# Patient Record
Sex: MALE | Race: WHITE | Employment: OTHER | URBAN - METROPOLITAN AREA
[De-identification: names, ages, dates, MRNs, and addresses within clinical notes are randomized per-mention and may not be internally consistent; named-entity substitution may affect disease eponyms.]

---

## 2017-11-27 ENCOUNTER — HOSPITAL ENCOUNTER (OUTPATIENT)
Dept: CT IMAGING | Age: 82
Discharge: HOME | End: 2017-11-27
Attending: UROLOGY
Payer: MEDICARE

## 2017-11-27 ENCOUNTER — HOSPITAL ENCOUNTER (OUTPATIENT)
Dept: NUCLEAR MEDICINE | Age: 82
Discharge: HOME | End: 2017-11-27
Attending: UROLOGY
Payer: MEDICARE

## 2017-11-27 DIAGNOSIS — C61 PROSTATE CANCER (HCC): ICD-10-CM

## 2017-11-27 PROCEDURE — 71260 CT THORAX DX C+: CPT

## 2017-11-27 PROCEDURE — 78306 BONE IMAGING WHOLE BODY: CPT

## 2017-11-27 PROCEDURE — 74177 CT ABD & PELVIS W/CONTRAST: CPT

## 2017-11-27 PROCEDURE — 74011250636 HC RX REV CODE- 250/636: Performed by: UROLOGY

## 2017-11-27 PROCEDURE — 82565 ASSAY OF CREATININE: CPT

## 2017-11-27 PROCEDURE — 74011636320 HC RX REV CODE- 636/320: Performed by: UROLOGY

## 2017-11-27 PROCEDURE — 74011000255 HC RX REV CODE- 255: Performed by: UROLOGY

## 2017-11-27 RX ORDER — SODIUM CHLORIDE 9 MG/ML
50 INJECTION, SOLUTION INTRAVENOUS
Status: COMPLETED | OUTPATIENT
Start: 2017-11-27 | End: 2017-11-27

## 2017-11-27 RX ORDER — SODIUM CHLORIDE 0.9 % (FLUSH) 0.9 %
10 SYRINGE (ML) INJECTION
Status: COMPLETED | OUTPATIENT
Start: 2017-11-27 | End: 2017-11-27

## 2017-11-27 RX ORDER — BARIUM SULFATE 20 MG/ML
900 SUSPENSION ORAL
Status: COMPLETED | OUTPATIENT
Start: 2017-11-27 | End: 2017-11-27

## 2017-11-27 RX ADMIN — SODIUM CHLORIDE 50 ML/HR: 900 INJECTION, SOLUTION INTRAVENOUS at 09:28

## 2017-11-27 RX ADMIN — IOPAMIDOL 100 ML: 755 INJECTION, SOLUTION INTRAVENOUS at 09:28

## 2017-11-27 RX ADMIN — Medication 10 ML: at 09:28

## 2017-11-27 RX ADMIN — BARIUM SULFATE 900 ML: 21 SUSPENSION ORAL at 09:28

## 2017-11-28 ENCOUNTER — HOSPITAL ENCOUNTER (OUTPATIENT)
Dept: CT IMAGING | Age: 82
Discharge: HOME | End: 2017-11-28
Attending: UROLOGY
Payer: MEDICARE

## 2017-11-28 DIAGNOSIS — C61 PROSTATE CANCER (HCC): ICD-10-CM

## 2017-11-28 LAB — CREAT BLD-MCNC: 1 MG/DL (ref 0.6–1.3)

## 2017-11-28 PROCEDURE — 82565 ASSAY OF CREATININE: CPT

## 2017-11-28 PROCEDURE — 74011636320 HC RX REV CODE- 636/320: Performed by: UROLOGY

## 2017-11-28 PROCEDURE — 74011250636 HC RX REV CODE- 250/636: Performed by: UROLOGY

## 2017-11-28 PROCEDURE — 71260 CT THORAX DX C+: CPT

## 2017-11-28 RX ORDER — SODIUM CHLORIDE 0.9 % (FLUSH) 0.9 %
10 SYRINGE (ML) INJECTION
Status: COMPLETED | OUTPATIENT
Start: 2017-11-28 | End: 2017-11-28

## 2017-11-28 RX ORDER — SODIUM CHLORIDE 9 MG/ML
50 INJECTION, SOLUTION INTRAVENOUS
Status: COMPLETED | OUTPATIENT
Start: 2017-11-28 | End: 2017-11-28

## 2017-11-28 RX ADMIN — SODIUM CHLORIDE 50 ML/HR: 900 INJECTION, SOLUTION INTRAVENOUS at 13:37

## 2017-11-28 RX ADMIN — Medication 10 ML: at 13:37

## 2017-11-28 RX ADMIN — IOPAMIDOL 100 ML: 612 INJECTION, SOLUTION INTRAVENOUS at 13:37

## 2018-05-30 ENCOUNTER — HOSPITAL ENCOUNTER (OUTPATIENT)
Dept: NUCLEAR MEDICINE | Age: 83
Discharge: HOME | End: 2018-05-30
Attending: UROLOGY
Payer: MEDICARE

## 2018-05-30 DIAGNOSIS — C61 MALIGNANT NEOPLASM OF PROSTATE (HCC): ICD-10-CM

## 2018-05-30 PROCEDURE — 78306 BONE IMAGING WHOLE BODY: CPT

## 2018-10-07 ENCOUNTER — HOSPITAL ENCOUNTER (INPATIENT)
Age: 83
LOS: 2 days | Discharge: HOME | DRG: 552 | End: 2018-10-09
Attending: EMERGENCY MEDICINE | Admitting: HOSPITALIST
Payer: MEDICARE

## 2018-10-07 ENCOUNTER — APPOINTMENT (OUTPATIENT)
Dept: CT IMAGING | Age: 83
DRG: 552 | End: 2018-10-07
Attending: EMERGENCY MEDICINE
Payer: MEDICARE

## 2018-10-07 ENCOUNTER — APPOINTMENT (OUTPATIENT)
Dept: GENERAL RADIOLOGY | Age: 83
DRG: 552 | End: 2018-10-07
Attending: EMERGENCY MEDICINE
Payer: MEDICARE

## 2018-10-07 ENCOUNTER — APPOINTMENT (OUTPATIENT)
Dept: MRI IMAGING | Age: 83
DRG: 552 | End: 2018-10-07
Attending: EMERGENCY MEDICINE
Payer: MEDICARE

## 2018-10-07 DIAGNOSIS — R29.898 LEFT LEG WEAKNESS: Primary | ICD-10-CM

## 2018-10-07 DIAGNOSIS — M54.40 ACUTE LEFT-SIDED LOW BACK PAIN WITH SCIATICA, SCIATICA LATERALITY UNSPECIFIED: ICD-10-CM

## 2018-10-07 PROBLEM — M54.9 ACUTE BACK PAIN: Status: ACTIVE | Noted: 2018-10-07

## 2018-10-07 LAB
ANION GAP BLD CALC-SCNC: 13 MMOL/L (ref 10–20)
BUN BLD-MCNC: 19 MG/DL (ref 9–20)
CA-I BLD-MCNC: 1.12 MMOL/L (ref 1.12–1.32)
CHLORIDE BLD-SCNC: 101 MMOL/L (ref 98–107)
CO2 BLD-SCNC: 31 MMOL/L (ref 21–32)
CREAT BLD-MCNC: 0.9 MG/DL (ref 0.6–1.3)
GLUCOSE BLD-MCNC: 92 MG/DL (ref 65–100)
HCT VFR BLD CALC: 37 % (ref 36.6–50.3)
POTASSIUM BLD-SCNC: 4 MMOL/L (ref 3.5–5.1)
SERVICE CMNT-IMP: NORMAL
SODIUM BLD-SCNC: 139 MMOL/L (ref 136–145)

## 2018-10-07 PROCEDURE — 74011636637 HC RX REV CODE- 636/637: Performed by: HOSPITALIST

## 2018-10-07 PROCEDURE — 72100 X-RAY EXAM L-S SPINE 2/3 VWS: CPT

## 2018-10-07 PROCEDURE — 99284 EMERGENCY DEPT VISIT MOD MDM: CPT

## 2018-10-07 PROCEDURE — 72158 MRI LUMBAR SPINE W/O & W/DYE: CPT

## 2018-10-07 PROCEDURE — 96374 THER/PROPH/DIAG INJ IV PUSH: CPT

## 2018-10-07 PROCEDURE — 74011250636 HC RX REV CODE- 250/636: Performed by: EMERGENCY MEDICINE

## 2018-10-07 PROCEDURE — 74177 CT ABD & PELVIS W/CONTRAST: CPT

## 2018-10-07 PROCEDURE — 74011250636 HC RX REV CODE- 250/636: Performed by: HOSPITALIST

## 2018-10-07 PROCEDURE — 65270000029 HC RM PRIVATE

## 2018-10-07 PROCEDURE — 74011000250 HC RX REV CODE- 250: Performed by: HOSPITALIST

## 2018-10-07 PROCEDURE — 72220 X-RAY EXAM SACRUM TAILBONE: CPT

## 2018-10-07 PROCEDURE — 80047 BASIC METABLC PNL IONIZED CA: CPT

## 2018-10-07 PROCEDURE — 74011000258 HC RX REV CODE- 258: Performed by: EMERGENCY MEDICINE

## 2018-10-07 PROCEDURE — 74011636320 HC RX REV CODE- 636/320: Performed by: EMERGENCY MEDICINE

## 2018-10-07 PROCEDURE — 74011250637 HC RX REV CODE- 250/637: Performed by: HOSPITALIST

## 2018-10-07 PROCEDURE — A9575 INJ GADOTERATE MEGLUMI 0.1ML: HCPCS | Performed by: EMERGENCY MEDICINE

## 2018-10-07 RX ORDER — PREDNISONE 5 MG/1
5 TABLET ORAL 2 TIMES DAILY WITH MEALS
Status: DISCONTINUED | OUTPATIENT
Start: 2018-10-07 | End: 2018-10-09 | Stop reason: HOSPADM

## 2018-10-07 RX ORDER — OXYCODONE AND ACETAMINOPHEN 5; 325 MG/1; MG/1
1 TABLET ORAL
Status: DISCONTINUED | OUTPATIENT
Start: 2018-10-07 | End: 2018-10-07

## 2018-10-07 RX ORDER — SODIUM CHLORIDE 0.9 % (FLUSH) 0.9 %
10 SYRINGE (ML) INJECTION
Status: COMPLETED | OUTPATIENT
Start: 2018-10-07 | End: 2018-10-07

## 2018-10-07 RX ORDER — PREDNISONE 5 MG/1
5 TABLET ORAL 2 TIMES DAILY
COMMUNITY

## 2018-10-07 RX ORDER — KETOROLAC TROMETHAMINE 30 MG/ML
15 INJECTION, SOLUTION INTRAMUSCULAR; INTRAVENOUS
Status: COMPLETED | OUTPATIENT
Start: 2018-10-07 | End: 2018-10-07

## 2018-10-07 RX ORDER — GADOTERATE MEGLUMINE 376.9 MG/ML
17 INJECTION INTRAVENOUS
Status: COMPLETED | OUTPATIENT
Start: 2018-10-07 | End: 2018-10-07

## 2018-10-07 RX ORDER — NALOXONE HYDROCHLORIDE 0.4 MG/ML
0.4 INJECTION, SOLUTION INTRAMUSCULAR; INTRAVENOUS; SUBCUTANEOUS AS NEEDED
Status: DISCONTINUED | OUTPATIENT
Start: 2018-10-07 | End: 2018-10-09 | Stop reason: HOSPADM

## 2018-10-07 RX ORDER — TAMSULOSIN HYDROCHLORIDE 0.4 MG/1
0.4 CAPSULE ORAL EVERY EVENING
COMMUNITY

## 2018-10-07 RX ORDER — ABIRATERONE ACETATE 250 MG/1
1000 TABLET ORAL DAILY
COMMUNITY

## 2018-10-07 RX ORDER — FENTANYL CITRATE 50 UG/ML
25 INJECTION, SOLUTION INTRAMUSCULAR; INTRAVENOUS
Status: DISCONTINUED | OUTPATIENT
Start: 2018-10-07 | End: 2018-10-09 | Stop reason: HOSPADM

## 2018-10-07 RX ORDER — SODIUM CHLORIDE 0.9 % (FLUSH) 0.9 %
5-10 SYRINGE (ML) INJECTION EVERY 8 HOURS
Status: DISCONTINUED | OUTPATIENT
Start: 2018-10-07 | End: 2018-10-09 | Stop reason: HOSPADM

## 2018-10-07 RX ORDER — DIPHENHYDRAMINE HCL 25 MG
25 CAPSULE ORAL
Status: DISCONTINUED | OUTPATIENT
Start: 2018-10-07 | End: 2018-10-09 | Stop reason: HOSPADM

## 2018-10-07 RX ORDER — OXYCODONE AND ACETAMINOPHEN 5; 325 MG/1; MG/1
1 TABLET ORAL
Status: DISCONTINUED | OUTPATIENT
Start: 2018-10-07 | End: 2018-10-09 | Stop reason: HOSPADM

## 2018-10-07 RX ORDER — AMOXICILLIN 250 MG
1 CAPSULE ORAL DAILY
Status: DISCONTINUED | OUTPATIENT
Start: 2018-10-08 | End: 2018-10-09 | Stop reason: HOSPADM

## 2018-10-07 RX ORDER — TRAMADOL HYDROCHLORIDE 50 MG/1
50 TABLET ORAL
Status: DISCONTINUED | OUTPATIENT
Start: 2018-10-07 | End: 2018-10-09 | Stop reason: HOSPADM

## 2018-10-07 RX ORDER — KETOROLAC TROMETHAMINE 30 MG/ML
15 INJECTION, SOLUTION INTRAMUSCULAR; INTRAVENOUS
Status: DISCONTINUED | OUTPATIENT
Start: 2018-10-07 | End: 2018-10-09 | Stop reason: HOSPADM

## 2018-10-07 RX ORDER — FAMOTIDINE 20 MG/1
20 TABLET, FILM COATED ORAL 2 TIMES DAILY
Status: DISCONTINUED | OUTPATIENT
Start: 2018-10-07 | End: 2018-10-09 | Stop reason: HOSPADM

## 2018-10-07 RX ORDER — SODIUM CHLORIDE 0.9 % (FLUSH) 0.9 %
5-10 SYRINGE (ML) INJECTION AS NEEDED
Status: DISCONTINUED | OUTPATIENT
Start: 2018-10-07 | End: 2018-10-09 | Stop reason: HOSPADM

## 2018-10-07 RX ORDER — ONDANSETRON 2 MG/ML
4 INJECTION INTRAMUSCULAR; INTRAVENOUS
Status: DISCONTINUED | OUTPATIENT
Start: 2018-10-07 | End: 2018-10-09 | Stop reason: HOSPADM

## 2018-10-07 RX ORDER — LIDOCAINE 50 MG/G
1 PATCH TOPICAL EVERY 24 HOURS
Status: DISCONTINUED | OUTPATIENT
Start: 2018-10-07 | End: 2018-10-09 | Stop reason: HOSPADM

## 2018-10-07 RX ORDER — ENOXAPARIN SODIUM 100 MG/ML
40 INJECTION SUBCUTANEOUS EVERY 24 HOURS
Status: DISCONTINUED | OUTPATIENT
Start: 2018-10-07 | End: 2018-10-08

## 2018-10-07 RX ORDER — TAMSULOSIN HYDROCHLORIDE 0.4 MG/1
0.8 CAPSULE ORAL
Status: DISCONTINUED | OUTPATIENT
Start: 2018-10-07 | End: 2018-10-09 | Stop reason: HOSPADM

## 2018-10-07 RX ORDER — CYCLOBENZAPRINE HCL 10 MG
5 TABLET ORAL 3 TIMES DAILY
Status: DISCONTINUED | OUTPATIENT
Start: 2018-10-07 | End: 2018-10-09 | Stop reason: HOSPADM

## 2018-10-07 RX ADMIN — Medication 10 ML: at 12:02

## 2018-10-07 RX ADMIN — GADOTERATE MEGLUMINE 17 ML: 376.9 INJECTION INTRAVENOUS at 12:02

## 2018-10-07 RX ADMIN — CYCLOBENZAPRINE HYDROCHLORIDE 5 MG: 10 TABLET, FILM COATED ORAL at 17:56

## 2018-10-07 RX ADMIN — CYCLOBENZAPRINE HYDROCHLORIDE 5 MG: 10 TABLET, FILM COATED ORAL at 21:17

## 2018-10-07 RX ADMIN — TAMSULOSIN HYDROCHLORIDE 0.8 MG: 0.4 CAPSULE ORAL at 21:17

## 2018-10-07 RX ADMIN — IOPAMIDOL 100 ML: 755 INJECTION, SOLUTION INTRAVENOUS at 16:06

## 2018-10-07 RX ADMIN — PREDNISONE 5 MG: 5 TABLET ORAL at 17:55

## 2018-10-07 RX ADMIN — Medication 10 ML: at 17:57

## 2018-10-07 RX ADMIN — OXYCODONE HYDROCHLORIDE AND ACETAMINOPHEN 1 TABLET: 5; 325 TABLET ORAL at 15:07

## 2018-10-07 RX ADMIN — Medication 5 ML: at 22:00

## 2018-10-07 RX ADMIN — Medication 10 ML: at 16:06

## 2018-10-07 RX ADMIN — SODIUM CHLORIDE 100 ML: 900 INJECTION, SOLUTION INTRAVENOUS at 16:06

## 2018-10-07 RX ADMIN — KETOROLAC TROMETHAMINE 15 MG: 30 INJECTION, SOLUTION INTRAMUSCULAR at 13:29

## 2018-10-07 NOTE — IP AVS SNAPSHOT
Summary of Care Report The Summary of Care report has been created to help improve care coordination. Users with access to [employer] or [address] (Web-based application) may access additional patient information including the Discharge Summary. If you are not currently a [address] user and need more information, please call the number listed below in the [place] [address] section and ask to be connected with Medical Records. Facility Information Name Address Phone [address] [phone] Patient Information Patient Name Sex  Hernandez Joshi ([id number]) Male 10/13/1933 Discharge Information Admitting Provider Service Area Unit Jessee Domínguez MD / [facility] / [phone] Discharge Provider Discharge Date/Time Discharge Disposition Destination (none) 10/9/2018 (Pending) AHR (none) Patient Language Language ENGLISH [13] Hospital Problems as of 10/9/2018  Reviewed: 10/9/2018  7:49 AM by Hiren Garcia NP Class Noted - Resolved Last Modified POA Active Problems * (Principal)Acute back pain  10/7/2018 - Present 10/7/2018 by Jessee Domínguez MD Yes Entered by Jessee Domínguez MD  
  
Non-Hospital Problems as of 10/9/2018  Reviewed: 10/9/2018  7:49 AM by Hiren Garcia NP None You are allergic to the following No active allergies Current Discharge Medication List  
  
START taking these medications Dose & Instructions Dispensing Information Comments  
 cyclobenzaprine 10 mg tablet Commonly known as:  FLEXERIL Dose:  5 mg Take 0.5 Tabs by mouth three (3) times daily. Quantity:  90 Tab Refills:  0  
   
 oxyCODONE-acetaminophen 5-325 mg per tablet Commonly known as:  PERCOCET Dose:  1 Tab Take 1 Tab by mouth every four (4) hours as needed. Max Daily Amount: 6 Tabs. Quantity:  8 Tab Refills:  0  
   
 senna-docusate 8.6-50 mg per tablet Commonly known as:  Perales Service Dose:  1 Tab Take 1 Tab by mouth daily as needed for Constipation. Quantity:  30 Tab Refills:  0 CONTINUE these medications which have NOT CHANGED Dose & Instructions Dispensing Information Comments CITRACAL + BONE DENSITY 300-200-13.5 mg-unit-mg Tab Generic drug:  Calcium Cmb 2-D3-Min Cmb34-Gen Dose:  1 Tab Take 1 Tab by mouth two (2) times daily (with meals). Refills:  0  
   
 predniSONE 5 mg tablet Commonly known as:  Lenon Rivas Dose:  5 mg Take 5 mg by mouth two (2) times a day. Refills:  0  
   
 tamsulosin 0.4 mg capsule Commonly known as:  FLOMAX Dose:  0.4 mg Take 0.4 mg by mouth daily. Refills:  0  
   
 XGEVA Soln injection Generic drug:  denosumab Dose:  120 mg  
120 mg by SubCUTAneous route once. Refills:  0  
   
 ZYTIGA 250 mg Tab Generic drug:  abiraterone Dose:  1000 mg Take 1,000 mg by mouth daily. Refills:  0 Current Immunizations Name Date Influenza Vaccine (Quad) PF 10/9/2018 Follow-up Information Follow up With Details Comments Contact Info None   None (395) Patient stated that they have no PCP Discharge Instructions Steroid Injection Discharge Instructions General Information: A steroid injection was performed today, placing a combination of a steroid and an anesthetic (numbing medicine) into the space around the nerves of your spine. This is done to treat back pain. It may take 7-10 days for the injection to reach its full potential.  This procedure can be done at any level of the spinal column, depending on where your pain is. Your doctor will have ordered the appropriate level to be treated prior to your coming in for the procedure. Home Care Instructions: You can resume your regular diet. Do not drink alcohol today. You may notice that you have to use your pain medications less after your injection. Some people do not notice much of a change in their pain after the first injection. If that is the case, it is worth your time to have a second one done. This is why these injections are sometimes ordered in a series of three. Keep the puncture site clean and dry for 24 hours, and then you may remove the dressing. Showering is acceptable after the bandage is removed. Call If: 
 You should call your Physician and/or the Radiology Nurse if you have any bleeding other than a small spot on your bandage. Call if you have any signs of infection, fever, increased pain at the puncture site, confusion, or a headache that worsens when you stand and eases when lying flat. Follow-Up Instructions:  Please see your ordering doctor as he/she has requested. Let your doctor know if you have relief from your pain so they may schedule another injection for you if it is indicated. To Reach Us:   
 
 Should you experience any of these significant changes, please call [phone] between the hours of 7:30 am and 10 pm or [phone] after hours. After hours, ask the  to page the [address] Technologist, and describe the problem to the technologist.  
 
  
 
 
Patient Signature: 
Date: 10/9/2018 Discharging Nurse: Tommie Sousa RN Chart Review Routing History No Routing History on File

## 2018-10-07 NOTE — IP AVS SNAPSHOT
[address] 
[phone] Patient: Collette Bayard MRN: [id number] :10/13/1933 About your hospitalization You were admitted on:  2018 You last received care in the:  [address] [place] [address] You were discharged on:  2018 Why you were hospitalized Your primary diagnosis was:  Acute Back Pain Follow-up Information Follow up With Details Comments Contact Info None   None (395) Patient stated that they have no PCP Discharge Orders None A check rose marie indicates which time of day the medication should be taken. My Medications START taking these medications Instructions Each Dose to Equal  
 Morning Noon Evening Bedtime  
 cyclobenzaprine 10 mg tablet Commonly known as:  FLEXERIL Your last dose was: Your next dose is: Take 0.5 Tabs by mouth three (3) times daily. 5 mg  
    
   
   
   
  
 oxyCODONE-acetaminophen 5-325 mg per tablet Commonly known as:  PERCOCET Your last dose was: Your next dose is: Take 1 Tab by mouth every four (4) hours as needed. Max Daily Amount: 6 Tabs. 1 Tab  
    
   
   
   
  
 senna-docusate 8.6-50 mg per tablet Commonly known as:  Xiang Padilla Your last dose was: Your next dose is: Take 1 Tab by mouth daily as needed for Constipation. 1 Tab CONTINUE taking these medications Instructions Each Dose to Equal  
 Morning Noon Evening Bedtime CITRACAL + BONE DENSITY 300-200-13.5 mg-unit-mg Tab Generic drug:  Calcium Cmb 2-D3-Min Cmb34-Gen Your last dose was: Your next dose is: Take 1 Tab by mouth two (2) times daily (with meals). 1 Tab  
    
   
   
   
  
 predniSONE 5 mg tablet Commonly known as:  Tigre Pimentels Your last dose was: Your next dose is: Take 5 mg by mouth two (2) times a day. 5 mg  
    
   
   
   
  
 tamsulosin 0.4 mg capsule Commonly known as:  FLOMAX Your last dose was: Your next dose is: Take 0.4 mg by mouth daily. 0.4 mg  
    
   
   
   
  
 XGEVA Soln injection Generic drug:  denosumab Your last dose was: Your next dose is:    
   
   
 120 mg by SubCUTAneous route once. 120 mg  
    
   
   
   
  
 ZYTIGA 250 mg Tab Generic drug:  abiraterone Your last dose was: Your next dose is: Take 1,000 mg by mouth daily. 1000 mg Where to Get Your Medications Information on where to get these meds will be given to you by the nurse or doctor. ! Ask your nurse or doctor about these medications  
  cyclobenzaprine 10 mg tablet  
 oxyCODONE-acetaminophen 5-325 mg per tablet  
 senna-docusate 8.6-50 mg per tablet Opioid Education Prescription Opioids: What You Need to Know: 
 
Prescription opioids can be used to help relieve moderate-to-severe pain and are often prescribed following a surgery or injury, or for certain health conditions. These medications can be an important part of treatment but also come with serious risks. Opioids are strong pain medicines. Examples include hydrocodone, oxycodone, fentanyl, and morphine. Heroin is an example of an illegal opioid. It is important to work with your health care provider to make sure you are getting the safest, most effective care. WHAT ARE THE RISKS AND SIDE EFFECTS OF OPIOID USE? Prescription opioids carry serious risks of addiction and overdose, especially with prolonged use. An opioid overdose, often marked by slow breathing, can cause sudden death. The use of prescription opioids can have a number of side effects as well, even when taken as directed. · Tolerance-meaning you might need to take more of a medication for the same pain relief · Physical dependence-meaning you have symptoms of withdrawal when the medication is stopped. Withdrawal symptoms can include nausea, sweating, chills, diarrhea, stomach cramps, and muscle aches. Withdrawal can last up to several weeks, depending on which drug you took and how long you took it. · Increased sensitivity to pain · Constipation · Nausea, vomiting, and dry mouth · Sleepiness and dizziness · Confusion · Depression · Low levels of testosterone that can result in lower sex drive, energy, and strength · Itching and sweating RISKS ARE GREATER WITH:      
· History of drug misuse, substance use disorder, or overdose · Mental health conditions (such as depression or anxiety) · Sleep apnea · Older age (72 years or older) · Pregnancy Avoid alcohol while taking prescription opioids. Also, unless specifically advised by your health care provider, medications to avoid include: · Benzodiazepines (such as Xanax or Valium) · Muscle relaxants (such as Soma or Flexeril) · Hypnotics (such as Ambien or Lunesta) · Other prescription opioids KNOW YOUR OPTIONS Talk to your health care provider about ways to manage your pain that don't involve prescription opioids. Some of these options may actually work better and have fewer risks and side effects. Consult your physician before adding or stopping any medications, treatments, or physical activity. Options may include: 
· Pain relievers such as acetaminophen, ibuprofen, and naproxen · Some medications that are also used for depression or seizures · Physical therapy and exercise · Counseling to help patients learn how to cope better with triggers of pain and stress. · Application of heat or cold compress · Massage therapy · Relaxation techniques Be Informed Make sure you know the name of your medication, how much and how often to take it, and its potential risks & side effects.  
 
IF YOU ARE PRESCRIBED OPIOIDS FOR PAIN: 
 · Never take opioids in greater amounts or more often than prescribed. Remember the goal is not to be pain-free but to manage your pain at a tolerable level. · Follow up with your primary care provider to: · Work together to create a plan on how to manage your pain. · Talk about ways to help manage your pain that don't involve prescription opioids. · Talk about any and all concerns and side effects. · Help prevent misuse and abuse. · Never sell or share prescription opioids · Help prevent misuse and abuse. · Store prescription opioids in a secure place and out of reach of others (this may include visitors, children, friends, and family). · Safely dispose of unused/unwanted prescription opioids: Find your community drug take-back program or your pharmacy mail-back program, or flush them down the toilet, following guidance from the Food and Drug Administration (www.fda.gov/Drugs/ResourcesForYou). · Visit www.cdc.gov/drugoverdose to learn about the risks of opioid abuse and overdose. · If you believe you may be struggling with addiction, tell your health care provider and ask for guidance or call [employer] at 0-440-914-PCOO. Discharge Instructions Steroid Injection Discharge Instructions General Information: A steroid injection was performed today, placing a combination of a steroid and an anesthetic (numbing medicine) into the space around the nerves of your spine. This is done to treat back pain. It may take 7-10 days for the injection to reach its full potential.  This procedure can be done at any level of the spinal column, depending on where your pain is. Your doctor will have ordered the appropriate level to be treated prior to your coming in for the procedure. Home Care Instructions: You can resume your regular diet. Do not drink alcohol today.  You may notice that you have to use your pain medications less after your injection. Some people do not notice much of a change in their pain after the first injection. If that is the case, it is worth your time to have a second one done. This is why these injections are sometimes ordered in a series of three. Keep the puncture site clean and dry for 24 hours, and then you may remove the dressing. Showering is acceptable after the bandage is removed. Call If: 
 You should call your Physician and/or the Radiology Nurse if you have any bleeding other than a small spot on your bandage. Call if you have any signs of infection, fever, increased pain at the puncture site, confusion, or a headache that worsens when you stand and eases when lying flat. Follow-Up Instructions:  Please see your ordering doctor as he/she has requested. Let your doctor know if you have relief from your pain so they may schedule another injection for you if it is indicated. To Reach Us:   
 
 Should you experience any of these significant changes, please call [phone] between the hours of 7:30 am and 10 pm or [phone] after hours. After hours, ask the  to page the [address] Technologist, and describe the problem to the technologist.  
 
  
 
 
Patient Signature: 
Date: 10/9/2018 Discharging Nurse: Priscilla Fonseca RN  
 
 
  
  
  
[facility] Announcement We are excited to announce that we are making your provider's discharge notes available to you in [employer]. You will see these notes when they are completed and signed by the physician that discharged you from your recent hospital stay. If you have any questions or concerns about any information you see in [employer], please call the Health Information Department where you were seen or reach out to your Primary Care Provider for more information about your plan of care. Introducing [facility]!    
 [facility] introduces [employer] patient portal. Now you can access parts of your medical record, email your doctor's office, and request medication refills online. 1. In your internet browser, go to [email or web address] 2. Click on the First Time User? Click Here link in the Sign In box. You will see the New Member Sign Up page. 3. Enter your [employer] Access Code exactly as it appears below. You will not need to use this code after youve completed the sign-up process. If you do not sign up before the expiration date, you must request a new code. · [employer] Access Code: [email or web address] Expires: 1/5/2019  8:09 AM 
 
4. Enter the last four digits of your Social Security Number (xxxx) and Date of Birth (mm/dd/yyyy) as indicated and click Submit. You will be taken to the next sign-up page. 5. Create a [employer] ID. This will be your [employer] login ID and cannot be changed, so think of one that is secure and easy to remember. 6. Create a [employer] password. You can change your password at any time. 7. Enter your Password Reset Question and Answer. This can be used at a later time if you forget your password. 8. Enter your e-mail address. You will receive e-mail notification when new information is available in [address] 9. Click Sign Up. You can now view and download portions of your medical record. 10. Click the Download Summary menu link to download a portable copy of your medical information. If you have questions, please visit the Frequently Asked Questions section of the [employer] website. Remember, [employer] is NOT to be used for urgent needs. For medical emergencies, dial 911. Now available from your iPhone and Android! Introducing [employer] As a [employer] [facility] patient, I wanted to make you aware of our electronic visit tool called [employer] [place]. [facility] [address] [facility]/7 allows you to connect within minutes with a medical provider 24 hours a day, seven days a week via a mobile device or tablet or logging into a secure website from your computer. You can access [employer] from anywhere in the [place]. A virtual visit might be right for you when you have a simple condition and feel like you just dont want to get out of bed, or cant get away from work for an appointment, when your regular [facility] provider is not available (evenings, weekends or holidays), or when youre out of town and need minor care. Electronic visits cost only $49 and if the [employer] provider determines a prescription is needed to treat your condition, one can be electronically transmitted to a nearby pharmacy*. Please take a moment to enroll today if you have not already done so. The enrollment process is free and takes just a few minutes. To enroll, please download the [employer] jayna to your tablet or phone, or visit [email or web address] to enroll on your computer. And, as an [address] [place] [address] patient with a [employer] account, the results of your visits will be scanned into your electronic medical record and your primary care provider will be able to view the scanned results. We urge you to continue to see your regular [facility] provider for your ongoing medical care. And while your primary care provider may not be the one available when you seek a [employer] virtual visit, the peace of mind you get from getting a real diagnosis real time can be priceless. For more information on [employer], view our Frequently Asked Questions (FAQs) at [email or web address]. Sincerely, 
 
Twanna Lefort, MD 
Chief Medical Officer Jimmy Jacinto *:  certain medications cannot be prescribed via [employer] Unresulted Labs-Please follow up with your PCP about these lab tests Order Current Status CULTURE, URINE Preliminary result Providers Seen During Your Hospitalization Provider Specialty Primary office phone Anand Lopez MD Emergency Medicine [phone] Rudi Abel MD Internal Medicine [phone] Waleska Denny MD Family Practice [phone] Hui Brooks MD Internal Medicine [phone] Immunizations Administered for This Admission Name Date Influenza Vaccine (Quad) PF 10/9/2018 Your Primary Care Physician (PCP) Primary Care Physician Office Phone Office Fax NONE ** None ** ** None ** You are allergic to the following No active allergies Recent Documentation Height Weight BMI  
  
  
 1.905 m 79.4 kg 21.87 kg/m2 Emergency Contacts Name Discharge Info Relation Home Work Mobile OsvaldoSamira TRONCOSO DISCHARGE CAREGIVER [3] Spouse [3] [phone] Patient Belongings The following personal items are in your possession at time of discharge: 
  Dental Appliances: None         Home Medications: None   Jewelry: None  Clothing: Shirt, Pants, Footwear    Other Valuables: Cell Phone Please provide this summary of care documentation to your next provider. Signatures-by signing, you are acknowledging that this After Visit Summary has been reviewed with you and you have received a copy. Patient Signature:  ____________________________________________________________ Date:  ____________________________________________________________  
  
Latoya Zhao Provider Signature:  ____________________________________________________________ Date:  ____________________________________________________________

## 2018-10-07 NOTE — H&P
History & Physical 
 
Primary Care Provider: None Source of Information: Patient History of Presenting Illness:  
Mirian Paige is a 80 y.o. male who presents with acute back pain and left leg pain Seth Flusumit. Jean Florentino is an 80 y.o. male  With history of metastatic prostate cancer came to ED with a c/o left sided back pain, left leg/knee pain s/p GLF today. Pt currently rates his pain at 8/10 in severity and states there his sx are exacerbated with movement. Pt reports that he began having mild back pain on Thursday which worsened through the night and into Friday morning. He states that he tried taking Advil, using hot/cold compresses with no relief of pain. Pt states that this morning, at around 0500am,  he got up to use the bathroom and his left leg \"gave out on him\" causing him to fall and land on his buttocks. He denies any LOC/head injury. Pt reports that since falling, his pain is radiating from his left lower back, down his leg and into his left knee. Pt specifically denies chest pain, shortness of breath, n/v,d , fever, chills, numbness, tingling, abdominal pain, HA, weakness, cough, leg swelling, dizziness or any other acute sx. Pt denies any recent travel, known sick contacts, or recent illness. Review of Systems: 
General: no weight changes, not sleeping last 2 days due to pain, no changes of appetitie HEENT: no headache, no vision changes, no nose discharge, no hearing changes RES: no wheezing, no cough, no sob CVS: no cp, no palpitation. Muscular: HPI, no leg swelling,  
Skin: no rash, no itching GI: no vomiting, no diarrhea : no dysuria, no hematuria Hemo: no gum bleeding, no petechial  
Neuro: no sensation changes, no focal weakness Endo: no polydipsia Psych: denied depression No past medical history on file. No past surgical history on file. Prior to Admission medications Medication Sig Start Date End Date Taking? Authorizing Provider  
abiraterone (ZYTIGA) 250 mg tab Take 1,000 mg by mouth daily. Yes Historical Provider  
predniSONE (DELTASONE) 5 mg tablet Take 5 mg by mouth two (2) times a day. Yes Historical Provider Calcium Cmb 2-D3-Min Cmb34-Gen (CITRACAL + BONE DENSITY) 300-200-13.5 mg-unit-mg tab Take 1 Tab by mouth two (2) times daily (with meals). Yes Historical Provider  
tamsulosin (FLOMAX) 0.4 mg capsule Take 0.4 mg by mouth daily. Yes Historical Provider  
denosumab (XGEVA) soln injection 120 mg by SubCUTAneous route once. Yes Historical Provider No Known Allergies No family history on file. SOCIAL HISTORY: 
Patient resides: 
[facility]ependently [facility] Smoking history:  
None x Former Chronic Alcohol history:  
None x Social   
Chronic Ambulates: was independently prior Independently   
w/cane   
w/walker   
w/wc CODE STATUS: 
DNR Full x Other Objective:  
 
Physical Exam:  
 
Visit Vitals  /80 (BP 1 Location: Right arm, BP Patient Position: At rest)  Pulse 86  Temp 97.6 °F (36.4 °C)  Resp 16  
 Ht 6' 3\" (1.905 m)  Wt 79.4 kg (175 lb)  SpO2 95%  BMI 21.87 kg/m2 O2 Device: Room air General:  Alert, cooperative, no distress, appears stated age. Head:  Normocephalic, without obvious abnormality, atraumatic. Eyes:  Conjunctivae/corneas clear. PERRL, EOMs intact. Nose: Nares normal. Septum midline. Mucosa normal. No drainage or sinus tenderness. Throat: Lips, mucosa, and tongue normal. Teeth and gums normal.  
Neck: Supple, symmetrical, trachea midline, no adenopathy, thyroid: no enlargement/tenderness/nodules, no carotid bruit and no JVD. Back:   Symmetric, lower back tenderness . No CVA tenderness. Lungs:   Clear to auscultation bilaterally. Chest wall:  No tenderness or deformity. Heart:  Regular rate and rhythm, S1, S2 normal, no murmur, click, rub or gallop. Abdomen:   Soft, non-tender. Bowel sounds normal. No masses,  No organomegaly. Extremities: Extremities normal, atraumatic, no cyanosis or edema. left straight leg positive,   
Pulses: 2+ and symmetric all extremities. Skin: Skin color, texture, turgor normal. No rashes or lesions Neurologic: CNII-XII intact. Patellar reflexes are 0 on the left side. Other DTR normal.   
 
 
 
Lumbar MRI:1. L5-S1 right disc extrusion likely affects the right L5 nerve. 2. Dextroscoliosis. Multilevel mild stenoses are detailed above. 3. No enhancing metastatic disease. Old, treated metastatic lesion is in the 
left aspect of the S1 segment of the sacrum. Data Review:  
 
Recent Days: 
No results for input(s): WBC, HGB, HCT, PLT, HGBEXT, HCTEXT, PLTEXT, HGBEXT, HCTEXT, PLTEXT in the last 72 hours. No results for input(s): NA, K, CL, CO2, GLU, BUN, CREA, CA, MG, PHOS, ALB, TBIL, SGOT, ALT, INR in the last 72 hours. No lab exists for component: INREXT, INREXT No results for input(s): PH, PCO2, PO2, HCO3, FIO2 in the last 72 hours. 24 Hour Results: No results found for this or any previous visit (from the past 24 hour(s)). Imaging:  
 
Assessment:  
 
Principal Problem: 
  Acute back pain (10/7/2018) Plan: 1. Acute back pain with left leg pain: no acute fracture, but has significant abnormal of back MRI. Will star ATC flexeril and prn pain meds with Toradol for moderate pain, if pain is severe, will consider IV fentanyl. Pending pelvic CT to eval hip. PT/OT eval.  
2. Metastatic prostate cancer: continue home oral meds including Zytiga and prednisone Signed By: Matti Barrett MD   
 October 7, 2018

## 2018-10-07 NOTE — PROGRESS NOTES
TRANSFER - IN REPORT: 
 
Verbal report received from Kellen(name) on Rom Hilario  being received from [facility](unit) for routine progression of care Report consisted of patients Situation, Background, Assessment and  
Recommendations(SBAR). Information from the following report(s) SBAR, Kardex, ED Summary, Procedure Summary, Intake/Output, MAR, Accordion and Recent Results was reviewed with the receiving nurse. Opportunity for questions and clarification was provided. Assessment completed upon patients arrival to unit and care assumed.

## 2018-10-07 NOTE — PROGRESS NOTES
Admission Medication Reconciliation: 
 
Information obtained from: Patient and wife Significant PMH/Disease States: No past medical history on file. Chief Complaint for this Admission:  Fall, back pain Allergies:  Review of patient's allergies indicates no known allergies. Prior to Admission Medications:  
Prior to Admission Medications Prescriptions Last Dose Informant Patient Reported? Taking? Calcium Cmb 2-D3-Min Cmb34-Gen (CITRACAL + BONE DENSITY) 300-200-13.5 mg-unit-mg tab 10/7/2018 at Unknown time  Yes Yes Sig: Take 1 Tab by mouth two (2) times daily (with meals). abiraterone (ZYTIGA) 250 mg tab 10/7/2018 at Unknown time  Yes Yes Sig: Take 1,000 mg by mouth daily. denosumab (XGEVA) soln injection 10/1/2018  Yes Yes Si mg by SubCUTAneous route once. predniSONE (DELTASONE) 5 mg tablet 10/7/2018 at Unknown time  Yes Yes Sig: Take 5 mg by mouth two (2) times a day. tamsulosin (FLOMAX) 0.4 mg capsule 10/6/2018 at 1900  Yes Yes Sig: Take 0.4 mg by mouth daily. Facility-Administered Medications: None Comments/Recommendations: Medication and allergy information provided by patient and his silvina wife. Added all agents. Followed by [facility] (Dr. Iram Gaitan). Thank you for allowing me to participate in the care of your patient. Kamron PhamD, RN #[id number]

## 2018-10-07 NOTE — ED TRIAGE NOTES
Pt reports falling this morning at 0500. Pt states his left leg gave out on him and he fell on his buttocks. Pt C/O left lower back pain that radiates into left leg. Pt denies hitting head of having LOC.

## 2018-10-07 NOTE — ROUTINE PROCESS
TRANSFER - OUT REPORT: 
 
Verbal report given to NATO Azevedo(name) on Tamie Davis  being transferred to [facility](unit) for routine progression of care Report consisted of patients Situation, Background, Assessment and  
Recommendations(SBAR). Information from the following report(s) SBAR, Kardex, Intake/Output, MAR and Recent Results was reviewed with the receiving nurse. Lines:  
Peripheral IV 10/07/18 Left Antecubital (Active) Site Assessment Clean, dry, & intact 10/7/2018  9:48 AM  
Phlebitis Assessment 0 10/7/2018  9:48 AM  
Infiltration Assessment 0 10/7/2018  9:48 AM  
Dressing Status Clean, dry, & intact 10/7/2018  9:48 AM  
  
 
Opportunity for questions and clarification was provided. Patient to be transported to CT and to floor.

## 2018-10-07 NOTE — ED PROVIDER NOTES
HPI Comments: JanineAlexandria Villatoro is an 80 y.o. male who presents ambulatory to the ED with a c/o left sided back pain, left leg/knee pain s/p GLF today. Pt currently rates his pain at 8/10 in severity and states there his sx are exacerbated with movement. Pt reports that he began having mild back pain on Thursday which worsened through the night and into Friday morning. He states that he tried taking Advil, using hot/cold compresses with no relief of pain. Pt states that this morning, at around 0500am,  he got up to use the bathroom and his left leg \"gave out on him\" causing him to fall and land on his buttocks. He denies any LOC/head injury. Pt reports that since falling, his pain is radiating from his left lower back, down his leg and into his left knee. Pt specifically denies chest pain, shortness of breath, n/v,d , fever, chills, numbness, tingling, abdominal pain, HA, weakness, cough, leg swelling, dizziness or any other acute sx. Pt denies any recent travel, known sick contacts, or recent illness. PCP: None PMHx significant for: Metastatic Prostate CA, Enlarged Prostate PSHx significant for: none Social Hx: Tobacco: none EtOH: none Illicit drug use: none There are no further complaints or symptoms at this time. Signed by: serenity Rojas for Elayne Lentz MD  on October 7th, 2018 at 08:24am. 
 
The history is provided by the patient. No  was used. No past medical history on file. No past surgical history on file. No family history on file. Social History Social History  Marital status:  Spouse name: N/A  
 Number of children: N/A  
 Years of education: N/A Occupational History  Not on file. Social History Main Topics  Smoking status: Not on file  Smokeless tobacco: Not on file  Alcohol use Not on file  Drug use: Not on file  Sexual activity: Not on file Other Topics Concern  Not on file Social History Narrative ALLERGIES: Review of patient's allergies indicates no known allergies. Review of Systems Constitutional: Negative for chills, diaphoresis and fever. Respiratory: Negative for cough and shortness of breath. Cardiovascular: Negative for chest pain and leg swelling. Gastrointestinal: Negative for abdominal pain, diarrhea, nausea and vomiting. Genitourinary: Negative for dysuria and hematuria. Musculoskeletal: Positive for back pain. Positive left leg/knee pain All other systems reviewed and are negative. Vitals:  
 10/07/18 0810 BP: 162/84 Pulse: 77 Resp: 16 Temp: 97.8 °F (36.6 °C) SpO2: 96% Weight: 79.4 kg (175 lb) Height: 6' 3\" (1.905 m) Physical Exam  
Constitutional: He is oriented to person, place, and time. He appears well-developed and well-nourished. He appears distressed (Patient in mild distress with pain in the left lower back. VS as noted. ). HENT:  
Head: Normocephalic and atraumatic. Nose: Nose normal.  
Mouth/Throat: Oropharynx is clear and moist.  
Eyes: Conjunctivae and EOM are normal. Pupils are equal, round, and reactive to light. No scleral icterus. Neck: Normal range of motion. Neck supple. No JVD present. No tracheal deviation present. No thyromegaly present. No carotid bruits noted. Cardiovascular: Normal rate, regular rhythm, normal heart sounds and intact distal pulses. Exam reveals no gallop and no friction rub. No murmur heard. Pulmonary/Chest: Effort normal and breath sounds normal. No respiratory distress. He has no wheezes. He has no rales. He exhibits no tenderness. Abdominal: Soft. Bowel sounds are normal. He exhibits no distension and no mass. There is no tenderness. There is no rebound and no guarding. Musculoskeletal: He exhibits tenderness. He exhibits no edema. No significant tenderness to palpation over the left lower back/sacral region. Lymphadenopathy: He has no cervical adenopathy. Neurological: He is alert and oriented to person, place, and time. Reflex Scores: 
     Patellar reflexes are 0 on the left side. Patient is unable to raise his leg off the bed. There is no patella tendon reflex. Skin: Skin is warm and dry. No rash noted. No erythema. Psychiatric: He has a normal mood and affect. His behavior is normal. Judgment and thought content normal.  
Nursing note and vitals reviewed. Signed by: Marquise Bangura scribing for Dominick Lentz MD  on October 7th, 2018 at 08:24am. 
 
MDM Number of Diagnoses or Management Options Left leg weakness: new and requires workup Amount and/or Complexity of Data Reviewed Tests in the radiology section of CPT®: ordered and reviewed Decide to obtain previous medical records or to obtain history from someone other than the patient: yes Obtain history from someone other than the patient: yes Review and summarize past medical records: yes Independent visualization of images, tracings, or specimens: yes Risk of Complications, Morbidity, and/or Mortality Presenting problems: high Diagnostic procedures: high Management options: high Patient Progress Patient progress: stable ED Course Procedures The patient's x-ray of the sacrum and coccyx demonstrates findings consistent with metastatic disease in the right ischium and right inferior pubic ramus. As previously noted on bone scan. There is a lot of degenerative change of his lumbar spine it appears arthritic in nature. Given the acute function of his left leg, the lack of patella reflex, and inability the patient recently of the bed, we'll obtain an MRI of the low back to determine etiology of the symptoms. The MRI fails to demonstrate any etiology for these symptoms. He has protruding disc on the right side at L5-S1 which does not explain the symptoms.  There are degenerative changes throughout the spine but no clear etiology for cauterization of the left leg weakness and loss of reflux. He continues to complain of pain in the lower back. We'll CT his abdomen and pelvis. The patient is still unable to lift the leg off the bed. He is unable to walk. We'll consult the hospitalist for admission, further evaluation and treatment. It is also noted that the patient is taking Lupron once every 6 months, Zytiga 4 tabs daily for his cancer, prednisone 5 mg twice a day with meals. He is not receiving any radiation therapy or other treatments for his cancer except those noted above

## 2018-10-08 LAB
APPEARANCE UR: ABNORMAL
BACTERIA URNS QL MICRO: ABNORMAL /HPF
BILIRUB UR QL: NEGATIVE
COLOR UR: ABNORMAL
EPITH CASTS URNS QL MICRO: ABNORMAL /LPF
GLUCOSE UR STRIP.AUTO-MCNC: NEGATIVE MG/DL
HGB UR QL STRIP: NEGATIVE
HYALINE CASTS URNS QL MICRO: ABNORMAL /LPF (ref 0–5)
KETONES UR QL STRIP.AUTO: NEGATIVE MG/DL
LEUKOCYTE ESTERASE UR QL STRIP.AUTO: ABNORMAL
NITRITE UR QL STRIP.AUTO: NEGATIVE
PH UR STRIP: 6.5 [PH] (ref 5–8)
PROT UR STRIP-MCNC: NEGATIVE MG/DL
RBC #/AREA URNS HPF: ABNORMAL /HPF (ref 0–5)
SP GR UR REFRACTOMETRY: 1.03 (ref 1–1.03)
UA: UC IF INDICATED,UAUC: ABNORMAL
UROBILINOGEN UR QL STRIP.AUTO: 1 EU/DL (ref 0.2–1)
WBC URNS QL MICRO: ABNORMAL /HPF (ref 0–4)

## 2018-10-08 PROCEDURE — G8979 MOBILITY GOAL STATUS: HCPCS

## 2018-10-08 PROCEDURE — 81001 URINALYSIS AUTO W/SCOPE: CPT | Performed by: HOSPITALIST

## 2018-10-08 PROCEDURE — 97116 GAIT TRAINING THERAPY: CPT

## 2018-10-08 PROCEDURE — 97530 THERAPEUTIC ACTIVITIES: CPT

## 2018-10-08 PROCEDURE — 74011250637 HC RX REV CODE- 250/637: Performed by: HOSPITALIST

## 2018-10-08 PROCEDURE — 74011636637 HC RX REV CODE- 636/637: Performed by: HOSPITALIST

## 2018-10-08 PROCEDURE — 87077 CULTURE AEROBIC IDENTIFY: CPT | Performed by: HOSPITALIST

## 2018-10-08 PROCEDURE — G8988 SELF CARE GOAL STATUS: HCPCS

## 2018-10-08 PROCEDURE — G8989 SELF CARE D/C STATUS: HCPCS

## 2018-10-08 PROCEDURE — G8978 MOBILITY CURRENT STATUS: HCPCS

## 2018-10-08 PROCEDURE — 87086 URINE CULTURE/COLONY COUNT: CPT | Performed by: HOSPITALIST

## 2018-10-08 PROCEDURE — 74011000250 HC RX REV CODE- 250: Performed by: HOSPITALIST

## 2018-10-08 PROCEDURE — 97535 SELF CARE MNGMENT TRAINING: CPT

## 2018-10-08 PROCEDURE — 74011250636 HC RX REV CODE- 250/636: Performed by: HOSPITALIST

## 2018-10-08 PROCEDURE — G8987 SELF CARE CURRENT STATUS: HCPCS

## 2018-10-08 PROCEDURE — 97161 PT EVAL LOW COMPLEX 20 MIN: CPT

## 2018-10-08 PROCEDURE — 65270000029 HC RM PRIVATE

## 2018-10-08 PROCEDURE — 97165 OT EVAL LOW COMPLEX 30 MIN: CPT

## 2018-10-08 RX ORDER — ABIRATERONE ACETATE 250 MG/1
1000 TABLET ORAL DAILY
Status: DISCONTINUED | OUTPATIENT
Start: 2018-10-08 | End: 2018-10-09 | Stop reason: HOSPADM

## 2018-10-08 RX ADMIN — OXYCODONE HYDROCHLORIDE AND ACETAMINOPHEN 1 TABLET: 5; 325 TABLET ORAL at 21:55

## 2018-10-08 RX ADMIN — Medication 10 ML: at 21:57

## 2018-10-08 RX ADMIN — CYCLOBENZAPRINE HYDROCHLORIDE 5 MG: 10 TABLET, FILM COATED ORAL at 08:25

## 2018-10-08 RX ADMIN — KETOROLAC TROMETHAMINE 15 MG: 30 INJECTION, SOLUTION INTRAMUSCULAR at 12:37

## 2018-10-08 RX ADMIN — Medication 10 ML: at 06:02

## 2018-10-08 RX ADMIN — ABIRATERONE ACETATE 1000 MG: 250 TABLET ORAL at 11:29

## 2018-10-08 RX ADMIN — SENNOSIDES AND DOCUSATE SODIUM 1 TABLET: 8.6; 5 TABLET ORAL at 08:26

## 2018-10-08 RX ADMIN — PREDNISONE 5 MG: 5 TABLET ORAL at 08:26

## 2018-10-08 RX ADMIN — PREDNISONE 5 MG: 5 TABLET ORAL at 18:31

## 2018-10-08 RX ADMIN — TAMSULOSIN HYDROCHLORIDE 0.8 MG: 0.4 CAPSULE ORAL at 21:56

## 2018-10-08 RX ADMIN — CYCLOBENZAPRINE HYDROCHLORIDE 5 MG: 10 TABLET, FILM COATED ORAL at 21:56

## 2018-10-08 RX ADMIN — OXYCODONE HYDROCHLORIDE AND ACETAMINOPHEN 1 TABLET: 5; 325 TABLET ORAL at 08:26

## 2018-10-08 RX ADMIN — Medication 10 ML: at 15:21

## 2018-10-08 RX ADMIN — CYCLOBENZAPRINE HYDROCHLORIDE 5 MG: 10 TABLET, FILM COATED ORAL at 15:17

## 2018-10-08 NOTE — CONSULTS
Consult dictated. Likely lft L3 radic. 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 No acute disk herniation or fx. Recommend pt/ot . Trans foramenal L3 nerve block.

## 2018-10-08 NOTE — PROGRESS NOTES
Occupational Therapy EVALUATION/discharge Patient: Rom Hilario (5 y.o. male) Date: 10/8/2018 Primary Diagnosis: Acute back pain Precautions:  (recommended back precautions with back pain) ASSESSMENT:  
Based on the objective data described below, the patient presents with overall IND for upper body ADLs, up to Min A for lower body ADLs, and SPV-CGA for functional mobility s/p admission for back pain with GLF. PTA, patient IND, active, working part time, & living with spouse. Now presents close to functional baseline, however noted LLE weakness, able to complete bed mobility, ambulation in room & bathroom with RW, and transfers with CGA & cues for safety (no noted L knee buckling). Educated on limiting bending, lifting, & twisting with back pain, LRT completed for bed mobility. Able to stand at the sink x3 minutes for grooming with SPV, provided education on ADL modifications for comfort. Able to tailor sit with RLE, however increased difficulty with LLE, encouraged hip ER exercises for strengthening. Of note, patient planning trip to [place] in a few days, educated on travel adaptations & safety. No further acute OT needs, however would benefit from outpatient PT to maximize safety, LLE strength, & mobility. Further skilled acute occupational therapy is not indicated at this time. Discharge Recommendations: Outpatient PT Further Equipment Recommendations for Discharge: No needs SUBJECTIVE:  
Patient stated Yony Dawsoner just can't figure out what's going on with this left leg.  OBJECTIVE DATA SUMMARY:  
HISTORY:  
No past medical history on file. No past surgical history on file. Prior Level of Function/Environment/Context: PTA, IND, working part time as an  (interviews, on-street/out of office work), living with wife in a 2 story house). Hx metastatic prostate CA. Has some DME/AE from wife's previous spinal sx & mother. Home Situation Home Environment: Private residence # Steps to Enter: 4 Rails to Enter: Yes Hand Rails : Bilateral 
One/Two Story Residence: Two story # of Interior Steps: 15 Interior Rails: Right Living Alone: No 
Support Systems: Spouse/Significant Other/Partner Patient Expects to be Discharged to[de-identified] Private residence Current DME Used/Available at Home: Grab bars (reacher) Tub or Shower Type: Shower Hand dominance: Right EXAMINATION OF PERFORMANCE DEFICITS: 
Cognitive/Behavioral Status: 
Neurologic State: Alert Orientation Level: Oriented X4 Cognition: Appropriate for age attention/concentration; Appropriate decision making; Appropriate safety awareness; Follows commands Perception: Appears intact Perseveration: No perseveration noted Safety/Judgement: Awareness of environment; Fall prevention Skin: Appears intact Edema: None noted in BUEs Hearing: Auditory Auditory Impairment: None Vision/Perceptual:   
Tracking: Able to track stimulus in all quadrants w/o difficulty Acuity: Within Defined Limits Range of Motion: In [address] AROM: Within functional limits PROM: Within functional limits Strength: In [address] Strength: Within functional limits Coordination: 
Coordination: Within functional limits Fine Motor Skills-Upper: Left Intact; Right Intact Gross Motor Skills-Upper: Left Intact; Right Intact Tone & Sensation: In [address] Tone: Normal 
Sensation: Intact Balance: 
Sitting: Intact Standing: Intact; With support (RW) Functional Mobility and Transfers for ADLs:Bed Mobility: 
Supine to Sit: Supervision (cues for LRT) Sit to Supine:  (up in the chair at end of session) Transfers: 
Sit to Stand: Supervision; Adaptive equipment (with RW, cues for safety) Stand to Sit: Supervision; Adaptive equipment (with RW, cues for safety) Bed to Chair: Minimum assistance; Adaptive equipment (with RW, cues for safety) Toilet Transfer : Contact guard assistance; Adaptive equipment (with RW, cues for safety) ADL Assessment: 
Feeding: Independent* Oral Facial Hygiene/Grooming: Supervision Bathing: Supervision* Upper Body Dressing: Independent* Lower Body Dressing: Supervision Toileting: Supervision *inferred per functional mobility, LLE pain & weakness, BUE AROM & strength, and activity tolerance ADL Intervention and task modifications: 
  
 
Grooming Grooming Assistance: Supervision/set up (standing at the sink) Brushing Teeth: Supervision/set-up; Compensatory technique training (education for limiting bend/twist with back pain) Cues: Verbal cues provided;Visual cues provided Lower Body Dressing Assistance Dressing Assistance: Supervision/set up (able to reach down to feet, difficulty with LLE tailor sit) Socks: Independent; Compensatory technique training (able to reach to feet to adjust socks, ed on tailor sit) Leg Crossed Method Used: Yes (difficulty with LLE d/t pain & decr AROM) Position Performed: Seated in chair Cues: Verbal cues provided;Visual cues provided Toileting Toileting Assistance: Contact guard assistance (simulated in bathroom, patient with no urgency, CGA for trx) Bladder Hygiene: Independent Bowel Hygiene: Supervision/set-up; Compensatory technique training (cues for limiting twisting & bending with back pain) Cues: Verbal cues provided;Visual cues provided Adaptive Equipment: Grab bars; [address] Cognitive Retraining Safety/Judgement: Awareness of environment; Fall prevention Therapeutic Exercise: 
Ambulation in room & bathroom with RW, cues for RW management & safety, able to stand at the sink x3 minutes Functional Measure: 
Barthel Index: 
 
Bathin Bladder: 10 Bowels: 10 
Groomin Dressing: 10 Feeding: 10 Mobility: 10 Stairs: 5 Toilet Use: 10 Transfer (Bed to Chair and Back): 10 Total: 85 Barthel and G-code impairment scale: Percentage of impairment CH 
0% CI 
1-19% CJ 
20-39% CK 
40-59% CL 
60-79% CM 
80-99% CN 
100% Barthel Score 0-100 100 99-80 79-60 59-40 20-39 1-19 
 0 Barthel Score 0-20 20 17-19 13-16 9-12 5-8 1-4 0 The Barthel ADL Index: Guidelines 1. The index should be used as a record of what a patient does, not as a record of what a patient could do. 2. The main aim is to establish degree of independence from any help, physical or verbal, however minor and for whatever reason. 3. The need for supervision renders the patient not independent. 4. A patient's performance should be established using the best available evidence. Asking the patient, friends/relatives and nurses are the usual sources, but direct observation and common sense are also important. However direct testing is not needed. 5. Usually the patient's performance over the preceding 24-48 hours is important, but occasionally longer periods will be relevant. 6. Middle categories imply that the patient supplies over 50 per cent of the effort. 7. Use of aids to be independent is allowed. Navin Carlin., Barthel, DArgeliaW. (0430). Functional evaluation: the Barthel Index. [facility] (14)2. SHERRIE Manjarrez, Lanette Curiel., Abi Pimentel, [place], [address] [place] [address] (1999). Measuring the change indisability after inpatient rehabilitation; comparison of the responsiveness of the Barthel Index and Functional [place] Measure. Journal of Neurology, Neurosurgery, and Psychiatry, 66(4), 718-593. Carlton Cooper, N.CRIS.A, AARON Arana, & Stephie Rebolledo, M.A. (2004.) Assessment of post-stroke quality of life in cost-effectiveness studies: The usefulness of the Barthel Index and the EuroQoL-5D. [facility], 13, 974-88 G codes: In compliance with CMSs Claims Based Outcome Reporting, the following G-code set was chosen for this patient based on their primary functional limitation being treated: The outcome measure chosen to determine the severity of the functional limitation was the Barthel Index with a score of 85/100 which was correlated with the impairment scale. ? Self Care:  
  - CURRENT STATUS: CI - 1%-19% impaired, limited or restricted  - GOAL STATUS: CI - 1%-19% impaired, limited or restricted  - D/C STATUS:  CI - 1%-19% impaired, limited or restricted Occupational Therapy Evaluation Charge Determination History Examination Decision-Making LOW Complexity : Brief history review  LOW Complexity : 1-3 performance deficits relating to physical, cognitive , or psychosocial skils that result in activity limitations and / or participation restrictions  LOW Complexity : No comorbidities that affect functional and no verbal or physical assistance needed to complete eval tasks Based on the above components, the patient evaluation is determined to be of the following complexity level: LOW Pain: 
Pain Scale 1: Numeric (0 - 10) Pain Intensity 1: 1 Pain Location 1: Back;Leg 
Pain Orientation 1: Left;Posterior Pain Description 1: Constant; Shooting Pain Intervention(s) 1: Medication (see MAR); Distraction Activity Tolerance:  
Good, minimal posterior hip/low back pain noted Please refer to the flowsheet for vital signs taken during this treatment. After treatment:  
[x]  Patient left in no apparent distress sitting up in chair 
[]  Patient left in no apparent distress in bed 
[x]  Call bell left within reach [x]  Nursing notified 
[x]  Caregiver present 
[]  Bed alarm activated COMMUNICATION/EDUCATION:  
Communication/Collaboration: 
[x]      Home safety education was provided and the patient/caregiver indicated understanding. [x]      Patient/family have participated as able and agree with findings and recommendations. []      Patient is unable to participate in plan of care at this time.  
Findings and recommendations were discussed with: Physical Therapist and Registered Nurse Mariusz Andersen OT Time Calculation: 32 mins

## 2018-10-08 NOTE — CONSULTS
[address]    Dasha Michelle  MR#: [id number]  : 10/13/1933  ACCOUNT #: [de-identified]   DATE OF SERVICE: 10/08/2018    REASON FOR CONSULTATION:  Left leg pain and weakness. HISTORY OF PRESENT ILLNESS:  This is a very nice relatively active 77-year-old male with history of prostate cancer metastatic to the bone. He reports no major chronic back or leg pain, although he does have some atrophy in his left upper leg. He started getting pain on Thursday of last week and the left lower back and into the leg. His left leg gave out on him and he fell on , was brought to the emergency room. His left leg he describes the pain as going in from the left leg down into the lateral and anterior thigh into the knee. He was admitted, underwent a CT scan of the abdomen and pelvis which shows old metastatic disease. No fractures or dislocations. He has scoliosis. MRI of the lumbar spine shows small the MRI shows what their reading is a disk herniation on the right at L5-S1. This does not correspond to any of his clinical findings. There is Scoliosis left leaning on the L3-4 with a slight lateral listhesis foraminal stenosis of the L3 foramen. This is all chronic, but could be causing his symptoms. He has spondylosis at L2-3 as well. PAST MEDICAL HISTORY:  Metastatic prostate cancer. PAST SURGICAL HISTORY:  Prostatectomy and radiation treatment. MEDICATIONS:  On admission, he is on oral steroids prednisone 5 mg b.i.d., calcium, Flomax Xgeva and Zytiga. SOCIAL HISTORY:  He is  and lives independently. Does not drink or smoke. REVIEW OF SYSTEMS:  As above. NEUROLOGIC:  Patient is a well nourished male in no apparent distress. He does have some atrophy of his left upper leg. His motor shows 3/5 weakness in his both left iliopsoas and quad. His plantar and dorsiflexion appears intact. His reflexes are benign.   He has no pain with an external or internal rotation of his hip. IMAGING:  As noted above. IMPRESSION:  Clinically, the patient has a left L3 radiculopathy. His MRI shows chronic findings of spondylosis and scoliosis that narrows the L3 foramen. I do not see any acute large disk herniation or fractures, but this is likely the etiology of his pain. His CT of his abdomen and pelvis does not show any major hip or pelvic pathology that I can tell and he does not have any provocative pain maneuvers with his hip. The incidental disk herniation is not clinically relevant at this point, I would not rush to operate on him given his age and the findings. We will consult interventional radiology for an L3 nerve block. Continue with physical therapy and likely we will continue to rehab and see how he does over the next several weeks. If he does not improve or gets worse, we can certainly proceed surgically, but this would require a fusion due to the anatomy. MD MARILYNN Beasley / YAMILETH  D: 10/08/2018 14:36     T: 10/08/2018 15:03  JOB #: [id number]  CC: Redd Atkinson MD

## 2018-10-08 NOTE — PROGRESS NOTES
Care Management Interventions PCP Verified by CM: Yes 
Palliative Care Criteria Met (RRAT>21 & CHF Dx)?: No 
MyChart Signup: No 
Discharge Durable Medical Equipment: No 
Physical Therapy Consult: Yes Occupational Therapy Consult: Yes Speech Therapy Consult: No 
Current Support Network: Lives with Spouse Confirm Follow Up Transport: Family Discharge Location Discharge Placement: Home with family assistance Reason for Admission:   Acute back pain and left leg pain RRAT Score:     7 Plan for utilizing home health:     Not homebound Likelihood of Readmission:  low Transition of Care Plan:   Chart reviewed for transitions of care, discussed patient during rounds. Patient admitted for the above, is being seen by PT,OT, neurosurgery. PT is recommending outpatient at this time, and patient is scheduled to go on a trip to [place] in 3 days. Cm will assist with any other needs.  
[employer] , [place]

## 2018-10-08 NOTE — PROGRESS NOTES
Bedside and Verbal shift change report given to Genene Sandhoff (oncoming nurse) by Con Guerrero (offgoing nurse). Report included the following information SBAR, Kardex, ED Summary and MAR. Bedside and Verbal shift change report given to Les Alegria (oncoming nurse) by Genene Sandhoff (offgoing nurse). Report included the following information SBAR, Kardex, ED Summary, Intake/Output, MAR and Recent Results.

## 2018-10-08 NOTE — PROGRESS NOTES
Informed MD that patient wanted to be discharged after hearing that IR will do the steroid injection tomorrow. No discharge order obtained. Explained to pt's wife that doing the injection outpatient could take 2-3 days to be scheduled versus staying another night to have injection tomorrow afternoon. Pt and wife agreed to stay.

## 2018-10-08 NOTE — PROGRESS NOTES
Spiritual Care Partner Volunteer visited patient in Rm 537 on 10/8/2018. Documented by: 
Chaplain Victoria MDiv, MS, [address] ([id number])

## 2018-10-08 NOTE — PROGRESS NOTES
Hospitalist Progress Note Minus MD Kerri 
Answering service: [phone] OR [phone] from in house phone Date of Service:  10/8/2018 NAME:  Louise Lanier :  10/13/1933 MRN:  [id number] Admission Summary:  
 
80 y.o. male came to ED with a c/o left sided back pain, left leg/knee pain s/p GLF today. Pt currently rates his pain at 8/10 in severity and states there his sx are exacerbated with movement. Pt reports that he began having mild back pain on Thursday which worsened through the night and into Friday morning. He states that he tried taking Advil, using hot/cold compresses with no relief of pain. Pt states that this morning, at around 0500am,  he got up to use the bathroom and his left leg \"gave out on him\" causing him to fall and land on his buttocks. He denies any LOC/head injury. Workup in the ER negative for acute fracture. But has degenerative disc disease. Interval history / Subjective: No acute complaint. Assessment & Plan:  
 
Fall 
-Mechanical fall secondary to leg weakness 
-No fractures on imaging -PT evaluation pending Back pain 
-Patient has dextroscoliosis with multilevel mild stenosis, also L5-S1 disc protrusion 
-Patient describes symptoms of radiculopathy radiating to left leg, with sudden weakness 
-Neurosurgery evaluation requested for further recommendation  
-Continue flexeril and PRN pain meds Prostate cancer 
-Continue zytiga, xgeva and flomax Code status: FULL 
DVT prophylaxis: Lovenox Care Plan discussed with: Patient/Family and Nurse Disposition: TBD Hospital Problems  Never Reviewed Codes Class Noted POA * (Principal)Acute back pain ICD-10-CM: M54.9 ICD-9-CM: 724.5  10/7/2018 Yes Review of Systems: A comprehensive review of systems was negative except for that written in the HPI. Vital Signs: Last 24hrs VS reviewed since prior progress note. Most recent are: 
Visit Vitals  /70 (BP 1 Location: Right arm, BP Patient Position: At rest;Supine; Head of bed elevated (Comment degrees))  Pulse 81  Temp 97.3 °F (36.3 °C)  Resp 16  
 Ht 6' 3\" (1.905 m)  Wt 79.4 kg (175 lb)  SpO2 97%  BMI 21.87 kg/m2 Intake/Output Summary (Last 24 hours) at 10/08/18 1315 Last data filed at 10/08/18 4321 Gross per 24 hour Intake                0 ml Output              200 ml Net             -200 ml Physical Examination:  
 
 
     
Constitutional:  No acute distress, cooperative, pleasant   
ENT:  Oral mucous moist, oropharynx benign. Neck supple, Resp:  CTA bilaterally. No wheezing/rhonchi/rales. No accessory muscle use CV:  Regular rhythm, normal rate, no murmurs, gallops, rubs GI:  Soft, non distended, non tender. normoactive bowel sounds, no hepatosplenomegaly Musculoskeletal:  No edema, warm, 2+ pulses throughout Neurologic:  Moves all extremities. AAOx3, CN II-XII reviewed Skin:  Good turgor, no rashes or ulcers Data Review:  
 Review and/or order of clinical lab test 
 
 
Labs:  
No results for input(s): WBC, HGB, HCT, PLT, HGBEXT, HCTEXT, PLTEXT in the last 72 hours. No results for input(s): NA, K, CL, CO2, BUN, CREA, GLU, CA, MG, PHOS, URICA in the last 72 hours. No results for input(s): SGOT, GPT, ALT, AP, TBIL, TBILI, TP, ALB, GLOB, GGT, AML, LPSE in the last 72 hours. No lab exists for component: AMYP, HLPSE No results for input(s): INR, PTP, APTT in the last 72 hours. No lab exists for component: INREXT No results for input(s): FE, TIBC, PSAT, FERR in the last 72 hours. No results found for: FOL, RBCF No results for input(s): PH, PCO2, PO2 in the last 72 hours. No results for input(s): CPK, CKNDX, TROIQ in the last 72 hours. No lab exists for component: CPKMB No results found for: CHOL, CHOLX, CHLST, CHOLV, HDL, LDL, LDLC, DLDLP, TGLX, TRIGL, TRIGP, CHHD, CHHDX Lab Results Component Value Date/Time Glucose (POC) 92 10/07/2018 02:51 PM  
 
Lab Results Component Value Date/Time Color YELLOW/STRAW 10/08/2018 08:48 AM  
 Appearance CLOUDY (A) 10/08/2018 08:48 AM  
 Specific gravity 1.029 10/08/2018 08:48 AM  
 pH (UA) 6.5 10/08/2018 08:48 AM  
 Protein NEGATIVE  10/08/2018 08:48 AM  
 Glucose NEGATIVE  10/08/2018 08:48 AM  
 Ketone NEGATIVE  10/08/2018 08:48 AM  
 Bilirubin NEGATIVE  10/08/2018 08:48 AM  
 Urobilinogen 1.0 10/08/2018 08:48 AM  
 Nitrites NEGATIVE  10/08/2018 08:48 AM  
 Leukocyte Esterase SMALL (A) 10/08/2018 08:48 AM  
 Epithelial cells FEW 10/08/2018 08:48 AM  
 Bacteria 2+ (A) 10/08/2018 08:48 AM  
 WBC 5-10 10/08/2018 08:48 AM  
 RBC 0-5 10/08/2018 08:48 AM  
 
 
 
Medications Reviewed:  
 
Current Facility-Administered Medications Medication Dose Route Frequency  influenza vaccine 2018-19 (6 mos+)(PF) (FLUARIX QUAD/FLULAVAL QUAD) injection 0.5 mL  0.5 mL IntraMUSCular PRIOR TO DISCHARGE  abiraterone (ZYTIGA) 250 mg tablet - 1,000 mg = 4 tab (Patient Supplied)  1,000 mg Oral DAILY  predniSONE (DELTASONE) tablet 5 mg  5 mg Oral BID WITH MEALS  cyclobenzaprine (FLEXERIL) tablet 5 mg  5 mg Oral TID  ketorolac (TORADOL) injection 15 mg  15 mg IntraVENous Q6H PRN  
 famotidine (PEPCID) tablet 20 mg  20 mg Oral BID  lidocaine (LIDODERM) 5 % patch 1 Patch  1 Patch TransDERmal Q24H  
 sodium chloride (NS) flush 5-10 mL  5-10 mL IntraVENous Q8H  
 sodium chloride (NS) flush 5-10 mL  5-10 mL IntraVENous PRN  
 naloxone (NARCAN) injection 0.4 mg  0.4 mg IntraVENous PRN  
 enoxaparin (LOVENOX) injection 40 mg  40 mg SubCUTAneous Q24H  
 diphenhydrAMINE (BENADRYL) capsule 25 mg  25 mg Oral Q4H PRN  
 ondansetron (ZOFRAN) injection 4 mg  4 mg IntraVENous Q4H PRN  
  fentaNYL citrate (PF) injection 25 mcg  25 mcg IntraVENous Q4H PRN  
 senna-docusate (PERICOLACE) 8.6-50 mg per tablet 1 Tab  1 Tab Oral DAILY  tamsulosin (FLOMAX) capsule 0.8 mg  0.8 mg Oral QHS  traMADol (ULTRAM) tablet 50 mg  50 mg Oral Q6H PRN  
 oxyCODONE-acetaminophen (PERCOCET) 5-325 mg per tablet 1 Tab  1 Tab Oral Q4H PRN  
 
______________________________________________________________________ EXPECTED LENGTH OF STAY: - - - 
ACTUAL LENGTH OF STAY:          1 Mikel Eden MD

## 2018-10-08 NOTE — PROGRESS NOTES
Problem: Mobility Impaired (Adult and Pediatric) Goal: *Acute Goals and Plan of Care (Insert Text) physical Therapy EVALUATION Patient: Elvin Jaime (25 y.o. male) Date: 10/8/2018 Primary Diagnosis: Acute back pain Precautions:recommended back precautions with back pain ASSESSMENT : 
Based on the objective data described below, the patient presents with onset of left sided back pain which pt describes as beginning Thursday morning and progressively worsening over the next several days. Pt reports he had a fall due to his LLE \"giving out\". After this fall pt reports left groin pain and progressive back pain. Pt presents with weakness of LLE and decreased mobility from his baseline level of independence. Pt reports he enjoys walking and frequently walks 1 mile. Provided education regarding back strategies to reduce strain on his back including no bending, lifting, or twisting. He was instructed to use the log roll technique and did so with supervision. Pt ambulated with a rolling walker and instructed to go slowly and use step to gait pattern to provide increased safety and support with LLE weakness. Pt tolerated ambulation x 45 feet without episodes of LLE buckling. Pt reports improved pain control and currently rating pain 2/10. Recommend pt continue using a rolling walker at this time. Pt will benefit from outpatient PT for strengthening and progressive gait training. Of note pt has a trip planned to [place] in 3 days. Patient will benefit from skilled intervention to address the above impairments. Patients rehabilitation potential is considered to be Good Factors which may influence rehabilitation potential include:  
[]         None noted 
[]         Mental ability/status []         Medical condition 
[]         Home/family situation and support systems 
[]         Safety awareness [x]         Pain tolerance/management 
[]         Other: PLAN : 
 Recommendations and Planned Interventions: 
[x]           Bed Mobility Training             []    Neuromuscular Re-Education 
[x]           Transfer Training                   []    Orthotic/Prosthetic Training 
[x]           Gait Training                         []    Modalities [x]           Therapeutic Exercises           []    Edema Management/Control 
[]           Therapeutic Activities            [x]    Patient and Family Training/Education 
[]           Other (comment): Frequency/Duration: Patient will be followed by physical therapy  5 times a week to address goals. Discharge Recommendations: Outpatient Further Equipment Recommendations for Discharge: pt owns a rolling walker and straight cane SUBJECTIVE:  
Patient stated I was not like this a few days ago. I went for a walk on Thursday.  OBJECTIVE DATA SUMMARY:  
HISTORY:   
No past medical history on file. No past surgical history on file. Prior Level of Function/Home Situation: independent, active, works part time, enjoys walking for exercise Personal factors and/or comorbidities impacting plan of care: medical history, CA with mets Home Situation Home Environment: Private residence # Steps to Enter: 4 Rails to Enter: Yes Hand Rails : Bilateral 
One/Two Story Residence: Two story # of Interior Steps: 15 Interior Rails: Right Living Alone: No 
Support Systems: Spouse/Significant Other/Partner Patient Expects to be Discharged to[de-identified] Private residence Current DME Used/Available at Home: Grab bars (reacher) Tub or Shower Type: Shower EXAMINATION/PRESENTATION/DECISION MAKING: Critical Behavior: 
Neurologic State: Alert Orientation Level: Oriented X4 Cognition: Appropriate for age attention/concentration, Appropriate decision making, Appropriate safety awareness, Follows commands Safety/Judgement: Awareness of environment, Fall prevention Hearing: Auditory Auditory Impairment: NoneSkin:  intact Range Of Motion: AROM: Within functional limits PROM: Within functional limits Strength:   
Strength: Within functional limits BUE, RLE 
LLE with weakness, knee extension 4-/5, hip flexion 4-/5, dorsiflexion 5/5 Tone & Sensation:  
Tone: Normal 
  
  
  
  
Sensation: Intact Coordination: 
Coordination: Within functional limits Vision:  
Tracking: Able to track stimulus in all quadrants w/o difficulty Acuity: Within Defined Limits Functional Mobility: 
Bed Mobility: 
Provided education regarding log roll technique Rolling: Supervision Supine to Sit: Supervision Transfers: 
Sit to Stand: Supervision; Adaptive equipment (with RW, cues for safety) Stand to Sit: Supervision; Adaptive equipment (with RW, cues for safety), on first attempt pt sat quickly with poor control when descending to surface of bed, needs cues for increased safety awareness Bed to Chair: Minimum assistance; Adaptive equipment (with RW, cues for safety) Balance:  
Sitting: Intact Standing: Intact; With supportAmbulation/Gait Training:Distance (ft): 45 Feet (ft) Assistive Device: Walker, rolling;Gait belt Ambulation - Level of Assistance: Assist x1;Contact guard assistance Gait Description (WDL): Exceptions to [facility] Gait Abnormalities: Antalgic; Step to gait Speed/Sejal: Slow Step Length: Left shortened;Right shortened Therapeutic Exercises:  
Reviewed knee extension, hip flexion, and hip IR exercises in sitting position Functional Measure: 
Tinetti test: 
 
Sitting Balance: 1 Arises: 2 Attempts to Rise: 2 Immediate Standing Balance: 1 Standing Balance: 1 Nudged: 2 Eyes Closed: 1 Turn 360 Degrees - Continuous/Discontinuous: 1 Turn 360 Degrees - Steady/Unsteady: 1 Sitting Down: 2 Balance Score: 14 Indication of Gait: 1 
R Step Length/Height: 1 L Step Length/Height: 1 
R Foot Clearance: 1 L Foot Clearance: 1 Step Symmetry: 0 Step Continuity: 1 Path: 1 Trunk: 1 Walking Time: 1 Gait Score: 9 Total Score: 23 Tinetti Test and G-code impairment scale: 
Percentage of Impairment CH 
 
0% 
 CI 
 
1-19% CJ 
 
20-39% CK 
 
40-59% CL 
 
60-79% CM 
 
80-99% CN  
 
100% Tinetti Score 0-28 28 23-27 17-22 12-16 6-11 1-5 0 Tinetti Tool Score Risk of Falls 
<19 = High Fall Risk 19-24 = Moderate Fall Risk 25-28 = Low Fall Risk Tinetti ME. Performance-Oriented Assessment of Mobility Problems in Elderly Patients. Brown 66; [id number]. (Scoring Description: PT Bulletin Feb. 10, 1993) Older adults: Glory Enrique et al, 2009; n = [address] [employer] elderly evaluated with ABC, KATHY, ADL, and IADL) · Mean KATHY score for males aged 69-68 years = 26.21(3.40) · Mean KATHY score for females age 69-68 years = 25.16(4.30) · Mean KATHY score for males over 80 years = 23.29(6.02) · Mean KATHY score for females over 80 years = 17.20(8.32) G codes: In compliance with CMSs Claims Based Outcome Reporting, the following G-code set was chosen for this patient based on their primary functional limitation being treated: The outcome measure chosen to determine the severity of the functional limitation was the Tinetti with a score of 23/28 which was correlated with the impairment scale. ? Mobility - Walking and Moving Around:  
  - CURRENT STATUS: CI - 1%-19% impaired, limited or restricted  - GOAL STATUS: CH - 0% impaired, limited or restricted  - D/C STATUS:  ---------------To be determined--------------- Physical Therapy Evaluation Charge Determination History Examination Presentation Decision-Making MEDIUM  Complexity : 1-2 comorbidities / personal factors will impact the outcome/ POC  LOW Complexity : 1-2 Standardized tests and measures addressing body structure, function, activity limitation and / or participation in recreation  MEDIUM Complexity : Evolving with changing characteristics  LOW Complexity : FOTO score of  Based on the above components, the patient evaluation is determined to be of the following complexity level: LOW Pain: 
Pain Scale 1: Numeric (0 - 10) Pain Intensity 1: 2 Pain Location 1: Back;Leg 
Pain Orientation 1: Left;Posterior Pain Description 1: Constant Pain Intervention(s) 1: Medication (see MAR) Activity Tolerance:  
Good After treatment:  
[x]         Patient left in no apparent distress sitting up in chair 
[]         Patient left in no apparent distress in bed 
[x]         Call bell left within reach [x]         Nursing notified 
[]         Caregiver present 
[]         Bed alarm activated COMMUNICATION/EDUCATION:  
The patients plan of care was discussed with: Registered Nurse. [x]         Fall prevention education was provided and the patient/caregiver indicated understanding. []         Patient/family have participated as able in goal setting and plan of care. [x]         Patient/family agree to work toward stated goals and plan of care. []         Patient understands intent and goals of therapy, but is neutral about his/her participation. []         Patient is unable to participate in goal setting and plan of care. Thank you for this referral. 
Sushila Conrad Time Calculation: 31 mins

## 2018-10-09 ENCOUNTER — APPOINTMENT (OUTPATIENT)
Dept: INTERVENTIONAL RADIOLOGY/VASCULAR | Age: 83
DRG: 552 | End: 2018-10-09
Attending: NURSE PRACTITIONER
Payer: MEDICARE

## 2018-10-09 VITALS
OXYGEN SATURATION: 95 % | SYSTOLIC BLOOD PRESSURE: 146 MMHG | DIASTOLIC BLOOD PRESSURE: 74 MMHG | TEMPERATURE: 98.5 F | BODY MASS INDEX: 21.76 KG/M2 | RESPIRATION RATE: 16 BRPM | HEART RATE: 89 BPM | HEIGHT: 75 IN | WEIGHT: 175 LBS

## 2018-10-09 PROCEDURE — 90471 IMMUNIZATION ADMIN: CPT

## 2018-10-09 PROCEDURE — 90686 IIV4 VACC NO PRSV 0.5 ML IM: CPT | Performed by: HOSPITALIST

## 2018-10-09 PROCEDURE — 97110 THERAPEUTIC EXERCISES: CPT

## 2018-10-09 PROCEDURE — 64483 NJX AA&/STRD TFRM EPI L/S 1: CPT

## 2018-10-09 PROCEDURE — 3E0T3BZ INTRODUCTION OF ANESTHETIC AGENT INTO PERIPHERAL NERVES AND PLEXI, PERCUTANEOUS APPROACH: ICD-10-PCS | Performed by: RADIOLOGY

## 2018-10-09 PROCEDURE — 74011250637 HC RX REV CODE- 250/637: Performed by: HOSPITALIST

## 2018-10-09 PROCEDURE — 97116 GAIT TRAINING THERAPY: CPT

## 2018-10-09 PROCEDURE — 3E0T33Z INTRODUCTION OF ANTI-INFLAMMATORY INTO PERIPHERAL NERVES AND PLEXI, PERCUTANEOUS APPROACH: ICD-10-PCS | Performed by: RADIOLOGY

## 2018-10-09 PROCEDURE — 74011250636 HC RX REV CODE- 250/636: Performed by: RADIOLOGY

## 2018-10-09 PROCEDURE — 74011636320 HC RX REV CODE- 636/320: Performed by: RADIOLOGY

## 2018-10-09 PROCEDURE — 74011250636 HC RX REV CODE- 250/636: Performed by: HOSPITALIST

## 2018-10-09 PROCEDURE — 74011636637 HC RX REV CODE- 636/637: Performed by: HOSPITALIST

## 2018-10-09 RX ORDER — OXYCODONE AND ACETAMINOPHEN 5; 325 MG/1; MG/1
1 TABLET ORAL
Qty: 8 TAB | Refills: 0 | Status: SHIPPED | OUTPATIENT
Start: 2018-10-09 | End: 2019-05-22

## 2018-10-09 RX ORDER — DEXAMETHASONE SODIUM PHOSPHATE 4 MG/ML
INJECTION, SOLUTION INTRA-ARTICULAR; INTRALESIONAL; INTRAMUSCULAR; INTRAVENOUS; SOFT TISSUE
Status: DISCONTINUED
Start: 2018-10-09 | End: 2018-10-09 | Stop reason: HOSPADM

## 2018-10-09 RX ORDER — AMOXICILLIN 250 MG
1 CAPSULE ORAL
Qty: 30 TAB | Refills: 0 | Status: SHIPPED | OUTPATIENT
Start: 2018-10-09 | End: 2019-05-22

## 2018-10-09 RX ORDER — CYCLOBENZAPRINE HCL 10 MG
5 TABLET ORAL 3 TIMES DAILY
Qty: 90 TAB | Refills: 0 | Status: SHIPPED | OUTPATIENT
Start: 2018-10-09 | End: 2019-05-22

## 2018-10-09 RX ORDER — DEXAMETHASONE SODIUM PHOSPHATE 4 MG/ML
8 INJECTION, SOLUTION INTRA-ARTICULAR; INTRALESIONAL; INTRAMUSCULAR; INTRAVENOUS; SOFT TISSUE ONCE
Status: COMPLETED | OUTPATIENT
Start: 2018-10-09 | End: 2018-10-09

## 2018-10-09 RX ORDER — LIDOCAINE HYDROCHLORIDE 10 MG/ML
10 INJECTION, SOLUTION EPIDURAL; INFILTRATION; INTRACAUDAL; PERINEURAL
Status: COMPLETED | OUTPATIENT
Start: 2018-10-09 | End: 2018-10-09

## 2018-10-09 RX ADMIN — DEXAMETHASONE SODIUM PHOSPHATE 8 MG: 4 INJECTION, SOLUTION INTRA-ARTICULAR; INTRALESIONAL; INTRAMUSCULAR; INTRAVENOUS; SOFT TISSUE at 07:56

## 2018-10-09 RX ADMIN — IOHEXOL 5 ML: 180 INJECTION INTRAVENOUS at 07:55

## 2018-10-09 RX ADMIN — OXYCODONE HYDROCHLORIDE AND ACETAMINOPHEN 1 TABLET: 5; 325 TABLET ORAL at 11:14

## 2018-10-09 RX ADMIN — Medication 10 ML: at 06:55

## 2018-10-09 RX ADMIN — LIDOCAINE HYDROCHLORIDE 5 ML: 10 INJECTION, SOLUTION EPIDURAL; INFILTRATION; INTRACAUDAL; PERINEURAL at 07:56

## 2018-10-09 RX ADMIN — PREDNISONE 5 MG: 5 TABLET ORAL at 09:21

## 2018-10-09 RX ADMIN — FAMOTIDINE 20 MG: 20 TABLET ORAL at 09:21

## 2018-10-09 RX ADMIN — CYCLOBENZAPRINE HYDROCHLORIDE 5 MG: 10 TABLET, FILM COATED ORAL at 09:21

## 2018-10-09 RX ADMIN — INFLUENZA VIRUS VACCINE 0.5 ML: 15; 15; 15; 15 SUSPENSION INTRAMUSCULAR at 13:55

## 2018-10-09 RX ADMIN — OXYCODONE HYDROCHLORIDE AND ACETAMINOPHEN 1 TABLET: 5; 325 TABLET ORAL at 06:55

## 2018-10-09 RX ADMIN — SENNOSIDES AND DOCUSATE SODIUM 1 TABLET: 8.6; 5 TABLET ORAL at 09:21

## 2018-10-09 RX ADMIN — ABIRATERONE ACETATE 1000 MG: 250 TABLET ORAL at 11:14

## 2018-10-09 NOTE — PROGRESS NOTES
Neurosurgery Spine Progress Note Haily Osuna, AGACNP-BC Work Cell: [phone] October 9, 2018 1:16 PM  
 
Elvin Jaime HPI:  
  
Elvin Jaime is a 80 y.o. male with history of prostate cancer with bony metastasis. He started to have low back pain last Thursday. He was able to go to work without difficulty. Unfortunately on Friday he developed leg pain and weakness. Pain traveled along the anterior and lateral left thigh extending to his knee. His leg gave out and he fell on Sunday. This prompted the patient to present to [facility] for evaluation. The neurosurgery service was consulted to render an opinion regarding his acute pain and weakness. Subjective  
  
Patient examined after receiving nerve block with IR. He tells me he has some new numbness to a small area in his anterior thigh right above his knee. No new weakness. Ambulating without difficulty, pain has improved. He walked and completed stairs with PT. He denies headache,dizziness,dyspnea, cp, cough, abd pain, f/c, or n/v. 
 
  
 
 
 
Objective:  
 
Physical Exam: 
General:  Alert and oriented. No acute distress. Respiratory:  Breathing unlabored. Abdomen:  
Extremities: Soft, non-tender, non-distended No evidence of cyanosis. Pulses palpable in both upper and lower extremities. Neurologic: 
 
 
Musculoskeletal:  Speech clear, face symmetric, conjugate gaze. Right leg 5/5 throughout, left leg 3/5 in iliopsoas and quadricep, 5/5 in tibialis anterior and gastrocnemius. Calves soft, nontender upon palpation and with passive twitch. Moves both upper and lower extremities. Incision: clean, dry, and intact. No significant erythema or swelling. No active drainage noted. Vital Signs:   
Patient Vitals for the past 8 hrs: 
 BP Temp Pulse Resp SpO2  
10/09/18 0917 146/74 98.5 °F (36.9 °C) 89 16 95 % 10/09/18 0711 166/80 97.8 °F (36.6 °C) 72 16 97 % Temp (24hrs), Av.1 °F (36.7 °C), Min:97.7 °F (36.5 °C), Max:98.6 °F (37 °C) Intake/Output: 
10/09 0701 - 10/09 1900 In: -  
Out: 100 [Urine:100] 10/07 1901 - 10/09 0700 In: -  
Out: 400 [Urine:400] Pain Control:  
Pain Assessment Pain Scale 1: Numeric (0 - 10) Pain Intensity 1: 2 Pain Onset 1: pta Pain Location 1: Back, Leg 
Pain Orientation 1: Lower, Left Pain Description 1: Aching Pain Intervention(s) 1: Medication (see MAR) (using percocet) LAB:   
No results for input(s): HCT, HGB, HCTEXT, HGBEXT in the last 72 hours. No results found for: NA, K, CL, CO2, GLU, BUN, CREA, CA 
 
PT/OT:  
Gait:  Gait Speed/Sejal: Slow Step Length: Left shortened, Right shortened Gait Abnormalities: Antalgic, Step to gait Ambulation - Level of Assistance: Modified independent Distance (ft): 180 Feet (ft) Assistive Device: Brace/Splint, Gait belt, Walker, rolling Rail Use: Both Stairs - Level of Assistance: Contact guard assistance, Assist X1 Number of Stairs Trained: 13 
            
 
 
Assessment/Plan  
  
1. Acute L3 radiculopathy  
 -MRI 10/8 shows dextroscoliosis, L2-3 spondylosis with slight lateral listhesis and foraminal stenosis at L3.  -s/p L3 nerve block with IR 
 -pain improved, stable proximal weakness in LLE 
 -PT/OT 
 -Voiding -VTE Prophylaxes - TEDS & SCDs Plan above discussed with Dr. Lauren Wei Discharge To: Home after PT. F/u with Dr. Lauren Wei in 2-4 weeks.  
 
Signed By: Leona Alves NP

## 2018-10-09 NOTE — DISCHARGE SUMMARY
Discharge Summary       PATIENT ID: Yuly Becker  MRN: [id number]   YOB: 1933    DATE OF ADMISSION: 10/7/2018  8:12 AM    DATE OF DISCHARGE: 10/9/2018  PRIMARY CARE PROVIDER: None     ATTENDING PHYSICIAN: Carson Andrade MD  DISCHARGING PROVIDER: Robert Camejo MD    To contact this individual call [phone] and ask the  to page. If unavailable ask to be transferred the Adult Hospitalist Department. CONSULTATIONS: None    PROCEDURES/SURGERIES: * No surgery found *    ADMITTING [address] [place] [address] COURSE:     Altrosannese Fellers. Shantal Langston is an 80 y.o. male  With history of metastatic prostate cancer came to ED with a complaint of left sided back pain, left leg/knee pain s/p GLF   Back pain due to acute L3 radiculopathy  -s/p nerve block with IR  -Patient has dextroscoliosis with multilevel mild stenosis, also L5-S1 disc protrusion  - symptoms of radiculopathy radiating to left leg, with sudden weakness  -Continue flexeril and PRN pain meds  -Neurosurgery on board   Fall  -Mechanical fall secondary to leg weakness  -No fractures on imaging  -PT evaluation pending    Prostate cancer  -Continue zytiga, xgeva and flomax           Code status: Full Code  DVT prophylaxis:SCD    DISCHARGE DIAGNOSES / PLAN:      Back pain due to acute L3 radiculopathy  -s/p nerve block with IR  -Continue flexeril and PRN pain meds  -outpatient follow up with Neurosurgery  Fall  -continue outpatient PT   Prostate cancer  -Continue zytiga, xgeva and flomax    FOLLOW UP APPOINTMENTS:  Follow up with PCP in 2 weeks  Follow up with Neurosurgeon in 2 weeks    ADDITIONAL CARE RECOMMENDATIONS:     DIET: Regular Diet    ACTIVITY: Activity as tolerated    WOUND CARE: none    EQUIPMENT needed: none      DISCHARGE MEDICATIONS:  Discharge Medication List as of 10/9/2018  2:00 PM      START taking these medications    Details   cyclobenzaprine (FLEXERIL) 10 mg tablet Take 0.5 Tabs by mouth three (3) times daily. , Print, Disp-90 Tab, R-0      oxyCODONE-acetaminophen (PERCOCET) 5-325 mg per tablet Take 1 Tab by mouth every four (4) hours as needed. Max Daily Amount: 6 Tabs., Print, Disp-8 Tab, R-0      senna-docusate (PERICOLACE) 8.6-50 mg per tablet Take 1 Tab by mouth daily as needed for Constipation. , Print, Disp-30 Tab, R-0         CONTINUE these medications which have NOT CHANGED    Details   abiraterone (ZYTIGA) 250 mg tab Take 1,000 mg by mouth daily. , Historical Med      predniSONE (DELTASONE) 5 mg tablet Take 5 mg by mouth two (2) times a day., Historical Med      Calcium Cmb 2-D3-Min Cmb34-Gen (CITRACAL + BONE DENSITY) 300-200-13.5 mg-unit-mg tab Take 1 Tab by mouth two (2) times daily (with meals). , Historical Med      tamsulosin (FLOMAX) 0.4 mg capsule Take 0.4 mg by mouth daily. , Historical Med      denosumab (XGEVA) soln injection 120 mg by SubCUTAneous route once., Historical Med               NOTIFY YOUR PHYSICIAN FOR ANY OF THE FOLLOWING:   Fever over 101 degrees for 24 hours. Chest pain, shortness of breath, fever, chills, nausea, vomiting, diarrhea, change in mentation, falling, weakness, bleeding. Severe pain or pain not relieved by medications. Or, any other signs or symptoms that you may have questions about.     DISPOSITION:    Home With:   OT  PT  HH  RN       Long term SNF/Inpatient Rehab   x Independent/assisted living    Hospice    Other:       PATIENT CONDITION AT DISCHARGE:     Functional status    Poor     Deconditioned    x Independent      Cognition   x  Lucid     Forgetful     Dementia      Catheters/lines (plus indication)    Serna     PICC     PEG    x None      Code status    x Full code     DNR      PHYSICAL EXAMINATION AT DISCHARGE:   Refer to Progress Note      CHRONIC MEDICAL DIAGNOSES:  Problem List as of 10/9/2018  Date Reviewed: 10/9/2018          Codes Class Noted - Resolved    * (Principal)Acute back pain ICD-10-CM: M54.9  ICD-9-CM: 724.5  10/7/2018 - Present              Greater than 35 minutes were spent with the patient on counseling and coordination of care    Signed:   Jeffrey Fitzgerald MD  10/9/2018  1:20 PM

## 2018-10-09 NOTE — PROGRESS NOTES
Discharge instructions covered with patient and wife. Both state full understanding and no questions at this time.

## 2018-10-09 NOTE — PROGRESS NOTES
Problem: Mobility Impaired (Adult and Pediatric) Goal: *Acute Goals and Plan of Care (Insert Text) physical Therapy TREATMENT Patient: Daniella De La Cruz (18 y.o. male) Date: 10/9/2018 Diagnosis: Acute back pain Acute back pain Precautions:  (recommended back precautions with back pain), fall Chart, physical therapy assessment, plan of care and goals were reviewed. ASSESSMENT: 
Pt is making steady progress, had L3 nerve block this morning, pt reports left sided back pain and received pain medication prior to therapy,  his wife brought in his knee immobilizer and pt asking if this would be beneficial, pt transfers to sitting EOB using log roll technique with independence, donned knee immobilizer, pt stands with modified independence, ambulated with rolling walker to wheelchair placed outside of room, pt had one minor episode of left knee buckling without loss of balance, transported to stairs via wheelchair, pt cleared on full flight of stairs, able to ascend,descend 13 steps with both rails and verbal cueing for safest sequence, pt ambulated back to his room with rolling walker and modified independence and no further episodes of buckling, returned to chair with reminders to use BUE to control descent to chair, reviewed strengthening exercises with pt including straight leg raises, knee extension, hip flexion, hip internal rotation, and adduction. Encouraged pt to continue exercises frequently during the day. Pt cleared for discharge from a PT standpoint. Recommend outpatient PT. Pt states he is planning to go on his trip to [place] and has another business trip afterwards. He states he cannot start therapy until November. Inquired if pt has consulted with physician regarding his trip. Pt reports he was told his trip may not be a good idea but it was his choice. Progression toward goals: 
[x]      Improving appropriately and progressing toward goals []      Improving slowly and progressing toward goals 
[]      Not making progress toward goals and plan of care will be adjusted PLAN: 
Patient continues to benefit from skilled intervention to address the above impairments. Continue treatment per established plan of care. Discharge Recommendations:  Outpatient Further Equipment Recommendations for Discharge: Owns rolling walker SUBJECTIVE:  
Patient stated My wife really wants to go on this trip.   Pt had his wife bring in his knee immobilizer that he had at home. OBJECTIVE DATA SUMMARY:  
Critical Behavior: 
Neurologic State: Alert Orientation Level: Oriented X4 Cognition: Appropriate for age attention/concentration, Appropriate decision making, Appropriate safety awareness, Follows commands Safety/Judgement: Awareness of environment, Fall prevention Functional Mobility Training: 
Bed Mobility: 
Log Rolling: Independent Supine to Sit: Independent Transfers: 
Sit to Stand: Modified independent Stand to Sit: Modified independent, needs reminders to use BUE to control descent to chair Balance: 
Sitting: Intact Standing: Intact; With supportAmbulation/Gait Training: 
Distance (ft): 180 Feet (ft) Assistive Device: Brace/Splint;Gait belt;Walker, rolling Ambulation - Level of Assistance: Modified independent Gait Abnormalities: Antalgic; Step to gait, had one minor occurrence of left knee buckling after ambulating 10 feet Step Length: Left shortened;Right shortened Stairs: 
Number of Stairs Trained: 13 Stairs - Level of Assistance: Contact guard assistance;Assist X1 Rail Use: BothPain: 
Pain Scale 1: Numeric (0 - 10) Pain Intensity 1: 5 Pain Location 1: Back Pain Orientation 1: Lower; Left Pain Description 1: Aching Pain Intervention(s) 1: Medication (see MAR) (given percocet x 1 tab) Activity Tolerance:  
good After treatment: [x]  Patient left in no apparent distress sitting up in chair 
[]  Patient left in no apparent distress in bed 
[x]  Call bell left within reach [x]  Nursing notified 
[]  Caregiver present 
[]  Bed alarm activated COMMUNICATION/COLLABORATION:  
The patients plan of care was discussed with: Registered Nurse David Velasco Time Calculation: 34 mins

## 2018-10-09 NOTE — DISCHARGE INSTRUCTIONS
Steroid Injection Discharge Instructions    General Information:   A steroid injection was performed today, placing a combination of a steroid and an anesthetic (numbing medicine) into the space around the nerves of your spine. This is done to treat back pain. It may take 7-10 days for the injection to reach its full potential.  This procedure can be done at any level of the spinal column, depending on where your pain is. Your doctor will have ordered the appropriate level to be treated prior to your coming in for the procedure. Home Care Instructions: You can resume your regular diet. Do not drink alcohol today. You may notice that you have to use your pain medications less after your injection. Some people do not notice much of a change in their pain after the first injection. If that is the case, it is worth your time to have a second one done. This is why these injections are sometimes ordered in a series of three. Keep the puncture site clean and dry for 24 hours, and then you may remove the dressing. Showering is acceptable after the bandage is removed. Call If:   You should call your Physician and/or the Radiology Nurse if you have any bleeding other than a small spot on your bandage. Call if you have any signs of infection, fever, increased pain at the puncture site, confusion, or a headache that worsens when you stand and eases when lying flat. Follow-Up Instructions:  Please see your ordering doctor as he/she has requested. Let your doctor know if you have relief from your pain so they may schedule another injection for you if it is indicated. To Reach Us:       Should you experience any of these significant changes, please call [phone] between the hours of 7:30 am and 10 pm or [phone] after hours.  After hours, ask the  to page the [address] [place] [address] Technologist, and describe the problem to the technologist.            Patient Signature:  Date: 10/9/2018  Discharging Nurse: Kamini Modi Alan Tidwell

## 2018-10-10 LAB
BACTERIA SPEC CULT: ABNORMAL
CC UR VC: ABNORMAL
SERVICE CMNT-IMP: ABNORMAL

## 2018-11-02 ENCOUNTER — APPOINTMENT (OUTPATIENT)
Dept: CT IMAGING | Age: 83
End: 2018-11-02
Attending: EMERGENCY MEDICINE
Payer: MEDICARE

## 2018-11-02 ENCOUNTER — HOSPITAL ENCOUNTER (EMERGENCY)
Age: 83
Discharge: HOME | End: 2018-11-02
Attending: EMERGENCY MEDICINE
Payer: MEDICARE

## 2018-11-02 VITALS
DIASTOLIC BLOOD PRESSURE: 77 MMHG | HEIGHT: 75 IN | SYSTOLIC BLOOD PRESSURE: 181 MMHG | TEMPERATURE: 97.8 F | RESPIRATION RATE: 16 BRPM | BODY MASS INDEX: 21.87 KG/M2 | HEART RATE: 89 BPM | OXYGEN SATURATION: 99 %

## 2018-11-02 DIAGNOSIS — S09.90XA CLOSED HEAD INJURY, INITIAL ENCOUNTER: Primary | ICD-10-CM

## 2018-11-02 DIAGNOSIS — S01.01XA LACERATION OF SCALP, INITIAL ENCOUNTER: ICD-10-CM

## 2018-11-02 PROCEDURE — 97116 GAIT TRAINING THERAPY: CPT

## 2018-11-02 PROCEDURE — G8978 MOBILITY CURRENT STATUS: HCPCS

## 2018-11-02 PROCEDURE — 97161 PT EVAL LOW COMPLEX 20 MIN: CPT

## 2018-11-02 PROCEDURE — G8979 MOBILITY GOAL STATUS: HCPCS

## 2018-11-02 PROCEDURE — 74011000250 HC RX REV CODE- 250: Performed by: EMERGENCY MEDICINE

## 2018-11-02 PROCEDURE — 70450 CT HEAD/BRAIN W/O DYE: CPT

## 2018-11-02 PROCEDURE — 75810000293 HC SIMP/SUPERF WND  RPR

## 2018-11-02 PROCEDURE — G8980 MOBILITY D/C STATUS: HCPCS

## 2018-11-02 PROCEDURE — 99284 EMERGENCY DEPT VISIT MOD MDM: CPT

## 2018-11-02 PROCEDURE — 77030008467 HC STPLR SKN COVD -B

## 2018-11-02 RX ORDER — LIDOCAINE HYDROCHLORIDE AND EPINEPHRINE 10; 10 MG/ML; UG/ML
1.5 INJECTION, SOLUTION INFILTRATION; PERINEURAL
Status: COMPLETED | OUTPATIENT
Start: 2018-11-02 | End: 2018-11-02

## 2018-11-02 RX ADMIN — LIDOCAINE HYDROCHLORIDE,EPINEPHRINE BITARTRATE 15 MG: 10; .01 INJECTION, SOLUTION INFILTRATION; PERINEURAL at 13:10

## 2018-11-02 NOTE — ED PROVIDER NOTES
80 y.o. male with past medical history significant for prostate CA who presents from [facility] via EMS with chief complaint of head injury s/p GLF. Pt currently being treated for prostate CA with bony metastasis. Pt was admitted here 10/7/18-10/9/18 for left back and leg pain s/p GLF. Pt had an MRI of his lumbar spine, showing L5-S1 right disc extrusion likely affecting the right L5 nerve. Pt states since discharge, he continues with intermittent weakness of his LLE, and was supposed to have an appointment today to F/U with neurosurgery, but the appointment was postponed. Pt states he has been ambulatory with a walker since then. Pt states this morning, he had a routine appointment with urology for a shot of medication and blood work. Pt states after this, he went to the car wash, took about 10 steps out of the car when he fell backwards, striking the back of his head and suffering a laceration. Pt arrives with complains of head pain. Pt denies any LOC or neck pain. Pt denies any anticoagulant use. There are no other acute medical concerns at this time. Note written by Geraldo Meng, as dictated by Donavan Jarquin MD 11:56 AM 
 
 
The history is provided by the patient and medical records. No  was used. No past medical history on file. No past surgical history on file. No family history on file. Social History Socioeconomic History  Marital status:  Spouse name: Not on file  Number of children: Not on file  Years of education: Not on file  Highest education level: Not on file Social Needs  Financial resource strain: Not on file  Food insecurity - worry: Not on file  Food insecurity - inability: Not on file  Transportation needs - medical: Not on file  Transportation needs - non-medical: Not on file Occupational History  Not on file Tobacco Use  Smoking status: Not on file Substance and Sexual Activity  Alcohol use: Not on file  Drug use: Not on file  Sexual activity: Not on file Other Topics Concern  Not on file Social History Narrative  Not on file ALLERGIES: Patient has no known allergies. Review of Systems Constitutional: Negative for appetite change, chills and fever. HENT: Negative for rhinorrhea, sore throat and trouble swallowing. Eyes: Negative for photophobia. Respiratory: Negative for cough and shortness of breath. Cardiovascular: Negative for chest pain and palpitations. Gastrointestinal: Negative for abdominal pain, nausea and vomiting. Genitourinary: Negative for dysuria, frequency and hematuria. Musculoskeletal: Negative for arthralgias and neck pain. Skin: Positive for wound. Neurological: Positive for weakness (Baseline) and headaches. Negative for dizziness and syncope. Psychiatric/Behavioral: Negative for behavioral problems. The patient is not nervous/anxious. All other systems reviewed and are negative. Vitals:  
 11/02/18 1154 BP: 178/84 Pulse: 93 Resp: 16 Temp: 97.5 °F (36.4 °C) SpO2: 98% Physical Exam  
Constitutional: He is oriented to person, place, and time. He appears well-developed and well-nourished. HENT:  
Head: Normocephalic. Head is with laceration. Mouth/Throat: Oropharynx is clear and moist.  
Lots of dried blood to back of head with 4cm laceration. Eyes: EOM are normal. Pupils are equal, round, and reactive to light. Neck: Normal range of motion. Neck supple. Cardiovascular: Normal rate, regular rhythm, normal heart sounds and intact distal pulses. Exam reveals no gallop and no friction rub. No murmur heard. Pulmonary/Chest: Effort normal. No respiratory distress. He has no wheezes. He has no rales. Abdominal: Soft. There is no tenderness. There is no rebound. Musculoskeletal: Normal range of motion. He exhibits no tenderness. Neurological: He is alert and oriented to person, place, and time. No cranial nerve deficit. Motor; symmetric. Alert and oriented x4. Normal finger-nose-finger. Skin: No erythema. Psychiatric: He has a normal mood and affect. His behavior is normal.  
Nursing note and vitals reviewed. Note written by Geraldo Christensen, as dictated by Hailey Ott MD 11:56 AM 
 
[facility] Wound Repair 
Date/Time: 11/2/2018 1:22 PM 
Performed by: attendingPreparation: skin prepped with Shmarci-Clens Pre-procedure re-eval: Immediately prior to the procedure, the patient was reevaluated and found suitable for the planned procedure and any planned medications. Location details: scalp Wound length:2.6 - 7.5 cm Anesthesia: local infiltration Anesthesia: 
Local Anesthetic: lidocaine 1% with epinephrine Foreign bodies: no foreign bodies Debridement: none Skin closure: staples (x 5) Approximation: close Patient tolerance: Patient tolerated the procedure well with no immediate complications My total time at bedside, performing this procedure was 16-30 minutes. 12:33 PM 
Spoke with Esmer, Physical therapy, who will see the patient after head is stapled.

## 2018-11-02 NOTE — DISCHARGE INSTRUCTIONS
Learning About a Closed Head Injury  What is a closed head injury? A closed head injury happens when your head gets hit hard. The strong force of the blow causes your brain to shake in your skull. This movement can cause the brain to bruise, swell, or tear. Sometimes nerves or blood vessels also get damaged. This can cause bleeding in or around the brain. A concussion is a type of closed head injury. What are the symptoms? If you have a mild concussion, you may have a mild headache or feel \"not quite right. \" These symptoms are common. They usually go away over a few days to 4 weeks. But sometimes after a concussion, you feel like you can't function as well as before the injury. And you have new symptoms. This is called postconcussive syndrome. You may:  · Find it harder to solve problems, think, concentrate, or remember. · Have headaches. · Have changes in your sleep patterns, such as not being able to sleep or sleeping all the time. · Have changes in your personality. · Not be interested in your usual activities. · Feel angry or anxious without a clear reason. · Lose your sense of taste or smell. · Be dizzy, lightheaded, or unsteady. It may be hard to stand or walk. How is a closed head injury treated? Any person who may have a concussion needs to see a doctor. Some people have to stay in the hospital to be watched. Others can go home safely. If you go home, follow your doctor's instructions. He or she will tell you if you need someone to watch you closely for the next 24 hours or longer. Rest is the best treatment. Get plenty of sleep at night. And try to rest during the day. · Avoid activities that are physically or mentally demanding. These include housework, exercise, and schoolwork. And don't play video games, send text messages, or use the computer. You may need to change your school or work schedule to be able to avoid these activities.   · Ask your doctor when it's okay to drive, ride a bike, or operate machinery. · Take an over-the-counter pain medicine, such as acetaminophen (Tylenol), ibuprofen (Advil, Motrin), or naproxen (Aleve). Be safe with medicines. Read and follow all instructions on the label. · Check with your doctor before you use any other medicines for pain. · Do not drink alcohol or use illegal drugs. They can slow recovery. They can also increase your risk of getting a second head injury. Follow-up care is a key part of your treatment and safety. Be sure to make and go to all appointments, and call your doctor if you are having problems. It's also a good idea to know your test results and keep a list of the medicines you take. Where can you learn more? Go to http://norman-rubina.info/. Enter E235 in the search box to learn more about \"Learning About a Closed Head Injury. \"  Current as of: June 4, 2018  Content Version: 11.8  © 5535-5325 [employer]. Care instructions adapted under license by [employer] (which disclaims liability or warranty for this information). If you have questions about a medical condition or this instruction, always ask your healthcare professional. [place] [address] any warranty or liability for your use of this information. Cuts Closed With Staples: Care Instructions  Your Care Instructions  A cut can happen anywhere on your body. The doctor used staples to close the cut. Staples easily and quickly close a cut, which helps the cut heal.  Sometimes a cut can injure tendons, blood vessels, or nerves. If the cut went deep and through the skin, the doctor may have put in a layer of stitches below the staples. The deeper layer of stitches brings the deep part of the cut together. These stitches will dissolve and don't need to be removed. The staples in the upper layer are what you see on the cut. You may have a bandage.  You will need to have the staples removed, usually in 7 to 14 days.  The doctor has checked you carefully, but problems can develop later. If you notice any problems or new symptoms, get medical treatment right away. Follow-up care is a key part of your treatment and safety. Be sure to make and go to all appointments, and call your doctor if you are having problems. It's also a good idea to know your test results and keep a list of the medicines you take. How can you care for yourself at home? · Keep the cut dry for the first 24 to 48 hours. After this, you can shower if your doctor okays it. Pat the cut dry. · Don't soak the cut, such as in a bathtub. Your doctor will tell you when it's safe to get the cut wet. · If your doctor told you how to care for your cut, follow your doctor's instructions. If you did not get instructions, follow this general advice:  ? After the first 24 to 48 hours, wash around the cut with clean water 2 times a day. Don't use hydrogen peroxide or alcohol, which can slow healing. ? You may cover the cut with a thin layer of petroleum jelly, such as Vaseline, and a nonstick bandage. ? Apply more petroleum jelly and replace the bandage as needed. · Avoid any activity that could cause your cut to reopen. · Do not remove the staples on your own. Your doctor will tell you when to come back to have the staples removed. · Take pain medicines exactly as directed. ? If the doctor gave you a prescription medicine for pain, take it as prescribed. ? If you are not taking a prescription pain medicine, ask your doctor if you can take an over-the-counter medicine. When should you call for help? Call your doctor now or seek immediate medical care if:    · You have new pain, or your pain gets worse.     · The skin near the cut is cold or pale or changes color.     · You have tingling, weakness, or numbness near the cut.     · The cut starts to bleed, and blood soaks through the bandage.  Oozing small amounts of blood is normal.     · You have trouble moving the area near the cut.     · You have symptoms of infection, such as:  ? Increased pain, swelling, warmth, or redness around the cut.  ? Red streaks leading from the cut.  ? Pus draining from the cut.  ? A fever.    Watch closely for changes in your health, and be sure to contact your doctor if:    · You do not get better as expected. Where can you learn more? Go to http://norman-rubina.info/. Enter J709 in the search box to learn more about \"Cuts Closed With Staples: Care Instructions. \"  Current as of: November 20, 2017  Content Version: 11.8  © 1934-3110 [employer]. Care instructions adapted under license by [employer] (which disclaims liability or warranty for this information). If you have questions about a medical condition or this instruction, always ask your healthcare professional. [address] any warranty or liability for your use of this information.

## 2018-11-02 NOTE — SENIOR SERVICES NOTE
physical Therapy Emergency Department EVALUATION/DISCHARGE Patient: Yuly Becker (51 y.o. male) Date: 11/2/2018 Primary Diagnosis: No admission diagnoses are documented for this encounter. Precautions: Fall risk ASSESSMENT : 
Chart reviewed. Patient cleared to be seen by MD. Patient presents s/p fall in the parking lot resulting in head laceration. Head CT negative. This is patient's 6th fall since back pain initiated approx 2-3 weeks ago. Patient has been hospitalized recently with full work up revealing nerve impingement at L2-L3. Hip flexor weakness on the L identified. Pain radiating to below the L knee. Patient will be walking normally when he L knee will suddenly give out on him. Otherwise, patient is a very active and healthy 80year old. Discussed using the RW slowly, relying on his hands for each step to reduce risk of falling when knee gives way. Assessed ligaments in the L knee, no laxity identified. Recommend follow up with neurosurgery, sooner rather than later considering high fall risk. CM involved and getting appointment set for Monday. Patient is to continue using RW in the meantime. Further acute physical therapy is not indicated at this time. PLAN : 
Discharge Recommendations:  
 
[x]   Home with family, neurosurgery follow up Monday 
[]   Skilled nursing facility []   Admission to hospital with rehab likely needed 
[]   Inpatient rehab referral 
[]   Outpatient physical therapy referral 
[]   Other: Further Equipment Recommendations for Discharge: none 
[]   Rolling walker with 5\" wheels 
[]   Crutches  
[]   Cane  
[]   Wheelchair  
[]   Other: COMMUNICATION/EDUCATION:  
Communication/Collaboration: 
[x]   Fall prevention education was provided and the patient/caregiver indicated understanding. [x]   Patient/family have participated as able and agree with findings and recommendations. []   Patient is unable to participate in plan of care at this time. Findings and recommendations were discussed with: MD physician and  SUBJECTIVE:  
Patient stated It just hits me out of nowhere.  OBJECTIVE DATA SUMMARY:  
HISTORY:   
Past Medical History:  
Diagnosis Date  Cancer [facility])   
 prostate ca with mets History reviewed. No pertinent surgical history. Prior Level of Function/Home Situation: Patient lives with his wife. Ambulates independently at baseline. Was ambulating 1.5 miles per day for exercise. Recently went on a cruise to [place]. Personal factors and/or comorbidities impacting plan of care:  
 
Home Situation Home Environment: Private residence # Steps to Enter: 4 Rails to Enter: Yes Hand Rails : Bilateral 
Wheelchair Ramp: No 
One/Two Story Residence: Two story # of Interior Steps: 12 Interior Rails: Both Lift Chair Available: No 
Living Alone: No 
Support Systems: Spouse/Significant Other/Partner Patient Expects to be Discharged to[de-identified] Private residence Current DME Used/Available at Home: Walker, rolling EXAMINATION/PRESENTATION/DECISION MAKING: Critical Behavior: 
Neurologic State: Alert Orientation Level: Oriented X4 Cognition: Follows commands Hearing: Auditory Auditory Impairment: None Strength:   
Strength: Generally decreased, functional 
  
Tone & Sensation:  
Tone: Normal 
Sensation: Intact Coordination: 
Coordination: Within functional limits Functional Mobility: 
Bed Mobility: 
Supine to Sit: Independent Sit to Supine: Independent Transfers: 
Sit to Stand: Modified independent Stand to Sit: Modified independent Balance:  
Sitting: Intact Standing: Intact Ambulation/Gait Training: 
Distance (ft): 140 Feet (ft) Assistive Device: Gait belt;Walker, rolling Ambulation - Level of Assistance: Supervision Base of Support: Narrowed Special Tests: 
10 Meter walk test:  (Specify if any supplemental oxygen is used, the type, pre, during and post sats.) Self-Selected Or Fast-Velocity: Self Selected Velocity Trial 1: 8 Seconds Trial 2: 8 Seconds Trial 3: 8 Seconds Average : 8 Seconds Score: 1.25 m/s Walking Speed (m/s) Modifier Scale Age 52-63 Age 61-76 Age 66-77 Age 80-80 Male Female Male Female Male Female Male Female CH 
 0% Impaired ? 1.39 ? 1.40 ? 1.36 ? 1.30 ? 1.33 ? 1.27 ? 1.21 ? 1.15  
CI  
1-19% Impaired 1.11-1.38 1.12-1.39 1.09-1.35 1.04-1.29 1.06-1.32 1.01-1.26 0.96-1.20 0.92-1.14 CJ  
20-39% Impaired 0.83-1.10 0.84-1.11 0.82-1.08 0.78-1.03 0.80-1.05 0.76-1.00 0.72-0.95 0.69-0.91 CK  
40-59% Impaired 0.56-0.82 0.57-0.83 0.54-0.81 0.52-0.77 0.53-0.79 0.51-0.75 0.48-0.71 0.46-0.68 CL  
60-79% Impaired 0.28-0.55 0.28-0.56 0.27-0.53 0.26-0.51 0.27-0.52 0.25-0.50 0.24-0.49 0.23-0.45 CM 
 80-99% Impaired 0.01-0.28 < 0.01-0.28 < 0.01-0.27 < 0.01-0.26 0.01-0.27 0.01-0.24 0.01-0.23 0.01-0.22  
CN  
100% Impaired Cannot Perform Minimal Detectable Change (MDC-90) = 0.1 m/s Ronny TAMEZ. \"Comfortable and maximum walking speed of adults aged 20-79 years: reference values and determinants. \" Age and Agin Volume 26(1):15-9. Juan Miguel Nielsen. \"Age- and gender-related test performance in community-dwelling elderly people: Six-Minute Walk Test, Hayes Balance Scale, Timed Up & Go Test, and gait speeds. \" Physical Therapy: 2002 Volume 82(2):128-37. Arelis ROPER, Shanae KEBEDE, Russell Simons JD, Tommie CUMMINGS. \"Assessing stability and change of four performance measures: a longitudinal study evaluating outcome following total hip and knee arthroplasty. \" [facility] Musculoskeletal Disorders: 2005 Volume 6(3). Marsha Cisneros, PhD; Yara Prado, PhD. Sergio Blue Paper: \"Walking Speed: the Sixth Vital Sign\" Journal of Geriatric Physical Therapy: 2009 - Volume 32 - Issue 2 - p 25 . In compliance with CMSs Claims Based Outcome Reporting, the following G-code set was chosen for this patient based on their primary functional limitation being treated: The outcome measure chosen to determine the severity of the functional limitation was the 10 MWT with a score of 1.25 m/s which was correlated with the impairment scale. ? Mobility - Walking and Moving Around:  
  - CURRENT STATUS: CH - 0% impaired, limited or restricted  - GOAL STATUS: CH - 0% impaired, limited or restricted  - D/C STATUS:  CH - 0% impaired, limited or restricted Physical Therapy Evaluation Charge Determination History Examination Presentation Decision-Making LOW Complexity : Zero comorbidities / personal factors that will impact the outcome / POC LOW Complexity : 1-2 Standardized tests and measures addressing body structure, function, activity limitation and / or participation in recreation  LOW Complexity : Stable, uncomplicated  LOW Complexity : FOTO score of  Based on the above components, the patient evaluation is determined to be of the following complexity level: LOW Pain: 
Pain Scale 1: Numeric (0 - 10) Pain Intensity 1: 4 Pain Location 1: Head 
Pain Orientation 1: Posterior Pain Description 1: Sore Activity Tolerance: WFL Please refer to the flowsheet for vital signs taken during this treatment. After treatment:  
[]         Patient left in no apparent distress sitting up in chair 
[x]         Patient left in no apparent distress in bed 
[x]         Call bell left within reach [x]         Nursing notified 
[]         Caregiver present 
[]         Bed alarm activated Thank you for this referral. 
Johnathan Alvarenga PT, DPT Geriatric Clinical Specialist  
 Time Calculation: 30 mins

## 2018-11-02 NOTE — ED NOTES
Wound cleansed and bandage applied. Md has reviewed discharge instructions with the patient. The patient verbalized understanding.

## 2018-11-02 NOTE — PROGRESS NOTES
Date of previous inpatient admission/ ED visit? 10/7-10/9/2018 and discharge home with recommendation of outpatient therapy. Pt has been using walker. Pt is normally independent, had a trip to [facility] planned after last admission. Pt's history includes Prostate CA with bony mets. What brought the patient back to ED? Ground level fall, head laceration, in parking lot of car wash. Did patient decline recommended services during last admission/ ED visit (if yes, what)? No, pt discharged home with outpatient follow up. Has patient seen a provider since their last inpatient admission/ED visit (if yes, when)? Pt reported having a follow up scheduled for today with neurosurgery which was postponed but he did see his urologist today. CM Interventions: 
From previous inpatient admission/ED visit: outpatient follow up. From current inpatient admission/ED visit:  [facility] PT to see. CM will meet with pt at bedside. Expect discharge home with continued outpatient follow up. Esmer Carroll MSW  
 
2:30pm  Met with pt and wife at bedside who explained the above. Concern is repeated, unexpected falls. CM offered to facilitate rescheduling appointment that was postponed with Dr. Olga Moralez from today. Called office, spoke with Alonzo Estes,  Dr. Kelly Kelley nurse, phone direct for Alonzo Estes is [phone].  updated on new fall today,Plan is for Monday, November 5th 3:30pm follow up with Dr. Amor Haro and Tuesday would be plan B if needed. Alonzo Estes has Samira's number, pt's wife, and Alonzo Estes requested CM give pt/wife her direct extension. Pt/wife very appreciative. Updated AVS for pt who has already received from ED physician.  
 
Masoud Arriaga MSW

## 2018-11-02 NOTE — ED TRIAGE NOTES
Arrives via EMS for ground level fall in parking lot, denies LOC. States he just tripped and fell. LAC noted to back of head. Patient recently discharged from hospital for back pain

## 2018-11-08 ENCOUNTER — OFFICE VISIT (OUTPATIENT)
Dept: INTERNAL MEDICINE CLINIC | Age: 83
End: 2018-11-08

## 2018-11-08 VITALS
BODY MASS INDEX: 22.13 KG/M2 | HEIGHT: 75 IN | OXYGEN SATURATION: 97 % | HEART RATE: 93 BPM | DIASTOLIC BLOOD PRESSURE: 76 MMHG | RESPIRATION RATE: 18 BRPM | WEIGHT: 178 LBS | TEMPERATURE: 97.7 F | SYSTOLIC BLOOD PRESSURE: 140 MMHG

## 2018-11-08 DIAGNOSIS — C61 PROSTATE CANCER METASTATIC TO PELVIS (HCC): ICD-10-CM

## 2018-11-08 DIAGNOSIS — C79.89 PROSTATE CANCER METASTATIC TO PELVIS (HCC): ICD-10-CM

## 2018-11-08 DIAGNOSIS — R01.1 HEART MURMUR, SYSTOLIC: Primary | ICD-10-CM

## 2018-11-08 DIAGNOSIS — M51.36 DEGENERATIVE DISC DISEASE, LUMBAR: ICD-10-CM

## 2018-11-08 DIAGNOSIS — S01.01XA LACERATION OF SCALP WITHOUT FOREIGN BODY, INITIAL ENCOUNTER: ICD-10-CM

## 2018-11-08 DIAGNOSIS — C61 PROSTATE CANCER METASTATIC TO MULTIPLE SITES (HCC): ICD-10-CM

## 2018-11-08 PROBLEM — M51.16 LUMBAR DISC DISEASE WITH RADICULOPATHY: Status: ACTIVE | Noted: 2018-11-08

## 2018-11-08 RX ORDER — TAMSULOSIN HYDROCHLORIDE 0.4 MG/1
0.8 CAPSULE ORAL DAILY
COMMUNITY
End: 2018-11-15 | Stop reason: SDUPTHER

## 2018-11-08 RX ORDER — PREDNISONE 5 MG/1
TABLET ORAL 2 TIMES DAILY
COMMUNITY
End: 2018-11-15 | Stop reason: SDUPTHER

## 2018-11-08 RX ORDER — MICONAZOLE NITRATE 2 %
1 CREAM WITH APPLICATOR VAGINAL 2 TIMES DAILY
COMMUNITY

## 2018-11-08 NOTE — PROGRESS NOTES
Chief Complaint Patient presents with  New Patient Subjective: Clare Anand is an 80year old gentleman, very healthy. He was getting all of his medical care at the [facility] until a year ago. He went into urinary retention, had a PSA of 56 and was told to have metastatic prostate cancer. He was eventually referred to [facility], where he has been getting initially monthly injections and now he is on every six month Lupron. He is also on prednisone and an oral drug. He has done okay with this. Recently he has had severe back pain and was told by Natasha Mendoza from neurosurgery that he needs back surgery. I do not have then otes Patient seemed to have insisted on this  The MRI of his back showed a herniated disc and showed no evidence of prostate cancer. His last PSA was less than 1, he tells me. He has been getting along okay. He had a fall because his left leg is giving out and he had five staples placed in his head about six days ago and he needs to have those taken out, but probably not till next week. The CT of his head was negative. All hospital records were reviewed. I do not have [facility] records. He tells me he was told to have a murmur when in high school and had it checked by a cardiologist in [employer] and was told to be okay. He then spent time at [facility]. He was a  in [facility] and the [facility] and then worked for [employer] for the [employer]. Vitals:  
 11/08/18 1422 BP: 140/76 Pulse: 93 Resp: 18 Temp: 97.7 °F (36.5 °C) TempSrc: Oral  
SpO2: 97% Weight: 178 lb (80.7 kg) Height: 6' 3\" (1.905 m) Alert oriented very clear on walker Physical Examination:  His blood pressure today is somewhat elevated. He had a regular S1, regular S2, but he had a loud 3/6 systolic murmur. He had clear lungs. He had a soft abdomen. He had a little bit of facial puffiness consistent with cushingoid.  He had some left leg weakness, was walking with a walker. He had a healing scabby lesion over the left posterior parietal area and occipital that was scabbed over. One of the staples was removed, but the others were left because he was uncomfortable still. and scab was covering some of staples Plan:  I have asked him to return in about six or seven days to return to take the staples off. I have reviewed his labs and xrays. I have asked him to get an echocardiogram as he will need that prior to having any back surgery. I understand his EKG is normal.  His blood pressure is high today, but he is a little upset and stressed out. We will continue to monitor that. He says it is 120/70 at home. Vitals:  
 11/08/18 1422 BP: 140/76 Pulse: 93 Resp: 18 Temp: 97.7 °F (36.5 °C) TempSrc: Oral  
SpO2: 97% Weight: 178 lb (80.7 kg) Height: 6' 3\" (1.905 m) 3/6  holo systolic murmur Chest clear 1. Heart murmur, systolic Needs eval before back surgery 
- 2D ECHO COMPLETE ADULT (TTE) W OR WO CONTR; Future 2. Laceration of scalp without foreign body, initial encounter Wait till next week  To remove 3. Degenerative disc disease, lumbar May need surgery  Versus time as outcome likely the same 4. Prostate cancer metastatic to multiple sites [facility]) Unfortunate on prednisone and 2 testosterone blockers 5. 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

## 2018-11-15 ENCOUNTER — OFFICE VISIT (OUTPATIENT)
Dept: INTERNAL MEDICINE CLINIC | Age: 83
End: 2018-11-15

## 2018-11-15 VITALS
OXYGEN SATURATION: 99 % | WEIGHT: 175 LBS | BODY MASS INDEX: 21.76 KG/M2 | HEART RATE: 84 BPM | HEIGHT: 75 IN | TEMPERATURE: 96.4 F | RESPIRATION RATE: 18 BRPM | DIASTOLIC BLOOD PRESSURE: 78 MMHG | SYSTOLIC BLOOD PRESSURE: 158 MMHG

## 2018-11-15 DIAGNOSIS — S01.01XA LACERATION OF SCALP WITHOUT FOREIGN BODY, INITIAL ENCOUNTER: ICD-10-CM

## 2018-11-15 DIAGNOSIS — Z00.00 INITIAL MEDICARE ANNUAL WELLNESS VISIT: ICD-10-CM

## 2018-11-15 DIAGNOSIS — R01.1 HEART MURMUR, SYSTOLIC: Primary | ICD-10-CM

## 2018-11-15 DIAGNOSIS — Z13.39 SCREENING FOR ALCOHOLISM: ICD-10-CM

## 2018-11-15 DIAGNOSIS — Z13.31 SCREENING FOR DEPRESSION: ICD-10-CM

## 2018-11-15 NOTE — PROGRESS NOTES
This is an Initial Medicare Annual Wellness Exam (AWV) (Performed 12 months after IPPE or effective date of Medicare Part B enrollment, Once in a lifetime) I have reviewed the patient's medical history in detail and updated the computerized patient record. History Past Medical History:  
Diagnosis Date  Cancer [facility])   
 prostate ca with mets  Prostate cancer ([facility] [address] History reviewed. No pertinent surgical history. Current Outpatient Medications Medication Sig Dispense Refill  pneumococcal 13 jessy conj dip (PREVNAR 13, PF,) 0.5 mL syrg injection 0.5 mL by IntraMUSCular route once for 1 dose. 0.5 mL 0  
 varicella-zoster recombinant, PF, (SHINGRIX, PF,) 50 mcg/0.5 mL susr injection 0.5mL by IntraMUSCular route once now and then repeat in 2-6 months 0.5 mL 1  
 tamsulosin (FLOMAX) 0.4 mg capsule Take 0.8 mg by mouth daily.  calcium citrate-vitamin D3 (CITRACAL + D) tablet Take  by mouth two (2) times a day.  predniSONE (DELTASONE) 5 mg tablet Take  by mouth two (2) times a day.  leuprolide acetate (LUPRON DEPOT IM) by IntraMUSCular route.  denosumab (XGEVA) soln injection 120 mg by SubCUTAneous route. A month    
 OTHER Take 1,000 mg by mouth every morning.  cyclobenzaprine (FLEXERIL) 10 mg tablet Take 0.5 Tabs by mouth three (3) times daily. 90 Tab 0  
 oxyCODONE-acetaminophen (PERCOCET) 5-325 mg per tablet Take 1 Tab by mouth every four (4) hours as needed. Max Daily Amount: 6 Tabs. 8 Tab 0  
 senna-docusate (PERICOLACE) 8.6-50 mg per tablet Take 1 Tab by mouth daily as needed for Constipation. 30 Tab 0  
 abiraterone (ZYTIGA) 250 mg tab Take 1,000 mg by mouth daily.  predniSONE (DELTASONE) 5 mg tablet Take 5 mg by mouth two (2) times a day.  Calcium Cmb 2-D3-Min Cmb34-Gen (CITRACAL + BONE DENSITY) 300-200-13.5 mg-unit-mg tab Take 1 Tab by mouth two (2) times daily (with meals).  tamsulosin (FLOMAX) 0.4 mg capsule Take 0.4 mg by mouth daily.  denosumab (XGEVA) soln injection 120 mg by SubCUTAneous route once. No Known Allergies History reviewed. No pertinent family history. Social History Tobacco Use  Smoking status: Never Smoker Substance Use Topics  Alcohol use: Not on file Patient Active Problem List  
Diagnosis Code  Prostate cancer metastatic to multiple sites [facility]) C61  
 Heart murmur, systolic I61.9  Degenerative disc disease, lumbar M51.36  
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 [address] C61, C79.89  Lumbar disc disease with radiculopathy M51.16 Depression Risk Factor Screening: PHQ over the last two weeks 11/15/2018 Little interest or pleasure in doing things Not at all Feeling down, depressed, irritable, or hopeless Not at all Total Score PHQ 2 0 Alcohol Risk Factor Screening: You do not drink alcohol or very rarely. Functional Ability and Level of Safety:  
 
Hearing Loss Hearing is good. Activities of Daily Living The home contains: leg goes out walker Patient does total self care Fall Risk Fall Risk Assessment, last 12 mths 11/8/2018 Able to walk? Yes Fall in past 12 months? Yes Fall with injury? Yes  
Number of falls in past 12 months 6 Fall Risk Score 7 Abuse Screen Patient is not abused Cognitive Screening Evaluation of Cognitive Function: 
Has your family/caregiver stated any concerns about your memory: no 
Normal 
 
Patient Care Team  
Patient Care Team: 
Kajal Poole MD as PCP - General (Internal Medicine) Assessment/Plan Education and counseling provided: 
Are appropriate based on today's review and evaluation Diagnoses and all orders for this visit: 
 
1. Initial Medicare annual wellness visit -     pneumococcal 13 jessy conj dip (PREVNAR 13, PF,) 0.5 mL syrg injection; 0.5 mL by IntraMUSCular route once for 1 dose. -     varicella-zoster recombinant, PF, (SHINGRIX, PF,) 50 mcg/0.5 mL susr injection; 0.5mL by IntraMUSCular route once now and then repeat in 2-6 months 2. Screening for alcoholism -     [place] ANNUAL ALCOHOL SCREEN 15 MIN 3. Screening for depression 
-     [facility] Health Maintenance Due Topic Date Due  
 DTaP/Tdap/Td series (1 - Tdap) 10/13/1954  Shingrix Vaccine Age 50> (1 of 2) 10/13/1983  GLAUCOMA SCREENING Q2Y  10/13/1998  Pneumococcal 65+ High/Highest Risk (1 of 2 - PCV13) 10/13/1998  MEDICARE YEARLY EXAM  10/25/2018 Follow up to remove staples placed in ER about 9 days ago Denies pain or drainage Feels they are sticking up some Vitals:  
 11/15/18 1540 BP: 158/78 Pulse: 84 Resp: 18 Temp: 96.4 °F (35.8 °C) TempSrc: Oral  
SpO2: 99% Weight: 175 lb (79.4 kg) Height: 6' 3\" (1.905 m) BP Readings from Last 3 Encounters:  
11/15/18 158/78  
11/08/18 140/76  
11/02/18 181/77 Wound on scalp clean 8 staples were removed with some difficulty and some minor bleeding Wound then was clean Pressure placed and minor bleeding stopped Advise haircut next week Can shower Can use mild pressure to area BP up some will monitor 1. Initial Medicare annual wellness visit - pneumococcal 13 jessy conj dip (PREVNAR 13, PF,) 0.5 mL syrg injection; 0.5 mL by IntraMUSCular route once for 1 dose. Dispense: 0.5 mL; Refill: 0 
- varicella-zoster recombinant, PF, (SHINGRIX, PF,) 50 mcg/0.5 mL susr injection; 0.5mL by IntraMUSCular route once now and then repeat in 2-6 months  Dispense: 0.5 mL; Refill: 1 2. Screening for alcoholism - [place] ANNUAL ALCOHOL SCREEN 15 MIN 3. Screening for depression 
 
- [facility] 4. Heart murmur, systolic Echocardiogram ordered 5. Laceration of scalp without foreign body, initial encounter Clean and healing

## 2018-11-15 NOTE — PATIENT INSTRUCTIONS
Medicare Wellness Visit, Male The best way to live healthy is to have a lifestyle where you eat a well-balanced diet, exercise regularly, limit alcohol use, and quit all forms of tobacco/nicotine, if applicable. Regular preventive services are another way to keep healthy. Preventive services (vaccines, screening tests, monitoring & exams) can help personalize your care plan, which helps you manage your own care. Screening tests can find health problems at the earliest stages, when they are easiest to treat. [address] [facility] follows the current, evidence-based guidelines published by the [facility] ([facility]) when recommending preventive services for our patients. Because we follow these guidelines, sometimes recommendations change over time as research supports it. (For example, a prostate screening blood test is no longer routinely recommended for men with no symptoms.) Of course, you and your doctor may decide to screen more often for some diseases, based on your risk and co-morbidities (chronic disease you are already diagnosed with). Preventive services for you include: - Medicare offers their members a free annual wellness visit, which is time for you and your primary care provider to discuss and plan for your preventive service needs. Take advantage of this benefit every year! 
-All adults over age 72 should receive the recommended pneumonia vaccines. Current USPSTF guidelines recommend a series of two vaccines for the best pneumonia protection.  
-All adults should have a flu vaccine yearly and an ECG.  All adults age 61 and older should receive a shingles vaccine once in their lifetime.   
-All adults age 38-68 who are overweight should have a diabetes screening test once every three years.  
-Other screening tests & preventive services for persons with diabetes include: an eye exam to screen for diabetic retinopathy, a kidney function test, a foot exam, and stricter control over your cholesterol.  
-Cardiovascular screening for adults with routine risk involves an electrocardiogram (ECG) at intervals determined by the provider.  
-Colorectal cancer screening should be done for adults age 54-65 with no increased risk factors for colorectal cancer. There are a number of acceptable methods of screening for this type of cancer. Each test has its own benefits and drawbacks. Discuss with your provider what is most appropriate for you during your annual wellness visit. The different tests include: colonoscopy (considered the best screening method), a fecal occult blood test, a fecal DNA test, and sigmoidoscopy. 
-All adults born between [facility] should be screened once for Hepatitis C. 
-An Abdominal Aortic Aneurysm (AAA) Screening is recommended for men age 73-68 who has ever smoked in their lifetime. Here is a list of your current Health Maintenance items (your personalized list of preventive services) with a due date: 
Health Maintenance Due Topic Date Due  
 DTaP/Tdap/Td  (1 - Tdap) 10/13/1954  Shingles Vaccine (1 of 2) 10/13/1983  Glaucoma Screening   10/13/1998  Pneumococcal Vaccine (1 of 2 - PCV13) 10/13/1998 [address] [place] [address] Annual Well Visit  10/25/2018

## 2018-11-15 NOTE — PROGRESS NOTES
1. Have you been to the ER, urgent care clinic since your last visit? Hospitalized since your last visit? No 
 
2. Have you seen or consulted any other health care providers outside of the [facility] since your last visit? Include any pap smears or colon screening.  No

## 2018-11-20 ENCOUNTER — HOSPITAL ENCOUNTER (OUTPATIENT)
Dept: NON INVASIVE DIAGNOSTICS | Age: 83
Discharge: HOME | End: 2018-11-20
Attending: INTERNAL MEDICINE
Payer: MEDICARE

## 2018-11-20 DIAGNOSIS — R01.1 HEART MURMUR, SYSTOLIC: ICD-10-CM

## 2018-11-20 PROCEDURE — 93306 TTE W/DOPPLER COMPLETE: CPT

## 2018-11-20 NOTE — PROGRESS NOTES
Nothing to bad some  Leaking of  Valve mild and some increased filling pressures  Nothing too serious

## 2019-05-22 ENCOUNTER — OFFICE VISIT (OUTPATIENT)
Dept: INTERNAL MEDICINE CLINIC | Age: 84
End: 2019-05-22

## 2019-05-22 VITALS
DIASTOLIC BLOOD PRESSURE: 80 MMHG | TEMPERATURE: 98.8 F | OXYGEN SATURATION: 97 % | WEIGHT: 183 LBS | HEART RATE: 90 BPM | SYSTOLIC BLOOD PRESSURE: 139 MMHG | HEIGHT: 75 IN | BODY MASS INDEX: 22.75 KG/M2

## 2019-05-22 DIAGNOSIS — J01.90 ACUTE NON-RECURRENT SINUSITIS, UNSPECIFIED LOCATION: Primary | ICD-10-CM

## 2019-05-22 RX ORDER — AMOXICILLIN 875 MG/1
875 TABLET, FILM COATED ORAL 2 TIMES DAILY
Qty: 20 TAB | Refills: 0 | Status: SHIPPED | OUTPATIENT
Start: 2019-05-22 | End: 2019-06-02

## 2019-05-22 NOTE — PROGRESS NOTES
Marcio Easton is a 80 y.o. male  Chief Complaint   Patient presents with    Cough     came back from [facility] last night      Visit Vitals  /80   Pulse 90   Temp 98.8 °F (37.1 °C) (Oral)   Ht 6' 3\" (1.905 m)   Wt 183 lb (83 kg)   SpO2 97%   BMI 22.87 kg/m²     Health Maintenance Due   Topic Date Due    DTaP/Tdap/Td series (1 - Tdap) 10/13/1954    Shingrix Vaccine Age 50> (1 of 2) 10/13/1983    GLAUCOMA SCREENING Q2Y  10/13/1998    Pneumococcal 65+ years (1 of 2 - PCV13) 10/13/1998       HPI  Fellow traveler was sick during trip to [facility]. Pt started coughing 1 day later. Has now been coughing x 6 days. Feeling sick/nauseated/low appetitie  Returned home last night - coughed all night. Cough is productive. This morning  Hasn't tried any cold medications    Review of Systems   Constitutional: Positive for malaise/fatigue. Negative for fever. HENT: Positive for congestion. Negative for ear pain. Respiratory: Positive for cough and sputum production. Negative for shortness of breath. Neurological: Positive for headaches. Endo/Heme/Allergies: Negative for environmental allergies. Physical Exam   Constitutional: He is oriented to person, place, and time. He appears well-developed and well-nourished. No distress. HENT:   Head: Normocephalic and atraumatic. Mouth/Throat: Oropharynx is clear and moist.   Bilateral TMs with fluid. No erythema. Nasal mucosa red, inflamed. Eyes: Conjunctivae are normal.   Neck: Neck supple. Cardiovascular: Normal rate, regular rhythm and normal heart sounds. Pulmonary/Chest: Effort normal and breath sounds normal.   Lymphadenopathy:     He has no cervical adenopathy. Neurological: He is alert and oriented to person, place, and time. Skin: Skin is warm and dry. Nursing note and vitals reviewed. Diagnoses and all orders for this visit:    1.  Acute non-recurrent sinusitis, unspecified location  -     Fill INI 48 hours or if sx worsen: amoxicillin (AMOXIL) 875 mg tablet; Take 1 Tab by mouth two (2) times a day. Mucinex, Cepacol PRN, Rest, Fluids.

## 2019-05-22 NOTE — PATIENT INSTRUCTIONS
Mucinex (guaifenesin - any strength/formula 1-2 times/day)  Cepacol cough drops as needed  Rest, Fluids.

## 2019-05-30 ENCOUNTER — APPOINTMENT (OUTPATIENT)
Dept: GENERAL RADIOLOGY | Age: 84
DRG: 194 | End: 2019-05-30
Attending: EMERGENCY MEDICINE
Payer: MEDICARE

## 2019-05-30 ENCOUNTER — APPOINTMENT (OUTPATIENT)
Dept: CT IMAGING | Age: 84
DRG: 194 | End: 2019-05-30
Attending: EMERGENCY MEDICINE
Payer: MEDICARE

## 2019-05-30 ENCOUNTER — HOSPITAL ENCOUNTER (INPATIENT)
Age: 84
LOS: 3 days | Discharge: HOME | DRG: 194 | End: 2019-06-02
Attending: EMERGENCY MEDICINE | Admitting: INTERNAL MEDICINE
Payer: MEDICARE

## 2019-05-30 DIAGNOSIS — I48.92 ATRIAL FLUTTER, UNSPECIFIED TYPE (HCC): Primary | ICD-10-CM

## 2019-05-30 DIAGNOSIS — R50.9 FEVER, UNSPECIFIED FEVER CAUSE: ICD-10-CM

## 2019-05-30 DIAGNOSIS — R05.9 COUGH: ICD-10-CM

## 2019-05-30 LAB
ALBUMIN SERPL-MCNC: 2.9 G/DL (ref 3.5–5)
ALBUMIN/GLOB SERPL: 0.7 {RATIO} (ref 1.1–2.2)
ALP SERPL-CCNC: 62 U/L (ref 45–117)
ALT SERPL-CCNC: 14 U/L (ref 12–78)
ANION GAP SERPL CALC-SCNC: 10 MMOL/L (ref 5–15)
AST SERPL-CCNC: 19 U/L (ref 15–37)
BASOPHILS # BLD: 0 K/UL (ref 0–0.1)
BASOPHILS NFR BLD: 0 % (ref 0–1)
BILIRUB SERPL-MCNC: 1.2 MG/DL (ref 0.2–1)
BUN SERPL-MCNC: 18 MG/DL (ref 6–20)
BUN/CREAT SERPL: 17 (ref 12–20)
CALCIUM SERPL-MCNC: 8.4 MG/DL (ref 8.5–10.1)
CHLORIDE SERPL-SCNC: 101 MMOL/L (ref 97–108)
CO2 SERPL-SCNC: 23 MMOL/L (ref 21–32)
COMMENT, HOLDF: NORMAL
CREAT SERPL-MCNC: 1.07 MG/DL (ref 0.7–1.3)
DIFFERENTIAL METHOD BLD: ABNORMAL
EOSINOPHIL # BLD: 0.1 K/UL (ref 0–0.4)
EOSINOPHIL NFR BLD: 1 % (ref 0–7)
ERYTHROCYTE [DISTWIDTH] IN BLOOD BY AUTOMATED COUNT: 13.2 % (ref 11.5–14.5)
GLOBULIN SER CALC-MCNC: 4 G/DL (ref 2–4)
GLUCOSE SERPL-MCNC: 97 MG/DL (ref 65–100)
HCT VFR BLD AUTO: 39.3 % (ref 36.6–50.3)
HGB BLD-MCNC: 13.2 G/DL (ref 12.1–17)
IMM GRANULOCYTES # BLD AUTO: 0.1 K/UL (ref 0–0.04)
IMM GRANULOCYTES NFR BLD AUTO: 1 % (ref 0–0.5)
LACTATE BLD-SCNC: 1.28 MMOL/L (ref 0.4–2)
LYMPHOCYTES # BLD: 1.1 K/UL (ref 0.8–3.5)
LYMPHOCYTES NFR BLD: 10 % (ref 12–49)
MCH RBC QN AUTO: 30.6 PG (ref 26–34)
MCHC RBC AUTO-ENTMCNC: 33.6 G/DL (ref 30–36.5)
MCV RBC AUTO: 91 FL (ref 80–99)
MONOCYTES # BLD: 1.2 K/UL (ref 0–1)
MONOCYTES NFR BLD: 12 % (ref 5–13)
NEUTS SEG # BLD: 7.7 K/UL (ref 1.8–8)
NEUTS SEG NFR BLD: 76 % (ref 32–75)
NRBC # BLD: 0 K/UL (ref 0–0.01)
NRBC BLD-RTO: 0 PER 100 WBC
PLATELET # BLD AUTO: 218 K/UL (ref 150–400)
PMV BLD AUTO: 10.9 FL (ref 8.9–12.9)
POTASSIUM SERPL-SCNC: 3.5 MMOL/L (ref 3.5–5.1)
PROT SERPL-MCNC: 6.9 G/DL (ref 6.4–8.2)
RBC # BLD AUTO: 4.32 M/UL (ref 4.1–5.7)
SAMPLES BEING HELD,HOLD: NORMAL
SODIUM SERPL-SCNC: 134 MMOL/L (ref 136–145)
WBC # BLD AUTO: 10.2 K/UL (ref 4.1–11.1)

## 2019-05-30 PROCEDURE — 74011636320 HC RX REV CODE- 636/320: Performed by: RADIOLOGY

## 2019-05-30 PROCEDURE — 96360 HYDRATION IV INFUSION INIT: CPT

## 2019-05-30 PROCEDURE — 85025 COMPLETE CBC W/AUTO DIFF WBC: CPT

## 2019-05-30 PROCEDURE — 74011250637 HC RX REV CODE- 250/637: Performed by: EMERGENCY MEDICINE

## 2019-05-30 PROCEDURE — 74011000258 HC RX REV CODE- 258: Performed by: RADIOLOGY

## 2019-05-30 PROCEDURE — 74011000250 HC RX REV CODE- 250: Performed by: EMERGENCY MEDICINE

## 2019-05-30 PROCEDURE — 87040 BLOOD CULTURE FOR BACTERIA: CPT

## 2019-05-30 PROCEDURE — 80053 COMPREHEN METABOLIC PANEL: CPT

## 2019-05-30 PROCEDURE — 81001 URINALYSIS AUTO W/SCOPE: CPT

## 2019-05-30 PROCEDURE — 87086 URINE CULTURE/COLONY COUNT: CPT

## 2019-05-30 PROCEDURE — 83605 ASSAY OF LACTIC ACID: CPT

## 2019-05-30 PROCEDURE — 71046 X-RAY EXAM CHEST 2 VIEWS: CPT

## 2019-05-30 PROCEDURE — 74011250636 HC RX REV CODE- 250/636: Performed by: EMERGENCY MEDICINE

## 2019-05-30 PROCEDURE — 71275 CT ANGIOGRAPHY CHEST: CPT

## 2019-05-30 PROCEDURE — 93005 ELECTROCARDIOGRAM TRACING: CPT

## 2019-05-30 PROCEDURE — 99285 EMERGENCY DEPT VISIT HI MDM: CPT

## 2019-05-30 PROCEDURE — 65270000029 HC RM PRIVATE

## 2019-05-30 PROCEDURE — 36415 COLL VENOUS BLD VENIPUNCTURE: CPT

## 2019-05-30 PROCEDURE — 74011000258 HC RX REV CODE- 258: Performed by: EMERGENCY MEDICINE

## 2019-05-30 RX ORDER — AZITHROMYCIN 250 MG/1
500 TABLET, FILM COATED ORAL
Status: COMPLETED | OUTPATIENT
Start: 2019-05-30 | End: 2019-05-30

## 2019-05-30 RX ORDER — DILTIAZEM HYDROCHLORIDE 5 MG/ML
15 INJECTION INTRAVENOUS
Status: COMPLETED | OUTPATIENT
Start: 2019-05-30 | End: 2019-05-30

## 2019-05-30 RX ORDER — SODIUM CHLORIDE, SODIUM LACTATE, POTASSIUM CHLORIDE, CALCIUM CHLORIDE 600; 310; 30; 20 MG/100ML; MG/100ML; MG/100ML; MG/100ML
75 INJECTION, SOLUTION INTRAVENOUS CONTINUOUS
Status: DISCONTINUED | OUTPATIENT
Start: 2019-05-30 | End: 2019-06-01

## 2019-05-30 RX ORDER — ACETAMINOPHEN 325 MG/1
650 TABLET ORAL
Status: DISCONTINUED | OUTPATIENT
Start: 2019-05-30 | End: 2019-06-02 | Stop reason: HOSPADM

## 2019-05-30 RX ORDER — SODIUM CHLORIDE 0.9 % (FLUSH) 0.9 %
10 SYRINGE (ML) INJECTION
Status: COMPLETED | OUTPATIENT
Start: 2019-05-30 | End: 2019-05-30

## 2019-05-30 RX ORDER — ACETAMINOPHEN 325 MG/1
975 TABLET ORAL
Status: COMPLETED | OUTPATIENT
Start: 2019-05-30 | End: 2019-05-30

## 2019-05-30 RX ADMIN — SODIUM CHLORIDE 100 ML: 900 INJECTION, SOLUTION INTRAVENOUS at 22:29

## 2019-05-30 RX ADMIN — Medication 10 ML: at 22:29

## 2019-05-30 RX ADMIN — CEFTRIAXONE 1 G: 1 INJECTION, POWDER, FOR SOLUTION INTRAMUSCULAR; INTRAVENOUS at 23:14

## 2019-05-30 RX ADMIN — DILTIAZEM HYDROCHLORIDE 15 MG: 5 INJECTION INTRAVENOUS at 23:16

## 2019-05-30 RX ADMIN — ACETAMINOPHEN 975 MG: 325 TABLET ORAL at 23:10

## 2019-05-30 RX ADMIN — AZITHROMYCIN MONOHYDRATE 500 MG: 250 TABLET ORAL at 23:10

## 2019-05-30 RX ADMIN — IOPAMIDOL 75 ML: 755 INJECTION, SOLUTION INTRAVENOUS at 22:29

## 2019-05-30 RX ADMIN — SODIUM CHLORIDE 1000 ML: 900 INJECTION, SOLUTION INTRAVENOUS at 21:39

## 2019-05-31 PROBLEM — J18.9 COMMUNITY ACQUIRED PNEUMONIA: Status: ACTIVE | Noted: 2019-05-31

## 2019-05-31 LAB
ALBUMIN SERPL-MCNC: 2.5 G/DL (ref 3.5–5)
ALBUMIN/GLOB SERPL: 0.9 {RATIO} (ref 1.1–2.2)
ALP SERPL-CCNC: 57 U/L (ref 45–117)
ALT SERPL-CCNC: 11 U/L (ref 12–78)
ANION GAP SERPL CALC-SCNC: 9 MMOL/L (ref 5–15)
APPEARANCE UR: CLEAR
AST SERPL-CCNC: 17 U/L (ref 15–37)
ATRIAL RATE: 352 BPM
ATRIAL RATE: 82 BPM
BACTERIA URNS QL MICRO: NEGATIVE /HPF
BASOPHILS # BLD: 0 K/UL (ref 0–0.1)
BASOPHILS NFR BLD: 0 % (ref 0–1)
BILIRUB SERPL-MCNC: 0.7 MG/DL (ref 0.2–1)
BILIRUB UR QL: NEGATIVE
BNP SERPL-MCNC: 1071 PG/ML
BUN SERPL-MCNC: 15 MG/DL (ref 6–20)
BUN/CREAT SERPL: 16 (ref 12–20)
CALCIUM SERPL-MCNC: 7.5 MG/DL (ref 8.5–10.1)
CALCULATED P AXIS, ECG09: 40 DEGREES
CALCULATED R AXIS, ECG10: -27 DEGREES
CALCULATED R AXIS, ECG10: 4 DEGREES
CALCULATED T AXIS, ECG11: -56 DEGREES
CALCULATED T AXIS, ECG11: 30 DEGREES
CHLORIDE SERPL-SCNC: 105 MMOL/L (ref 97–108)
CHOLEST SERPL-MCNC: 124 MG/DL
CO2 SERPL-SCNC: 24 MMOL/L (ref 21–32)
COLOR UR: ABNORMAL
COMMENT, HOLDF: NORMAL
CREAT SERPL-MCNC: 0.92 MG/DL (ref 0.7–1.3)
DIAGNOSIS, 93000: NORMAL
DIAGNOSIS, 93000: NORMAL
DIFFERENTIAL METHOD BLD: ABNORMAL
EOSINOPHIL # BLD: 0.2 K/UL (ref 0–0.4)
EOSINOPHIL NFR BLD: 2 % (ref 0–7)
EPITH CASTS URNS QL MICRO: ABNORMAL /LPF
ERYTHROCYTE [DISTWIDTH] IN BLOOD BY AUTOMATED COUNT: 13.4 % (ref 11.5–14.5)
GLOBULIN SER CALC-MCNC: 2.9 G/DL (ref 2–4)
GLUCOSE SERPL-MCNC: 71 MG/DL (ref 65–100)
GLUCOSE UR STRIP.AUTO-MCNC: NEGATIVE MG/DL
HCT VFR BLD AUTO: 36.4 % (ref 36.6–50.3)
HDLC SERPL-MCNC: 39 MG/DL
HDLC SERPL: 3.2 {RATIO} (ref 0–5)
HGB BLD-MCNC: 11.9 G/DL (ref 12.1–17)
HGB UR QL STRIP: NEGATIVE
HYALINE CASTS URNS QL MICRO: ABNORMAL /LPF (ref 0–5)
IMM GRANULOCYTES # BLD AUTO: 0.1 K/UL (ref 0–0.04)
IMM GRANULOCYTES NFR BLD AUTO: 1 % (ref 0–0.5)
KETONES UR QL STRIP.AUTO: 15 MG/DL
LDLC SERPL CALC-MCNC: 62 MG/DL (ref 0–100)
LEUKOCYTE ESTERASE UR QL STRIP.AUTO: NEGATIVE
LIPID PROFILE,FLP: NORMAL
LYMPHOCYTES # BLD: 1.3 K/UL (ref 0.8–3.5)
LYMPHOCYTES NFR BLD: 16 % (ref 12–49)
MAGNESIUM SERPL-MCNC: 2.2 MG/DL (ref 1.6–2.4)
MCH RBC QN AUTO: 30.5 PG (ref 26–34)
MCHC RBC AUTO-ENTMCNC: 32.7 G/DL (ref 30–36.5)
MCV RBC AUTO: 93.3 FL (ref 80–99)
MONOCYTES # BLD: 1 K/UL (ref 0–1)
MONOCYTES NFR BLD: 12 % (ref 5–13)
NEUTS SEG # BLD: 5.8 K/UL (ref 1.8–8)
NEUTS SEG NFR BLD: 69 % (ref 32–75)
NITRITE UR QL STRIP.AUTO: NEGATIVE
NRBC # BLD: 0 K/UL (ref 0–0.01)
NRBC BLD-RTO: 0 PER 100 WBC
P-R INTERVAL, ECG05: 196 MS
PH UR STRIP: 6 [PH] (ref 5–8)
PHOSPHATE SERPL-MCNC: 2.7 MG/DL (ref 2.6–4.7)
PLATELET # BLD AUTO: 201 K/UL (ref 150–400)
PMV BLD AUTO: 9.9 FL (ref 8.9–12.9)
POTASSIUM SERPL-SCNC: 3.6 MMOL/L (ref 3.5–5.1)
PROT SERPL-MCNC: 5.4 G/DL (ref 6.4–8.2)
PROT UR STRIP-MCNC: NEGATIVE MG/DL
Q-T INTERVAL, ECG07: 348 MS
Q-T INTERVAL, ECG07: 404 MS
QRS DURATION, ECG06: 88 MS
QRS DURATION, ECG06: 92 MS
QTC CALCULATION (BEZET), ECG08: 464 MS
QTC CALCULATION (BEZET), ECG08: 472 MS
RBC # BLD AUTO: 3.9 M/UL (ref 4.1–5.7)
RBC #/AREA URNS HPF: ABNORMAL /HPF (ref 0–5)
SAMPLES BEING HELD,HOLD: NORMAL
SODIUM SERPL-SCNC: 138 MMOL/L (ref 136–145)
SP GR UR REFRACTOMETRY: 1.01 (ref 1–1.03)
TRIGL SERPL-MCNC: 115 MG/DL (ref ?–150)
TROPONIN I SERPL-MCNC: <0.05 NG/ML
TSH SERPL DL<=0.05 MIU/L-ACNC: 1.34 UIU/ML (ref 0.36–3.74)
UROBILINOGEN UR QL STRIP.AUTO: 0.2 EU/DL (ref 0.2–1)
VENTRICULAR RATE, ECG03: 107 BPM
VENTRICULAR RATE, ECG03: 82 BPM
VLDLC SERPL CALC-MCNC: 23 MG/DL
WBC # BLD AUTO: 8.4 K/UL (ref 4.1–11.1)
WBC URNS QL MICRO: ABNORMAL /HPF (ref 0–4)

## 2019-05-31 PROCEDURE — 74011250637 HC RX REV CODE- 250/637: Performed by: INTERNAL MEDICINE

## 2019-05-31 PROCEDURE — 74011250637 HC RX REV CODE- 250/637: Performed by: NURSE PRACTITIONER

## 2019-05-31 PROCEDURE — 65660000001 HC RM ICU INTERMED STEPDOWN

## 2019-05-31 PROCEDURE — 80061 LIPID PANEL: CPT

## 2019-05-31 PROCEDURE — 80053 COMPREHEN METABOLIC PANEL: CPT

## 2019-05-31 PROCEDURE — 97116 GAIT TRAINING THERAPY: CPT

## 2019-05-31 PROCEDURE — 84484 ASSAY OF TROPONIN QUANT: CPT

## 2019-05-31 PROCEDURE — 83735 ASSAY OF MAGNESIUM: CPT

## 2019-05-31 PROCEDURE — 74011250636 HC RX REV CODE- 250/636: Performed by: INTERNAL MEDICINE

## 2019-05-31 PROCEDURE — 74011000258 HC RX REV CODE- 258: Performed by: INTERNAL MEDICINE

## 2019-05-31 PROCEDURE — 93005 ELECTROCARDIOGRAM TRACING: CPT

## 2019-05-31 PROCEDURE — 85025 COMPLETE CBC W/AUTO DIFF WBC: CPT

## 2019-05-31 PROCEDURE — 84100 ASSAY OF PHOSPHORUS: CPT

## 2019-05-31 PROCEDURE — 97161 PT EVAL LOW COMPLEX 20 MIN: CPT

## 2019-05-31 PROCEDURE — 94760 N-INVAS EAR/PLS OXIMETRY 1: CPT

## 2019-05-31 PROCEDURE — 36415 COLL VENOUS BLD VENIPUNCTURE: CPT

## 2019-05-31 PROCEDURE — 84443 ASSAY THYROID STIM HORMONE: CPT

## 2019-05-31 PROCEDURE — 83880 ASSAY OF NATRIURETIC PEPTIDE: CPT

## 2019-05-31 PROCEDURE — 74011636637 HC RX REV CODE- 636/637: Performed by: INTERNAL MEDICINE

## 2019-05-31 RX ORDER — METOPROLOL SUCCINATE 25 MG/1
25 TABLET, EXTENDED RELEASE ORAL
Status: DISCONTINUED | OUTPATIENT
Start: 2019-05-31 | End: 2019-06-02 | Stop reason: HOSPADM

## 2019-05-31 RX ORDER — ENOXAPARIN SODIUM 100 MG/ML
40 INJECTION SUBCUTANEOUS EVERY 24 HOURS
Status: DISCONTINUED | OUTPATIENT
Start: 2019-05-31 | End: 2019-05-31

## 2019-05-31 RX ORDER — CALCIUM CARBONATE 200(500)MG
400 TABLET,CHEWABLE ORAL 2 TIMES DAILY WITH MEALS
Status: DISCONTINUED | OUTPATIENT
Start: 2019-05-31 | End: 2019-06-02 | Stop reason: HOSPADM

## 2019-05-31 RX ORDER — SODIUM CHLORIDE 0.9 % (FLUSH) 0.9 %
5-40 SYRINGE (ML) INJECTION AS NEEDED
Status: DISCONTINUED | OUTPATIENT
Start: 2019-05-31 | End: 2019-06-02 | Stop reason: HOSPADM

## 2019-05-31 RX ORDER — SODIUM CHLORIDE 0.9 % (FLUSH) 0.9 %
5-40 SYRINGE (ML) INJECTION EVERY 8 HOURS
Status: DISCONTINUED | OUTPATIENT
Start: 2019-05-31 | End: 2019-06-02 | Stop reason: HOSPADM

## 2019-05-31 RX ORDER — PREDNISONE 5 MG/1
5 TABLET ORAL 2 TIMES DAILY WITH MEALS
Status: DISCONTINUED | OUTPATIENT
Start: 2019-05-31 | End: 2019-06-02 | Stop reason: HOSPADM

## 2019-05-31 RX ORDER — GUAIFENESIN 100 MG/5ML
81 LIQUID (ML) ORAL DAILY
Status: DISCONTINUED | OUTPATIENT
Start: 2019-06-01 | End: 2019-06-02 | Stop reason: HOSPADM

## 2019-05-31 RX ORDER — ABIRATERONE ACETATE 250 MG/1
1000 TABLET ORAL DAILY
Status: DISCONTINUED | OUTPATIENT
Start: 2019-05-31 | End: 2019-06-02 | Stop reason: HOSPADM

## 2019-05-31 RX ORDER — MELATONIN
1000 DAILY
Status: DISCONTINUED | OUTPATIENT
Start: 2019-06-01 | End: 2019-06-02 | Stop reason: HOSPADM

## 2019-05-31 RX ORDER — ONDANSETRON 2 MG/ML
4 INJECTION INTRAMUSCULAR; INTRAVENOUS
Status: DISCONTINUED | OUTPATIENT
Start: 2019-05-31 | End: 2019-06-02 | Stop reason: HOSPADM

## 2019-05-31 RX ORDER — IPRATROPIUM BROMIDE AND ALBUTEROL SULFATE 2.5; .5 MG/3ML; MG/3ML
3 SOLUTION RESPIRATORY (INHALATION)
Status: DISCONTINUED | OUTPATIENT
Start: 2019-05-31 | End: 2019-06-02 | Stop reason: HOSPADM

## 2019-05-31 RX ORDER — BISACODYL 5 MG
5 TABLET, DELAYED RELEASE (ENTERIC COATED) ORAL DAILY PRN
Status: DISCONTINUED | OUTPATIENT
Start: 2019-05-31 | End: 2019-06-02 | Stop reason: HOSPADM

## 2019-05-31 RX ORDER — TAMSULOSIN HYDROCHLORIDE 0.4 MG/1
0.4 CAPSULE ORAL DAILY
Status: DISCONTINUED | OUTPATIENT
Start: 2019-05-31 | End: 2019-06-02 | Stop reason: HOSPADM

## 2019-05-31 RX ADMIN — METOPROLOL SUCCINATE 25 MG: 25 TABLET, EXTENDED RELEASE ORAL at 21:36

## 2019-05-31 RX ADMIN — AZITHROMYCIN MONOHYDRATE 500 MG: 500 INJECTION, POWDER, LYOPHILIZED, FOR SOLUTION INTRAVENOUS at 23:49

## 2019-05-31 RX ADMIN — Medication 10 ML: at 13:55

## 2019-05-31 RX ADMIN — Medication 10 ML: at 21:40

## 2019-05-31 RX ADMIN — SODIUM CHLORIDE, SODIUM LACTATE, POTASSIUM CHLORIDE, AND CALCIUM CHLORIDE 75 ML/HR: 600; 310; 30; 20 INJECTION, SOLUTION INTRAVENOUS at 16:28

## 2019-05-31 RX ADMIN — CEFTRIAXONE 1 G: 1 INJECTION, POWDER, FOR SOLUTION INTRAMUSCULAR; INTRAVENOUS at 21:35

## 2019-05-31 RX ADMIN — ABIRATERONE ACETATE 1000 MG: 250 TABLET ORAL at 13:53

## 2019-05-31 RX ADMIN — TAMSULOSIN HYDROCHLORIDE 0.4 MG: 0.4 CAPSULE ORAL at 08:11

## 2019-05-31 RX ADMIN — CALCIUM CARBONATE (ANTACID) CHEW TAB 500 MG 400 MG: 500 CHEW TAB at 16:25

## 2019-05-31 RX ADMIN — APIXABAN 2.5 MG: 2.5 TABLET, FILM COATED ORAL at 13:52

## 2019-05-31 RX ADMIN — PREDNISONE 5 MG: 5 TABLET ORAL at 16:25

## 2019-05-31 RX ADMIN — SODIUM CHLORIDE, SODIUM LACTATE, POTASSIUM CHLORIDE, AND CALCIUM CHLORIDE 75 ML/HR: 600; 310; 30; 20 INJECTION, SOLUTION INTRAVENOUS at 03:28

## 2019-05-31 NOTE — CONSULTS
Cardiovascular Consult    Patient: Desean Natarajan MRN: [id number]  SSN: [id number]    YOB: 1933  Age: 80 y.o. Sex: male       Subjective:      Date of  Admission: 5/30/2019   Primary Care Provider: Sandra Palumbo MD  Admission type:   Chief Complaint   Patient presents with    Fever    Cough     Desean Natarajan is a 80 y.o.  male [facility] [I48.92]. Patient complains of cough. This consultation was requested by Dr. Julia Richardson MD.  Pt has PMH of prostate cancer. Presented with chief c/o cough which he said started approximately 1 week ago after he returned from a trip in [place] where he was around another tourist who had a cough. He went to see his PCP who placed him on oral antibiotics but his cough did not improve after 2 days of amoxicillin. He had fever but no chills. He presented to ER last night with continued cough which was productive (yellow sputum). He also had generalized body aches and fatigue. In ER, he was noted to have Left lower lobe focal consolidation and determined to have pneumonia. Additionally, he was noted to be in [address] with RVR with rate to 140's. He Denies having any palpitations, dizziness, SOB at that time and no hx of syncope. His heart rate improved after IV hydration and he never required rate controled medications. Overnight he converted to NSR. He says he is overall feeling better. Pt denies any cardiac hx including afib, cad, HTN, HLD. Denies any hx of chest pain and was able to walk distances on [employer] tour without chest pain or SOB. Had echo 11/2018 with NL EF, NWMA, mild-mod mac     Of note, pt's wife follows with Dr. Marcel Sanchez. Past Medical History:   Diagnosis Date    Cancer [facility])     prostate ca with mets    Prostate cancer [facility])       History reviewed. No pertinent surgical history. History reviewed. No pertinent family history.    Social History     Tobacco Use    Smoking status: Never Smoker    Smokeless tobacco: Never Used   Substance Use Topics    Alcohol use: Never     Frequency: Never      Current Facility-Administered Medications   Medication Dose Route Frequency    . PHARMACY TO SUBSTITUTE PER PROTOCOL (Reordered from: abiraterone (ZYTIGA) 250 mg tab)    Per Protocol    tamsulosin (FLOMAX) capsule 0.4 mg  0.4 mg Oral DAILY    sodium chloride (NS) flush 5-40 mL  5-40 mL IntraVENous Q8H    sodium chloride (NS) flush 5-40 mL  5-40 mL IntraVENous PRN    ondansetron (ZOFRAN) injection 4 mg  4 mg IntraVENous Q4H PRN    bisacodyl (DULCOLAX) tablet 5 mg  5 mg Oral DAILY PRN    azithromycin (ZITHROMAX) 500 mg in 0.9% sodium chloride 250 mL IVPB  500 mg IntraVENous Q24H    cefTRIAXone (ROCEPHIN) 1 g in 0.9% sodium chloride (MBP/ADV) 50 mL  1 g IntraVENous Q24H    albuterol-ipratropium (DUO-NEB) 2.5 MG-0.5 MG/3 ML  3 mL Nebulization Q4H PRN    predniSONE (DELTASONE) tablet 5 mg  5 mg Oral BID WITH MEALS    apixaban (ELIQUIS) tablet 2.5 mg  2.5 mg Oral BID    metoprolol succinate (TOPROL-XL) XL tablet 25 mg  25 mg Oral QHS    acetaminophen (TYLENOL) tablet 650 mg  650 mg Oral Q4H PRN    lactated Ringers infusion  75 mL/hr IntraVENous CONTINUOUS    dilTIAZem (CARDIZEM) 125 mg in dextrose 5% 125 mL infusion  0-15 mg/hr IntraVENous TITRATE        No Known Allergies     Review of Symptoms:  Constitutional:Positive fevers negativechills. HEET: No trauma. No visual changes. No hearing loss. No sore throat. Neck: No adenopathy or swelling. Heart: No chest pain or palpitations, dizziness, syncope  Lungs: No shortness of breath. Positive cough. Abdomen: No pain. No nausea of vomiting. No BRBPR or melena. No diarrhea. Urology: No dysuria or hematuria. Ext: No edema. Skin: No acute rashes or ulcers. Endocrine: No heat or cold intolerance. Neuro: No transient neurologic deficits.             Subjective:     Visit Vitals  /71 (BP 1 Location: Right arm, BP Patient Position: At rest)   Pulse 83 Temp 98.5 °F (36.9 °C)   Resp 18   Ht 6' 3\" (1.905 m)   Wt 172 lb 13.5 oz (78.4 kg)   SpO2 97%   BMI 21.60 kg/m²     Physical Exam:  Head: Normocephalic, atraumatic. Eyes: Pupils equal, , Extra ocular muscles intact. Sclera anicteric. Ears: Grossly responsive to sound. Neck: No adenopathy. No JVD. Throat: No sores or erythema. Heart: Regular rate and rhythm. Normal S1 and S2. Grade II/VI systolic murmur heard best at apex. Lungs: Clear to auscultation bilaterally. No wheezing, rales, or rhonchi. Abdomen: Soft, non-tender. No guarding or rebound. No hepatosplenomegaly. Bowel sounds active. Ext: No edema. No ulceration. Skin: Normal coloration. Warm and dry. No rash. Neuro: Cranial nerves II through XII intact. Motor and sensory grossly intact. Affect: Appropriate. Alert and interactive. Cardiographics:  Telemetry: normal sinus rhythm  ECG: aflutter, variable AV conduction  Echocardiogram:  11/2018  Left ventricle: Systolic function was normal. Ejection fraction was  estimated in the range of 60 % to 65 %. There were no regional wall motion  abnormalities. Doppler parameters were consistent with abnormal left  ventricular relaxation (grade 1 diastolic dysfunction). Mitral valve: There was mild to moderate annular calcification. Tricuspid valve: There was mild regurgitation.       Data Reviewed: CMP:   Lab Results   Component Value Date/Time     05/31/2019 07:07 AM    K 3.6 05/31/2019 07:07 AM     05/31/2019 07:07 AM    CO2 24 05/31/2019 07:07 AM    AGAP 9 05/31/2019 07:07 AM    GLU 71 05/31/2019 07:07 AM    BUN 15 05/31/2019 07:07 AM    CREA 0.92 05/31/2019 07:07 AM    GFRAA >60 05/31/2019 07:07 AM    GFRNA >60 05/31/2019 07:07 AM    CA 7.5 (L) 05/31/2019 07:07 AM    MG 2.2 05/31/2019 07:07 AM    PHOS 2.7 05/31/2019 07:07 AM    ALB 2.5 (L) 05/31/2019 07:07 AM    TP 5.4 (L) 05/31/2019 07:07 AM    GLOB 2.9 05/31/2019 07:07 AM    AGRAT 0.9 (L) 05/31/2019 07:07 AM    SGOT 17 05/31/2019 07:07 AM    ALT 11 (L) 05/31/2019 07:07 AM     CBC:   Lab Results   Component Value Date/Time    WBC 8.4 05/31/2019 07:07 AM    HGB 11.9 (L) 05/31/2019 07:07 AM    HCT 36.4 (L) 05/31/2019 07:07 AM     05/31/2019 07:07 AM     All Cardiac Markers in the last 24 hours:   Lab Results   Component Value Date/Time    TROIQ <0.05 05/31/2019 07:07 AM     Lab Results   Component Value Date/Time    TSH 1.34 05/31/2019 07:07 AM       ABG: No results found for: PH, PHI, PCO2, PCO2I, PO2, PO2I, HCO3, HCO3I, FIO2, FIO2I  COAGS: No results found for: APTT, PTP, INR     Assessment:         Hospital Problems  Date Reviewed: 5/31/2019          Codes Class Noted POA    Community acquired pneumonia ICD-10-CM: J18.9  ICD-9-CM: 088  5/31/2019 Yes        * (Principal) Atrial flutter ([facility] [address] ICD-10-CM: U09.33  ICD-9-CM: 427.32  5/30/2019 Yes        Prostate cancer metastatic to multiple sites [facility]) ICD-10-CM: C61  ICD-9-CM: 185, 199.0  11/8/2018 Yes        Degenerative disc disease, lumbar ICD-10-CM: M51.36  ICD-9-CM: 722.52  11/8/2018 Yes               Plan:     Pt with CAP who presented with aflutter with RVR on admission, now converted on own overnight to NSR without antiarrhythmic- did receive IV hydration. Suspect aflutter RVR was driven by pneumonia. TSH normal.   Start Toprol XL 25 mg q hs. [id number]=4 (age). Start eliquis 2.5 mg BID (reduced dose due to age). No need for repeat echo since had echo in 11/2018 with NL ef, and only mild TR. Follow up with cardiology in 2 weeks. Darya Locke.  ITZEL Nash  5/31/2019, 11:42 AM    [facility] Office:   Priti Gamez Office:  [address]    [facility] [address] [facility]  [address]     [address] [place] [address] Street    [place], [id number] [place] [address]  P: [phone]    P: [phone]  F: [phone]    F: [phone]

## 2019-05-31 NOTE — ED TRIAGE NOTES
TRIAGE: Pt arrives ambulatory reporting cough that has worsened over the last few days, reports malaise, has been on ABX for 1-2 days (Amoxicillin). Pt reports travel to [facility] and returned 1 week ago.  Reports fever at home    Hx: [facility] MD

## 2019-05-31 NOTE — PROGRESS NOTES
Pt expressed trepidation regarding taking eliquis while also being on prednisone and zytiga (13 percent incidence of bruising with zytiga). Pt is concerned that he already bruises easily on both of these medications. Discussed pt concerns with Dr. Ninoska Irving- [id number] score is 2 and only due to age. Has had only one known episode of afib (on admission), none since. Per Dr. Ninoska Irving- will change Eliquis to ASA. Will arrange 30 day event monitor to be sent to pt's home and have pt follow up after 30 day event completed.

## 2019-05-31 NOTE — PROGRESS NOTES
Hospitalist Progress Note  Rudy Quinn MD  Answering service: [phone] from in house phone      Date of Service:  2019  NAME:  Estefanía Coronado  :  10/13/1933  MRN:  [id number]    Admission Summary:   85M hx of Prostate Ca p/w cough/fever following trip to [facility]. Found to be in A. Flutter in ED  Interval history / Subjective:   Patient seen and examined at bedside, feels ok, still having cough, back in sinus rhythm     Assessment & Plan:     #. A flutter with RVR- resolved. - rate controlled now back in Sinus, TSH wnl, TTE pending, Cardiology consult pending, check trops    #. CAP: seen on CT, hx of cough/fever: Abx, O2 as needed. Monitor- get PT/OT eval  #. Prostate Ca: on Hormonal Tx    Code status: Full  DVT prophylaxis: Enoxaparin  Care Plan discussed with: Patient/Family and Nurse  Disposition: TBD     Hospital Problems  Date Reviewed: 2019          Codes Class Noted POA    * (Principal) Atrial flutter ([facility] [address] ICD-10-CM: S42.54  ICD-9-CM: 427.32  2019 Yes            Review of Systems:   Pertinent items are mentioned in interval history. Vital Signs:    Last 24hrs VS reviewed since prior progress note. Most recent are:  Visit Vitals  /69 (BP 1 Location: Right arm, BP Patient Position: At rest)   Pulse 78   Temp 97.4 °F (36.3 °C)   Resp 22   Ht 6' 3\" (1.905 m)   Wt 78.4 kg (172 lb 13.5 oz)   SpO2 96%   BMI 21.60 kg/m²       No intake or output data in the 24 hours ending 19 0818     Physical Examination:   General:  Alert, oriented, No acute distress  Card:  S1, S2 with systolic murmurs- precordium, good peripheral perfusion  Resp:  No accessory muscle use, Good AE, no wheezes, no rhonchi  Abd:  Soft, non-tender, non-distended  Extremities:  No cyanosis or clubbing, no significant edema  Neuro:  Grossly normal, no focal neuro deficits, follows commands   Psych:  Good insight, AAOx3, not agitated.     Data Review:    Review and/or order of clinical lab test  Review and/or order of tests in the radiology section of CPT  Review and/or order of tests in the medicine section of CPT  Labs:     Recent Labs     05/31/19  0707 05/30/19 2110   WBC 8.4 10.2   HGB 11.9* 13.2   HCT 36.4* 39.3    218     Recent Labs     05/30/19 2110   *   K 3.5      CO2 23   BUN 18   CREA 1.07   GLU 97   CA 8.4*     Recent Labs     05/30/19 2110   SGOT 19   ALT 14   AP 62   TBILI 1.2*   TP 6.9   ALB 2.9*   GLOB 4.0     No results for input(s): INR, PTP, APTT in the last 72 hours. No lab exists for component: INREXT   No results for input(s): FE, TIBC, PSAT, FERR in the last 72 hours. No results found for: FOL, RBCF   No results for input(s): PH, PCO2, PO2 in the last 72 hours. No results for input(s): CPK, CKNDX, TROIQ in the last 72 hours. No lab exists for component: CPKMB  No results found for: CHOL, CHOLX, CHLST, CHOLV, HDL, LDL, LDLC, DLDLP, TGLX, TRIGL, TRIGP, CHHD, CHHDX  Lab Results   Component Value Date/Time    Glucose (POC) 92 10/07/2018 02:51 PM     Lab Results   Component Value Date/Time    Color YELLOW/STRAW 05/30/2019 10:58 PM    Appearance CLEAR 05/30/2019 10:58 PM    Specific gravity 1.015 05/30/2019 10:58 PM    pH (UA) 6.0 05/30/2019 10:58 PM    Protein NEGATIVE  05/30/2019 10:58 PM    Glucose NEGATIVE  05/30/2019 10:58 PM    Ketone 15 (A) 05/30/2019 10:58 PM    Bilirubin NEGATIVE  05/30/2019 10:58 PM    Urobilinogen 0.2 05/30/2019 10:58 PM    Nitrites NEGATIVE  05/30/2019 10:58 PM    Leukocyte Esterase NEGATIVE  05/30/2019 10:58 PM    Epithelial cells FEW 05/30/2019 10:58 PM    Bacteria NEGATIVE  05/30/2019 10:58 PM    WBC 0-4 05/30/2019 10:58 PM    RBC 0-5 05/30/2019 10:58 PM     Medications Reviewed:     Current Facility-Administered Medications   Medication Dose Route Frequency    . PHARMACY TO SUBSTITUTE PER PROTOCOL (Reordered from: abiraterone (ZYTIGA) 250 mg tab)    Per Protocol    tamsulosin (FLOMAX) capsule 0.4 mg  0.4 mg Oral DAILY    sodium chloride (NS) flush 5-40 mL  5-40 mL IntraVENous Q8H    sodium chloride (NS) flush 5-40 mL  5-40 mL IntraVENous PRN    ondansetron (ZOFRAN) injection 4 mg  4 mg IntraVENous Q4H PRN    bisacodyl (DULCOLAX) tablet 5 mg  5 mg Oral DAILY PRN    enoxaparin (LOVENOX) injection 40 mg  40 mg SubCUTAneous Q24H    azithromycin (ZITHROMAX) 500 mg in 0.9% sodium chloride 250 mL IVPB  500 mg IntraVENous Q24H    cefTRIAXone (ROCEPHIN) 1 g in 0.9% sodium chloride (MBP/ADV) 50 mL  1 g IntraVENous Q24H    albuterol-ipratropium (DUO-NEB) 2.5 MG-0.5 MG/3 ML  3 mL Nebulization Q4H PRN    acetaminophen (TYLENOL) tablet 650 mg  650 mg Oral Q4H PRN    lactated Ringers infusion  75 mL/hr IntraVENous CONTINUOUS    dilTIAZem (CARDIZEM) 125 mg in dextrose 5% 125 mL infusion  0-15 mg/hr IntraVENous TITRATE   ______________________________________________________________________  EXPECTED LENGTH OF STAY: - - -  ACTUAL LENGTH OF STAY:          1               Velasquez Barcenas MD

## 2019-05-31 NOTE — PROGRESS NOTES
Bedside shift change report given to [address] (oncoming nurse) by LEAH Ham (offgoing nurse). Report included the following information SBAR, Kardex, Procedure Summary, MAR and Recent Results.

## 2019-05-31 NOTE — PROGRESS NOTES
PHYSICAL THERAPY EVALUATION WITH DISCHARGE  Patient: Lacho Westfall (19 y.o. male)  Date: 5/31/2019  Primary Diagnosis: Atrial flutter ([facility] [I48.92]       Precautions: standard       ASSESSMENT :   Based on the objective data described below, patient presents with Independent overall for functional mobility. Gait training completed at Independent, 250 feet and using a no AD. Able to climb 6 steps with rail independently. Patient steady and safe. O2 sats stable on RA 96-97% throughout session. Educated on importance of OOB up in chair and ambulation given his pneumonia. No further PT needs identified. The following are barriers to independence while in acute care:   -Cognitive and/or behavioral: none  -Medical condition: functional endurance    -Other:       Discharge recommendations: None     Equipment recommendations for successful discharge (if) home: none       PLAN :  Discharging further skilled acute physical therapy at this time. SUBJECTIVE:   Patient stated I feel fine.     OBJECTIVE DATA SUMMARY:   HISTORY:    Past Medical History:   Diagnosis Date    Cancer [facility])     prostate ca with mets    [facility] ([facility]    History reviewed. No pertinent surgical history. Prior Level of Function/Home Situation: lives with wife, completely independent and working  Personal factors and/or comorbidities impacting plan of care: none    Home Situation  Home Environment: Private residence  One/Two Story Residence: Two story  Living Alone: No  Support Systems: Family member(s), Friends \ neighbors  Patient Expects to be Discharged to[de-identified] Private residence  Current DME Used/Available at Home: Reema Joe    EXAMINATION/PRESENTATION/DECISION MAKING:   Critical Behavior:              Hearing: Auditory  Auditory Impairment: Hard of hearing, bilateral  Skin:  NT  Edema: NT  Range Of Motion:  AROM: Within functional limits           PROM: Within functional limits           Strength:    Strength:  Within functional limits                    Tone & Sensation:   Tone: Normal                              Coordination:  Coordination: Within functional limits  Vision:      Functional Mobility:  Bed Mobility:  Rolling: Independent  Supine to Sit: Independent        Transfers:  Sit to Stand: Independent  Stand to Sit: Independent                       Balance:   Sitting: Intact  Standing: Intact  Ambulation/Gait Training:  Distance (ft): 250 Feet (ft)  Assistive Device: (no AD)  Ambulation - Level of Assistance: Independent                 Base of Support: Widened     Speed/Sejal: Pace decreased (<100 feet/min)     Stairs:  Number of Stairs Trained: 6  Stairs - Level of Assistance: Modified independent   Rail Use: Right       Functional Measure:  Tinetti test:    Sitting Balance: 1  Arises: 2  Attempts to Rise: 2  Immediate Standing Balance: 2  Standing Balance: 2  Nudged: 2  Eyes Closed: 1  Turn 360 Degrees - Continuous/Discontinuous: 1  Turn 360 Degrees - Steady/Unsteady: 1  Sitting Down: 2  Balance Score: 16  Indication of Gait: 1  R Step Length/Height: 1  L Step Length/Height: 1  R Foot Clearance: 1  L Foot Clearance: 1  Step Symmetry: 1  Step Continuity: 1  Path: 2  Trunk: 1  Walking Time: 1  Gait Score: 11  Total Score: 27         Tinetti Tool Score Risk of Falls  <19 = High Fall Risk  19-24 = Moderate Fall Risk  25-28 = Low Fall Risk  Tinetti ME. Performance-Oriented Assessment of Mobility Problems in Elderly Patients. Brown 66; [id number].  (Scoring Description: PT Bulletin Feb. 10, 1993)    Older adults: Blue Willett et al, 2009; n = 1000 [facility] elderly evaluated with ABC, KATHY, ADL, and IADL)  · Mean KATHY score for males aged 69-68 years = 26.21(3.40)  · Mean KATHY score for females age 69-68 years = 25.16(4.30)  · Mean KATHY score for males over 80 years = 23.29(6.02)  · Mean KATHY score for females over 80 years = 17.20(8.32)       Pain:  Pre treatment: 0 /10   During treatment: 0/10  Post treatment:  0/10   Location: NA Activity Tolerance:   Good  Please refer to the flowsheet for vital signs taken during this treatment. After treatment patient left:   Up in chair  Call light within reach  RN notified     COMMUNICATION/EDUCATION:   The patients plan of care was discussed with: Occupational Therapist, RN.       Thank you for this referral.  Shani Mahoney, PT   Time Calculation: 24 mins

## 2019-05-31 NOTE — ED NOTES
Patient left department with RN for transportation to inpatient bed. Patient's VS at the time of transfer were /50, 95 on RA, denies pain at this time, 98.9 orally, HR 95 sinus rhythm rhythm on the monitor. Patient was alert and oriented x 4 and denies further needs at time of transfer. TRANSFER - OUT REPORT:    Verbal report given to NATO Hogan(name) on Shanique Guy  being transferred to [facility](unit) for routine progression of care       Report consisted of patients Situation, Background, Assessment and   Recommendations(SBAR). Information from the following report(s) SBAR, Kardex, ED Summary, [facility] and Recent Results was reviewed with the receiving nurse. Opportunity for questions and clarification was provided.

## 2019-05-31 NOTE — PROGRESS NOTES
Reason for Admission:  The patient presented with A flutter with RVR                RRAT Score: 22                 Resources/supports as identified by patient/family: The patient has spouse as support                  Top Challenges facing patient (as identified by patient/family and CM): Finances/Medication cost?  The patient denied difficulty affording or accessing medications prior to hospitalization                   Transportation? The patient endorses that spouse will transport home upon discharge              Support system or lack thereof? Patient has support of spouse                      Living arrangements? The patient lives in own home with his wife             Self-care/ADLs/Cognition? Patient is alert and oriented x4, endorses being independent with ADLs and mobility           Current Advanced Directive/Advance Care Plan: Full code, not on file                            Plan for utilizing home health: PT/OT evaluated the patient and are currently stating no needs- no skilled nursing needs identified at this time                          Likelihood of readmission: Low                 Transition of Care Plan: Home    The CM met with the patient at bedside in order to introduce the role of CM and assess for patient needs. 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 The patient just returned from [facility] to the [address] [place] [address] Road. The patient endorses being independent with ADLs and mobility, denied use of DME in the home. The patient denied difficulty affording or accessing medications prior to hospitalization, and denied any concerns for transition home. CM will continue to follow for transitions of care, family to transport home upon discharge.  NNEO Tovar    Care Management Interventions  PCP Verified by CM: Yes(The patient verified PCP as Dr. Scott Fitchburg)  Mode of Transport at Discharge:  Other (see comment)(Patient's spouse will transport home upon discharge)  Transition of Care Consult (CM Consult): Discharge Planning  MyChart Signup: No  Discharge Durable Medical Equipment: No  Health Maintenance Reviewed: Yes  Physical Therapy Consult: Yes  Occupational Therapy Consult: Yes  Speech Therapy Consult: No  Current Support Network: Lives with Spouse, Own Home  Confirm Follow Up Transport: Family  Plan discussed with Pt/Family/Caregiver: Yes  Marvin Resource Information Provided?: No  Discharge Location  Discharge Placement: Home

## 2019-05-31 NOTE — PROGRESS NOTES
Problem: Pneumonia: Day 1  Goal: Activity/Safety  Pt educated on call bell, safety and fall prevention. Pt verbalizes understanding. Problem: Falls - Risk of  Goal: *Absence of Falls  Description  Document Betty Latin Fall Risk and appropriate interventions in the flowsheet. Outcome: Progressing Towards Goal  Note:   Fall Risk Interventions:                      History of Falls Interventions:  Investigate reason for fall, Door open when patient unattended         Outcome: Progressing Towards Goal

## 2019-05-31 NOTE — ED PROVIDER NOTES
HPI       80y M with hx of prostate CA here with fever and cough. Started about a week ago with cough. Was on a tour in [place] and other people around him on his tour were coughing. Got back home and it seemed better but then got worse. Saw his doctor and given rx for amox but then again started to feel better so didn't start it until 2 days ago when he started to feel badly again. Says he has diffuse body aches and trouble breathing with sputum production. No hx of breathing problems. No vomiting. No abdominal pain. No chest pain. Past Medical History:   Diagnosis Date    Cancer [facility])     prostate ca with mets    Prostate cancer [facility])        History reviewed. No pertinent surgical history. History reviewed. No pertinent family history.     Social History     Socioeconomic History    Marital status:      Spouse name: Not on file    Number of children: Not on file    Years of education: Not on file    Highest education level: Not on file   Occupational History    Not on file   Social Needs    Financial resource strain: Not on file    Food insecurity:     Worry: Not on file     Inability: Not on file    Transportation needs:     Medical: Not on file     Non-medical: Not on file   Tobacco Use    Smoking status: Never Smoker    Smokeless tobacco: Never Used   Substance and Sexual Activity    Alcohol use: Never     Frequency: Never    Drug use: Never    Sexual activity: Not on file   Lifestyle    Physical activity:     Days per week: Not on file     Minutes per session: Not on file    Stress: Not on file   Relationships    Social connections:     Talks on phone: Not on file     Gets together: Not on file     Attends [employer]ism service: Not on file     Active member of club or organization: Not on file     Attends meetings of clubs or organizations: Not on file     Relationship status: Not on file    Intimate partner violence:     Fear of current or ex partner: Not on file     Emotionally abused: Not on file     Physically abused: Not on file     Forced sexual activity: Not on file   Other Topics Concern    Not on file   Social History Narrative    ** Merged History Encounter **              ALLERGIES: Patient has no known allergies. Review of Systems   Review of Systems   Constitutional: (-) weight loss. HEENT: (-) stiff neck   Eyes: (-) discharge. Respiratory: (-) cough. Cardiovascular: (-) syncope. Gastrointestinal: (-) blood in stool. Genitourinary: (-) hematuria. Musculoskeletal: (-) myalgias. Neurological: (-) seizure. Skin: (-) petechiae  Lymph/Immunologic: (-) enlarged lymph nodes  All other systems reviewed and are negative. Vitals:    05/30/19 2103 05/30/19 2108 05/30/19 2115   BP:  148/65 108/73   Pulse: (!) 140 (!) 159 (!) 124   Resp:  20 20   Temp:  99.1 °F (37.3 °C)    SpO2: 94% 95% 95%   Weight:  79.8 kg (175 lb 14.8 oz)    Height:  6' 3\" (1.905 m)             Physical Exam Nursing note and vitals reviewed. Constitutional: oriented to person, place, and time. appears well-developed and well-nourished. No distress. Head: Normocephalic and atraumatic. Sclera anicteric  Nose: No rhinorrhea  Mouth/Throat: Oropharynx is clear and moist. Pharynx normal  Eyes: Conjunctivae are normal. Pupils are equal, round, and reactive to light. Right eye exhibits no discharge. Left eye exhibits no discharge. Neck: Painless normal range of motion. Neck supple. No LAD. Cardiovascular: fast rate, regular rhythm, normal heart sounds and intact distal pulses. Exam reveals no gallop and no friction rub. No murmur heard. Pulmonary/Chest:  Mild to mod respiratory distress. No wheezes. Coarse breath sounds throughout. Some increased work of breathing. No accessory muscle use. Good air exchange throughout. Abdominal: soft, non-tender, no rebound or guarding. No hepatosplenomegaly. Normal bowel sounds throughout.   Back: no tenderness to palpation, no deformities, no CVA tenderness  Extremities/Musculoskeletal: Normal range of motion. no tenderness. No edema. Distal extremities are neurovasc intact. Lymphadenopathy:   No adenopathy. Neurological:  Alert and oriented to person, place, and time. Coordination normal. CN 2-12 intact. Motor and sensory function intact. Skin: Skin is warm and dry. No rash noted. No pallor. MDM 80y M here with fever and cough. Suspect pneumonia. Will need cultures, lactate, fluids. Likely admit. Procedures    ED EKG interpretation:  Rhythm: atrial flutter; and irregular. Rate (approx.): 107; Axis: normal; QRS interval: normal ; ST/T wave: normal;  This EKG was interpreted by Antonia Ellison MD,ED Provider. Hospitalist Erlin for Admission  10:06 PM    ED Room Number: ER05/05  Patient Name and age:  Vidal Fallow 80 y.o.  male  Working Diagnosis:   1. Atrial flutter, unspecified type ([address]    2. Cough    3. Fever, unspecified fever cause      Readmission: no  Isolation Requirements:  no  Recommended Level of Care:  telemetry  Code Status:  full  Other:  80y M here with 1 week of worsening cough. Now with fever at home. Started on amox 2 days ago by PMD but no improvement. sats in low 90's on RA on arrival. Also tachy to the 140's and in new a-flutter. With fluids, HR into the 110's and not on any rate control. CXR clear but clinically it seems like pneumonia so will add on CT chest to have a better look. 10:14 PM  HR in the 100-110's with just IVF. Will hold on dilt unless rate goes up. Total critical care time (excluding procedures): 40 min    11:31 PM  HR back up so started on dilt drip. CTA chest shows infiltrate but no PE. Written for ceftriaxone and azithromycin.

## 2019-05-31 NOTE — PROGRESS NOTES
Problem: Patient Education: Go to Patient Education Activity  Goal: Patient/Family Education  Outcome: Progressing Towards Goal     Problem: Pneumonia: Day 1  Goal: Off Pathway (Use only if patient is Off Pathway)  Outcome: Progressing Towards Goal     Problem: Falls - Risk of  Goal: *Absence of Falls  Description  Document Gail Appiah Fall Risk and appropriate interventions in the flowsheet.   Outcome: Progressing Towards Goal

## 2019-05-31 NOTE — PROGRESS NOTES
Order acknowledged. Patient and wife decline need for OT at this time. Patient walked in hallway and went up stairs with PT earlier. Mohsen complete Ot order.

## 2019-05-31 NOTE — H&P
[address] [facility]  HISTORY AND PHYSICAL    Name:  Brittany Franco  MR#:  [id number]  :  10/13/1933  ACCOUNT #:  [de-identified]  ADMIT DATE:  2019      PRIMARY CARE PHYSICIAN:  Derald Goodpasture, MD    SOURCE OF INFORMATION:  The patient and the patient's spouse who was present at the bedside. CHIEF COMPLAINT:  Cough. HISTORY OF PRESENT ILLNESS:  This is an 26-year-old man with a past medical history significant for prostate cancer, was in his usual state of health until about a week ago when the patient developed cough. The cough is productive of yellowish sputum associated with fever at home. The patient was recently on a tour and stated that other people around him had similar symptoms. When the patient got back home from his trip, he went to his primary care physician office. The patient was prescribed amoxicillin, but was only told to take the amoxicillin if the symptoms get worse. The symptoms got worse 2 days ago and the patient started taking the antibiotics 2 days ago and despite being on antibiotics for 2 days, the patient did not notice any improvement, because of that came to the emergency room. In addition to cough, the patient also has shortness of breath, which is worse with very minimal exertion, but the patient denies chest pain. The cough is also associated with generalized body aches and pain. He was last admitted to this hospital in 10/2018 for evaluation of back pain following a fall at home. When the patient arrived at the emergency room, CTA of the chest was obtained. This was negative for pulmonary embolism, but the CTA shows evidence of pneumonia. The patient was started on antibiotics and was referred to the hospitalist service for evaluation for admission. Also when the patient arrived at the emergency room, he was found to be in atrial fibrillation/flutter. The patient has no history of cardiac arrhythmia or heart disease.   He was started on continuous infusion of Cardizem for rate control before he was referred to the hospitalist service for evaluation for admission. PAST MEDICAL HISTORY:  Prostate cancer. ALLERGIES:  NO KNOWN DRUG ALLERGIES. MEDICATIONS:  Zytiga 1000 mg daily, amoxicillin 875 mg 1 tablet twice daily, Xgeva 120 mg subcutaneously every month, prednisone 5 mg twice daily, Flomax 0.4 mg daily. FAMILY HISTORY:  This was reviewed. His father had heart disease at old age. PAST SURGICAL HISTORY:  Not significant. SOCIAL HISTORY:  No history of alcohol or tobacco abuse. REVIEW OF SYSTEMS:  HEAD, EYES, EARS, NOSE AND THROAT:  No headache, no dizziness, no blurring of vision, no photophobia. RESPIRATORY SYSTEM:  This is positive for cough and shortness of breath, no hemoptysis. CARDIOVASCULAR SYSTEM:  This is positive for orthopnea. No chest pain, no palpitations. GASTROINTESTINAL SYSTEM:  No nausea or vomiting. No diarrhea. No constipation. GENITOURINARY SYSTEM:  No dysuria, no urgency and no frequency. All other systems are reviewed and they are negative. PHYSICAL EXAMINATION:  GENERAL APPEARANCE:  The patient appeared ill, in moderate distress. VITAL SIGNS:  On arrival at the emergency room, temperature 99.1, pulse 140, respiratory rate 20, blood pressure 148/65, oxygen saturation 94% on room air. HEENT:  Head:  Normocephalic, atraumatic. Eyes:  Normal eye movements. No redness, no drainage, no discharge. Ears:  Normal external ears with no evidence of drainage. Nose:  No deformity and no drainage. Mouth and Throat:  No visible oral lesion. NECK:  Neck is supple. No JVD, no thyromegaly. CHEST:  Bilateral basilar crackles and few expiratory wheezing. HEART:  Normal S1 and S2, irregularly irregular. No clinically appreciable murmur. ABDOMEN:  Soft, nontender, normal bowel sounds. CNS:  Alert, oriented x3. No gross focal neurological deficit. EXTREMITIES:  No edema. Pulses 2+ bilaterally.   MUSCULOSKELETAL SYSTEM:  No evidence of joint deformity and swelling. SKIN:  No active skin lesions seen in the exposed part of the body. PSYCHIATRY:  Normal mood and affect. LYMPHATIC SYSTEM:  No cervical lymphadenopathy. DIAGNOSTIC DATA:  EKG shows atrial flutter with rapid ventricular response. Chest x-ray shows hyperinflated lungs. No acute cardiopulmonary disease. CTA of the chest, no pulmonary emboli, focal consolidation in left lower lobe superior segment suspicious for pneumonia. LABORATORY DATA:  Urinalysis: This is significant for negative nitrites, negative leuko esterase, negative bacteria. Hematology:  WBC 10.2, hemoglobin 13.2, hematocrit 39.3, platelets 583. Chemistry:  Sodium 134, potassium 3.5, chloride 101, CO2 23, glucose 97, BUN 18, creatinine 1.07, calcium 8.4, total bilirubin 1.2, ALT 14, AST 19, alkaline phosphatase 62, total protein 6.9, albumin level 2.9, globulin at 4.0, lactic acid level 1.28.    ASSESSMENT:  1. Atrial flutter with rapid ventricular response. 2.  Prostate cancer. 3.  Bacterial pneumonia. PLAN:  1. Atrial flutter with rapid ventricular response. We will admit the patient for further evaluation and treatment. The atrial flutter is most likely triggered by the episode of the pneumonia. We will continue with Cardizem infusion for rate control. We will check TSH level. We will obtain echocardiogram.  We will check serial cardiac markers to rule out acute myocardial infarction. Cardiology consult will be requested to assist in further evaluation and treatment of atrial fibrillation with rapid ventricular response. 2.  Prostate cancer. We will continue with preadmission medication. 3.  Bacterial pneumonia. This is what most likely triggered the atrial flutter with rapid ventricular response. We will start the patient on Rocephin and Zithromax. We will monitor the patient's clinical response to treatment.   The patient will also be placed on DuoNeb as well.    OTHER ISSUES:  Code status: The patient is a full code. We will place the patient on Lovenox for DVT prophylaxis. FUNCTIONAL STATUS PRIOR TO ADMISSION:  The patient is ambulatory without a cane or walker.         Varsha Whitlock MD      [id number]  D:  05/31/2019 5:39  T:  05/31/2019 5:48  JOB #:  [id number]  CC:  Alem Farr MD

## 2019-06-01 LAB
BACTERIA SPEC CULT: NORMAL
CC UR VC: NORMAL
SERVICE CMNT-IMP: NORMAL

## 2019-06-01 PROCEDURE — 74011000258 HC RX REV CODE- 258: Performed by: INTERNAL MEDICINE

## 2019-06-01 PROCEDURE — 94760 N-INVAS EAR/PLS OXIMETRY 1: CPT

## 2019-06-01 PROCEDURE — 65660000001 HC RM ICU INTERMED STEPDOWN

## 2019-06-01 PROCEDURE — 74011250637 HC RX REV CODE- 250/637: Performed by: INTERNAL MEDICINE

## 2019-06-01 PROCEDURE — 74011250636 HC RX REV CODE- 250/636: Performed by: INTERNAL MEDICINE

## 2019-06-01 PROCEDURE — 74011250637 HC RX REV CODE- 250/637: Performed by: NURSE PRACTITIONER

## 2019-06-01 PROCEDURE — 77030027138 HC INCENT SPIROMETER -A

## 2019-06-01 PROCEDURE — 74011636637 HC RX REV CODE- 636/637: Performed by: INTERNAL MEDICINE

## 2019-06-01 RX ORDER — GUAIFENESIN 600 MG/1
600 TABLET, EXTENDED RELEASE ORAL EVERY 12 HOURS
Status: DISCONTINUED | OUTPATIENT
Start: 2019-06-01 | End: 2019-06-02 | Stop reason: HOSPADM

## 2019-06-01 RX ADMIN — Medication 10 ML: at 22:14

## 2019-06-01 RX ADMIN — CALCIUM CARBONATE (ANTACID) CHEW TAB 500 MG 400 MG: 500 CHEW TAB at 09:13

## 2019-06-01 RX ADMIN — TAMSULOSIN HYDROCHLORIDE 0.4 MG: 0.4 CAPSULE ORAL at 09:12

## 2019-06-01 RX ADMIN — GUAIFENESIN 600 MG: 600 TABLET, EXTENDED RELEASE ORAL at 22:14

## 2019-06-01 RX ADMIN — SODIUM CHLORIDE, SODIUM LACTATE, POTASSIUM CHLORIDE, AND CALCIUM CHLORIDE 75 ML/HR: 600; 310; 30; 20 INJECTION, SOLUTION INTRAVENOUS at 13:23

## 2019-06-01 RX ADMIN — AZITHROMYCIN MONOHYDRATE 500 MG: 500 INJECTION, POWDER, LYOPHILIZED, FOR SOLUTION INTRAVENOUS at 23:36

## 2019-06-01 RX ADMIN — METOPROLOL SUCCINATE 25 MG: 25 TABLET, EXTENDED RELEASE ORAL at 22:14

## 2019-06-01 RX ADMIN — PREDNISONE 5 MG: 5 TABLET ORAL at 09:12

## 2019-06-01 RX ADMIN — VITAMIN D 1000 UNITS: 25 TAB ORAL at 09:12

## 2019-06-01 RX ADMIN — CEFTRIAXONE 1 G: 1 INJECTION, POWDER, FOR SOLUTION INTRAMUSCULAR; INTRAVENOUS at 23:36

## 2019-06-01 RX ADMIN — PREDNISONE 5 MG: 5 TABLET ORAL at 17:25

## 2019-06-01 RX ADMIN — Medication 10 ML: at 06:10

## 2019-06-01 RX ADMIN — ABIRATERONE ACETATE 1000 MG: 250 TABLET ORAL at 14:13

## 2019-06-01 RX ADMIN — Medication 10 ML: at 13:23

## 2019-06-01 RX ADMIN — CALCIUM CARBONATE (ANTACID) CHEW TAB 500 MG 400 MG: 500 CHEW TAB at 17:25

## 2019-06-01 RX ADMIN — ASPIRIN 81 MG 81 MG: 81 TABLET ORAL at 09:12

## 2019-06-01 RX ADMIN — GUAIFENESIN 600 MG: 600 TABLET, EXTENDED RELEASE ORAL at 10:34

## 2019-06-01 NOTE — PROGRESS NOTES
Problem: Pneumonia: Day 1  Goal: Diagnostic Test/Procedures  Outcome: Progressing Towards Goal  Daily labs, WBC within normal limits   Goal: Nutrition/Diet  Outcome: Progressing Towards Goal  Consumes 50% of daily food   Goal: Medications  Outcome: Progressing Towards Goal  Antibiotic therapy with rocephin and azithromycin   Goal: Respiratory  Outcome: Progressing Towards Goal  Incentive spirometer initiated, on RA cough and deep breathing  1530-Bedside shift change report given to Carly Meredith RN (oncoming nurse) by Iram Bernstein RN (offgoing nurse). Report included the following information SBAR, Kardex, Intake/Output and MAR.

## 2019-06-01 NOTE — PROGRESS NOTES
Hospitalist Progress Note  Madelin Helm MD  Answering service: [phone] from in house phone      Date of Service:  2019  NAME:  Shanique Guy  :  10/13/1933  MRN:  [id number]    Admission Summary:   85M hx of Prostate Ca p/w cough/fever following trip to [place]. Found to be in A. Flutter in ED  Interval history / Subjective:   Patient seen and examined at bedside, feels ok, still has cough, struggling to expectorate, in sinus rhythm. Will need to stay overnight will check tomorrow     Assessment & Plan:     #. A flutter with RVR- resolved. - rate controlled now back in Sinus,TSH wnl, Cardiology evaluated, changed AC to Aspirin only, trops -ve    #. CAP: seen on CT, hx of cough/fever: Abx, O2 as needed. Monitor- PT/OT cleared  #. 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) ICD-10-CM: C61  ICD-9-CM: 185, 199.0  2018 Yes        Degenerative disc disease, lumbar ICD-10-CM: M51.36  ICD-9-CM: 722.52  2018 Yes            Review of Systems:   Pertinent items are mentioned in interval history. Vital Signs:    Last 24hrs VS reviewed since prior progress note.  Most recent are:  Visit Vitals  /57 (BP 1 Location: Left arm, BP Patient Position: At rest)   Pulse 86   Temp 99.2 °F (37.3 °C)   Resp 22   Ht 6' 3\" (1.905 m)   Wt 78.4 kg (172 lb 13.5 oz)   SpO2 99%   BMI 21.60 kg/m²         Intake/Output Summary (Last 24 hours) at 2019 0748  Last data filed at 2019 1628  Gross per 24 hour   Intake 1455 ml   Output 600 ml   Net 855 ml        Physical Examination:   General:  Alert, oriented, No acute distress  Resp:  No accessory muscle use, fair AE, no wheezes, few rhonchi  Abd:  Soft, non-tender, non-distended  Extremities:  No cyanosis or clubbing, no significant edema  Neuro:  Grossly normal, no focal neuro deficits, follows commands   Psych:  Good insight, AAOx3, not agitated. Data Review:    Review and/or order of clinical lab test  Review and/or order of tests in the radiology section of CPT  Review and/or order of tests in the medicine section of CPT  Labs:     Recent Labs     05/31/19 0707 05/30/19 2110   WBC 8.4 10.2   HGB 11.9* 13.2   HCT 36.4* 39.3    218     Recent Labs     05/31/19 0707 05/30/19 2110    134*   K 3.6 3.5    101   CO2 24 23   BUN 15 18   CREA 0.92 1.07   GLU 71 97   CA 7.5* 8.4*   MG 2.2  --    PHOS 2.7  --      Recent Labs     05/31/19 0707 05/30/19 2110   SGOT 17 19   ALT 11* 14   AP 57 62   TBILI 0.7 1.2*   TP 5.4* 6.9   ALB 2.5* 2.9*   GLOB 2.9 4.0     No results for input(s): INR, PTP, APTT in the last 72 hours. No lab exists for component: INREXT, INREXT   No results for input(s): FE, TIBC, PSAT, FERR in the last 72 hours. No results found for: FOL, RBCF   No results for input(s): PH, PCO2, PO2 in the last 72 hours.   Recent Labs     05/31/19 0707   TROIQ <0.05     Lab Results   Component Value Date/Time    Cholesterol, total 124 05/31/2019 07:07 AM    HDL Cholesterol 39 05/31/2019 07:07 AM    LDL, calculated 62 05/31/2019 07:07 AM    Triglyceride 115 05/31/2019 07:07 AM    CHOL/HDL Ratio 3.2 05/31/2019 07:07 AM     Lab Results   Component Value Date/Time    Glucose (POC) 92 10/07/2018 02:51 PM     Lab Results   Component Value Date/Time    Color YELLOW/STRAW 05/30/2019 10:58 PM    Appearance CLEAR 05/30/2019 10:58 PM    Specific gravity 1.015 05/30/2019 10:58 PM    pH (UA) 6.0 05/30/2019 10:58 PM    Protein NEGATIVE  05/30/2019 10:58 PM    Glucose NEGATIVE 05/30/2019 10:58 PM    Ketone 15 (A) 05/30/2019 10:58 PM    Bilirubin NEGATIVE  05/30/2019 10:58 PM    Urobilinogen 0.2 05/30/2019 10:58 PM    Nitrites NEGATIVE  05/30/2019 10:58 PM    Leukocyte Esterase NEGATIVE  05/30/2019 10:58 PM    Epithelial cells FEW 05/30/2019 10:58 PM    Bacteria NEGATIVE  05/30/2019 10:58 PM    WBC 0-4 05/30/2019 10:58 PM    RBC 0-5 05/30/2019 10:58 PM     Medications Reviewed:     Current Facility-Administered Medications   Medication Dose Route Frequency    abiraterone tab 1,000 mg  1,000 mg Oral DAILY    tamsulosin (FLOMAX) capsule 0.4 mg  0.4 mg Oral DAILY    sodium chloride (NS) flush 5-40 mL  5-40 mL IntraVENous Q8H    sodium chloride (NS) flush 5-40 mL  5-40 mL IntraVENous PRN    ondansetron (ZOFRAN) injection 4 mg  4 mg IntraVENous Q4H PRN    bisacodyl (DULCOLAX) tablet 5 mg  5 mg Oral DAILY PRN    cefTRIAXone (ROCEPHIN) 1 g in 0.9% sodium chloride (MBP/ADV) 50 mL  1 g IntraVENous Q24H    albuterol-ipratropium (DUO-NEB) 2.5 MG-0.5 MG/3 ML  3 mL Nebulization Q4H PRN    predniSONE (DELTASONE) tablet 5 mg  5 mg Oral BID WITH MEALS    metoprolol succinate (TOPROL-XL) XL tablet 25 mg  25 mg Oral QHS    calcium carbonate (TUMS) chewable tablet 400 mg [elemental]  400 mg Oral BID WITH MEALS    cholecalciferol (VITAMIN D3) tablet 1,000 Units  1,000 Units Oral DAILY    azithromycin (ZITHROMAX) 500 mg in 0.9% sodium chloride (MBP/ADV) 250 mL  500 mg IntraVENous Q24H    aspirin chewable tablet 81 mg  81 mg Oral DAILY    acetaminophen (TYLENOL) tablet 650 mg  650 mg Oral Q4H PRN    lactated Ringers infusion  75 mL/hr IntraVENous CONTINUOUS    dilTIAZem (CARDIZEM) 125 mg in dextrose 5% 125 mL infusion  0-15 mg/hr IntraVENous TITRATE   ______________________________________________________________________  EXPECTED LENGTH OF STAY: 3d 7h  ACTUAL LENGTH OF STAY:          2               Valerie Graves MD

## 2019-06-01 NOTE — PROGRESS NOTES
1950: Bedside shift change report given to Jhoan Smith RN (oncoming nurse) by Rosina Duggan RN (offgoing nurse). Report included the following information SBAR, Kardex, MAR, Recent Results and Cardiac Rhythm NSR.       SPO2 WDL on room air. Vitals WDL. Patient has productive cough    Problem: Pneumonia: Day 3  Goal: Treatments/Interventions/Procedures  Outcome: Progressing Towards Goal     Problem: Pneumonia: Day 3  Goal: *Oxygen saturation within defined limits  Outcome: Progressing Towards Goal     Problem: Pneumonia: Day 3  Goal: *Hemodynamically stable  Outcome: Progressing Towards Goal     Problem: Falls - Risk of  Goal: *Absence of Falls  Description  Document Paolawneal Laynea Fall Risk and appropriate interventions in the flowsheet.   Outcome: Progressing Towards Goal

## 2019-06-02 VITALS
RESPIRATION RATE: 16 BRPM | TEMPERATURE: 97.5 F | HEIGHT: 75 IN | OXYGEN SATURATION: 96 % | SYSTOLIC BLOOD PRESSURE: 133 MMHG | DIASTOLIC BLOOD PRESSURE: 63 MMHG | BODY MASS INDEX: 22.01 KG/M2 | WEIGHT: 177.03 LBS | HEART RATE: 78 BPM

## 2019-06-02 PROCEDURE — 74011250637 HC RX REV CODE- 250/637: Performed by: INTERNAL MEDICINE

## 2019-06-02 PROCEDURE — 74011250637 HC RX REV CODE- 250/637: Performed by: NURSE PRACTITIONER

## 2019-06-02 PROCEDURE — 74011636637 HC RX REV CODE- 636/637: Performed by: INTERNAL MEDICINE

## 2019-06-02 RX ORDER — LEVOFLOXACIN 750 MG/1
750 TABLET ORAL DAILY
Qty: 5 TAB | Refills: 0 | Status: SHIPPED | OUTPATIENT
Start: 2019-06-02 | End: 2019-06-07

## 2019-06-02 RX ORDER — GUAIFENESIN 100 MG/5ML
81 LIQUID (ML) ORAL DAILY
Qty: 30 TAB | Refills: 0 | Status: SHIPPED | OUTPATIENT
Start: 2019-06-02 | End: 2019-06-03

## 2019-06-02 RX ORDER — METOPROLOL SUCCINATE 25 MG/1
25 TABLET, EXTENDED RELEASE ORAL
Qty: 30 TAB | Refills: 0 | Status: SHIPPED | OUTPATIENT
Start: 2019-06-02 | End: 2019-07-02

## 2019-06-02 RX ORDER — GUAIFENESIN 600 MG/1
600 TABLET, EXTENDED RELEASE ORAL EVERY 12 HOURS
Qty: 14 TAB | Refills: 0 | Status: SHIPPED | OUTPATIENT
Start: 2019-06-02 | End: 2019-06-09

## 2019-06-02 RX ADMIN — PREDNISONE 5 MG: 5 TABLET ORAL at 08:04

## 2019-06-02 RX ADMIN — TAMSULOSIN HYDROCHLORIDE 0.4 MG: 0.4 CAPSULE ORAL at 08:04

## 2019-06-02 RX ADMIN — GUAIFENESIN 600 MG: 600 TABLET, EXTENDED RELEASE ORAL at 08:04

## 2019-06-02 RX ADMIN — CALCIUM CARBONATE (ANTACID) CHEW TAB 500 MG 400 MG: 500 CHEW TAB at 08:04

## 2019-06-02 RX ADMIN — Medication 10 ML: at 06:57

## 2019-06-02 RX ADMIN — VITAMIN D 1000 UNITS: 25 TAB ORAL at 08:04

## 2019-06-02 RX ADMIN — ASPIRIN 81 MG 81 MG: 81 TABLET ORAL at 08:04

## 2019-06-02 NOTE — PROGRESS NOTES
Received a discharge order. Reviewed discharge instructions with patient and he verbalized understanding. Four new prescriptions were reviewed and given. IV's and heart monitor were removed. Vitals are stable and he has no complaints at this time. Patient is being discharged home with his wife. His wife will be transporting him home. He will be wheeled down to the car by [facility].

## 2019-06-02 NOTE — DISCHARGE INSTRUCTIONS
Discharge Instructions       PATIENT ID: Marina Martinez  MRN: [id number]   YOB: 1933    DATE OF ADMISSION: 5/30/2019  8:57 PM    DATE OF DISCHARGE: 6/2/2019    PRIMARY CARE PROVIDER: Theresa Macedo MD     ATTENDING PHYSICIAN: Mercy Ann MD  DISCHARGING PROVIDER: Kathi Larios MD    To contact this individual call [phone] and ask the  to page. If unavailable ask to be transferred the Adult Hospitalist Department. DISCHARGE DIAGNOSES  CAP and Atrial Flutter    CONSULTATIONS: IP CONSULT TO CARDIOLOGY    PROCEDURES/SURGERIES: * No surgery found *    FOLLOW UP APPOINTMENTS:   Follow-up Information     Follow up With Specialties Details Why Contact Info    Raphael Agee MD Cardiology On 7/3/2019 9:20  [facility]  [address] [facility]  [phone]      Theresa Macedo MD Internal Medicine   [address] [place] [address]  [phone]          cardiology office after Event monitor- supposed to be sent to you           ADDITIONAL CARE RECOMMENDATIONS: follow up with PCP and Cardiology    DIET: Resume previous diet    DISCHARGE MEDICATIONS:  Aspirin, metoprolol, levofloxacin    · It is important that you take the medication exactly as they are prescribed. · Keep your medication in the bottles provided by the pharmacist and keep a list of the medication names, dosages, and times to be taken in your wallet. · Do not take other medications without consulting your doctor. NOTIFY YOUR PHYSICIAN FOR ANY OF THE FOLLOWING:   Fever over 101 degrees for 24 hours. Chest pain, shortness of breath, fever, chills, nausea, vomiting, diarrhea, change in mentation, falling, weakness, bleeding. Severe pain or pain not relieved by medications. Or, any other signs or symptoms that you may have questions about. It was our pleasure to help take care of you and we hope you get well very soon.     DISPOSITION:    Home With:   OT  PT  HH  RN       SNF/Inpatient Rehab/LTAC   x Independent/assisted living    Hospice    Other:     CDMP Checked:   Yes x     PROBLEM LIST Updated:  Yes x     Signed:   Dallas Garrison MD  6/2/2019  7:55 AM

## 2019-06-02 NOTE — DISCHARGE SUMMARY
Discharge Summary       PATIENT ID: Marina Martinez  MRN: [id number]   YOB: 1933    DATE OF ADMISSION: 5/30/2019  8:57 PM    DATE OF DISCHARGE: 6/2/2019   PRIMARY CARE PROVIDER: Theresa Macedo MD     ATTENDING PHYSICIAN: Kathi Larios MD  DISCHARGING PROVIDER: Kathi Larios MD    To contact this individual call [phone] and ask the  to page. If unavailable ask to be transferred the Adult Hospitalist Department. CONSULTATIONS: IP CONSULT TO CARDIOLOGY    PROCEDURES/SURGERIES: * No surgery found *    ADMITTING DIAGNOSES & HOSPITAL COURSE:   Admitted with cough, CAP, developed Atrial Flutter which was converted to sinus with diltiazem, then evaluated by cardiology and started on metoprolol and aspirin, Abx helping with PNA treatment. F/u with Cardiology    Risk of Re-Admission: mod  DISCHARGE DIAGNOSES / PLAN:      #. A flutter with RVR- resolved. - rate controlled now back in Sinus,TSH wnl, Cardiology evaluated, changed AC to Aspirin only, trops -ve. F/u with cardiology who are arranging for event monitor outpatient.     #. CAP: seen on CT, hx of cough/fever: Abx, O2 as needed. Monitor- PT/OT cleared for home dc, switch to po abx.   #. Prostate Ca: on Hormonal Tx     FOLLOW UP APPOINTMENTS:    Follow-up Information     Follow up With Specialties Details Why Contact Info    Raphael Agee MD Cardiology On 7/3/2019 9:20  [address]  [address]      Theresa Macedo MD Internal Medicine   [address] [place] [address]  [phone]          cardiology office after Event monitor- supposed to be sent to you         ADDITIONAL CARE RECOMMENDATIONS:  Follow up with PCP and Cardiology    DIET: Resume previous diet    ACTIVITY: Activity as tolerated    DISCHARGE MEDICATIONS:  Current Discharge Medication List      START taking these medications    Details   aspirin 81 mg chewable tablet Take 1 Tab by mouth daily for 30 days. Qty: 30 Tab, Refills: 0      guaiFENesin ER (MUCINEX) 600 mg ER tablet Take 1 Tab by mouth every twelve (12) hours for 7 days. Qty: 14 Tab, Refills: 0      metoprolol succinate (TOPROL-XL) 25 mg XL tablet Take 1 Tab by mouth nightly for 30 days. Qty: 30 Tab, Refills: 0      levoFLOXacin (LEVAQUIN) 750 mg tablet Take 1 Tab by mouth daily for 5 days. Qty: 5 Tab, Refills: 0         CONTINUE these medications which have NOT CHANGED    Details   calcium citrate-vitamin D3 (CITRACAL + D) tablet Take  by mouth two (2) times a day. denosumab (XGEVA) soln injection 120 mg by SubCUTAneous route. A month      abiraterone (ZYTIGA) 250 mg tab Take 1,000 mg by mouth daily. predniSONE (DELTASONE) 5 mg tablet Take 5 mg by mouth two (2) times a day. tamsulosin (FLOMAX) 0.4 mg capsule Take 0.4 mg by mouth daily. leuprolide acetate (LUPRON DEPOT IM) by IntraMUSCular route. Every 6 months         STOP taking these medications       amoxicillin (AMOXIL) 875 mg tablet Comments:   Reason for Stopping:             NOTIFY YOUR PHYSICIAN FOR ANY OF THE FOLLOWING:   Fever over 101 degrees for 24 hours. Chest pain, shortness of breath, fever, chills, nausea, vomiting, diarrhea, change in mentation, falling, weakness, bleeding. Severe pain or pain not relieved by medications. Or, any other signs or symptoms that you may have questions about. DISPOSITION:    Home With:   OT  PT  HH  RN       Long term SNF/Inpatient Rehab   x Independent/assisted living    Hospice    Other:     PATIENT CONDITION AT DISCHARGE:     Functional status    Poor     Deconditioned     Independent      Cognition     Lucid     Forgetful     Dementia      Catheters/lines (plus indication)    Serna     PICC     PEG     None      Code status     Full code     DNR      PHYSICAL EXAMINATION AT DISCHARGE:  Patient seen and examined at bedside, Condition stable, explained discharge and follow up plans.   General:  Alert, oriented, No acute distress  Resp:  No accessory muscle use, Good AE. 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 [facility]) ICD-10-CM: C61, C79.89  ICD-9-CM: 185, 198.89  11/8/2018 - Present        Lumbar disc disease with radiculopathy ICD-10-CM: M51.16  ICD-9-CM: 722.10, 724.4  11/8/2018 - Present        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     Signed:   Verónica Mccabe MD  6/2/2019  7:56 AM

## 2019-06-03 ENCOUNTER — PATIENT OUTREACH (OUTPATIENT)
Dept: INTERNAL MEDICINE CLINIC | Age: 84
End: 2019-06-03

## 2019-06-03 ENCOUNTER — TELEPHONE (OUTPATIENT)
Dept: CARDIOLOGY CLINIC | Age: 84
End: 2019-06-03

## 2019-06-03 ENCOUNTER — TELEPHONE (OUTPATIENT)
Dept: INTERNAL MEDICINE CLINIC | Age: 84
End: 2019-06-03

## 2019-06-03 DIAGNOSIS — I48.92 ATRIAL FLUTTER, UNSPECIFIED TYPE (HCC): Primary | ICD-10-CM

## 2019-06-03 NOTE — TELEPHONE ENCOUNTER
Pt is calling he was admitted to the hospital on 5/30/19 and was told to f/u with his PCP, but Justus Hicks doesn't have anything until 6/27/19, pt would like to call back to discuss his other options to f/u with him, pt can be reached at [phone] or [phone]

## 2019-06-03 NOTE — TELEPHONE ENCOUNTER
Identifiers x 2. Patient informed that 30 day loop monitor would be mailed to [facility]. Given contact number for Marsha and instructed to call number to verify any financial obligations. Discussed the rationale for monitor, types of monitor and that Marsha is not affiliated with [employer]. Patient hesitant to wear monitor. Does not feel that he needs it. Rescheduled appointment to 7-17-19 at 10 am to verify that results are obtained. Patient verbalized understanding.

## 2019-06-03 NOTE — TELEPHONE ENCOUNTER
Identifiers x 2.  Spoke to patient and his wife. He is refusing to wear monitor. Does not feel that it is necessary. Discussed rationale and ease of monitor. Would like for me to cancel order and to move follow up appointment with Dr. Saurav Tucker.   [place] on 7-3-19 @ 10:40am.

## 2019-06-04 LAB
BACTERIA SPEC CULT: NORMAL
SERVICE CMNT-IMP: NORMAL

## 2019-06-05 ENCOUNTER — TELEPHONE (OUTPATIENT)
Dept: CARDIOLOGY CLINIC | Age: 84
End: 2019-06-05

## 2019-06-05 NOTE — TELEPHONE ENCOUNTER
Patient stated that he was sent a holter monitor that he does not want and he wants to make sure that he does not get billed for it. Please advise.     Phone #: [phone]  Thanks

## 2019-06-06 ENCOUNTER — PATIENT OUTREACH (OUTPATIENT)
Dept: INTERNAL MEDICINE CLINIC | Age: 84
End: 2019-06-06

## 2019-06-06 ENCOUNTER — OFFICE VISIT (OUTPATIENT)
Dept: INTERNAL MEDICINE CLINIC | Age: 84
End: 2019-06-06

## 2019-06-06 VITALS
HEIGHT: 75 IN | RESPIRATION RATE: 18 BRPM | TEMPERATURE: 96 F | HEART RATE: 80 BPM | OXYGEN SATURATION: 96 % | DIASTOLIC BLOOD PRESSURE: 66 MMHG | BODY MASS INDEX: 22.33 KG/M2 | SYSTOLIC BLOOD PRESSURE: 118 MMHG | WEIGHT: 179.6 LBS

## 2019-06-06 DIAGNOSIS — D84.821 IMMUNOSUPPRESSION DUE TO DRUG THERAPY (HCC): ICD-10-CM

## 2019-06-06 DIAGNOSIS — J18.9 COMMUNITY ACQUIRED PNEUMONIA OF LEFT LOWER LOBE OF LUNG: Primary | ICD-10-CM

## 2019-06-06 DIAGNOSIS — Z79.899 IMMUNOSUPPRESSION DUE TO DRUG THERAPY (HCC): ICD-10-CM

## 2019-06-06 DIAGNOSIS — I48.92 ATRIAL FLUTTER, UNSPECIFIED TYPE (HCC): ICD-10-CM

## 2019-06-06 NOTE — TELEPHONE ENCOUNTER
Returned patient's call and told him that he will not be charged for the loop monitor since he did not wear it and to send it back in the mail.

## 2019-06-06 NOTE — PROGRESS NOTES
Goals      Patient will attend [facility] appointments (pt-stated)      6/6/19: Patient attended [facility] appt today with Dr. Katie Rivas. NN discussed w/ Dr. Katie Rivas that pt declined ASA, wanted to discuss metoprolol, & declined Holter monitor. Provided patient with [facility] information & AMD. Dr. Katie Rivas is in agreement with cardiology f/u 7/3. Plan to contact patient next week for f/u. -SRW    6/3/19: Patient spoke with [facility] office today and has appointment with cardiologist Dr. Luna Cheung on 7/3 @ 10:40 AM, per Dr. Ghanshyam Rodriguez instructions to f/u after 30 day event monitor. Dr. Katie Rivas would like to see patient prior to leaving for vacation next week. His schedule is very full, so unable to get patient in until this Thursday 6/6 @ 2:30 PM. -SRW       Patient will monitor for s/s PNA (pt-stated)      6/6/19: Patient reports each day he feels better. Notified Dr. Katie Rivas that he may be due for a pneumonia vaccine. -SRW    6/3/19: Patient reports cough is better, productive for clear to light yellow sputum. Denies fever/chills. Taking Levaquin & Mucinex. Will discuss pneumococcal vaccine w/ Dr. Katie Rivas at upcoming appt to see if patient is eligible.  -SRW

## 2019-06-06 NOTE — PROGRESS NOTES
1. Have you been to the ER, urgent care clinic since your last visit? Hospitalized since your last visit? No    2. Have you seen or consulted any other health care providers outside of the [address] [place] [address] since your last visit? Include any pap smears or colon screening.  Yes    Chief Complaint   Patient presents with   [facility] Follow Up     follow up for PNA went to [facility]      Not fasting

## 2019-06-06 NOTE — PROGRESS NOTES
Chief Complaint   Patient presents with   [facility] Follow Up     follow up for PNA went to [facility]      Patient was admitted to [facility] on 5/30 and discharged on 6/2 for PNA and Atrial Flutter. The physician discharge summary was available at the time of outreach. Patient was contacted within 1 business day of discharge.            Top Challenges reviewed with the provider   Patient declines to take ASA, as prescribed during admission  Patient wants to discuss metoprolol to see if it's necessary to continue taking  Patient declines to participate in 30 day Holter monitor study  Cardiology follow up is schedule in one month-may need to see them sooner?         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 He is followed by [facility], did have an opinion once from the [facility]. He was admitted. He had pneumonia symptoms with cough and feeling very poorly. His admitting chest xray did not show it. His follow up CT the same day showed a left lower lobe infiltrate. He was treated with IV antibiotics and then switched to 750 of Levaquin, which he took for five days at home. He is feeling better. He had atrial flutter that converted shortly after hospital stay. He was sent home on aspirin and Metoprolol. He is afraid to take aspirin because his skin is so thin and he gets multiple bruises from the Zytiga, prednisone. He is still weak, his energy level is reduced, his appetite is good, he is not short of breath and he has not run a fever and he is much improved over what he was. Physical Examination:  His BP was normal.  He is a little cushingoid. His chest was clear. No rales, wheezes. S1, S2 regular. He was in a normal rhythm. Abdomen soft. Impression/Plan:  1. Pneumonia, improved. He has finished a full seven day course.   I do not think a follow up chest xray is necessary, not clinically important. I think based on his clinical symptoms if he should have worsening symptoms another xray would be important. 2. Follow up with cardiology the first week of July. He will continue Metoprolol till then. 3. He will stay off aspirin as he is wishing. 4. I told him he has immunosuppression and he needs to expect that he is going to get sick more easily. He had one pneumococcal 23 vaccine around age 72. I advised Prevnar 13, gave him a rx for it, told him to wait about a week and get that. He will get another pneumococcal 23 about a year from now. 5. We will see what cardiology thinks. 6. He also asked me in detail about his prostate cancer diagnosis and his treatment. I told him he is on a standard of care that he could get an opinion from a research center like [facility] and I will email him with a [facility] physician if he is interested. They may have a protocol for him. 7. I encouraged him to continue to follow up with me on a regular basis. Vitals:    06/06/19 1444   BP: 118/66   Pulse: 80   Resp: 18   Temp: 96 °F (35.6 °C)   TempSrc: Oral   SpO2: 96%   Weight: 179 lb 9.6 oz (81.5 kg)   Height: 6' 3\" (1.905 m)     SpO2 Readings from Last 3 Encounters:   06/06/19 96%   06/02/19 96%   05/22/19 97%           Diagnoses and all orders for this visit:    1. Community acquired pneumonia of left lower lobe of lung ([address]    2. Atrial flutter, unspecified type ([address]    3. Immunosuppression due to drug therapy    Other orders  -     pneumococcal 13 jessy conj dip (PREVNAR-13) 0.5 mL syrg injection; 0.5 mL by IntraMUSCular route once for 1 dose.  Indications: prevention of Streptococcus pneumoniae infection      Reviewed in detail  Wife present  Follow regularly advised

## 2019-06-14 ENCOUNTER — PATIENT OUTREACH (OUTPATIENT)
Dept: INTERNAL MEDICINE CLINIC | Age: 84
End: 2019-06-14

## 2019-06-14 NOTE — PROGRESS NOTES
Goals Addressed                 This Visit's Progress     Patient will monitor for s/s PNA (pt-stated)   On track     6/14/19: Patient reports he is feeling \"much improved\". He has a dry cough 1-2x/day only. Denies any fevers/chills. Reports Dr. Mike Felder told him to wait and get the Prevnar 13 vaccine in at least 2-3 weeks. Reviewed guidelines for PCV 23 and Prevnar 13. Patient is in agreement with plan to call next week for f/u. -SRW    6/6/19: Patient reports each day he feels better. Notified Dr. Mike Felder that he may be due for a pneumonia vaccine. Dr. Mike Felder reports patient received a PCV 23 around age 72. He provided patient with a Rx for Prevnar 13 and encouraged him to get in about 1 week or more. He will then be due for his 2nd dose of PCV 23 in about 1 year. -SRW    6/3/19: Patient reports cough is better, productive for clear to light yellow sputum. Denies fever/chills. Taking Levaquin & Mucinex. Will discuss pneumococcal vaccine w/ Dr. Mike Felder at upcoming appt to see if patient is eligible.  -SRW

## 2019-06-21 ENCOUNTER — PATIENT OUTREACH (OUTPATIENT)
Dept: INTERNAL MEDICINE CLINIC | Age: 84
End: 2019-06-21

## 2019-06-21 NOTE — PROGRESS NOTES
Attempted to contact patient for weekly [facility] follow up. No answer. Left detailed VMM requesting a return call.

## 2019-06-24 ENCOUNTER — PATIENT OUTREACH (OUTPATIENT)
Dept: INTERNAL MEDICINE CLINIC | Age: 84
End: 2019-06-24

## 2019-06-27 ENCOUNTER — PATIENT OUTREACH (OUTPATIENT)
Dept: INTERNAL MEDICINE CLINIC | Age: 84
End: 2019-06-27

## 2019-06-27 NOTE — PROGRESS NOTES
Goals Addressed                 This Visit's Progress     Patient will monitor for s/s PNA (pt-stated)   On track     6/27/19: Patient contacted this NN stating he is feeling much better, other than feeling his strength has not fully returned. He is currently taking care of his wife who had a recent knee surgery. -SRW    6/14/19: Patient reports he is feeling \"much improved\". He has a dry cough 1-2x/day only. Denies any fevers/chills. Reports Dr. Ying Lassiter told him to wait and get the Prevnar 13 vaccine in at least 2-3 weeks. Reviewed guidelines for PCV 23 and Prevnar 13. Patient is in agreement with plan to call next week for f/u. -SRW    6/6/19: Patient reports each day he feels better. Notified Dr. Ying Lassiter that he may be due for a pneumonia vaccine. Dr. Ying Lassiter reports patient received a PCV 23 around age 72. He provided patient with a Rx for Prevnar 13 and encouraged him to get in about 1 week or more. He will then be due for his 2nd dose of PCV 23 in about 1 year. -SRW    6/3/19: Patient reports cough is better, productive for clear to light yellow sputum. Denies fever/chills. Taking Levaquin & Mucinex. Will discuss pneumococcal vaccine w/ Dr. Ying Lassiter at upcoming appt to see if patient is eligible.  -SRW

## 2019-07-01 ENCOUNTER — PATIENT OUTREACH (OUTPATIENT)
Dept: INTERNAL MEDICINE CLINIC | Age: 84
End: 2019-07-01

## 2019-07-01 NOTE — PROGRESS NOTES
Patient has graduated from the Transitions of Care Coordination  program on 7/1/19. Patient's symptoms are stable at this time. Patient/family has the ability to self-manage. Care management goals have been completed at this time. No further nurse navigator follow up scheduled. Goals Addressed                 This Visit's Progress     COMPLETED: Patient will attend [facility] appointments (pt-stated)   On track     6/27/19: Patient declined f/u with cardiology given that he has declined holter monitor study. He feels the A Flutter was brought on by PNA and does not wish to f/u further on this. He discussed in detail with Dr. Misty Watts at appt. -SRW    6/6/19: Patient attended [facility] appt today with Dr. Misty Watts. NN discussed w/ Dr. Misty Watts that pt declined ASA, wanted to discuss metoprolol, & declined Holter monitor. Provided patient with [facility] information & AMD. Dr. Misty Watts is in agreement with cardiology f/u 7/3. Plan to contact patient next week for f/u. -SRW    6/3/19: Patient spoke with [facility] office today and has appointment with cardiologist Dr. KASI ANDREWS [facility] on 7/3 @ 10:40 AM, per Dr. Magdalena Braden instructions to f/u after 30 day event monitor. Dr. Misty Watts would like to see patient prior to leaving for vacation next week. His schedule is very full, so unable to get patient in until this Thursday 6/6 @ 2:30 PM. -SRW       COMPLETED: Patient will monitor for s/s PNA (pt-stated)        6/27/19: Patient contacted this NN stating he is feeling much better, other than feeling his strength has not fully returned. He is currently taking care of his wife who had a recent knee surgery. -SRW    6/14/19: Patient reports he is feeling \"much improved\". He has a dry cough 1-2x/day only. Denies any fevers/chills. Reports Dr. Misty Watts told him to wait and get the Prevnar 13 vaccine in at least 2-3 weeks. Reviewed guidelines for PCV 23 and Prevnar 13.  Patient is in agreement with plan to call next week for f/u. -SRW    6/6/19: Patient reports each day he feels better. Notified Dr. Kathrine Royal that he may be due for a pneumonia vaccine. Dr. Kathrine Royal reports patient received a PCV 23 around age 72. He provided patient with a Rx for Prevnar 13 and encouraged him to get in about 1 week or more. He will then be due for his 2nd dose of PCV 23 in about 1 year. -SRW    6/3/19: Patient reports cough is better, productive for clear to light yellow sputum. Denies fever/chills. Taking Levaquin & Mucinex. Will discuss pneumococcal vaccine w/ Dr. Kathrine Royal at upcoming appt to see if patient is eligible. -SRW            Pt has nurse navigator's contact information for any further questions, concerns, or needs. Patients upcoming visits:  No future appointments.

## 2019-10-23 ENCOUNTER — OFFICE VISIT (OUTPATIENT)
Dept: INTERNAL MEDICINE CLINIC | Age: 84
End: 2019-10-23

## 2019-10-23 VITALS
WEIGHT: 184 LBS | DIASTOLIC BLOOD PRESSURE: 81 MMHG | SYSTOLIC BLOOD PRESSURE: 157 MMHG | HEART RATE: 85 BPM | BODY MASS INDEX: 22.88 KG/M2 | OXYGEN SATURATION: 96 % | TEMPERATURE: 98.6 F | HEIGHT: 75 IN | RESPIRATION RATE: 14 BRPM

## 2019-10-23 DIAGNOSIS — C79.89 PROSTATE CANCER METASTATIC TO PELVIS (HCC): ICD-10-CM

## 2019-10-23 DIAGNOSIS — Z23 ENCOUNTER FOR IMMUNIZATION: ICD-10-CM

## 2019-10-23 DIAGNOSIS — S41.112A LACERATION OF ARM, LEFT, MULTIPLE SITES, INITIAL ENCOUNTER: Primary | ICD-10-CM

## 2019-10-23 DIAGNOSIS — S40.812A ABRASION OF LEFT ARM, INITIAL ENCOUNTER: ICD-10-CM

## 2019-10-23 DIAGNOSIS — C61 PROSTATE CANCER METASTATIC TO PELVIS (HCC): ICD-10-CM

## 2019-10-23 RX ORDER — CEPHALEXIN 500 MG/1
500 CAPSULE ORAL 2 TIMES DAILY
Qty: 20 CAP | Refills: 0 | Status: SHIPPED | OUTPATIENT
Start: 2019-10-23 | End: 2019-11-02

## 2019-10-23 RX ORDER — MUPIROCIN 20 MG/G
OINTMENT TOPICAL DAILY
Qty: 22 G | Refills: 0 | Status: SHIPPED | OUTPATIENT
Start: 2019-10-23 | End: 2020-09-14 | Stop reason: ALTCHOICE

## 2019-10-23 NOTE — PROGRESS NOTES
Nate Baez is a 80 y.o. male  Chief Complaint   Patient presents with    Fall     post fall. open area to elbow     Visit Vitals  /81 (BP 1 Location: Left arm, BP Patient Position: At rest)   Pulse 85   Temp 98.6 °F (37 °C) (Oral)   Resp 14   Ht 6' 3\" (1.905 m)   Wt 184 lb (83.5 kg)   SpO2 96%   BMI 23.00 kg/m²      Health Maintenance Due   Topic Date Due    DTaP/Tdap/Td series (1 - Tdap) [birth year]/1954    Shingrix Vaccine Age 50> (1 of 2) 10/13/1983    GLAUCOMA SCREENING Q2Y  10/13/1998    Pneumococcal 65+ years (1 of 2 - PCV13) 10/13/1998    Influenza Age 9 to Adult  08/01/2019    MEDICARE YEARLY EXAM  11/16/2019       HPI  Gray Jackman MD patient in to see me today for an acute visit. Lt was rushing to take out the recycling last night. It was raining. Pt slipped on the driveway and fell onto rough concrete. No head trauma. Landed on L arm. L arm bled at the time. Now bandaged with bandaids and bleeding stopped last night. Changed bandaids this morning. Not in pain today. UTD with [facility] for Prostate CA tx. Review of Systems   Constitutional: Negative for fever and malaise/fatigue. Respiratory: Negative for shortness of breath. Cardiovascular: Negative for chest pain and palpitations. Neurological: Negative for dizziness, loss of consciousness and weakness. Physical Exam   Constitutional: He is oriented to person, place, and time. He appears well-developed and well-nourished. No distress. HENT:   Head: Normocephalic and atraumatic. Neck: Neck supple. No JVD present. No bruit bilateral carotid arteries. Cardiovascular: Normal rate, regular rhythm and normal heart sounds. Pulmonary/Chest: Effort normal and breath sounds normal. No respiratory distress. Musculoskeletal: Normal range of motion. He exhibits no edema, tenderness or deformity. Neurological: He is alert and oriented to person, place, and time. Skin: Skin is warm and dry.    L arm with bruising, abrasion, and several small lacerations. Area is not hot/tender. Psychiatric: He has a normal mood and affect. His behavior is normal. Judgment and thought content normal.   Nursing note and vitals reviewed. Diagnoses and all orders for this visit:    1. Laceration of arm, left, multiple sites, initial encounter  -     TETANUS, DIPHTHERIA TOXOIDS AND ACELLULAR PERTUSSIS VACCINE (TDAP), IN INDIVIDS. >=7, IM    2. Encounter for immunization  -     TETANUS, DIPHTHERIA TOXOIDS AND ACELLULAR PERTUSSIS VACCINE (TDAP), IN INDIVIDS. >=7, IM    3. Abrasion of left arm, initial encounter  -     mupirocin (BACTROBAN) 2 % ointment; Apply  to affected area daily. -     cephALEXin (KEFLEX) 500 mg capsule; Take 1 Cap by mouth two (2) times a day for 10 days. - fill only necessary if area becomes more painful/hot/inflamed. Wound care instructions given today. 4. 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

## 2019-10-23 NOTE — PATIENT INSTRUCTIONS
Vaccine Information Statement Tdap (Tetanus, Diphtheria, Pertussis) Vaccine: What You Need to Know Many Vaccine Information Statements are available in [place] and other languages. See www.immunize.org/vis. Hojas de Información Sobre Vacunas están disponibles en español y en muchos otros idiomas. Visite MelanyScale.si 1. Why get vaccinated? Tetanus, diphtheria, and pertussis are very serious diseases. Tdap vaccine can protect us from these diseases. And, Tdap vaccine given to pregnant women can protect  babies against pertussis. TETANUS (Lockjaw) is rare in the [facility] today. It causes painful muscle tightening and stiffness, usually all over the body. ? It can lead to tightening of muscles in the head and neck so you cant open your mouth, swallow, or sometimes even breathe. Tetanus kills about 1 out of 10 people who are infected even after receiving the best medical care. DIPHTHERIA is also rare in the [facility] today. It can cause a thick coating to form in the back of the throat. ? It can lead to breathing problems, heart failure, paralysis, and death. PERTUSSIS (Whooping Cough) causes severe coughing spells, which can cause difficulty breathing, vomiting, and disturbed sleep. ? It can also lead to weight loss, incontinence, and rib fractures. Up to 2 in 100 adolescents and 5 in 100 adults with pertussis are hospitalized or have complications, which could include pneumonia or death. These diseases are caused by bacteria. Diphtheria and pertussis are spread from person to person through secretions from coughing or sneezing. Tetanus enters the body through cuts, scratches, or wounds. Before vaccines, as many as 200,000 cases of diphtheria, 200,000 cases of pertussis, and hundreds of cases of tetanus, were reported in the [place] each year.  Since vaccination began, reports of cases for tetanus and diphtheria have dropped by about 99% and for pertussis by about 80%. 2. Tdap vaccine Tdap vaccine can protect adolescents and adults from tetanus, diphtheria, and pertussis. One dose of Tdap is routinely given at age 6 or 15. People who did not get Tdap at that age should get it as soon as possible. Tdap is especially important for health care professionals and anyone having close contact with a baby younger than 12 months. Pregnant women should get a dose of Tdap during every pregnancy, to protect the  from pertussis. Infants are most at risk for severe, life-threatening complications from pertussis. Another vaccine, called Td, protects against tetanus and diphtheria, but not pertussis. A Td booster should be given every 10 years. Tdap may be given as one of these boosters if you have never gotten Tdap before. Tdap may also be given after a severe cut or burn to prevent tetanus infection. Your doctor or the person giving you the vaccine can give you more information. Tdap may safely be given at the same time as other vaccines. 3. Some people should not get this vaccine  A person who has ever had a life-threatening allergic reaction after a previous dose of any diphtheria, tetanus or pertussis containing vaccine, OR has a severe allergy to any part of this vaccine, should not get Tdap vaccine. Tell the person giving the vaccine about any severe allergies.  Anyone who had coma or long repeated seizures within 7 days after a childhood dose of DTP or DTaP, or a previous dose of Tdap, should not get Tdap, unless a cause other than the vaccine was found. They can still get Td.  Talk to your doctor if you: 
- have seizures or another nervous system problem, 
- had severe pain or swelling after any vaccine containing diphtheria, tetanus or pertussis,  
- ever had a condition called Guillain Barré Syndrome (GBS), 
- arent feeling well on the day the shot is scheduled. 4. Risks With any medicine, including vaccines, there is a chance of side effects. These are usually mild and go away on their own. Serious reactions are also possible but are rare. Most people who get Tdap vaccine do not have any problems with it. Mild Problems following Tdap 
(Did not interfere with activities)  Pain where the shot was given (about 3 in 4 adolescents or 2 in 3 adults)  Redness or swelling where the shot was given (about 1 person in 5)  Mild fever of at least 100.4°F (up to about 1 in 25 adolescents or 1 in 100 adults)  Headache (about 3 or 4 people in 10)  Tiredness (about 1 person in 3 or 4)  Nausea, vomiting, diarrhea, stomach ache (up to 1 in 4 adolescents or 1 in 10 adults)  Chills,  sore joints (about 1 person in 10)  Body aches (about 1 person in 3 or 4)  Rash, swollen glands (uncommon) Moderate Problems following Tdap (Interfered with activities, but did not require medical attention)  Pain where the shot was given (up to 1 in 5 or 6)  Redness or swelling where the shot was given (up to about 1 in 16 adolescents or 1 in 12 adults)  Fever over 102°F (about 1 in 100 adolescents or 1 in 250 adults)  Headache (about 1 in 7 adolescents or 1 in 10 adults)  Nausea, vomiting, diarrhea, stomach ache (up to 1 or 3 people in 100)  Swelling of the entire arm where the shot was given (up to about 1 in 500). Severe Problems following Tdap 
(Unable to perform usual activities; required medical attention)  Swelling, severe pain, bleeding, and redness in the arm where the shot was given (rare). Problems that could happen after any vaccine:  People sometimes faint after a medical procedure, including vaccination. Sitting or lying down for about 15 minutes can help prevent fainting, and injuries caused by a fall. Tell your doctor if you feel dizzy, or have vision changes or ringing in the ears.  Some people get severe pain in the shoulder and have difficulty moving the arm where a shot was given. This happens very rarely.  Any medication can cause a severe allergic reaction. Such reactions from a vaccine are very rare, estimated at fewer than 1 in a million doses, and would happen within a few minutes to a few hours after the vaccination. As with any medicine, there is a very remote chance of a vaccine causing a serious injury or death. The safety of vaccines is always being monitored. For more information, visit: www.cdc.gov/vaccinesafety/ 
 
 
The [place] Vaccine Injury Compensation Program (VICP) is a federal program that was created to compensate people who may have been injured by certain vaccines. Persons who believe they may have been injured by a vaccine can learn about the program and about filing a claim by calling 1-283.148.7300 or visiting the [employer] website at www.[facility].gov/vaccinecompensation.  There is a time limit to file a claim for compensation. 7. How can I learn more?  Ask your doctor. He or she can give you the vaccine package insert or suggest other sources of information.  Call your local or state health department.  Contact the Centers for Disease Control and Prevention (CDC): 
- Call 3-773.446.4701 (1-800-CDC-INFO) or 
- Visit CDCs website at www.cdc.gov/vaccines Vaccine Information Statement Tdap Vaccine 
(2/24/2015) 42 [address]AL[address]IS. Danielle Ordonez [id number] Department of Health and [employer] Centers for Disease Control and Prevention Office Use Only

## 2019-10-23 NOTE — PROGRESS NOTES
1. Have you been to the ER, urgent care clinic since your last visit? Hospitalized since your last visit? yes      2. Have you seen or consulted any other health care providers outside of the [address] [place] [address] since your last visit? Include any pap smears or colon screening. Yes    Chief Complaint   Patient presents with    Fall     post fall.  open area to elbow     Not fasting

## 2019-12-23 ENCOUNTER — OFFICE VISIT (OUTPATIENT)
Dept: INTERNAL MEDICINE CLINIC | Age: 84
End: 2019-12-23

## 2019-12-23 VITALS
DIASTOLIC BLOOD PRESSURE: 69 MMHG | WEIGHT: 191 LBS | OXYGEN SATURATION: 97 % | BODY MASS INDEX: 23.75 KG/M2 | HEART RATE: 79 BPM | RESPIRATION RATE: 18 BRPM | TEMPERATURE: 96.3 F | SYSTOLIC BLOOD PRESSURE: 144 MMHG | HEIGHT: 75 IN

## 2019-12-23 DIAGNOSIS — Z01.818 PREOP EXAM FOR INTERNAL MEDICINE: Primary | ICD-10-CM

## 2019-12-23 DIAGNOSIS — C61 PROSTATE CANCER METASTATIC TO PELVIS (HCC): ICD-10-CM

## 2019-12-23 DIAGNOSIS — Z23 ENCOUNTER FOR IMMUNIZATION: ICD-10-CM

## 2019-12-23 DIAGNOSIS — C79.89 PROSTATE CANCER METASTATIC TO PELVIS (HCC): ICD-10-CM

## 2019-12-23 DIAGNOSIS — Z00.00 MEDICARE ANNUAL WELLNESS VISIT, SUBSEQUENT: ICD-10-CM

## 2019-12-23 DIAGNOSIS — R01.1 HEART MURMUR, SYSTOLIC: ICD-10-CM

## 2019-12-23 NOTE — PATIENT INSTRUCTIONS
Medicare Wellness Visit, Male The best way to live healthy is to have a lifestyle where you eat a well-balanced diet, exercise regularly, limit alcohol use, and quit all forms of tobacco/nicotine, if applicable. Regular preventive services are another way to keep healthy. Preventive services (vaccines, screening tests, monitoring & exams) can help personalize your care plan, which helps you manage your own care. Screening tests can find health problems at the earliest stages, when they are easiest to treat. [facility] follows the current, evidence-based guidelines published by the [facility] ([facility]) when recommending preventive services for our patients. Because we follow these guidelines, sometimes recommendations change over time as research supports it. (For example, a prostate screening blood test is no longer routinely recommended for men with no symptoms). Of course, you and your doctor may decide to screen more often for some diseases, based on your risk and co-morbidities (chronic disease you are already diagnosed with). Preventive services for you include: - Medicare offers their members a free annual wellness visit, which is time for you and your primary care provider to discuss and plan for your preventive service needs. Take advantage of this benefit every year! 
-All adults over age 72 should receive the recommended pneumonia vaccines. Current USPSTF guidelines recommend a series of two vaccines for the best pneumonia protection.  
-All adults should have a flu vaccine yearly and tetanus vaccine every 10 years. 
-All adults age 48 and older should receive the shingles vaccines (series of two vaccines).       
-All adults age 38-68 who are overweight should have a diabetes screening test once every three years.  
-Other screening tests & preventive services for persons with diabetes include: an eye exam to screen for diabetic retinopathy, a kidney function test, a foot exam, and stricter control over your cholesterol.  
-Cardiovascular screening for adults with routine risk involves an electrocardiogram (ECG) at intervals determined by the provider.  
-Colorectal cancer screening should be done for adults age 54-65 with no increased risk factors for colorectal cancer. There are a number of acceptable methods of screening for this type of cancer. Each test has its own benefits and drawbacks. Discuss with your provider what is most appropriate for you during your annual wellness visit. The different tests include: colonoscopy (considered the best screening method), a fecal occult blood test, a fecal DNA test, and sigmoidoscopy. 
-All adults born between [facility] should be screened once for Hepatitis C. 
-An Abdominal Aortic Aneurysm (AAA) Screening is recommended for men age 73-68 who has ever smoked in their lifetime. Here is a list of your current Health Maintenance items (your personalized list of preventive services) with a due date: 
Health Maintenance Due Topic Date Due  Shingles Vaccine (1 of 2) 10/13/1983  Glaucoma Screening   10/13/1998  Pneumococcal Vaccine (1 of 1 - PPSV23) 10/13/1998  Flu Vaccine  08/01/2019 [address] [place] [address] Annual Well Visit  11/16/2019

## 2019-12-23 NOTE — PROGRESS NOTES
1. Have you been to the ER, urgent care clinic since your last visit? Hospitalized since your last visit? No    2. Have you seen or consulted any other health care providers outside of the [address] [place] [address] since your last visit? Include any pap smears or colon screening.  Urology-dr Ines Salvador, ophthamology-dr Negron Finely

## 2019-12-23 NOTE — PROGRESS NOTES
Henrry Noel was referred for evaluation by:Dr. pringle for Pre- Op Evaluation. Please see encounter details and orders for consultative summary. Type of surgery : cataract  Surgery site : VEI  Anesthesia type: MAC  Date of procedure: 01/09/20    Allergies: No Known Allergies  Latex allergy: no  Prior reactions to anesthesia:  None    Functional status:  he is able to ambulate up 2 flights of step withno shortness of breath, chest pain  Prior cardiac evaluation:   no    Current Outpatient Medications   Medication Sig    mupirocin (BACTROBAN) 2 % ointment Apply  to affected area daily.  calcium citrate-vitamin D3 (CITRACAL + D) tablet Take 1 Tab by mouth two (2) times a day.  leuprolide acetate (LUPRON DEPOT IM) by IntraMUSCular route. Every 6 months    denosumab (XGEVA) soln injection 120 mg by SubCUTAneous route. A month    abiraterone (ZYTIGA) 250 mg tab Take 1,000 mg by mouth daily.  predniSONE (DELTASONE) 5 mg tablet Take 5 mg by mouth two (2) times a day.  tamsulosin (FLOMAX) 0.4 mg capsule Take 0.4 mg by mouth every evening. No current facility-administered medications for this visit. Past Medical History:   Diagnosis Date    Cancer [facility])     prostate ca with mets    Prostate cancer [facility])      History reviewed. No pertinent surgical history. Social History     Tobacco Use    Smoking status: Never Smoker    Smokeless tobacco: Never Used   Substance Use Topics    Alcohol use: Never     Frequency: Never    Drug use: Never       Blood pressure 144/69, pulse 79, temperature 96.3 °F (35.7 °C), temperature source Oral, resp. rate 18, height 6' 3\" (1.905 m), weight 191 lb (86.6 kg), SpO2 97 %. Cushingoid  Reg rhythm  Systolic M  Lungs clear  abd soft    Gait normal  Neuro: Cranial nerves and fundi are normal. JIMBO. EOM's intact. No papilledema. Neck supple.   Skin excoriated  Cleared for the planned surgical procedure and anesthesia based on todays findings and exam  Report sent and hand written report filled out  This is the Subsequent Medicare Annual Wellness Exam, performed 12 months or more after the Initial AWV or the last Subsequent AWV    I have reviewed the patient's medical history in detail and updated the computerized patient record. 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 No pertinent surgical history. Current Outpatient Medications   Medication Sig Dispense Refill    mupirocin (BACTROBAN) 2 % ointment Apply  to affected area daily. 22 g 0    calcium citrate-vitamin D3 (CITRACAL + D) tablet Take 1 Tab by mouth two (2) times a day.  leuprolide acetate (LUPRON DEPOT IM) by IntraMUSCular route. Every 6 months      denosumab (XGEVA) soln injection 120 mg by SubCUTAneous route. A month      abiraterone (ZYTIGA) 250 mg tab Take 1,000 mg by mouth daily.  predniSONE (DELTASONE) 5 mg tablet Take 5 mg by mouth two (2) times a day.  tamsulosin (FLOMAX) 0.4 mg capsule Take 0.4 mg by mouth every evening. No Known Allergies    History reviewed. No pertinent family history.   Social History     Tobacco Use    Smoking status: Never Smoker    Smokeless tobacco: Never Used   Substance Use Topics    Alcohol use: Never     Frequency: Never       Depression Risk Factor Screening:     3 most recent PHQ Screens 10/23/2019   Little interest or pleasure in doing things Not at all   Feeling down, depressed, irritable, or hopeless Not at all Total Score PHQ 2 0       Alcohol Risk Factor Screening (MALE > 65): Do you average more 1 drink per night or more than 7 drinks a week: No    In the past three months have you have had more than 4 drinks containing alcohol on one occasion: No      Functional Ability and Level of Safety:   Hearing: Hearing is good. Activities of Daily Living: The home contains: handrails  Patient does total self care    Ambulation: with no difficulty    Fall Risk:  Fall Risk Assessment, last 12 mths 10/23/2019   Able to walk? Yes   Fall in past 12 months? Yes   Fall with injury? No   Number of falls in past 12 months 1   Fall Risk Score 1       Abuse Screen:  Patient is not abused    Cognitive Screening   Has your family/caregiver stated any concerns about your memory: no  Cognitive Screening: Normal - Verbal Fluency Test    Patient Care Team   Patient Care Team:  Cherie Roque MD as PCP - General (Internal Medicine)  Cherie Roque MD as PCP - [facility] Provider    Assessment/Plan   Education and counseling provided:  Are appropriate based on today's review and evaluation    Diagnoses and all orders for this visit:    1. Medicare annual wellness visit, subsequent    2. Encounter for immunization  -     ADMIN PNEUMOCOCCAL VACCINE  -     PNEUMOCOCCAL CONJ VACCINE 13 VALENT IM        Health Maintenance Due   Topic Date Due    Shingrix Vaccine Age 49> (1 of 2) 10/13/1983    GLAUCOMA SCREENING Q2Y  10/13/1998    Pneumococcal 65+ years (1 of 1 - PPSV23) 10/13/1998    Influenza Age 5 to Adult  08/01/2019    MEDICARE YEARLY EXAM  11/16/2019     Diagnoses and all orders for this visit:    1. Preop exam for internal medicine    2. Medicare annual wellness visit, subsequent    3. Encounter for immunization  -     ADMIN PNEUMOCOCCAL VACCINE  -     PNEUMOCOCCAL CONJ VACCINE 13 VALENT IM    4. 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  Heart murmur, systolic      Reviewed immune status  Reviewed fall risk  Reviewed  Echocardiogram  Certainly doing ell    Will coordinate care with all the physicians and providers

## 2020-06-28 ENCOUNTER — APPOINTMENT (OUTPATIENT)
Dept: CT IMAGING | Age: 85
End: 2020-06-28
Attending: PHYSICIAN ASSISTANT
Payer: MEDICARE

## 2020-06-28 ENCOUNTER — HOSPITAL ENCOUNTER (EMERGENCY)
Age: 85
Discharge: HOME | End: 2020-06-28
Attending: EMERGENCY MEDICINE | Admitting: EMERGENCY MEDICINE
Payer: MEDICARE

## 2020-06-28 ENCOUNTER — APPOINTMENT (OUTPATIENT)
Dept: GENERAL RADIOLOGY | Age: 85
End: 2020-06-28
Attending: PHYSICIAN ASSISTANT
Payer: MEDICARE

## 2020-06-28 VITALS
RESPIRATION RATE: 18 BRPM | TEMPERATURE: 98.4 F | DIASTOLIC BLOOD PRESSURE: 81 MMHG | SYSTOLIC BLOOD PRESSURE: 160 MMHG | OXYGEN SATURATION: 96 % | HEART RATE: 84 BPM

## 2020-06-28 DIAGNOSIS — T14.8XXA MULTIPLE SKIN TEARS: ICD-10-CM
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
## 2020-06-28 DIAGNOSIS — T14.8XXA ABRASION: ICD-10-CM

## 2020-06-28 DIAGNOSIS — S20.212A CONTUSION OF LEFT CHEST WALL, INITIAL ENCOUNTER: ICD-10-CM

## 2020-06-28 PROCEDURE — 99283 EMERGENCY DEPT VISIT LOW MDM: CPT

## 2020-06-28 PROCEDURE — 70450 CT HEAD/BRAIN W/O DYE: CPT

## 2020-06-28 PROCEDURE — 72125 CT NECK SPINE W/O DYE: CPT

## 2020-06-28 PROCEDURE — 71101 X-RAY EXAM UNILAT RIBS/CHEST: CPT

## 2020-06-28 RX ORDER — TRAMADOL HYDROCHLORIDE 50 MG/1
50 TABLET ORAL
Qty: 10 TAB | Refills: 0 | Status: SHIPPED | OUTPATIENT
Start: 2020-06-28 | End: 2020-07-01

## 2020-06-28 RX ORDER — BACITRACIN 500 UNIT/G
1 PACKET (EA) TOPICAL
Status: DISCONTINUED | OUTPATIENT
Start: 2020-06-28 | End: 2020-06-29 | Stop reason: HOSPADM

## 2020-06-28 NOTE — ED TRIAGE NOTES
Patient arrives to ED after fall. Reports left knee gave out and patient fell in driveway. Skin tears noted to forehead, left hand, elbow, and knee. Patient complaining of left rib pain.

## 2020-06-29 NOTE — ED PROVIDER NOTES
Yaz Espinoza is a 80 y.o. male  who presents by private vehicle  to ER with c/o Patient presents with:  Geetha Pryor presents with complaints of fall. Patient reports he was walking down his stairs outside when his left knee gave out and he fell. Patient reports hitting his head, denies LOC. Patient with multiple abrasions/skin tears and left rib pain. Patient states that he is UTD on his tetanus vaccine. He specifically denies any fevers, chills, nausea, vomiting, chest pain, shortness of breath, headache, rash, diarrhea, abdominal pain, urinary/bowel changes, sweating or weight loss. PCP: Mario Rosen MD   PMHx significant for: Past Medical History:  No date: Cancer [facility])      Comment:  prostate ca with mets  No date: Prostate cancer [facility])   PSHx significant for: History reviewed. No pertinent surgical history. Social Hx: Tobacco use: Social History    Tobacco Use      Smoking status: Never Smoker      Smokeless tobacco: Never Used  ; EtOH use: The patient states he drinks 0 per week.; Illicit Drug use: Allergies:  No Known Allergies    There are no other complaints, changes or physical findings at this time. Past Medical History:   Diagnosis Date    Cancer [facility])     prostate ca with mets    Prostate cancer [facility])        History reviewed. No pertinent surgical history. History reviewed. No pertinent family history.     Social History     Socioeconomic History    Marital status:      Spouse name: Not on file    Number of children: Not on file    Years of education: Not on file    Highest education level: Not on file   Occupational History    Not on file   Social Needs    Financial resource strain: Not on file    Food insecurity     Worry: Not on file     Inability: Not on file    Transportation needs     Medical: Not on file     Non-medical: Not on file   Tobacco Use    Smoking status: Never Smoker    Smokeless tobacco: Never Used   Substance and Sexual Activity    Alcohol use: Never     Frequency: Never    Drug use: Never    Sexual activity: Not on file   Lifestyle    Physical activity     Days per week: Not on file     Minutes per session: Not on file    Stress: Not on file   Relationships    Social connections     Talks on phone: Not on file     Gets together: Not on file     Attends Islam service: Not on file     Active member of club or organization: Not on file     Attends meetings of clubs or organizations: Not on file     Relationship status: Not on file    Intimate partner violence     Fear of current or ex partner: Not on file     Emotionally abused: Not on file     Physically abused: Not on file     Forced sexual activity: Not on file   Other Topics Concern    Not on file   Social History Narrative    ** Merged History Encounter **              ALLERGIES: Patient has no known allergies. Review of Systems   Constitutional: Negative for activity change, appetite change, chills and fever. HENT: Negative for congestion and sore throat. Respiratory: Negative for cough and shortness of breath. Cardiovascular: Positive for chest pain. Gastrointestinal: Negative for abdominal pain, diarrhea, nausea and vomiting. Genitourinary: Negative for dysuria. Musculoskeletal: Negative for arthralgias and myalgias. Skin: Positive for wound. Negative for color change. Neurological: Negative for dizziness. Psychiatric/Behavioral: The patient is not nervous/anxious. All other systems reviewed and are negative. Vitals:    06/28/20 1825   BP: 160/81   Pulse: 84   Resp: 18   Temp: 98.4 °F (36.9 °C)   SpO2: 96%            Physical Exam  Vitals signs and nursing note reviewed. Constitutional:       Appearance: He is well-developed. HENT:      Head: Normocephalic and atraumatic. Right Ear: External ear normal.      Left Ear: External ear normal.      Mouth/Throat:      Pharynx: No oropharyngeal exudate.    Eyes:      General: No scleral icterus. Right eye: No discharge. Left eye: No discharge. Conjunctiva/sclera: Conjunctivae normal.      Pupils: Pupils are equal, round, and reactive to light. Neck:      Musculoskeletal: Normal range of motion and neck supple. Thyroid: No thyromegaly. Trachea: No tracheal deviation. Cardiovascular:      Rate and Rhythm: Normal rate and regular rhythm. Heart sounds: Normal heart sounds. No murmur. Pulmonary:      Effort: Pulmonary effort is normal. No respiratory distress. Breath sounds: Normal breath sounds. No wheezing or rales. Chest:      Chest wall: Tenderness present. Abdominal:      General: Bowel sounds are normal. There is no distension. Palpations: Abdomen is soft. Tenderness: There is no abdominal tenderness. There is no guarding or rebound. Musculoskeletal: Normal range of motion. General: No tenderness. Arms:       Comments: Spine palpated with out step off or crepitus. Non-TTP over spine. Full ROM to all extremities. All extremities are NVI. Patient ambulatory to exam room with out difficulty. Lymphadenopathy:      Cervical: No cervical adenopathy. Skin:     General: Skin is warm. Findings: Abrasion present. No erythema or rash. Neurological:      Mental Status: He is alert and oriented to person, place, and time. Cranial Nerves: No cranial nerve deficit. Coordination: Coordination normal.   Psychiatric:         Behavior: Behavior normal.         Thought Content: Thought content normal.         Judgment: Judgment normal.          MDM  Number of Diagnoses or Management Options  Abrasion:   Contusion of left chest wall, initial encounter:   Fall, initial encounter:   Multiple skin tears:   Diagnosis management comments: Assesment/Plan- 80 y.o. Patient presents with:  Fall  differential includes: abrasion, rib fracture, contusion, head injury. Labs and imaging reviewed with no acute findings. Patient is well appearing, afebrile and tolerating PO. Wounds cleaned and dressed. Recommend PCP follow up. Patient educated on reasons to return to the ED.            Procedures

## 2020-06-29 NOTE — ED NOTES
MD  reviewed discharge instructions and options with patient. Patient verbalized understanding. RN reviewed discharge instructions using teach back method. Patient ambulatory to exit without difficulty and no acute signs of distress. No complaints or needs expressed at this time. Patient counseled on medications prescribed at discharge. Vital signs stable. Patient to follow up with PCP in the morning for appointment.

## 2020-06-29 NOTE — DISCHARGE INSTRUCTIONS
Patient Education        Scrapes (Abrasions): Care Instructions  Your Care Instructions  Scrapes (abrasions) are wounds where your skin has been rubbed or torn off. Most scrapes do not go deep into the skin, but some may remove several layers of skin. Scrapes usually don't bleed much, but they may ooze pinkish fluid. Scrapes on the head or face may appear worse than they are. They may bleed a lot because of the good blood supply to this area. Most scrapes heal well and may not need a bandage. They usually heal within 3 to 7 days. A large, deep scrape may take 1 to 2 weeks or longer to heal. A scab may form on some scrapes. Follow-up care is a key part of your treatment and safety. Be sure to make and go to all appointments, and call your doctor if you are having problems. It's also a good idea to know your test results and keep a list of the medicines you take. How can you care for yourself at home? · If your doctor told you how to care for your wound, follow your doctor's instructions. If you did not get instructions, follow this general advice:  ? Wash the scrape with clean water 2 times a day. Don't use hydrogen peroxide or alcohol, which can slow healing. ? You may cover the scrape with a thin layer of petroleum jelly, such as Vaseline, and a nonstick bandage. ? Apply more petroleum jelly and replace the bandage as needed. · Prop up the injured area on a pillow anytime you sit or lie down during the next 3 days. Try to keep it above the level of your heart. This will help reduce swelling. · Be safe with medicines. Take pain medicines exactly as directed. ? If the doctor gave you a prescription medicine for pain, take it as prescribed. ? If you are not taking a prescription pain medicine, ask your doctor if you can take an over-the-counter medicine. When should you call for help?    Call your doctor now or seek immediate medical care if:  · You have signs of infection, such as:  ? Increased pain, swelling, warmth, or redness around the scrape. ? Red streaks leading from the scrape. ? Pus draining from the scrape. ? A fever. · The scrape starts to bleed, and blood soaks through the bandage. Oozing small amounts of blood is normal.  Watch closely for changes in your health, and be sure to contact your doctor if the scrape is not getting better each day. Where can you learn more? Go to http://norman-rubina.info/  Enter A374 in the search box to learn more about \"Scrapes (Abrasions): Care Instructions. \"  Current as of: June 26, 2019               Content Version: 12.5  © 4907-0682 [employer]. Care instructions adapted under license by [employer] (which disclaims liability or warranty for this information). If you have questions about a medical condition or this instruction, always ask your healthcare professional. [place] [address] any warranty or liability for your use of this information. Patient Education        Contusion: Care Instructions  Your Care Instructions     Contusion is the medical term for a bruise. It is the result of a direct blow or an impact, such as a fall. Contusions are common sports injuries. Most people think of a bruise as a black-and-blue spot. This happens when small blood vessels get torn and leak blood under the skin. But bones, muscles, and organs can also get bruised. This may damage deep tissues but not cause a bruise you can see. The doctor will do a physical exam to find the location of your contusion. You may also have tests to make sure you do not have a more serious injury, such as a broken bone or nerve damage. These may include X-rays or other imaging tests like a CT scan or MRI. Deep-tissue contusions may cause pain and swelling. But if there is no serious damage, they will often get better in a few weeks with home treatment.   The doctor has checked you carefully, but problems can develop later. If you notice any problems or new symptoms, get medical treatment right away. Follow-up care is a key part of your treatment and safety. Be sure to make and go to all appointments, and call your doctor if you are having problems. It's also a good idea to know your test results and keep a list of the medicines you take. How can you care for yourself at home? · Put ice or a cold pack on the sore area for 10 to 20 minutes at a time to stop swelling. Put a thin cloth between the ice pack and your skin. · Be safe with medicines. Read and follow all instructions on the label. ? If the doctor gave you a prescription medicine for pain, take it as prescribed. ? If you are not taking a prescription pain medicine, ask your doctor if you can take an over-the-counter medicine. · If you can, prop up the sore area on pillows as much as possible for the next few days. Try to keep the sore area above the level of your heart. When should you call for help? Call your doctor now or seek immediate medical care if:  · Your pain gets worse. · You have new or worse swelling. · You have tingling, weakness, or numbness in the area near the contusion. · The area near the contusion is cold or pale. Watch closely for changes in your health, and be sure to contact your doctor if:  · You do not get better as expected. Where can you learn more? Go to http://www.gray.com/  Enter [id number] in the search box to learn more about \"Contusion: Care Instructions. \"  Current as of: June 26, 2019               Content Version: 12.5  © 3050-1058 [employer]. Care instructions adapted under license by [employer] (which disclaims liability or warranty for this information). If you have questions about a medical condition or this instruction, always ask your healthcare professional. [address] any warranty or liability for your use of this information.          Patient Education        Preventing Falls: Care Instructions  Your Care Instructions    Getting around your home safely can be a challenge if you have injuries or health problems that make it easy for you to fall. Loose rugs and furniture in walkways are among the dangers for many older people who have problems walking or who have poor eyesight. People who have conditions such as arthritis, osteoporosis, or dementia also have to be careful not to fall. You can make your home safer with a few simple measures. Follow-up care is a key part of your treatment and safety. Be sure to make and go to all appointments, and call your doctor if you are having problems. It's also a good idea to know your test results and keep a list of the medicines you take. How can you care for yourself at home? Taking care of yourself  · You may get dizzy if you do not drink enough water. To prevent dehydration, drink plenty of fluids, enough so that your urine is light yellow or clear like water. Choose water and other caffeine-free clear liquids. If you have kidney, heart, or liver disease and have to limit fluids, talk with your doctor before you increase the amount of fluids you drink. · Exercise regularly to improve your strength, muscle tone, and balance. Walk if you can. Swimming may be a good choice if you cannot walk easily. · Have your vision and hearing checked each year or any time you notice a change. If you have trouble seeing and hearing, you might not be able to avoid objects and could lose your balance. · Know the side effects of the medicines you take. Ask your doctor or pharmacist whether the medicines you take can affect your balance. Sleeping pills or sedatives can affect your balance. · Limit the amount of alcohol you drink. Alcohol can impair your balance and other senses. · Ask your doctor whether calluses or corns on your feet need to be removed.  If you wear loose-fitting shoes because of calluses or corns, you can lose your balance and fall. · Talk to your doctor if you have numbness in your feet. Preventing falls at home  · Remove raised doorway thresholds, throw rugs, and clutter. Repair loose carpet or raised areas in the floor. · Move furniture and electrical cords to keep them out of walking paths. · Use nonskid floor wax, and wipe up spills right away, especially on ceramic tile floors. · If you use a walker or cane, put rubber tips on it. If you use crutches, clean the bottoms of them regularly with an abrasive pad, such as steel wool. · Keep your house well lit, especially [place], and outside walkways. Use night-lights in areas such as hallways and bathrooms. Add extra light switches or use remote switches (such as switches that go on or off when you clap your hands) to make it easier to turn lights on if you have to get up during the night. · Install sturdy handrails on stairways. · Move items in your cabinets so that the things you use a lot are on the lower shelves (about waist level). · Keep a cordless phone and a flashlight with new batteries by your bed. If possible, put a phone in each of the main rooms of your house, or carry a cell phone in case you fall and cannot reach a phone. Or, you can wear a device around your neck or wrist. You push a button that sends a signal for help. · Wear low-heeled shoes that fit well and give your feet good support. Use footwear with nonskid soles. Check the heels and soles of your shoes for wear. Repair or replace worn heels or soles. · Do not wear socks without shoes on wood floors. · Walk on the grass when the sidewalks are slippery. If you live in an area that gets snow and ice in the winter, sprinkle salt on slippery steps and sidewalks. Preventing falls in the bath  · Install grab bars and nonskid mats inside and outside your shower or tub and near the toilet and sinks. · Use shower chairs and bath benches.   · Use a hand-held shower head that will allow you to sit while showering. · Get into a tub or shower by putting the weaker leg in first. Get out of a tub or shower with your strong side first.  · Repair loose toilet seats and consider installing a raised toilet seat to make getting on and off the toilet easier. · Keep your bathroom door unlocked while you are in the shower. Where can you learn more? Go to http://norman-rubina.info/. Enter [id number] [id number] in the search box to learn more about \"Preventing Falls: Care Instructions. \"  Current as of: March 16, 2018  Content Version: 11.8  © 4944-3917 [employer], [employer]. Care instructions adapted under license by [employer] (which disclaims liability or warranty for this information). If you have questions about a medical condition or this instruction, always ask your healthcare professional. [address] any warranty or liability for your use of this information.

## 2020-06-30 ENCOUNTER — VIRTUAL VISIT (OUTPATIENT)
Dept: INTERNAL MEDICINE CLINIC | Age: 85
End: 2020-06-30

## 2020-06-30 DIAGNOSIS — C61 PROSTATE CANCER METASTATIC TO PELVIS (HCC): ICD-10-CM

## 2020-06-30 DIAGNOSIS — M51.16 LUMBAR DISC DISEASE WITH RADICULOPATHY: ICD-10-CM

## 2020-06-30 DIAGNOSIS — S00.83XA FACIAL CONTUSION, INITIAL ENCOUNTER: Primary | ICD-10-CM

## 2020-06-30 DIAGNOSIS — D84.821 IMMUNOSUPPRESSION DUE TO DRUG THERAPY (HCC): ICD-10-CM

## 2020-06-30 DIAGNOSIS — C79.89 PROSTATE CANCER METASTATIC TO PELVIS (HCC): ICD-10-CM

## 2020-06-30 DIAGNOSIS — Z79.899 IMMUNOSUPPRESSION DUE TO DRUG THERAPY (HCC): ICD-10-CM

## 2020-06-30 PROBLEM — I48.92 ATRIAL FLUTTER (HCC): Status: RESOLVED | Noted: 2019-05-30 | Resolved: 2020-06-30

## 2020-06-30 NOTE — PROGRESS NOTES
Face time technology used today    Seen er 2 days ago  For fall    Er visit reviewed    Subjective:  Mr. Capri Ceja is 80. He has metastatic prostate cancer. He also has lumbar disc disease. His left leg intermittently gives out and he thinks it is related to the bad back. On Sunday in his driveway his left leg gave out and he fell forward, bracing both hands. He had multiple abrasions on his hands and elbow and then he hit the ground with his face and he developed an abrasion on his forehead, nose and to the side of his face. Happily, x-rays were negative for fracture, negative for any serious injury. The CT of the brain showed no evidence of a subdural or anything acute. He was alert during the whole time period. Family came over and helped get him up. He says intermittently that leg has been bothering him. He has not had follow up with Dr. Faviola López, the neurosurgeon who he was seeing a couple years ago. No change in his meds. He has not changed the dressing on his left forearm and elbow. The other areas he has just been keeping clean and applying Bacitracin. The video visit shows abrasions all over his face, nose and the left side under his left eye. He was alert and oriented. He showed me a bandage on his left arm and showed me some abrasions on his hands. Plan:  I told him to wait till tomorrow, then remove the dressing on his left arm, and if he has trouble getting it off, to use warm water or saline to loosen it. I told him to continue to keep the area clean and use Bacitracin. In reference to the leg weakness, I told him he could go back and see neurosurgery. He said intermittently now he is getting some pain into the right leg. He tells me his last PSA was 0, so it is not likely that his metastatic prostate cancer is causing this issue. BP Readings from Last 3 Encounters:   06/28/20 160/81   12/23/19 144/69   10/23/19 157/81   anmbulatory but now with a walker  1.  Facial contusion, initial encounter  Will use care with cleaning    2. 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 Immunosuppression due to drug therapy  On going    4. Lumbar disc disease with radiculopathy  More issues on left and some on right needs imaging again and NS appointment  MRI Results (most recent):  Results from [place] encounter on 10/07/18   MRI LUMB SPINE W WO CONT    Narrative EXAM:  MRI LUMB SPINE W WO CONT    INDICATION:  Left leg instability resulted and fall earlier this morning. Left  low back pain radiates into the left lower extremity. Metastatic prostate  carcinoma the bones. COMPARISON: Lumbar spine views on 10/7/2013 at 7:50 AM. Bone scan on 5/30/2018. CT abdomen/pelvis on 11/27/2017. TECHNIQUE: MR imaging of the lumbar spine was performed using the following  sequences: sagittal T1, T2, STIR;  axial T1, T2 prior to and following contrast  administration  performed on the 3 Debi magnet. CONTRAST: 17 mL of Dotarem. FINDINGS:    Right curvature of the lumbar spine is centered at L2-L3. No anterior or  posterior subluxation. Vertebral body heights are maintained. Mild degenerative  bone marrow edema is greatest at L3 and L4. No MRI evidence of active metastatic  disease. Left S1 sacral metastatic lesion does not enhance, measures 3 cm, and  is most likely treated. L3 and L4 Schmorl's nodes are nonacute. The conus medullaris terminates at L2. Signal and caliber of the distal spinal  cord are within normal limits. There is no pathologic intrathecal enhancement. Moderate atrophy of the paraspinal musculature. Right renal cystic lesion  contains a thin septation. Visualized aorta is normal in caliber. Lower thoracic spine: No herniation or stenosis. L1-L2:  Diffuse disc bulge, facet arthrosis, and thickened ligamentum flavum. Minimal central spinal canal stenosis.     L2-L3:  Diffuse disc bulge, facet arthrosis, and thickened ligamentum flavum. Mild central spinal canal stenosis. Mild left foraminal stenosis. L3-L4:  Diffuse disc bulge, facet arthrosis, and thickened ligamentum flavum. Mild central spinal canal stenosis. Mild bilateral foraminal stenosis. L4-L5:  Diffuse disc bulge, facet arthrosis, and thickened ligamentum flavum. Mild right foraminal stenosis. L5-S1:  Right central disc extrusion migrates 7 mm caudally and has mass effect  on the descending right L5 nerve. Moderate right foraminal stenosis. No central  spinal canal stenosis. Impression IMPRESSION:    1. L5-S1 right disc extrusion likely affects the right L5 nerve. 2. Dextroscoliosis. Multilevel mild stenoses are detailed above. 3. No enhancing metastatic disease. Old, treated metastatic lesion is in the  left aspect of the S1 segment of the sacrum.    Prolonged visit with 15 minutes of time out  of more than a  25 minute visit spent counseling patient and family and formulation of care

## 2020-06-30 NOTE — PROGRESS NOTES
1. Have you been to the ER, urgent care clinic since your last visit? Hospitalized since your last visit? No    2. Have you seen or consulted any other health care providers outside of the [address] [place] [address] since your last visit? Include any pap smears or colon screening. Yes    Chief Complaint   Patient presents with    Fall     post fall on 6/28/2020. patient went to ER after hitting his head.

## 2020-07-08 ENCOUNTER — HOSPITAL ENCOUNTER (OUTPATIENT)
Dept: MRI IMAGING | Age: 85
Discharge: HOME | End: 2020-07-08
Attending: INTERNAL MEDICINE
Payer: MEDICARE

## 2020-07-08 DIAGNOSIS — M51.16 LUMBAR DISC DISEASE WITH RADICULOPATHY: ICD-10-CM

## 2020-07-08 PROCEDURE — 72148 MRI LUMBAR SPINE W/O DYE: CPT

## 2020-07-09 ENCOUNTER — TELEPHONE (OUTPATIENT)
Dept: INTERNAL MEDICINE CLINIC | Age: 85
End: 2020-07-09

## 2020-09-14 ENCOUNTER — OFFICE VISIT (OUTPATIENT)
Dept: INTERNAL MEDICINE CLINIC | Age: 85
End: 2020-09-14
Payer: MEDICARE

## 2020-09-14 VITALS
HEIGHT: 75 IN | WEIGHT: 177 LBS | BODY MASS INDEX: 22.01 KG/M2 | DIASTOLIC BLOOD PRESSURE: 71 MMHG | HEART RATE: 85 BPM | SYSTOLIC BLOOD PRESSURE: 140 MMHG | OXYGEN SATURATION: 95 % | RESPIRATION RATE: 18 BRPM | TEMPERATURE: 96.5 F

## 2020-09-14 DIAGNOSIS — C79.89 PROSTATE CANCER METASTATIC TO PELVIS (HCC): ICD-10-CM

## 2020-09-14 DIAGNOSIS — M51.16 LUMBAR DISC DISEASE WITH RADICULOPATHY: ICD-10-CM

## 2020-09-14 DIAGNOSIS — Z01.818 PREOP EXAM FOR INTERNAL MEDICINE: Primary | ICD-10-CM

## 2020-09-14 DIAGNOSIS — D84.821 IMMUNOSUPPRESSION DUE TO DRUG THERAPY (HCC): ICD-10-CM

## 2020-09-14 DIAGNOSIS — C61 PROSTATE CANCER METASTATIC TO PELVIS (HCC): ICD-10-CM

## 2020-09-14 DIAGNOSIS — Z79.899 IMMUNOSUPPRESSION DUE TO DRUG THERAPY (HCC): ICD-10-CM

## 2020-09-14 PROCEDURE — 99214 OFFICE O/P EST MOD 30 MIN: CPT | Performed by: INTERNAL MEDICINE

## 2020-09-14 PROCEDURE — 3288F FALL RISK ASSESSMENT DOCD: CPT | Performed by: INTERNAL MEDICINE

## 2020-09-14 PROCEDURE — G8427 DOCREV CUR MEDS BY ELIG CLIN: HCPCS | Performed by: INTERNAL MEDICINE

## 2020-09-14 PROCEDURE — G8420 CALC BMI NORM PARAMETERS: HCPCS | Performed by: INTERNAL MEDICINE

## 2020-09-14 PROCEDURE — G8510 SCR DEP NEG, NO PLAN REQD: HCPCS | Performed by: INTERNAL MEDICINE

## 2020-09-14 PROCEDURE — 1100F PTFALLS ASSESS-DOCD GE2>/YR: CPT | Performed by: INTERNAL MEDICINE

## 2020-09-14 PROCEDURE — G0463 HOSPITAL OUTPT CLINIC VISIT: HCPCS | Performed by: INTERNAL MEDICINE

## 2020-09-14 PROCEDURE — G8536 NO DOC ELDER MAL SCRN: HCPCS | Performed by: INTERNAL MEDICINE

## 2020-09-14 NOTE — PROGRESS NOTES
Samira Rock was referred for evaluation by:Dr. pringle for Pre- Op Evaluation. Please see encounter details and orders for consultative summary. Type of surgery : cataract  Surgery site : VEI  Anesthesia type: MAC  Date of procedure  09/16/20    Allergies: No Known Allergies  Latex allergy: no  Prior reactions to anesthesia:  None    Functional status:  he is able to ambulate up 2 flights of step withno shortness of breath, chest pain  Prior cardiac evaluation:   no    Current Outpatient Medications   Medication Sig    calcium citrate-vitamin D3 (CITRACAL + D) tablet Take 1 Tab by mouth two (2) times a day.  leuprolide acetate (LUPRON DEPOT IM) by IntraMUSCular route. Every 6 months    abiraterone (ZYTIGA) 250 mg tab Take 1,000 mg by mouth daily.  predniSONE (DELTASONE) 5 mg tablet Take 5 mg by mouth two (2) times a day.  tamsulosin (FLOMAX) 0.4 mg capsule Take 0.4 mg by mouth every evening. No current facility-administered medications for this visit. Past Medical History:   Diagnosis Date    Cancer [facility])     prostate ca with mets    Prostate cancer [facility])      History reviewed. No pertinent surgical history. Social History     Tobacco Use    Smoking status: Never Smoker    Smokeless tobacco: Never Used   Substance Use Topics    Alcohol use: Never     Frequency: Never    Drug use: Never       Blood pressure (!) 140/71, pulse 85, temperature (!) 96.5 °F (35.8 °C), temperature source Temporal, resp. rate 18, height 6' 3\" (1.905 m), weight 177 lb (80.3 kg), SpO2 95 %. Cushingoid  Reg rhythm  Systolic M  Lungs clear  abd soft    Gait normal  Neuro: Cranial nerves and fundi are normal. JIMBO. EOM's intact. No papilledema. Neck supple.   Skin excoriated  Cleared for the planned surgical procedure and anesthesia based on todays findings and exam  Report sent and hand written report filled out  Cleared for the planned surgical procedure and anesthesia based on todays findings and exam    Diagnoses and all orders for this visit:    1. Preop exam for internal medicine    2. 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 Immunosuppression due to drug therapy    4. Lumbar disc disease with radiculopathy      Also, he has additional complaints of covid questions no antibody test indicated.

## 2020-10-12 NOTE — PROGRESS NOTES
[address] [facility].  [address], [address]  [phone]                                History and Physical     NAME: Isabell Herrera   :  1951   MRN:  [id number]     HPI:  The patient was seen and examined. 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 No pertinent family history. Current Facility-Administered Medications   Medication Dose Route Frequency    0.9% sodium chloride infusion  50 mL/hr IntraVENous CONTINUOUS    sodium chloride (NS) flush 5-40 mL  5-40 mL IntraVENous Q8H    sodium chloride (NS) flush 5-40 mL  5-40 mL IntraVENous PRN    midazolam (VERSED) injection 0.25-10 mg  0.25-10 mg IntraVENous Multiple    fentaNYL citrate (PF) injection 100 mcg  100 mcg IntraVENous MULTIPLE DOSE GIVEN    naloxone (NARCAN) injection 0.4 mg  0.4 mg IntraVENous Multiple    flumazeniL (ROMAZICON) 0.1 mg/mL injection 0.2 mg  0.2 mg IntraVENous Multiple    simethicone (MYLICON) 52LS/5.3XG oral drops 80 mg  1.2 mL Oral Multiple    atropine injection 0.5 mg  0.5 mg IntraVENous ONCE PRN    EPINEPHrine (ADRENALIN) 0.1 mg/mL syringe 1 mg  1 mg Endoscopically ONCE PRN         PHYSICAL EXAM:  General: WD, WN.  Alert, Hospital Discharge Follow-Up      Date/Time:  6/3/2019 10:21 AM    Patient was admitted to [facility] on  and discharged on  for PNA and Atrial Flutter. The physician discharge summary was available at the time of outreach. Patient was contacted within 1 business day of discharge. Top Challenges reviewed with the provider   Patient declines to take ASA, as prescribed during admission  Patient wants to discuss metoprolol to see if it's necessary to continue taking  Patient declines to participate in 30 day Holter monitor study  Cardiology follow up is schedule in one month-may need to see them sooner? Pneumococcal vaccine not on file, patient may be due     Method of communication with provider :face to face, plan to discuss at upcoming appointment    Inpatient RRAT score: 25  Was this a readmission? no   Patient stated reason for the readmission: n/a    Nurse Navigator (NN) contacted the patient by telephone to perform post hospital discharge assessment. Verified name and  with patient as identifiers. Provided introduction to self, and explanation of the Nurse Navigator role. Reviewed discharge instructions and red flags with patient who verbalized understanding. Patient given an opportunity to ask questions and does not have any further questions or concerns at this time. The patient agrees to contact the PCP office for questions related to their healthcare. NN provided contact information for future reference. Disease Specific:   N/A    Summary of patient's top problems:    1. PNA: presented with productive cough-yellow sputum, fever, SOB, CT+ LLL PNA, d/c amoxicillin & started on Levaquin. Taking antibiotic, denies fever/chills, reports cough productive of clear to light yellow sputum. Feels \"tip top\".     2. A Flutter: likely driven by PNA, treated w/ Cardizem infusion & converted to NSR, started on metoprolol and ASA, f/u with 30 day event monitor-per cardiology note cooperative, no acute distress    HEENT: NC, Atraumatic. PERRLA, EOMI. Anicteric sclerae. Lungs:  CTA Bilaterally. No Wheezing/Rhonchi/Rales. Heart:  Regular  rhythm,  No murmur, No Rubs, No Gallops  Abdomen: Soft, Non distended, Non tender. +Bowel sounds, no HSM  Extremities: No c/c/e  Neurologic:  CN 2-12 gi, Alert and oriented X 3. No acute neurological distress   Psych:   Good insight. Not anxious nor agitated. The heart, lungs and mental status were satisfactory for the administration of MAC sedation and for the procedure. Mallampati score: 2     The patient was counseled at length about the risks of suki Covid-19 in the chantel-operative and post-operative states including the recovery window of their procedure. The patient was made aware that suki Covid-19 after a surgical procedure may worsen their prognosis for recovering from the virus and lend to a higher morbidity and or mortality risk. The patient was given the options of postponing their procedure. All of the risks, benefits, and alternatives were discussed. The patient does wish to proceed with the procedure.       Assessment:   · History colon polyps/ weight loss    Plan:   · Endoscopic procedure :egd/colon  · MAC sedation today-spoke to pt this morning & monitor will be shipped to him, f/u with Dr. Kamille Obrien after monitor has been completed. Denies s/s arrhythmia. Wants to discuss metoprolol w/ Dr. Mike Felder to get his recommendation on whether or not it's necessary. Declines to take ASA. Declines to participate in Holter monitor study. [facility] Home Health orders at discharge: per evaluation in hospital, pt has no needs, none  [address]: n/a  Date of initial visit: n/a  Patient declines home health after discussion with NN. Durable Medical Equipment ordered/company: n/a  Durable Medical Equipment received: n/a    Barriers to care? may need more education on A Flutter, Holter monitor study, use of metoprolol/ASA-    Advance Care Planning:   Does patient have an Advance Directive:  not on file , plan to discuss at appt    Medication(s):   New Medications at Discharge: ASA, Mucinex, metoprolol, Levaquin  Changed Medications at Discharge: none  Discontinued Medications at Discharge: amoxicillin    Medication reconciliation was performed with patient, who verbalizes understanding of administration of home medications. There were no barriers to obtaining medications identified at this time. Referral to Pharm D needed: no     Current Outpatient Medications   Medication Sig    guaiFENesin ER (MUCINEX) 600 mg ER tablet Take 1 Tab by mouth every twelve (12) hours for 7 days.  metoprolol succinate (TOPROL-XL) 25 mg XL tablet Take 1 Tab by mouth nightly for 30 days.  levoFLOXacin (LEVAQUIN) 750 mg tablet Take 1 Tab by mouth daily for 5 days.  calcium citrate-vitamin D3 (CITRACAL + D) tablet Take 1 Tab by mouth two (2) times a day.  leuprolide acetate (LUPRON DEPOT IM) by IntraMUSCular route. Every 6 months    denosumab (XGEVA) soln injection 120 mg by SubCUTAneous route. A month    abiraterone (ZYTIGA) 250 mg tab Take 1,000 mg by mouth daily.     predniSONE (DELTASONE) 5 mg tablet Take 5 mg by mouth two (2) times a day.  tamsulosin (FLOMAX) 0.4 mg capsule Take 0.4 mg by mouth every evening. No current facility-administered medications for this visit. Medications Discontinued During This Encounter   Medication Reason    aspirin 81 mg chewable tablet Other       [facility] follow up appointment(s):   Future Appointments   Date Time Provider Rick Dania   6/6/2019  2:30 PM MD Tommie Abreu   7/3/2019 10:40 AM Lucas Amin  [address]      Non-[facility] follow up appointment(s): N/A  [facility]:  plan to provide information at upcoming appointment   Dr. Gray José schedule is booked most days, earliest available is this Thursday 6/6. Patient declined visit from [facility] given improved symptoms. Patient has spoken to cardiology twice this AM and appointment was scheduled 7/3, per cardiology's recommendation to see them within this time frame. Plan to discuss with Dr. Awais Snowden to see if he thinks earlier f/u is necessary, given patient is not interested in Holter monitor. Goals      Patient will attend [facility] appointments (pt-stated)      6/3/19: Patient spoke with [facility] office today and has appointment with cardiologist Dr. Lexie Carlos on 7/3 @ 10:40 AM, per Dr. Hu Fraction instructions to f/u after 30 day event monitor. Dr. Awais Snowden would like to see patient prior to leaving for vacation next week. His schedule is very full, so unable to get patient in until this Thursday 6/6 @ 2:30 PM. -SRW       Patient will monitor for s/s PNA (pt-stated)      6/3/19: Patient reports cough is better, productive for clear to light yellow sputum. Denies fever/chills. Taking Levaquin & Mucinex. Will discuss pneumococcal vaccine w/ Dr. Awais Snowden at upcoming appt to see if patient is eligible.  -SRW

## 2021-01-01 ENCOUNTER — HOSPITAL ENCOUNTER (EMERGENCY)
Age: 86
Discharge: HOME | End: 2021-12-30
Attending: STUDENT IN AN ORGANIZED HEALTH CARE EDUCATION/TRAINING PROGRAM
Payer: MEDICARE

## 2021-01-01 ENCOUNTER — HOSPITAL ENCOUNTER (OUTPATIENT)
Dept: CT IMAGING | Age: 86
Discharge: HOME | End: 2021-09-28
Attending: UROLOGY
Payer: MEDICARE

## 2021-01-01 ENCOUNTER — TRANSCRIBE ORDER (OUTPATIENT)
Dept: SCHEDULING | Age: 86
End: 2021-01-01

## 2021-01-01 ENCOUNTER — HOSPITAL ENCOUNTER (OUTPATIENT)
Dept: NUCLEAR MEDICINE | Age: 86
Discharge: HOME | End: 2021-09-28
Attending: UROLOGY
Payer: MEDICARE

## 2021-01-01 ENCOUNTER — APPOINTMENT (OUTPATIENT)
Dept: GENERAL RADIOLOGY | Age: 86
End: 2021-01-01
Attending: EMERGENCY MEDICINE
Payer: MEDICARE

## 2021-01-01 ENCOUNTER — OFFICE VISIT (OUTPATIENT)
Dept: INTERNAL MEDICINE CLINIC | Age: 86
End: 2021-01-01
Payer: MEDICARE

## 2021-01-01 VITALS
OXYGEN SATURATION: 99 % | HEIGHT: 75 IN | HEART RATE: 71 BPM | BODY MASS INDEX: 22.88 KG/M2 | SYSTOLIC BLOOD PRESSURE: 128 MMHG | TEMPERATURE: 98.2 F | WEIGHT: 184 LBS | DIASTOLIC BLOOD PRESSURE: 70 MMHG | RESPIRATION RATE: 18 BRPM

## 2021-01-01 VITALS
RESPIRATION RATE: 20 BRPM | TEMPERATURE: 97.4 F | SYSTOLIC BLOOD PRESSURE: 177 MMHG | HEART RATE: 87 BPM | BODY MASS INDEX: 21.93 KG/M2 | HEIGHT: 75 IN | OXYGEN SATURATION: 97 % | WEIGHT: 176.37 LBS | DIASTOLIC BLOOD PRESSURE: 87 MMHG

## 2021-01-01 DIAGNOSIS — S41.112A SKIN TEAR OF LEFT UPPER EXTREMITY: Primary | ICD-10-CM

## 2021-01-01 DIAGNOSIS — C61 PROSTATE CANCER METASTATIC TO PELVIS (HCC): ICD-10-CM

## 2021-01-01 DIAGNOSIS — Z79.899 IMMUNOSUPPRESSION DUE TO DRUG THERAPY (HCC): ICD-10-CM

## 2021-01-01 DIAGNOSIS — C61 MALIGNANT NEOPLASM OF PROSTATE (HCC): ICD-10-CM

## 2021-01-01 DIAGNOSIS — C79.89 PROSTATE CANCER METASTATIC TO PELVIS (HCC): ICD-10-CM

## 2021-01-01 DIAGNOSIS — Z00.00 MEDICARE ANNUAL WELLNESS VISIT, SUBSEQUENT: Primary | ICD-10-CM

## 2021-01-01 DIAGNOSIS — R60.0 LOCALIZED EDEMA: ICD-10-CM

## 2021-01-01 DIAGNOSIS — D84.821 IMMUNOSUPPRESSION DUE TO DRUG THERAPY (HCC): ICD-10-CM

## 2021-01-01 DIAGNOSIS — M25.522 LEFT ELBOW PAIN: ICD-10-CM

## 2021-01-01 DIAGNOSIS — C61 MALIGNANT NEOPLASM OF PROSTATE (HCC): Primary | ICD-10-CM

## 2021-01-01 LAB — CREAT BLD-MCNC: 1 MG/DL (ref 0.6–1.3)

## 2021-01-01 PROCEDURE — G0439 PPPS, SUBSEQ VISIT: HCPCS | Performed by: INTERNAL MEDICINE

## 2021-01-01 PROCEDURE — 75810000293 HC SIMP/SUPERF WND  RPR

## 2021-01-01 PROCEDURE — 74177 CT ABD & PELVIS W/CONTRAST: CPT

## 2021-01-01 PROCEDURE — 1101F PT FALLS ASSESS-DOCD LE1/YR: CPT | Performed by: INTERNAL MEDICINE

## 2021-01-01 PROCEDURE — 99282 EMERGENCY DEPT VISIT SF MDM: CPT

## 2021-01-01 PROCEDURE — 78306 BONE IMAGING WHOLE BODY: CPT

## 2021-01-01 PROCEDURE — 73080 X-RAY EXAM OF ELBOW: CPT

## 2021-01-01 PROCEDURE — G8427 DOCREV CUR MEDS BY ELIG CLIN: HCPCS | Performed by: INTERNAL MEDICINE

## 2021-01-01 PROCEDURE — G8420 CALC BMI NORM PARAMETERS: HCPCS | Performed by: INTERNAL MEDICINE

## 2021-01-01 PROCEDURE — G8536 NO DOC ELDER MAL SCRN: HCPCS | Performed by: INTERNAL MEDICINE

## 2021-01-01 PROCEDURE — 99213 OFFICE O/P EST LOW 20 MIN: CPT | Performed by: INTERNAL MEDICINE

## 2021-01-01 PROCEDURE — G8510 SCR DEP NEG, NO PLAN REQD: HCPCS | Performed by: INTERNAL MEDICINE

## 2021-01-01 PROCEDURE — 82565 ASSAY OF CREATININE: CPT

## 2021-01-01 PROCEDURE — 74011000636 HC RX REV CODE- 636: Performed by: INTERNAL MEDICINE

## 2021-01-01 PROCEDURE — G0463 HOSPITAL OUTPT CLINIC VISIT: HCPCS | Performed by: INTERNAL MEDICINE

## 2021-01-01 PROCEDURE — 73130 X-RAY EXAM OF HAND: CPT

## 2021-01-01 RX ORDER — HYDROCHLOROTHIAZIDE 25 MG/1
25 TABLET ORAL AS NEEDED
COMMUNITY
End: 2022-01-01

## 2021-01-01 RX ADMIN — IOPAMIDOL 100 ML: 755 INJECTION, SOLUTION INTRAVENOUS at 13:01

## 2021-01-01 RX ADMIN — IOHEXOL 50 ML: 240 INJECTION, SOLUTION INTRATHECAL; INTRAVASCULAR; INTRAVENOUS; ORAL at 13:01

## 2021-01-25 ENCOUNTER — OFFICE VISIT (OUTPATIENT)
Dept: URGENT CARE | Age: 86
End: 2021-01-25
Payer: MEDICARE

## 2021-01-25 VITALS — OXYGEN SATURATION: 97 % | TEMPERATURE: 98.4 F | RESPIRATION RATE: 16 BRPM | HEART RATE: 87 BPM

## 2021-01-25 DIAGNOSIS — Z20.822 EXPOSURE TO COVID-19 VIRUS: Primary | ICD-10-CM

## 2021-01-25 PROCEDURE — G8420 CALC BMI NORM PARAMETERS: HCPCS | Performed by: NURSE PRACTITIONER

## 2021-01-25 PROCEDURE — G8536 NO DOC ELDER MAL SCRN: HCPCS | Performed by: NURSE PRACTITIONER

## 2021-01-25 PROCEDURE — 1100F PTFALLS ASSESS-DOCD GE2>/YR: CPT | Performed by: NURSE PRACTITIONER

## 2021-01-25 PROCEDURE — G8427 DOCREV CUR MEDS BY ELIG CLIN: HCPCS | Performed by: NURSE PRACTITIONER

## 2021-01-25 PROCEDURE — 3288F FALL RISK ASSESSMENT DOCD: CPT | Performed by: NURSE PRACTITIONER

## 2021-01-25 PROCEDURE — 99202 OFFICE O/P NEW SF 15 MIN: CPT | Performed by: NURSE PRACTITIONER

## 2021-01-25 PROCEDURE — G8432 DEP SCR NOT DOC, RNG: HCPCS | Performed by: NURSE PRACTITIONER

## 2021-01-26 NOTE — PROGRESS NOTES
Subjective: (As above and below)       This patient was seen in Flu Clinic at [address] [place] [address] Urgent Care while in their vehicle due to COVID-19 pandemic with PPE and focused examination in order to decrease community viral transmission. The patient/guardian gave verbal consent to treat. Chief Complaint   Patient presents with    Covid Testing     exp 1/22, no symptoms     Nenita Mueller is a 80 y.o. male who presents for COVID-19 Exposure and testing. Known contact with: covid 19 positive individual  Currently has not tried any therapies. Feels well and denies any symptoms at this point in time. Recent travel: no  Known Exposure to COVID-19: yes  Known flu or strep contact: no         Review of Systems - negative except as listed above    Reviewed PmHx, RxHx, FmHx, SocHx, AllgHx and updated in chart. History reviewed. No pertinent family history. Past Medical History:   Diagnosis Date    Cancer [facility])     prostate ca with mets    Prostate cancer [facility])       Social History     Socioeconomic History    Marital status:      Spouse name: Not on file    Number of children: Not on file    Years of education: Not on file    Highest education level: Not on file   Tobacco Use    Smoking status: Never Smoker    Smokeless tobacco: Never Used   Substance and Sexual Activity    Alcohol use: Never     Frequency: Never    Drug use: Never   Social History Narrative    ** Merged History Encounter **               Current Outpatient Medications   Medication Sig    calcium citrate-vitamin D3 (CITRACAL + D) tablet Take 1 Tab by mouth two (2) times a day.  leuprolide acetate (LUPRON DEPOT IM) by IntraMUSCular route. Every 6 months    abiraterone (ZYTIGA) 250 mg tab Take 1,000 mg by mouth daily.  predniSONE (DELTASONE) 5 mg tablet Take 5 mg by mouth two (2) times a day.  tamsulosin (FLOMAX) 0.4 mg capsule Take 0.4 mg by mouth every evening.      No current facility-administered medications for this visit. Objective:     Vitals:    01/25/21 1919   Pulse: 87   Resp: 16   Temp: 98.4 °F (36.9 °C)   SpO2: 97%       Physical Exam  General appearance  appears well hydrated and does not appear toxic, no acute distress  Eyes - EOMs intact. Non injected. Ears - no external swelling  Neck/Lymphatics  no obvious swelling  Chest - normal breathing effort. No active cough heard. No audible wheezing. Heart - HR-see vitals  Skin - no observable rashes or pallor  Neurologic- alert and oriented x 3  Psychiatric- normal mood, behavior and though content. Assessment/ Plan:     1. Exposure to COVID-19 virus    - NOVEL CORONAVIRUS (COVID-19)      Will test for COVID-19 given exposure  Supportive home care for any development of mild symptoms - tylenol, maintain adequate fluid intake, deep breathing exercises  Self isolate/quarantine advised based on recommendations in current CDC guidelines.              Kuldip Cox, NP

## 2021-01-28 LAB — SARS-COV-2, NAA: NOT DETECTED

## 2021-01-29 ENCOUNTER — IMMUNIZATION (OUTPATIENT)
Dept: INTERNAL MEDICINE CLINIC | Age: 86
End: 2021-01-29
Payer: MEDICARE

## 2021-01-29 DIAGNOSIS — Z23 ENCOUNTER FOR IMMUNIZATION: Primary | ICD-10-CM

## 2021-01-29 PROCEDURE — 0011A PR IMM ADMN SARSCOV2 100 MCG/0.5 ML 1ST DOSE: CPT | Performed by: FAMILY MEDICINE

## 2021-01-29 PROCEDURE — 91301 COVID-19, MRNA, LNP-S, PF, 100MCG/0.5ML DOSE(MODERNA): CPT | Performed by: FAMILY MEDICINE

## 2021-02-26 ENCOUNTER — IMMUNIZATION (OUTPATIENT)
Dept: INTERNAL MEDICINE CLINIC | Age: 86
End: 2021-02-26
Payer: MEDICARE

## 2021-02-26 DIAGNOSIS — Z23 ENCOUNTER FOR IMMUNIZATION: Primary | ICD-10-CM

## 2021-02-26 PROCEDURE — 91301 COVID-19, MRNA, LNP-S, PF, 100MCG/0.5ML DOSE(MODERNA): CPT | Performed by: FAMILY MEDICINE

## 2021-02-26 PROCEDURE — 0012A PR IMM ADMN SARSCOV2 100 MCG/0.5 ML 2ND DOSE: CPT | Performed by: FAMILY MEDICINE

## 2021-12-20 PROBLEM — R33.8 ACUTE RETENTION OF URINE: Status: ACTIVE | Noted: 2021-01-01

## 2021-12-20 PROBLEM — E55.9 VITAMIN D DEFICIENCY: Status: ACTIVE | Noted: 2021-01-01

## 2021-12-20 PROBLEM — S22.39XA CLOSED FRACTURE OF ONE RIB: Status: ACTIVE | Noted: 2021-01-01

## 2021-12-20 NOTE — PROGRESS NOTES
Chief Complaint   Patient presents with   [place] [facility] Annual Wellness Visit     1. Have you been to the ER, urgent care clinic since your last visit? Hospitalized since your last visit? No    2. Have you seen or consulted any other health care providers outside of the [address] [place] [address] since your last visit? Include any pap smears or colon screening.  No     Visit Vitals  BP (!) 166/79   Pulse 71   Temp 98.2 °F (36.8 °C) (Temporal)   Resp 18   Ht 6' 3\" (1.905 m)   Wt 184 lb (83.5 kg)   SpO2 99%   BMI 23.00 kg/m²

## 2021-12-20 NOTE — PROGRESS NOTES
This is the Subsequent Medicare Annual Wellness Exam, performed 12 months or more after the Initial AWV or the last Subsequent AWV    I have reviewed the patient's medical history in detail and updated the computerized patient record. Assessment/Plan   Education and counseling provided:  Are appropriate based on today's review and evaluation  Pneumococcal Vaccine  Influenza Vaccine    1. Medicare annual wellness visit, subsequent  2. 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 Immunosuppression due to drug therapy [facility])       Depression Risk Factor Screening     3 most recent PHQ Screens 12/20/2021   Little interest or pleasure in doing things Not at all   Feeling down, depressed, irritable, or hopeless Not at all   Total Score PHQ 2 0       Alcohol Risk Screen    Do you average more than 1 drink per night or more than 7 drinks a week: In the past three months have you have had more than 4 drinks containing alcohol on one occasion: No        Functional Ability and Level of Safety    Hearing: Hearing is good. Activities of Daily Living: The home contains: no safety equipment. Patient does total self care      Ambulation: with no difficulty     Fall Risk:  Fall Risk Assessment, last 12 mths 12/20/2021   Able to walk? Yes   Fall in past 12 months? 0   Do you feel unsteady? 1   Are you worried about falling 1   Is the gait abnormal? 0   Number of falls in past 12 months -   Fall with injury?  -      Abuse Screen:  Patient is not abused       Cognitive Screening    Has your family/caregiver stated any concerns about your memory: no     Cognitive Screening: Normal - Verbal Fluency Test    Health Maintenance Due     Health Maintenance Due   Topic Date Due    Shingrix Vaccine Age 50> (1 of 2) Never done    COVID-19 Vaccine (3 - Moderna risk 4-dose series) 03/26/2021    Flu Vaccine (1) 09/01/2021       Patient Care Team   Patient Care Team:  Jasper Vang MD as PCP - General (Internal Medicine)  Dionne Snellen, MD as PCP - [facility] Provider    History     Patient Active Problem List   Diagnosis Code    Prostate cancer metastatic to multiple sites [facility]) C61    Heart murmur, systolic F26.9    Degenerative disc disease, lumbar M51.36    Acute back pain M54.9    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 Current Outpatient Medications   Medication Sig Dispense Refill    hydroCHLOROthiazide (HYDRODIURIL) 25 mg tablet Take 25 mg by mouth as needed (edema).  calcium citrate-vitamin D3 (CITRACAL + D) tablet Take 1 Tab by mouth two (2) times a day.  leuprolide acetate (LUPRON DEPOT IM) by IntraMUSCular route. Every 6 months      abiraterone (ZYTIGA) 250 mg tab Take 1,000 mg by mouth daily.  predniSONE (DELTASONE) 5 mg tablet Take 5 mg by mouth two (2) times a day.  tamsulosin (FLOMAX) 0.4 mg capsule Take 0.4 mg by mouth every evening. Allergies   Allergen Reactions    Penicillins Itching       No family history on file. Social History     Tobacco Use    Smoking status: Never Smoker    Smokeless tobacco: Never Used   Substance Use Topics    Alcohol use: Never     Major health problem is prostate cancer. Four years ago, he went into urinary retention, had some quick treatments at the [facility], then switched to Dr. Zaina Robison who told him he had metastatic prostate cancer with multiple bony metastases and rib metastases. Since that time, he has been treated with aggressive therapy. He is on Lupron.   He is on prednisone 5 mg b.i.d., and he is on Zytiga b.i.d. He takes calcium and vitamin D supplements. He says that they have noted a slight increase in his PSA lately, not a lot, but this has been gradually climbing. He is going to undergo some new therapy starting in January, which will involve an indwelling catheter and what sounds like some leukapheresis or plasmapheresis done at the blood bank. The patient does not know too much details, but he is supposed to go in to have the indwelling catheter placed sometime in early January. He is not sure about whether he will continue Zytiga and prednisone. He has gained a little weight since I have seen him, and he attributes that to the prednisone. He has had no acute problems. He is up to date on vaccine for flu and COVID and has done his best to stay safe from [facility]. He denies pain. He has no back pain or rib pain. He says his urine flow is great as long as he takes the Flomax. He denies hematuria, denies any GI symptoms. Denies shortness of breath, chest pain, or palpitations. We have not done any blood work on him recently. Dr. Polo Reyes does blood work on him every three months. Vitals:    12/20/21 1313 12/20/21 1332   BP: (!) 166/79 128/70   Pulse: 71    Resp: 18    Temp: 98.2 °F (36.8 °C)    TempSrc: Temporal    SpO2: 99%    Weight: 184 lb (83.5 kg)    Height: 6' 3\" (1.905 m)      S1 and S2 normal, no murmurs, clicks, gallops or rubs. Regular rate and rhythm. Chest is clear; no wheezes or rales. No edema or JVD. No jvd  Min edema lower legs  No evidence clots  1. 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 Immunosuppression due to drug therapy ([facility])  prednisone    3. Medicare annual wellness visit, subsequent  Will coordinate care with all the physicians and providers      4.  Localized edema  From prednisone HCTZ prn ok    urology monitoring labs    Bia Levy MD

## 2021-12-20 NOTE — PATIENT INSTRUCTIONS
Medicare Wellness Visit, Male    The best way to live healthy is to have a lifestyle where you eat a well-balanced diet, exercise regularly, limit alcohol use, and quit all forms of tobacco/nicotine, if applicable. Regular preventive services are another way to keep healthy. Preventive services (vaccines, screening tests, monitoring & exams) can help personalize your care plan, which helps you manage your own care. Screening tests can find health problems at the earliest stages, when they are easiest to treat. [facility] follows the current, evidence-based guidelines published by the [facility] ([facility]) when recommending preventive services for our patients. Because we follow these guidelines, sometimes recommendations change over time as research supports it. (For example, a prostate screening blood test is no longer routinely recommended for men with no symptoms). Of course, you and your doctor may decide to screen more often for some diseases, based on your risk and co-morbidities (chronic disease you are already diagnosed with). Preventive services for you include:  - Medicare offers their members a free annual wellness visit, which is time for you and your primary care provider to discuss and plan for your preventive service needs. Take advantage of this benefit every year!  -All adults over age 72 should receive the recommended pneumonia vaccines. Current USPSTF guidelines recommend a series of two vaccines for the best pneumonia protection.   -All adults should have a flu vaccine yearly and tetanus vaccine every 10 years.  -All adults age 48 and older should receive the shingles vaccines (series of two vaccines).        -All adults age 38-68 who are overweight should have a diabetes screening test once every three years.   -Other screening tests & preventive services for persons with diabetes include: an eye exam to screen for diabetic retinopathy, a kidney function test, a foot exam, and stricter control over your cholesterol.   -Cardiovascular screening for adults with routine risk involves an electrocardiogram (ECG) at intervals determined by the provider.   -Colorectal cancer screening should be done for adults age 54-65 with no increased risk factors for colorectal cancer. There are a number of acceptable methods of screening for this type of cancer. Each test has its own benefits and drawbacks. Discuss with your provider what is most appropriate for you during your annual wellness visit. The different tests include: colonoscopy (considered the best screening method), a fecal occult blood test, a fecal DNA test, and sigmoidoscopy.  -All adults born between [facility] should be screened once for Hepatitis C.  -An Abdominal Aortic Aneurysm (AAA) Screening is recommended for men age 73-68 who has ever smoked in their lifetime.      Here is a list of your current Health Maintenance items (your personalized list of preventive services) with a due date:  Health Maintenance Due   Topic Date Due    Shingles Vaccine (1 of 2) Never done    COVID-19 Vaccine (3 - Moderna risk 4-dose series) 03/26/2021    Yearly Flu Vaccine (1) 09/01/2021

## 2021-12-30 NOTE — ED PROVIDER NOTES
Patient was at [place] [address] urology and got hit in the left elbow by an elevator door. Patient has large skin tear to the left elbow with bruising and oozing blood requiring pressure dressing. Patient is on antiplatelet/anticoagulation. Patient had no fall. The history is provided by the patient and the spouse. Arm Pain   This is a new problem. The current episode started 1 to 2 hours ago. The problem occurs constantly. The problem has been gradually improving. The pain is present in the left hand and left elbow. The pain is at a severity of 5/10. The pain is moderate. Pertinent negatives include full range of motion. The symptoms are aggravated by palpation. Treatments tried: Pressure dressing. The treatment provided moderate relief. There has been a history of trauma. Past Medical History:   Diagnosis Date    Cancer [facility])     prostate ca with mets    Prostate cancer ([facility] [address]        No past surgical history on file. No family history on file. Social History     Socioeconomic History    Marital status:      Spouse name: Not on file    Number of children: Not on file    Years of education: Not on file    Highest education level: Not on file   Occupational History    Not on file   Tobacco Use    Smoking status: Never Smoker    Smokeless tobacco: Never Used   Substance and Sexual Activity    Alcohol use: Never    Drug use: Never    Sexual activity: Not on file   Other Topics Concern    Not on file   Social History Narrative    ** Merged History Encounter **          Social Determinants of Health     Financial Resource Strain:     Difficulty of Paying Living Expenses: Not on file   Food Insecurity:     Worried About Running Out of Food in the Last Year: Not on file    Taniya of Food in the Last Year: Not on file   Transportation Needs:     Lack of Transportation (Medical): Not on file    Lack of Transportation (Non-Medical):  Not on file   Physical Activity:     Days of Exercise per Week: Not on file    Minutes of Exercise per Session: Not on file   Stress:     Feeling of Stress : Not on file   Social Connections:     Frequency of Communication with Friends and Family: Not on file    Frequency of Social Gatherings with Friends and Family: Not on file    Attends Hoahaoism Services: Not on file    Active Member of Clubs or Organizations: Not on file    Attends Club or Organization Meetings: Not on file    Marital Status: Not on file   Intimate Partner Violence:     Fear of Current or Ex-Partner: Not on file    Emotionally Abused: Not on file    Physically Abused: Not on file    Sexually Abused: Not on file   Housing Stability:     Unable to Pay for Housing in the Last Year: Not on file    Number of [place] in the Last Year: Not on file    Unstable Housing in the Last Year: Not on file         ALLERGIES: Penicillins    Review of Systems   Constitutional: Negative. HENT: Negative. Eyes: Negative. Respiratory: Negative. Cardiovascular: Negative. Gastrointestinal: Negative. Endocrine: Negative. Genitourinary: Negative. Musculoskeletal: Positive for arthralgias. Skin: Positive for wound. Allergic/Immunologic: Negative. Neurological: Negative. Hematological: Negative. Psychiatric/Behavioral: Negative. Vitals:    12/30/21 1125   BP: (!) 177/87   Pulse: 87   Resp: 20   Temp: 97.4 °F (36.3 °C)   SpO2: 97%   Weight: 80 kg (176 lb 5.9 oz)   Height: 6' 3\" (1.905 m)            Physical Exam  Vitals and nursing note reviewed. Constitutional:       General: He is not in acute distress. Appearance: Normal appearance. HENT:      Head: Normocephalic and atraumatic. Right Ear: External ear normal.      Left Ear: External ear normal.      Nose: Nose normal.   Eyes:      Extraocular Movements: Extraocular movements intact. Conjunctiva/sclera: Conjunctivae normal.   Cardiovascular:      Rate and Rhythm: Normal rate.       Pulses: Normal pulses. Radial pulses are 2+ on the right side and 2+ on the left side. Heart sounds: Normal heart sounds. Pulmonary:      Effort: Pulmonary effort is normal.      Breath sounds: Normal breath sounds. Chest:      Chest wall: No deformity or tenderness. Abdominal:      General: Abdomen is flat. There is no distension. Tenderness: There is no abdominal tenderness. Musculoskeletal:         General: Tenderness and signs of injury present. No deformity. Normal range of motion. Cervical back: Normal range of motion and neck supple. No tenderness. Comments: Patient has tenderness of bruising of the elbow   Skin:     General: Skin is warm and dry. Capillary Refill: Capillary refill takes less than 2 seconds. Findings: Abrasion, bruising, ecchymosis and laceration present. Comments: Patient has large skin tear with flap over the left elbow, moderate venous oozing bleeding controlled by pressure. Skin tear left elbow covers approximately 3 inch x 2 inch area with some areas of skin completely destroyed. Mild 2 centimeter skin tear on left hand, hemostatic. Neurological:      General: No focal deficit present. Mental Status: He is alert and oriented to person, place, and time. Psychiatric:         Attention and Perception: Attention normal.         Mood and Affect: Mood normal.         Behavior: Behavior normal.          MDM       IMAGING RESULTS:  XR ELBOW LT MIN 3 V   Final Result   No acute abnormality. XR HAND LT MIN 3 V   Final Result   No acute abnormality.           MEDICATIONS GIVEN:  Medications - No data to display    Differential diagnosis: Skin tear, laceration, elbow fracture, contusion, difficult hemostasis    MDM: Patient is a 27-year-old male presenting to the ED after getting his left arm caught in an elevator with a large skin tear to the left elbow and small skin tear to the left hand repaired per procedure note below who has stable vital signs and is afebrile appropriate discharge home with home wound care by family with follow-up with PCP for wound check. Key Discharge Instructions and summary of care: You presented to the ED after having your arm hit by an elevator door. You have a large skin tear to your left elbow with some venous oozing. Surgifoam observable gelatin sponge pads x2 were applied to your left elbow. Wound is rechecked 1 hour later and bleeding is controlled. Additionally a Steri-Strip was placed on your left hand a small skin tear about the thumb. Your Tdap is up-to-date. Wound care: Tomorrow 12/31/2021, change dressing and remove gauze but leave Surgifoam pad that is likely adhered to the elbow. Replace with nonstick impregnated dressing, rewrapped with gauze or Covan  Day 3-1/1/2022: Remove impregnated dressing and gauze, leave Surgifoam that is likely adhered to the wound. Apply nonstick dry dressings and gauze. On 1/2/2022 cover the dressing with dry clean gauze. If bleeding continues or worsens or you have any concerns return to the ED for further evaluation. ED Impression:    ICD-10-CM ICD-9-CM    1. Skin tear of left upper extremity  S41.112A 880.03    2. Left elbow pain  M25.522 719.42         Disposition: Discharged      Wound Repair    Date/Time: 12/31/2021 1:20 PM  Performed by: attendingLocation details: left arm  Wound length:2.6 - 7.5 cm (Large surface area skin tear on the left elbow approximately 8 cm x 4 cm)  Foreign bodies: no foreign bodies  Irrigation solution: saline  Irrigation method: syringe  Debridement: minimal  Skin closure: Steri-Strips (Due to skin tear and significant loss of skin no suturing is appropriate on the elbow.   1 Steri-Strip placed on left hand skin tear/laceration.)  Dressing: pressure dressing (Surgicel x2)  Patient tolerance: patient tolerated the procedure well with no immediate complications  My total time at bedside, performing this procedure was 16-30 minutes. Comments: Patient's left hand injury was easily repaired with 1 Steri-Strip. However patient's left elbow has significant skin damage due to skin tear. Venous oozing bleeding over the elbow required placement of 2 Surgicel sponges as well as nonstick dressing over the remainder of the wound with pressure dressing applied. Wound reevaluated after 1 hour. Bleeding has been controlled. Mild pressure dressing replaced due to patient's anticoagulation/antiplatelet use.

## 2021-12-30 NOTE — DISCHARGE INSTRUCTIONS
You presented to the ED after having your arm hit by an elevator door. You have a large skin tear to your left elbow with some venous oozing. Surgifoam observable gelatin sponge pads x2 were applied to your left elbow. Wound is rechecked 1 hour later and bleeding is controlled. Additionally a Steri-Strip was placed on your left hand a small skin tear about the thumb. Your Tdap is up-to-date. Wound care: Tomorrow 12/31/2021, change dressing and remove gauze but leave Surgifoam pad that is likely adhered to the elbow. Replace with nonstick impregnated dressing, rewrapped with gauze or Covan  Day 3-1/1/2022: Remove impregnated dressing and gauze, leave Surgifoam that is likely adhered to the wound. Apply nonstick dry dressings and gauze. On 1/2/2022 cover the dressing with dry clean gauze. If bleeding continues or worsens or you have any concerns return to the ED for further evaluation.

## 2022-01-01 ENCOUNTER — OFFICE VISIT (OUTPATIENT)
Dept: CARDIOLOGY CLINIC | Age: 87
End: 2022-01-01
Payer: MEDICARE

## 2022-01-01 ENCOUNTER — HOME CARE VISIT (OUTPATIENT)
Dept: SCHEDULING | Facility: HOME HEALTH | Age: 87
End: 2022-01-01
Payer: MEDICARE

## 2022-01-01 ENCOUNTER — HOSPITAL ENCOUNTER (OUTPATIENT)
Dept: RADIATION THERAPY | Age: 87
Discharge: HOME | End: 2022-06-14

## 2022-01-01 ENCOUNTER — TRANSCRIBE ORDER (OUTPATIENT)
Dept: SCHEDULING | Age: 87
End: 2022-01-01

## 2022-01-01 ENCOUNTER — HOME CARE VISIT (OUTPATIENT)
Dept: HOSPICE | Facility: HOSPICE | Age: 87
End: 2022-01-01
Payer: MEDICARE

## 2022-01-01 ENCOUNTER — APPOINTMENT (OUTPATIENT)
Dept: CT IMAGING | Age: 87
DRG: 308 | End: 2022-01-01
Attending: INTERNAL MEDICINE
Payer: MEDICARE

## 2022-01-01 ENCOUNTER — HOSPITAL ENCOUNTER (INPATIENT)
Dept: INTERVENTIONAL RADIOLOGY/VASCULAR | Age: 87
LOS: 1 days | Discharge: HOME HEALTH | DRG: 478 | End: 2022-06-21
Attending: STUDENT IN AN ORGANIZED HEALTH CARE EDUCATION/TRAINING PROGRAM | Admitting: FAMILY MEDICINE
Payer: MEDICARE

## 2022-01-01 ENCOUNTER — TELEPHONE (OUTPATIENT)
Dept: ONCOLOGY | Age: 87
End: 2022-01-01

## 2022-01-01 ENCOUNTER — APPOINTMENT (OUTPATIENT)
Dept: CT IMAGING | Age: 87
DRG: 436 | End: 2022-01-01
Attending: STUDENT IN AN ORGANIZED HEALTH CARE EDUCATION/TRAINING PROGRAM
Payer: MEDICARE

## 2022-01-01 ENCOUNTER — HOSPITAL ENCOUNTER (OUTPATIENT)
Dept: NUCLEAR MEDICINE | Age: 87
Discharge: HOME | End: 2022-04-27
Attending: UROLOGY
Payer: MEDICARE

## 2022-01-01 ENCOUNTER — HOSPITAL ENCOUNTER (OUTPATIENT)
Dept: CT IMAGING | Age: 87
Discharge: HOME | End: 2022-04-26
Attending: UROLOGY
Payer: MEDICARE

## 2022-01-01 ENCOUNTER — TELEPHONE (OUTPATIENT)
Dept: INTERNAL MEDICINE CLINIC | Age: 87
End: 2022-01-01

## 2022-01-01 ENCOUNTER — APPOINTMENT (OUTPATIENT)
Dept: CT IMAGING | Age: 87
DRG: 436 | End: 2022-01-01
Attending: INTERNAL MEDICINE
Payer: MEDICARE

## 2022-01-01 ENCOUNTER — APPOINTMENT (OUTPATIENT)
Dept: NON INVASIVE DIAGNOSTICS | Age: 87
DRG: 308 | End: 2022-01-01
Attending: INTERNAL MEDICINE
Payer: MEDICARE

## 2022-01-01 ENCOUNTER — HOSPITAL ENCOUNTER (OUTPATIENT)
Dept: RADIATION THERAPY | Age: 87
Discharge: HOME | End: 2022-06-13

## 2022-01-01 ENCOUNTER — APPOINTMENT (OUTPATIENT)
Dept: MRI IMAGING | Age: 87
DRG: 436 | End: 2022-01-01
Attending: FAMILY MEDICINE
Payer: MEDICARE

## 2022-01-01 ENCOUNTER — HOSPITAL ENCOUNTER (OUTPATIENT)
Dept: RADIATION THERAPY | Age: 87
Discharge: HOME | End: 2022-06-16

## 2022-01-01 ENCOUNTER — HOSPITAL ENCOUNTER (INPATIENT)
Age: 87
LOS: 9 days | Discharge: HOSPICE | DRG: 436 | End: 2022-07-14
Attending: STUDENT IN AN ORGANIZED HEALTH CARE EDUCATION/TRAINING PROGRAM | Admitting: INTERNAL MEDICINE
Payer: MEDICARE

## 2022-01-01 ENCOUNTER — TELEPHONE (OUTPATIENT)
Dept: CARDIOLOGY CLINIC | Age: 87
End: 2022-01-01

## 2022-01-01 ENCOUNTER — APPOINTMENT (OUTPATIENT)
Dept: INFUSION THERAPY | Age: 87
End: 2022-01-01

## 2022-01-01 ENCOUNTER — HOSPICE ADMISSION (OUTPATIENT)
Dept: HOSPICE | Facility: HOSPICE | Age: 87
End: 2022-01-01
Payer: MEDICARE

## 2022-01-01 ENCOUNTER — DOCUMENTATION ONLY (OUTPATIENT)
Dept: ONCOLOGY | Age: 87
End: 2022-01-01

## 2022-01-01 ENCOUNTER — HOSPITAL ENCOUNTER (OUTPATIENT)
Dept: RADIATION THERAPY | Age: 87
Discharge: HOME | End: 2022-06-15

## 2022-01-01 ENCOUNTER — HOSPITAL ENCOUNTER (OUTPATIENT)
Dept: MRI IMAGING | Age: 87
Discharge: HOME | End: 2022-06-13
Attending: RADIOLOGY
Payer: MEDICARE

## 2022-01-01 ENCOUNTER — NURSE TRIAGE (OUTPATIENT)
Dept: OTHER | Facility: CLINIC | Age: 87
End: 2022-01-01

## 2022-01-01 ENCOUNTER — HOSPITAL ENCOUNTER (OUTPATIENT)
Dept: INTERVENTIONAL RADIOLOGY/VASCULAR | Age: 87
Discharge: HOME | End: 2022-01-06
Attending: RADIOLOGY | Admitting: RADIOLOGY
Payer: MEDICARE

## 2022-01-01 ENCOUNTER — APPOINTMENT (OUTPATIENT)
Dept: GENERAL RADIOLOGY | Age: 87
DRG: 308 | End: 2022-01-01
Attending: EMERGENCY MEDICINE
Payer: MEDICARE

## 2022-01-01 ENCOUNTER — HOSPITAL ENCOUNTER (OUTPATIENT)
Dept: RADIATION THERAPY | Age: 87
Discharge: HOME | End: 2022-06-17

## 2022-01-01 ENCOUNTER — OFFICE VISIT (OUTPATIENT)
Dept: INTERNAL MEDICINE CLINIC | Age: 87
End: 2022-01-01
Payer: MEDICARE

## 2022-01-01 ENCOUNTER — OFFICE VISIT (OUTPATIENT)
Dept: ONCOLOGY | Age: 87
End: 2022-01-01
Payer: MEDICARE

## 2022-01-01 ENCOUNTER — HOSPITAL ENCOUNTER (OUTPATIENT)
Dept: RADIATION THERAPY | Age: 87
Discharge: HOME | End: 2022-06-10

## 2022-01-01 ENCOUNTER — HOSPITAL ENCOUNTER (OUTPATIENT)
Dept: MRI IMAGING | Age: 87
Discharge: HOME | End: 2022-06-06
Attending: NEUROLOGICAL SURGERY
Payer: MEDICARE

## 2022-01-01 ENCOUNTER — HOSPITAL ENCOUNTER (INPATIENT)
Age: 87
LOS: 4 days | Discharge: NURSING, REHAB OR LONG-TERM CARE | DRG: 308 | End: 2022-04-13
Attending: EMERGENCY MEDICINE | Admitting: INTERNAL MEDICINE
Payer: MEDICARE

## 2022-01-01 ENCOUNTER — HOSPITAL ENCOUNTER (OUTPATIENT)
Dept: INTERVENTIONAL RADIOLOGY/VASCULAR | Age: 87
Discharge: HOME | End: 2022-01-25
Attending: RADIOLOGY | Admitting: RADIOLOGY
Payer: MEDICARE

## 2022-01-01 ENCOUNTER — HOSPITAL ENCOUNTER (OUTPATIENT)
Dept: INFUSION THERAPY | Age: 87
End: 2022-01-01

## 2022-01-01 VITALS
HEIGHT: 75 IN | SYSTOLIC BLOOD PRESSURE: 130 MMHG | DIASTOLIC BLOOD PRESSURE: 80 MMHG | RESPIRATION RATE: 18 BRPM | HEART RATE: 68 BPM | BODY MASS INDEX: 22.04 KG/M2 | DIASTOLIC BLOOD PRESSURE: 60 MMHG | HEART RATE: 76 BPM | SYSTOLIC BLOOD PRESSURE: 122 MMHG | TEMPERATURE: 97.7 F

## 2022-01-01 VITALS
SYSTOLIC BLOOD PRESSURE: 115 MMHG | TEMPERATURE: 98 F | DIASTOLIC BLOOD PRESSURE: 60 MMHG | HEART RATE: 56 BPM | OXYGEN SATURATION: 94 % | RESPIRATION RATE: 20 BRPM

## 2022-01-01 VITALS — HEART RATE: 92 BPM | DIASTOLIC BLOOD PRESSURE: 66 MMHG | SYSTOLIC BLOOD PRESSURE: 111 MMHG

## 2022-01-01 VITALS
DIASTOLIC BLOOD PRESSURE: 62 MMHG | HEART RATE: 96 BPM | RESPIRATION RATE: 16 BRPM | TEMPERATURE: 98 F | SYSTOLIC BLOOD PRESSURE: 122 MMHG

## 2022-01-01 VITALS
RESPIRATION RATE: 17 BRPM | DIASTOLIC BLOOD PRESSURE: 50 MMHG | SYSTOLIC BLOOD PRESSURE: 108 MMHG | HEART RATE: 104 BPM | OXYGEN SATURATION: 93 % | TEMPERATURE: 98.1 F

## 2022-01-01 VITALS
TEMPERATURE: 98.3 F | SYSTOLIC BLOOD PRESSURE: 151 MMHG | HEART RATE: 87 BPM | RESPIRATION RATE: 16 BRPM | OXYGEN SATURATION: 97 % | BODY MASS INDEX: 19.67 KG/M2 | HEIGHT: 75 IN | DIASTOLIC BLOOD PRESSURE: 75 MMHG | WEIGHT: 158.2 LBS

## 2022-01-01 VITALS
RESPIRATION RATE: 17 BRPM | SYSTOLIC BLOOD PRESSURE: 130 MMHG | DIASTOLIC BLOOD PRESSURE: 87 MMHG | BODY MASS INDEX: 21.34 KG/M2 | HEIGHT: 75 IN | WEIGHT: 171.6 LBS | OXYGEN SATURATION: 98 % | TEMPERATURE: 97.9 F | HEART RATE: 91 BPM

## 2022-01-01 VITALS
WEIGHT: 172 LBS | HEART RATE: 86 BPM | RESPIRATION RATE: 18 BRPM | BODY MASS INDEX: 21.5 KG/M2 | OXYGEN SATURATION: 98 % | TEMPERATURE: 98.3 F | DIASTOLIC BLOOD PRESSURE: 78 MMHG | SYSTOLIC BLOOD PRESSURE: 139 MMHG

## 2022-01-01 VITALS
HEIGHT: 75 IN | RESPIRATION RATE: 18 BRPM | OXYGEN SATURATION: 97 % | BODY MASS INDEX: 21.92 KG/M2 | SYSTOLIC BLOOD PRESSURE: 102 MMHG | TEMPERATURE: 98.1 F | HEART RATE: 86 BPM | DIASTOLIC BLOOD PRESSURE: 60 MMHG | WEIGHT: 176.3 LBS

## 2022-01-01 VITALS
OXYGEN SATURATION: 95 % | HEART RATE: 83 BPM | RESPIRATION RATE: 18 BRPM | DIASTOLIC BLOOD PRESSURE: 83 MMHG | SYSTOLIC BLOOD PRESSURE: 173 MMHG

## 2022-01-01 VITALS
OXYGEN SATURATION: 93 % | DIASTOLIC BLOOD PRESSURE: 54 MMHG | RESPIRATION RATE: 18 BRPM | SYSTOLIC BLOOD PRESSURE: 96 MMHG | TEMPERATURE: 97.7 F | HEART RATE: 91 BPM

## 2022-01-01 VITALS
DIASTOLIC BLOOD PRESSURE: 80 MMHG | HEIGHT: 75 IN | HEART RATE: 77 BPM | RESPIRATION RATE: 16 BRPM | BODY MASS INDEX: 21.26 KG/M2 | OXYGEN SATURATION: 97 % | WEIGHT: 171 LBS | SYSTOLIC BLOOD PRESSURE: 130 MMHG

## 2022-01-01 VITALS
DIASTOLIC BLOOD PRESSURE: 60 MMHG | HEART RATE: 100 BPM | TEMPERATURE: 98.1 F | RESPIRATION RATE: 14 BRPM | SYSTOLIC BLOOD PRESSURE: 116 MMHG

## 2022-01-01 VITALS
DIASTOLIC BLOOD PRESSURE: 75 MMHG | SYSTOLIC BLOOD PRESSURE: 135 MMHG | WEIGHT: 188 LBS | HEIGHT: 75 IN | HEART RATE: 84 BPM | TEMPERATURE: 98 F | OXYGEN SATURATION: 100 % | BODY MASS INDEX: 23.38 KG/M2 | RESPIRATION RATE: 16 BRPM

## 2022-01-01 VITALS
DIASTOLIC BLOOD PRESSURE: 62 MMHG | RESPIRATION RATE: 18 BRPM | SYSTOLIC BLOOD PRESSURE: 122 MMHG | HEART RATE: 106 BPM | TEMPERATURE: 98.2 F

## 2022-01-01 VITALS — SYSTOLIC BLOOD PRESSURE: 78 MMHG | HEART RATE: 89 BPM | OXYGEN SATURATION: 97 % | DIASTOLIC BLOOD PRESSURE: 43 MMHG

## 2022-01-01 VITALS
RESPIRATION RATE: 18 BRPM | HEART RATE: 76 BPM | TEMPERATURE: 97.4 F | SYSTOLIC BLOOD PRESSURE: 104 MMHG | DIASTOLIC BLOOD PRESSURE: 52 MMHG

## 2022-01-01 VITALS
BODY MASS INDEX: 23.43 KG/M2 | SYSTOLIC BLOOD PRESSURE: 156 MMHG | HEIGHT: 75 IN | WEIGHT: 188.4 LBS | OXYGEN SATURATION: 98 % | RESPIRATION RATE: 16 BRPM | HEART RATE: 80 BPM | DIASTOLIC BLOOD PRESSURE: 81 MMHG | TEMPERATURE: 98.2 F

## 2022-01-01 VITALS
TEMPERATURE: 98.2 F | DIASTOLIC BLOOD PRESSURE: 55 MMHG | RESPIRATION RATE: 16 BRPM | HEIGHT: 75 IN | HEART RATE: 84 BPM | OXYGEN SATURATION: 99 % | WEIGHT: 171 LBS | BODY MASS INDEX: 21.26 KG/M2 | SYSTOLIC BLOOD PRESSURE: 122 MMHG

## 2022-01-01 VITALS
SYSTOLIC BLOOD PRESSURE: 116 MMHG | HEART RATE: 90 BPM | TEMPERATURE: 98.8 F | DIASTOLIC BLOOD PRESSURE: 50 MMHG | RESPIRATION RATE: 15 BRPM

## 2022-01-01 VITALS — HEART RATE: 72 BPM | DIASTOLIC BLOOD PRESSURE: 68 MMHG | SYSTOLIC BLOOD PRESSURE: 118 MMHG

## 2022-01-01 VITALS
DIASTOLIC BLOOD PRESSURE: 82 MMHG | HEART RATE: 95 BPM | OXYGEN SATURATION: 95 % | SYSTOLIC BLOOD PRESSURE: 120 MMHG | TEMPERATURE: 98.1 F

## 2022-01-01 VITALS
SYSTOLIC BLOOD PRESSURE: 132 MMHG | TEMPERATURE: 97.6 F | HEART RATE: 84 BPM | RESPIRATION RATE: 20 BRPM | DIASTOLIC BLOOD PRESSURE: 62 MMHG

## 2022-01-01 DIAGNOSIS — C61 PROSTATE CANCER (HCC): Primary | ICD-10-CM

## 2022-01-01 DIAGNOSIS — S41.112D SKIN TEAR OF UPPER ARM WITHOUT COMPLICATION, LEFT, SUBSEQUENT ENCOUNTER: Primary | ICD-10-CM

## 2022-01-01 DIAGNOSIS — K59.03 DRUG INDUCED CONSTIPATION: ICD-10-CM

## 2022-01-01 DIAGNOSIS — C79.51 PROSTATE CANCER METASTATIC TO BONE (HCC): ICD-10-CM

## 2022-01-01 DIAGNOSIS — J12.82 PNEUMONIA DUE TO COVID-19 VIRUS: Primary | ICD-10-CM

## 2022-01-01 DIAGNOSIS — Z09 HOSPITAL DISCHARGE FOLLOW-UP: ICD-10-CM

## 2022-01-01 DIAGNOSIS — C79.51 SECONDARY MALIGNANT NEOPLASM OF BONE (HCC): ICD-10-CM

## 2022-01-01 DIAGNOSIS — C79.89 PROSTATE CANCER METASTATIC TO PELVIS (HCC): ICD-10-CM

## 2022-01-01 DIAGNOSIS — Z51.5 END OF LIFE CARE: ICD-10-CM

## 2022-01-01 DIAGNOSIS — R91.1 LUNG NODULE: Primary | ICD-10-CM

## 2022-01-01 DIAGNOSIS — C61 PROSTATE CANCER (HCC): ICD-10-CM

## 2022-01-01 DIAGNOSIS — C79.51 BONE METASTASIS (HCC): ICD-10-CM

## 2022-01-01 DIAGNOSIS — C61 PROSTATE CANCER METASTATIC TO MULTIPLE SITES (HCC): ICD-10-CM

## 2022-01-01 DIAGNOSIS — D84.821 IMMUNOSUPPRESSION DUE TO DRUG THERAPY (HCC): ICD-10-CM

## 2022-01-01 DIAGNOSIS — E88.09 HYPOALBUMINEMIA: ICD-10-CM

## 2022-01-01 DIAGNOSIS — C61 PROSTATE CANCER METASTATIC TO PELVIS (HCC): Primary | ICD-10-CM

## 2022-01-01 DIAGNOSIS — U07.1 PNEUMONIA DUE TO COVID-19 VIRUS: Primary | ICD-10-CM

## 2022-01-01 DIAGNOSIS — G89.3 CANCER ASSOCIATED PAIN: ICD-10-CM

## 2022-01-01 DIAGNOSIS — C79.89 PROSTATE CANCER METASTATIC TO PELVIS (HCC): Primary | ICD-10-CM

## 2022-01-01 DIAGNOSIS — Z79.899 IMMUNOSUPPRESSION DUE TO DRUG THERAPY (HCC): ICD-10-CM

## 2022-01-01 DIAGNOSIS — R53.81 PHYSICAL DEBILITY: ICD-10-CM

## 2022-01-01 DIAGNOSIS — C79.89 MALIGNANT NEOPLASM METASTATIC TO OTHER SITE (HCC): Primary | ICD-10-CM

## 2022-01-01 DIAGNOSIS — C61 PROSTATE CANCER METASTATIC TO PELVIS (HCC): ICD-10-CM

## 2022-01-01 DIAGNOSIS — A41.9 SEPSIS WITHOUT ACUTE ORGAN DYSFUNCTION, DUE TO UNSPECIFIED ORGANISM (HCC): ICD-10-CM

## 2022-01-01 DIAGNOSIS — Z51.5 PALLIATIVE CARE ENCOUNTER: ICD-10-CM

## 2022-01-01 DIAGNOSIS — R50.9 FEVER, UNSPECIFIED FEVER CAUSE: Primary | ICD-10-CM

## 2022-01-01 DIAGNOSIS — R29.898 BILATERAL LEG WEAKNESS: Primary | ICD-10-CM

## 2022-01-01 DIAGNOSIS — G89.3 CANCER RELATED PAIN: ICD-10-CM

## 2022-01-01 DIAGNOSIS — R29.898 BILATERAL LEG WEAKNESS: ICD-10-CM

## 2022-01-01 DIAGNOSIS — C79.51 BONE METASTASIS (HCC): Primary | ICD-10-CM

## 2022-01-01 DIAGNOSIS — I48.20 CHRONIC ATRIAL FIBRILLATION, UNSPECIFIED (HCC): ICD-10-CM

## 2022-01-01 DIAGNOSIS — I48.3 TYPICAL ATRIAL FLUTTER (HCC): ICD-10-CM

## 2022-01-01 DIAGNOSIS — I48.0 PAF (PAROXYSMAL ATRIAL FIBRILLATION) (HCC): Primary | ICD-10-CM

## 2022-01-01 DIAGNOSIS — I48.91 ATRIAL FIBRILLATION WITH RVR (HCC): ICD-10-CM

## 2022-01-01 DIAGNOSIS — A41.9 SEPSIS WITH ACUTE ORGAN DYSFUNCTION AND SEPTIC SHOCK, DUE TO UNSPECIFIED ORGANISM, UNSPECIFIED TYPE (HCC): ICD-10-CM

## 2022-01-01 DIAGNOSIS — R65.21 SEPSIS WITH ACUTE ORGAN DYSFUNCTION AND SEPTIC SHOCK, DUE TO UNSPECIFIED ORGANISM, UNSPECIFIED TYPE (HCC): ICD-10-CM

## 2022-01-01 DIAGNOSIS — C61 PROSTATE CANCER METASTATIC TO BONE (HCC): ICD-10-CM

## 2022-01-01 LAB
ALBUMIN SERPL-MCNC: 1.6 G/DL (ref 3.5–5)
ALBUMIN SERPL-MCNC: 1.7 G/DL (ref 3.5–5)
ALBUMIN SERPL-MCNC: 1.9 G/DL (ref 3.5–5)
ALBUMIN SERPL-MCNC: 2.2 G/DL (ref 3.5–5)
ALBUMIN SERPL-MCNC: 2.2 G/DL (ref 3.5–5)
ALBUMIN SERPL-MCNC: 2.6 G/DL (ref 3.5–5)
ALBUMIN SERPL-MCNC: 2.6 G/DL (ref 3.5–5)
ALBUMIN/GLOB SERPL: 0.7 {RATIO} (ref 1.1–2.2)
ALBUMIN/GLOB SERPL: 0.7 {RATIO} (ref 1.1–2.2)
ALBUMIN/GLOB SERPL: 0.8 {RATIO} (ref 1.1–2.2)
ALBUMIN/GLOB SERPL: 0.8 {RATIO} (ref 1.1–2.2)
ALBUMIN/GLOB SERPL: 1 {RATIO} (ref 1.1–2.2)
ALP SERPL-CCNC: 117 U/L (ref 45–117)
ALP SERPL-CCNC: 66 U/L (ref 45–117)
ALP SERPL-CCNC: 77 U/L (ref 45–117)
ALP SERPL-CCNC: 87 U/L (ref 45–117)
ALP SERPL-CCNC: 90 U/L (ref 45–117)
ALT SERPL-CCNC: 12 U/L (ref 12–78)
ALT SERPL-CCNC: 17 U/L (ref 12–78)
ALT SERPL-CCNC: 17 U/L (ref 12–78)
ALT SERPL-CCNC: 20 U/L (ref 12–78)
ALT SERPL-CCNC: 23 U/L (ref 12–78)
AMORPH CRY URNS QL MICRO: ABNORMAL
ANION GAP SERPL CALC-SCNC: 10 MMOL/L (ref 5–15)
ANION GAP SERPL CALC-SCNC: 10 MMOL/L (ref 5–15)
ANION GAP SERPL CALC-SCNC: 11 MMOL/L (ref 5–15)
ANION GAP SERPL CALC-SCNC: 11 MMOL/L (ref 5–15)
ANION GAP SERPL CALC-SCNC: 12 MMOL/L (ref 5–15)
ANION GAP SERPL CALC-SCNC: 13 MMOL/L (ref 5–15)
ANION GAP SERPL CALC-SCNC: 4 MMOL/L (ref 5–15)
ANION GAP SERPL CALC-SCNC: 6 MMOL/L (ref 5–15)
ANION GAP SERPL CALC-SCNC: 7 MMOL/L (ref 5–15)
ANION GAP SERPL CALC-SCNC: 7 MMOL/L (ref 5–15)
ANION GAP SERPL CALC-SCNC: 8 MMOL/L (ref 5–15)
ANION GAP SERPL CALC-SCNC: 9 MMOL/L (ref 5–15)
APPEARANCE UR: ABNORMAL
APPEARANCE UR: ABNORMAL
APPEARANCE UR: CLEAR
AST SERPL-CCNC: 14 U/L (ref 15–37)
AST SERPL-CCNC: 29 U/L (ref 15–37)
AST SERPL-CCNC: 30 U/L (ref 15–37)
AST SERPL-CCNC: 32 U/L (ref 15–37)
AST SERPL-CCNC: 37 U/L (ref 15–37)
ATRIAL RATE: 101 BPM
ATRIAL RATE: 89 BPM
B PERT DNA SPEC QL NAA+PROBE: NOT DETECTED
BACTERIA SPEC CULT: NORMAL
BACTERIA URNS QL MICRO: ABNORMAL /HPF
BACTERIA URNS QL MICRO: NEGATIVE /HPF
BACTERIA URNS QL MICRO: NEGATIVE /HPF
BASOPHILS # BLD: 0 K/UL (ref 0–0.1)
BASOPHILS # BLD: 0.1 K/UL (ref 0–0.1)
BASOPHILS # BLD: 0.1 K/UL (ref 0–0.1)
BASOPHILS NFR BLD: 0 % (ref 0–1)
BASOPHILS NFR BLD: 1 % (ref 0–1)
BILIRUB SERPL-MCNC: 0.3 MG/DL (ref 0.2–1)
BILIRUB SERPL-MCNC: 0.4 MG/DL (ref 0.2–1)
BILIRUB SERPL-MCNC: 0.6 MG/DL (ref 0.2–1)
BILIRUB SERPL-MCNC: 1.1 MG/DL (ref 0.2–1)
BILIRUB SERPL-MCNC: 1.2 MG/DL (ref 0.2–1)
BILIRUB UR QL CFM: NEGATIVE
BILIRUB UR QL: NEGATIVE
BILIRUB UR QL: NEGATIVE
BNP SERPL-MCNC: 293 PG/ML
BNP SERPL-MCNC: 959 PG/ML
BORDETELLA PARAPERTUSSIS PCR, BORPAR: NOT DETECTED
BUN SERPL-MCNC: 12 MG/DL (ref 6–20)
BUN SERPL-MCNC: 14 MG/DL (ref 6–20)
BUN SERPL-MCNC: 14 MG/DL (ref 6–20)
BUN SERPL-MCNC: 17 MG/DL (ref 6–20)
BUN SERPL-MCNC: 18 MG/DL (ref 6–20)
BUN SERPL-MCNC: 7 MG/DL (ref 6–20)
BUN SERPL-MCNC: 8 MG/DL (ref 6–20)
BUN/CREAT SERPL: 10 (ref 12–20)
BUN/CREAT SERPL: 11 (ref 12–20)
BUN/CREAT SERPL: 11 (ref 12–20)
BUN/CREAT SERPL: 12 (ref 12–20)
BUN/CREAT SERPL: 13 (ref 12–20)
BUN/CREAT SERPL: 14 (ref 12–20)
BUN/CREAT SERPL: 16 (ref 12–20)
BUN/CREAT SERPL: 16 (ref 12–20)
BUN/CREAT SERPL: 18 (ref 12–20)
BUN/CREAT SERPL: 18 (ref 12–20)
BUN/CREAT SERPL: 21 (ref 12–20)
BUN/CREAT SERPL: 9 (ref 12–20)
C PNEUM DNA SPEC QL NAA+PROBE: NOT DETECTED
CALCIUM SERPL-MCNC: 7.8 MG/DL (ref 8.5–10.1)
CALCIUM SERPL-MCNC: 7.9 MG/DL (ref 8.5–10.1)
CALCIUM SERPL-MCNC: 8 MG/DL (ref 8.5–10.1)
CALCIUM SERPL-MCNC: 8.1 MG/DL (ref 8.5–10.1)
CALCIUM SERPL-MCNC: 8.1 MG/DL (ref 8.5–10.1)
CALCIUM SERPL-MCNC: 8.3 MG/DL (ref 8.5–10.1)
CALCIUM SERPL-MCNC: 8.3 MG/DL (ref 8.5–10.1)
CALCIUM SERPL-MCNC: 8.5 MG/DL (ref 8.5–10.1)
CALCIUM SERPL-MCNC: 8.9 MG/DL (ref 8.5–10.1)
CALCIUM SERPL-MCNC: 9.3 MG/DL (ref 8.5–10.1)
CALCULATED P AXIS, ECG09: 25 DEGREES
CALCULATED P AXIS, ECG09: 42 DEGREES
CALCULATED R AXIS, ECG10: 18 DEGREES
CALCULATED R AXIS, ECG10: 20 DEGREES
CALCULATED R AXIS, ECG10: 7 DEGREES
CALCULATED T AXIS, ECG11: 29 DEGREES
CALCULATED T AXIS, ECG11: 29 DEGREES
CALCULATED T AXIS, ECG11: 70 DEGREES
CC UR VC: NORMAL
CHLORIDE SERPL-SCNC: 100 MMOL/L (ref 97–108)
CHLORIDE SERPL-SCNC: 102 MMOL/L (ref 97–108)
CHLORIDE SERPL-SCNC: 104 MMOL/L (ref 97–108)
CHLORIDE SERPL-SCNC: 106 MMOL/L (ref 97–108)
CHLORIDE SERPL-SCNC: 107 MMOL/L (ref 97–108)
CHLORIDE SERPL-SCNC: 108 MMOL/L (ref 97–108)
CHLORIDE SERPL-SCNC: 108 MMOL/L (ref 97–108)
CHLORIDE SERPL-SCNC: 111 MMOL/L (ref 97–108)
CHLORIDE SERPL-SCNC: 92 MMOL/L (ref 97–108)
CHLORIDE SERPL-SCNC: 92 MMOL/L (ref 97–108)
CHLORIDE SERPL-SCNC: 93 MMOL/L (ref 97–108)
CHLORIDE SERPL-SCNC: 93 MMOL/L (ref 97–108)
CHLORIDE SERPL-SCNC: 96 MMOL/L (ref 97–108)
CHLORIDE SERPL-SCNC: 99 MMOL/L (ref 97–108)
CK SERPL-CCNC: 37 U/L (ref 39–308)
CO2 SERPL-SCNC: 16 MMOL/L (ref 21–32)
CO2 SERPL-SCNC: 19 MMOL/L (ref 21–32)
CO2 SERPL-SCNC: 19 MMOL/L (ref 21–32)
CO2 SERPL-SCNC: 21 MMOL/L (ref 21–32)
CO2 SERPL-SCNC: 22 MMOL/L (ref 21–32)
CO2 SERPL-SCNC: 23 MMOL/L (ref 21–32)
CO2 SERPL-SCNC: 24 MMOL/L (ref 21–32)
CO2 SERPL-SCNC: 24 MMOL/L (ref 21–32)
CO2 SERPL-SCNC: 26 MMOL/L (ref 21–32)
COLOR UR: ABNORMAL
COMMENT, HOLDF: NORMAL
CORTIS SERPL-MCNC: 3.6 UG/DL
COVID-19 RAPID TEST, COVR: NOT DETECTED
COVID-19 RAPID TEST, COVR: NOT DETECTED
CREAT SERPL-MCNC: 0.51 MG/DL (ref 0.7–1.3)
CREAT SERPL-MCNC: 0.53 MG/DL (ref 0.7–1.3)
CREAT SERPL-MCNC: 0.56 MG/DL (ref 0.7–1.3)
CREAT SERPL-MCNC: 0.58 MG/DL (ref 0.7–1.3)
CREAT SERPL-MCNC: 0.6 MG/DL (ref 0.7–1.3)
CREAT SERPL-MCNC: 0.66 MG/DL (ref 0.7–1.3)
CREAT SERPL-MCNC: 0.76 MG/DL (ref 0.7–1.3)
CREAT SERPL-MCNC: 0.83 MG/DL (ref 0.7–1.3)
CREAT SERPL-MCNC: 0.86 MG/DL (ref 0.7–1.3)
CREAT SERPL-MCNC: 0.87 MG/DL (ref 0.7–1.3)
CREAT SERPL-MCNC: 0.93 MG/DL (ref 0.7–1.3)
CREAT SERPL-MCNC: 0.98 MG/DL (ref 0.7–1.3)
CREAT SERPL-MCNC: 0.99 MG/DL (ref 0.7–1.3)
CREAT SERPL-MCNC: 1 MG/DL (ref 0.7–1.3)
CREAT SERPL-MCNC: 1.06 MG/DL (ref 0.7–1.3)
CREAT SERPL-MCNC: 1.09 MG/DL (ref 0.7–1.3)
CRP SERPL-MCNC: 13.3 MG/DL (ref 0–0.6)
DIAGNOSIS, 93000: NORMAL
DIFFERENTIAL METHOD BLD: ABNORMAL
ECHO AV PEAK GRADIENT: 16 MMHG
ECHO AV PEAK VELOCITY: 2 M/S
ECHO AV VELOCITY RATIO: 0.6
ECHO LV FRACTIONAL SHORTENING: 43 % (ref 28–44)
ECHO LV INTERNAL DIMENSION DIASTOLE INDEX: 2.62 CM/M2
ECHO LV INTERNAL DIMENSION DIASTOLIC MMODE: 6.1 CM (ref 4.2–5.9)
ECHO LV INTERNAL DIMENSION DIASTOLIC: 5.4 CM (ref 4.2–5.9)
ECHO LV INTERNAL DIMENSION SYSTOLIC INDEX: 1.5 CM/M2
ECHO LV INTERNAL DIMENSION SYSTOLIC MMODE: 3.9 CM
ECHO LV INTERNAL DIMENSION SYSTOLIC: 3.1 CM
ECHO LV IVSD MMODE: 0.9 CM (ref 0.6–1)
ECHO LV IVSD: 0.8 CM (ref 0.6–1)
ECHO LV IVSS MMODE: 1.5 CM
ECHO LV MASS 2D: 180.1 G (ref 88–224)
ECHO LV MASS INDEX 2D: 87.4 G/M2 (ref 49–115)
ECHO LV POSTERIOR WALL DIASTOLIC MMODE: 1.2 CM (ref 0.6–1)
ECHO LV POSTERIOR WALL DIASTOLIC: 1 CM (ref 0.6–1)
ECHO LV RELATIVE WALL THICKNESS RATIO: 0.37
ECHO LVOT PEAK GRADIENT: 6 MMHG
ECHO LVOT PEAK VELOCITY: 1.2 M/S
ECHO MV A VELOCITY: 1.04 M/S
ECHO MV AREA PHT: 3.8 CM2
ECHO MV E VELOCITY: 0.66 M/S
ECHO MV E/A RATIO: 0.63
ECHO MV MAX VELOCITY: 1.3 M/S
ECHO MV MEAN GRADIENT: 4 MMHG
ECHO MV MEAN VELOCITY: 1 M/S
ECHO MV PEAK GRADIENT: 7 MMHG
ECHO MV PRESSURE HALF TIME (PHT): 58.1 MS
ECHO MV VTI: 31.8 CM
ECHO PULMONARY ARTERY SYSTOLIC PRESSURE (PASP): 44 MMHG
ECHO RV TAPSE: 3 CM (ref 1.7–?)
ECHO TV REGURGITANT MAX VELOCITY: 3.17 M/S
ECHO TV REGURGITANT PEAK GRADIENT: 40 MMHG
EOSINOPHIL # BLD: 0 K/UL (ref 0–0.4)
EOSINOPHIL # BLD: 0.1 K/UL (ref 0–0.4)
EOSINOPHIL # BLD: 0.2 K/UL (ref 0–0.4)
EOSINOPHIL # BLD: 0.2 K/UL (ref 0–0.4)
EOSINOPHIL # BLD: 0.3 K/UL (ref 0–0.4)
EOSINOPHIL NFR BLD: 0 % (ref 0–7)
EOSINOPHIL NFR BLD: 1 % (ref 0–7)
EOSINOPHIL NFR BLD: 1 % (ref 0–7)
EOSINOPHIL NFR BLD: 2 % (ref 0–7)
EOSINOPHIL NFR BLD: 4 % (ref 0–7)
EOSINOPHIL NFR BLD: 4 % (ref 0–7)
EOSINOPHIL NFR BLD: 7 % (ref 0–7)
EPITH CASTS URNS QL MICRO: ABNORMAL /LPF
ERYTHROCYTE [DISTWIDTH] IN BLOOD BY AUTOMATED COUNT: 14.5 % (ref 11.5–14.5)
ERYTHROCYTE [DISTWIDTH] IN BLOOD BY AUTOMATED COUNT: 14.8 % (ref 11.5–14.5)
ERYTHROCYTE [DISTWIDTH] IN BLOOD BY AUTOMATED COUNT: 14.8 % (ref 11.5–14.5)
ERYTHROCYTE [DISTWIDTH] IN BLOOD BY AUTOMATED COUNT: 14.9 % (ref 11.5–14.5)
ERYTHROCYTE [DISTWIDTH] IN BLOOD BY AUTOMATED COUNT: 15.6 % (ref 11.5–14.5)
ERYTHROCYTE [DISTWIDTH] IN BLOOD BY AUTOMATED COUNT: 15.8 % (ref 11.5–14.5)
ERYTHROCYTE [DISTWIDTH] IN BLOOD BY AUTOMATED COUNT: 15.9 % (ref 11.5–14.5)
ERYTHROCYTE [DISTWIDTH] IN BLOOD BY AUTOMATED COUNT: 15.9 % (ref 11.5–14.5)
ERYTHROCYTE [DISTWIDTH] IN BLOOD BY AUTOMATED COUNT: 16.1 % (ref 11.5–14.5)
FLUAV H1 2009 PAND RNA SPEC QL NAA+PROBE: NOT DETECTED
FLUAV H1 RNA SPEC QL NAA+PROBE: NOT DETECTED
FLUAV H3 RNA SPEC QL NAA+PROBE: NOT DETECTED
FLUAV SUBTYP SPEC NAA+PROBE: NOT DETECTED
FLUBV RNA SPEC QL NAA+PROBE: NOT DETECTED
GLOBULIN SER CALC-MCNC: 2.6 G/DL (ref 2–4)
GLOBULIN SER CALC-MCNC: 2.6 G/DL (ref 2–4)
GLOBULIN SER CALC-MCNC: 2.8 G/DL (ref 2–4)
GLOBULIN SER CALC-MCNC: 3 G/DL (ref 2–4)
GLOBULIN SER CALC-MCNC: 3.3 G/DL (ref 2–4)
GLUCOSE BLD STRIP.AUTO-MCNC: 55 MG/DL (ref 65–117)
GLUCOSE BLD STRIP.AUTO-MCNC: 75 MG/DL (ref 65–117)
GLUCOSE SERPL-MCNC: 122 MG/DL (ref 65–100)
GLUCOSE SERPL-MCNC: 123 MG/DL (ref 65–100)
GLUCOSE SERPL-MCNC: 128 MG/DL (ref 65–100)
GLUCOSE SERPL-MCNC: 153 MG/DL (ref 65–100)
GLUCOSE SERPL-MCNC: 54 MG/DL (ref 65–100)
GLUCOSE SERPL-MCNC: 65 MG/DL (ref 65–100)
GLUCOSE SERPL-MCNC: 65 MG/DL (ref 65–100)
GLUCOSE SERPL-MCNC: 69 MG/DL (ref 65–100)
GLUCOSE SERPL-MCNC: 72 MG/DL (ref 65–100)
GLUCOSE SERPL-MCNC: 79 MG/DL (ref 65–100)
GLUCOSE SERPL-MCNC: 80 MG/DL (ref 65–100)
GLUCOSE SERPL-MCNC: 83 MG/DL (ref 65–100)
GLUCOSE SERPL-MCNC: 87 MG/DL (ref 65–100)
GLUCOSE SERPL-MCNC: 88 MG/DL (ref 65–100)
GLUCOSE SERPL-MCNC: 92 MG/DL (ref 65–100)
GLUCOSE SERPL-MCNC: 93 MG/DL (ref 65–100)
GLUCOSE UR STRIP.AUTO-MCNC: NEGATIVE MG/DL
HADV DNA SPEC QL NAA+PROBE: NOT DETECTED
HCOV 229E RNA SPEC QL NAA+PROBE: NOT DETECTED
HCOV HKU1 RNA SPEC QL NAA+PROBE: NOT DETECTED
HCOV NL63 RNA SPEC QL NAA+PROBE: NOT DETECTED
HCOV OC43 RNA SPEC QL NAA+PROBE: NOT DETECTED
HCT VFR BLD AUTO: 26.5 % (ref 36.6–50.3)
HCT VFR BLD AUTO: 26.6 % (ref 36.6–50.3)
HCT VFR BLD AUTO: 27.4 % (ref 36.6–50.3)
HCT VFR BLD AUTO: 29.1 % (ref 36.6–50.3)
HCT VFR BLD AUTO: 29.3 % (ref 36.6–50.3)
HCT VFR BLD AUTO: 29.3 % (ref 36.6–50.3)
HCT VFR BLD AUTO: 30.6 % (ref 36.6–50.3)
HCT VFR BLD AUTO: 31.6 % (ref 36.6–50.3)
HCT VFR BLD AUTO: 33.8 % (ref 36.6–50.3)
HCT VFR BLD AUTO: 34.1 % (ref 36.6–50.3)
HCT VFR BLD AUTO: 38.3 % (ref 36.6–50.3)
HGB BLD-MCNC: 10 G/DL (ref 12.1–17)
HGB BLD-MCNC: 10 G/DL (ref 12.1–17)
HGB BLD-MCNC: 10.7 G/DL (ref 12.1–17)
HGB BLD-MCNC: 10.9 G/DL (ref 12.1–17)
HGB BLD-MCNC: 11.5 G/DL (ref 12.1–17)
HGB BLD-MCNC: 12.5 G/DL (ref 12.1–17)
HGB BLD-MCNC: 8.9 G/DL (ref 12.1–17)
HGB BLD-MCNC: 9 G/DL (ref 12.1–17)
HGB BLD-MCNC: 9.1 G/DL (ref 12.1–17)
HGB BLD-MCNC: 9.7 G/DL (ref 12.1–17)
HGB BLD-MCNC: 9.7 G/DL (ref 12.1–17)
HGB UR QL STRIP: ABNORMAL
HMPV RNA SPEC QL NAA+PROBE: NOT DETECTED
HPIV1 RNA SPEC QL NAA+PROBE: NOT DETECTED
HPIV2 RNA SPEC QL NAA+PROBE: NOT DETECTED
HPIV3 RNA SPEC QL NAA+PROBE: NOT DETECTED
HPIV4 RNA SPEC QL NAA+PROBE: NOT DETECTED
HYALINE CASTS URNS QL MICRO: ABNORMAL /LPF (ref 0–5)
IMM GRANULOCYTES # BLD AUTO: 0 K/UL (ref 0–0.04)
IMM GRANULOCYTES # BLD AUTO: 0 K/UL (ref 0–0.04)
IMM GRANULOCYTES # BLD AUTO: 0.1 K/UL (ref 0–0.04)
IMM GRANULOCYTES NFR BLD AUTO: 0 % (ref 0–0.5)
IMM GRANULOCYTES NFR BLD AUTO: 1 % (ref 0–0.5)
INR PPP: 1 (ref 0.9–1.1)
KETONES UR QL STRIP.AUTO: 15 MG/DL
KETONES UR QL STRIP.AUTO: 40 MG/DL
KETONES UR QL STRIP.AUTO: 40 MG/DL
LACTATE SERPL-SCNC: 1.2 MMOL/L (ref 0.4–2)
LEUKOCYTE ESTERASE UR QL STRIP.AUTO: ABNORMAL
LEUKOCYTE ESTERASE UR QL STRIP.AUTO: ABNORMAL
LEUKOCYTE ESTERASE UR QL STRIP.AUTO: NEGATIVE
LIPASE SERPL-CCNC: 52 U/L (ref 73–393)
LYMPHOCYTES # BLD: 0.1 K/UL (ref 0.8–3.5)
LYMPHOCYTES # BLD: 0.3 K/UL (ref 0.8–3.5)
LYMPHOCYTES # BLD: 0.4 K/UL (ref 0.8–3.5)
LYMPHOCYTES # BLD: 0.4 K/UL (ref 0.8–3.5)
LYMPHOCYTES # BLD: 0.5 K/UL (ref 0.8–3.5)
LYMPHOCYTES # BLD: 0.5 K/UL (ref 0.8–3.5)
LYMPHOCYTES # BLD: 0.6 K/UL (ref 0.8–3.5)
LYMPHOCYTES NFR BLD: 10 % (ref 12–49)
LYMPHOCYTES NFR BLD: 10 % (ref 12–49)
LYMPHOCYTES NFR BLD: 2 % (ref 12–49)
LYMPHOCYTES NFR BLD: 4 % (ref 12–49)
LYMPHOCYTES NFR BLD: 6 % (ref 12–49)
LYMPHOCYTES NFR BLD: 6 % (ref 12–49)
LYMPHOCYTES NFR BLD: 7 % (ref 12–49)
LYMPHOCYTES NFR BLD: 8 % (ref 12–49)
LYMPHOCYTES NFR BLD: 8 % (ref 12–49)
M PNEUMO DNA SPEC QL NAA+PROBE: NOT DETECTED
MAGNESIUM SERPL-MCNC: 1.4 MG/DL (ref 1.6–2.4)
MAGNESIUM SERPL-MCNC: 1.5 MG/DL (ref 1.6–2.4)
MAGNESIUM SERPL-MCNC: 1.5 MG/DL (ref 1.6–2.4)
MAGNESIUM SERPL-MCNC: 1.6 MG/DL (ref 1.6–2.4)
MAGNESIUM SERPL-MCNC: 1.8 MG/DL (ref 1.6–2.4)
MAGNESIUM SERPL-MCNC: 1.8 MG/DL (ref 1.6–2.4)
MAGNESIUM SERPL-MCNC: 1.9 MG/DL (ref 1.6–2.4)
MAGNESIUM SERPL-MCNC: 2.1 MG/DL (ref 1.6–2.4)
MCH RBC QN AUTO: 29.4 PG (ref 26–34)
MCH RBC QN AUTO: 29.5 PG (ref 26–34)
MCH RBC QN AUTO: 29.8 PG (ref 26–34)
MCH RBC QN AUTO: 30.1 PG (ref 26–34)
MCH RBC QN AUTO: 30.2 PG (ref 26–34)
MCH RBC QN AUTO: 30.2 PG (ref 26–34)
MCH RBC QN AUTO: 30.3 PG (ref 26–34)
MCH RBC QN AUTO: 30.7 PG (ref 26–34)
MCH RBC QN AUTO: 30.7 PG (ref 26–34)
MCHC RBC AUTO-ENTMCNC: 32 G/DL (ref 30–36.5)
MCHC RBC AUTO-ENTMCNC: 32.6 G/DL (ref 30–36.5)
MCHC RBC AUTO-ENTMCNC: 32.7 G/DL (ref 30–36.5)
MCHC RBC AUTO-ENTMCNC: 33.1 G/DL (ref 30–36.5)
MCHC RBC AUTO-ENTMCNC: 33.2 G/DL (ref 30–36.5)
MCHC RBC AUTO-ENTMCNC: 33.3 G/DL (ref 30–36.5)
MCHC RBC AUTO-ENTMCNC: 33.6 G/DL (ref 30–36.5)
MCHC RBC AUTO-ENTMCNC: 33.8 G/DL (ref 30–36.5)
MCHC RBC AUTO-ENTMCNC: 33.9 G/DL (ref 30–36.5)
MCHC RBC AUTO-ENTMCNC: 34 G/DL (ref 30–36.5)
MCHC RBC AUTO-ENTMCNC: 34.1 G/DL (ref 30–36.5)
MCV RBC AUTO: 87.2 FL (ref 80–99)
MCV RBC AUTO: 88.2 FL (ref 80–99)
MCV RBC AUTO: 88.7 FL (ref 80–99)
MCV RBC AUTO: 88.8 FL (ref 80–99)
MCV RBC AUTO: 89.1 FL (ref 80–99)
MCV RBC AUTO: 90.8 FL (ref 80–99)
MCV RBC AUTO: 91.3 FL (ref 80–99)
MCV RBC AUTO: 91.4 FL (ref 80–99)
MCV RBC AUTO: 92.2 FL (ref 80–99)
MCV RBC AUTO: 92.5 FL (ref 80–99)
MCV RBC AUTO: 94.2 FL (ref 80–99)
MONOCYTES # BLD: 0.2 K/UL (ref 0–1)
MONOCYTES # BLD: 0.7 K/UL (ref 0–1)
MONOCYTES # BLD: 0.9 K/UL (ref 0–1)
MONOCYTES # BLD: 0.9 K/UL (ref 0–1)
MONOCYTES # BLD: 1 K/UL (ref 0–1)
MONOCYTES # BLD: 1 K/UL (ref 0–1)
MONOCYTES # BLD: 1.1 K/UL (ref 0–1)
MONOCYTES NFR BLD: 13 % (ref 5–13)
MONOCYTES NFR BLD: 14 % (ref 5–13)
MONOCYTES NFR BLD: 14 % (ref 5–13)
MONOCYTES NFR BLD: 15 % (ref 5–13)
MONOCYTES NFR BLD: 17 % (ref 5–13)
MONOCYTES NFR BLD: 19 % (ref 5–13)
MONOCYTES NFR BLD: 3 % (ref 5–13)
NEUTS SEG # BLD: 3.3 K/UL (ref 1.8–8)
NEUTS SEG # BLD: 3.5 K/UL (ref 1.8–8)
NEUTS SEG # BLD: 3.6 K/UL (ref 1.8–8)
NEUTS SEG # BLD: 4.2 K/UL (ref 1.8–8)
NEUTS SEG # BLD: 5.4 K/UL (ref 1.8–8)
NEUTS SEG # BLD: 6.2 K/UL (ref 1.8–8)
NEUTS SEG # BLD: 6.2 K/UL (ref 1.8–8)
NEUTS SEG NFR BLD: 69 % (ref 32–75)
NEUTS SEG NFR BLD: 70 % (ref 32–75)
NEUTS SEG NFR BLD: 71 % (ref 32–75)
NEUTS SEG NFR BLD: 72 % (ref 32–75)
NEUTS SEG NFR BLD: 73 % (ref 32–75)
NEUTS SEG NFR BLD: 73 % (ref 32–75)
NEUTS SEG NFR BLD: 77 % (ref 32–75)
NEUTS SEG NFR BLD: 80 % (ref 32–75)
NEUTS SEG NFR BLD: 94 % (ref 32–75)
NITRITE UR QL STRIP.AUTO: NEGATIVE
NITRITE UR QL STRIP.AUTO: NEGATIVE
NITRITE UR QL STRIP.AUTO: POSITIVE
NRBC # BLD: 0 K/UL (ref 0–0.01)
NRBC BLD-RTO: 0 PER 100 WBC
OSMOLALITY UR: 488 MOSM/KG H2O
P-R INTERVAL, ECG05: 182 MS
P-R INTERVAL, ECG05: 210 MS
PH UR STRIP: 5.5 [PH] (ref 5–8)
PH UR STRIP: 6 [PH] (ref 5–8)
PH UR STRIP: 6.5 [PH] (ref 5–8)
PHOSPHATE SERPL-MCNC: 2.2 MG/DL (ref 2.6–4.7)
PHOSPHATE SERPL-MCNC: 3 MG/DL (ref 2.6–4.7)
PHOSPHATE SERPL-MCNC: 3 MG/DL (ref 2.6–4.7)
PHOSPHATE SERPL-MCNC: 3.3 MG/DL (ref 2.6–4.7)
PHOSPHATE SERPL-MCNC: 3.5 MG/DL (ref 2.6–4.7)
PHOSPHATE SERPL-MCNC: 3.7 MG/DL (ref 2.6–4.7)
PHOSPHATE SERPL-MCNC: 4.1 MG/DL (ref 2.6–4.7)
PHOSPHATE SERPL-MCNC: 4.2 MG/DL (ref 2.6–4.7)
PLATELET # BLD AUTO: 134 K/UL (ref 150–400)
PLATELET # BLD AUTO: 134 K/UL (ref 150–400)
PLATELET # BLD AUTO: 142 K/UL (ref 150–400)
PLATELET # BLD AUTO: 144 K/UL (ref 150–400)
PLATELET # BLD AUTO: 163 K/UL (ref 150–400)
PLATELET # BLD AUTO: 171 K/UL (ref 150–400)
PLATELET # BLD AUTO: 173 K/UL (ref 150–400)
PLATELET # BLD AUTO: 180 K/UL (ref 150–400)
PLATELET # BLD AUTO: 186 K/UL (ref 150–400)
PLATELET # BLD AUTO: 205 K/UL (ref 150–400)
PLATELET # BLD AUTO: 212 K/UL (ref 150–400)
PMV BLD AUTO: 10.2 FL (ref 8.9–12.9)
PMV BLD AUTO: 10.3 FL (ref 8.9–12.9)
PMV BLD AUTO: 10.3 FL (ref 8.9–12.9)
PMV BLD AUTO: 9.3 FL (ref 8.9–12.9)
PMV BLD AUTO: 9.6 FL (ref 8.9–12.9)
PMV BLD AUTO: 9.7 FL (ref 8.9–12.9)
PMV BLD AUTO: 9.7 FL (ref 8.9–12.9)
PMV BLD AUTO: 9.8 FL (ref 8.9–12.9)
PMV BLD AUTO: 9.8 FL (ref 8.9–12.9)
PMV BLD AUTO: 9.9 FL (ref 8.9–12.9)
PMV BLD AUTO: 9.9 FL (ref 8.9–12.9)
POTASSIUM SERPL-SCNC: 3.3 MMOL/L (ref 3.5–5.1)
POTASSIUM SERPL-SCNC: 3.6 MMOL/L (ref 3.5–5.1)
POTASSIUM SERPL-SCNC: 3.7 MMOL/L (ref 3.5–5.1)
POTASSIUM SERPL-SCNC: 3.7 MMOL/L (ref 3.5–5.1)
POTASSIUM SERPL-SCNC: 3.8 MMOL/L (ref 3.5–5.1)
POTASSIUM SERPL-SCNC: 3.9 MMOL/L (ref 3.5–5.1)
POTASSIUM SERPL-SCNC: 3.9 MMOL/L (ref 3.5–5.1)
POTASSIUM SERPL-SCNC: 4.1 MMOL/L (ref 3.5–5.1)
POTASSIUM SERPL-SCNC: 4.2 MMOL/L (ref 3.5–5.1)
POTASSIUM SERPL-SCNC: 4.3 MMOL/L (ref 3.5–5.1)
POTASSIUM UR-SCNC: 37 MMOL/L
PROCALCITONIN SERPL-MCNC: 0.17 NG/ML
PROCALCITONIN SERPL-MCNC: 0.18 NG/ML
PROT SERPL-MCNC: 4.7 G/DL (ref 6.4–8.2)
PROT SERPL-MCNC: 4.8 G/DL (ref 6.4–8.2)
PROT SERPL-MCNC: 5.2 G/DL (ref 6.4–8.2)
PROT SERPL-MCNC: 5.2 G/DL (ref 6.4–8.2)
PROT SERPL-MCNC: 5.9 G/DL (ref 6.4–8.2)
PROT UR STRIP-MCNC: 100 MG/DL
PROT UR STRIP-MCNC: 300 MG/DL
PROT UR STRIP-MCNC: NEGATIVE MG/DL
PROTHROMBIN TIME: 10.6 SEC (ref 9–11.1)
Q-T INTERVAL, ECG07: 308 MS
Q-T INTERVAL, ECG07: 366 MS
Q-T INTERVAL, ECG07: 372 MS
QRS DURATION, ECG06: 84 MS
QRS DURATION, ECG06: 86 MS
QRS DURATION, ECG06: 92 MS
QTC CALCULATION (BEZET), ECG08: 445 MS
QTC CALCULATION (BEZET), ECG08: 482 MS
QTC CALCULATION (BEZET), ECG08: 491 MS
RBC # BLD AUTO: 2.9 M/UL (ref 4.1–5.7)
RBC # BLD AUTO: 2.93 M/UL (ref 4.1–5.7)
RBC # BLD AUTO: 3 M/UL (ref 4.1–5.7)
RBC # BLD AUTO: 3.29 M/UL (ref 4.1–5.7)
RBC # BLD AUTO: 3.3 M/UL (ref 4.1–5.7)
RBC # BLD AUTO: 3.32 M/UL (ref 4.1–5.7)
RBC # BLD AUTO: 3.36 M/UL (ref 4.1–5.7)
RBC # BLD AUTO: 3.56 M/UL (ref 4.1–5.7)
RBC # BLD AUTO: 3.62 M/UL (ref 4.1–5.7)
RBC # BLD AUTO: 3.81 M/UL (ref 4.1–5.7)
RBC # BLD AUTO: 4.14 M/UL (ref 4.1–5.7)
RBC #/AREA URNS HPF: >100 /HPF (ref 0–5)
RBC #/AREA URNS HPF: ABNORMAL /HPF (ref 0–5)
RBC #/AREA URNS HPF: ABNORMAL /HPF (ref 0–5)
RBC MORPH BLD: ABNORMAL
RSV RNA SPEC QL NAA+PROBE: NOT DETECTED
RV+EV RNA SPEC QL NAA+PROBE: NOT DETECTED
SAMPLES BEING HELD,HOLD: NORMAL
SARS-COV-2 PCR, COVPCR: DETECTED
SERVICE CMNT-IMP: ABNORMAL
SERVICE CMNT-IMP: NORMAL
SODIUM SERPL-SCNC: 123 MMOL/L (ref 136–145)
SODIUM SERPL-SCNC: 124 MMOL/L (ref 136–145)
SODIUM SERPL-SCNC: 124 MMOL/L (ref 136–145)
SODIUM SERPL-SCNC: 127 MMOL/L (ref 136–145)
SODIUM SERPL-SCNC: 128 MMOL/L (ref 136–145)
SODIUM SERPL-SCNC: 128 MMOL/L (ref 136–145)
SODIUM SERPL-SCNC: 130 MMOL/L (ref 136–145)
SODIUM SERPL-SCNC: 131 MMOL/L (ref 136–145)
SODIUM SERPL-SCNC: 131 MMOL/L (ref 136–145)
SODIUM SERPL-SCNC: 132 MMOL/L (ref 136–145)
SODIUM SERPL-SCNC: 132 MMOL/L (ref 136–145)
SODIUM SERPL-SCNC: 134 MMOL/L (ref 136–145)
SODIUM SERPL-SCNC: 136 MMOL/L (ref 136–145)
SODIUM SERPL-SCNC: 138 MMOL/L (ref 136–145)
SODIUM SERPL-SCNC: 138 MMOL/L (ref 136–145)
SODIUM SERPL-SCNC: 139 MMOL/L (ref 136–145)
SODIUM UR-SCNC: 7 MMOL/L
SOURCE, COVRS: NORMAL
SOURCE, COVRS: NORMAL
SP GR UR REFRACTOMETRY: 1.01 (ref 1–1.03)
SP GR UR REFRACTOMETRY: 1.02 (ref 1–1.03)
SP GR UR REFRACTOMETRY: 1.02 (ref 1–1.03)
TROPONIN-HIGH SENSITIVITY: 16 NG/L (ref 0–76)
TROPONIN-HIGH SENSITIVITY: 18 NG/L (ref 0–76)
TROPONIN-HIGH SENSITIVITY: 9 NG/L (ref 0–76)
TSH SERPL DL<=0.05 MIU/L-ACNC: 0.68 UIU/ML (ref 0.36–3.74)
TSH SERPL DL<=0.05 MIU/L-ACNC: 0.95 UIU/ML (ref 0.36–3.74)
UR CULT HOLD, URHOLD: NORMAL
UROBILINOGEN UR QL STRIP.AUTO: 0.2 EU/DL (ref 0.2–1)
UROBILINOGEN UR QL STRIP.AUTO: 1 EU/DL (ref 0.2–1)
UROBILINOGEN UR QL STRIP.AUTO: 2 EU/DL (ref 0.2–1)
VENTRICULAR RATE, ECG03: 101 BPM
VENTRICULAR RATE, ECG03: 153 BPM
VENTRICULAR RATE, ECG03: 89 BPM
WBC # BLD AUTO: 4.6 K/UL (ref 4.1–11.1)
WBC # BLD AUTO: 4.6 K/UL (ref 4.1–11.1)
WBC # BLD AUTO: 4.9 K/UL (ref 4.1–11.1)
WBC # BLD AUTO: 5 K/UL (ref 4.1–11.1)
WBC # BLD AUTO: 5.1 K/UL (ref 4.1–11.1)
WBC # BLD AUTO: 5.8 K/UL (ref 4.1–11.1)
WBC # BLD AUTO: 5.9 K/UL (ref 4.1–11.1)
WBC # BLD AUTO: 7.4 K/UL (ref 4.1–11.1)
WBC # BLD AUTO: 7.8 K/UL (ref 4.1–11.1)
WBC # BLD AUTO: 8 K/UL (ref 4.1–11.1)
WBC # BLD AUTO: 8.1 K/UL (ref 4.1–11.1)
WBC URNS QL MICRO: >100 /HPF (ref 0–4)
WBC URNS QL MICRO: ABNORMAL /HPF (ref 0–4)
WBC URNS QL MICRO: ABNORMAL /HPF (ref 0–4)
YEAST BUDDING URNS QL: PRESENT
YEAST URNS QL MICRO: PRESENT
YEAST URNS QL MICRO: PRESENT

## 2022-01-01 PROCEDURE — 83735 ASSAY OF MAGNESIUM: CPT

## 2022-01-01 PROCEDURE — 97530 THERAPEUTIC ACTIVITIES: CPT

## 2022-01-01 PROCEDURE — 2709999900 HC NON-CHARGEABLE SUPPLY

## 2022-01-01 PROCEDURE — 74011250637 HC RX REV CODE- 250/637: Performed by: INTERNAL MEDICINE

## 2022-01-01 PROCEDURE — G0156 HHCP-SVS OF AIDE,EA 15 MIN: HCPCS

## 2022-01-01 PROCEDURE — 84145 PROCALCITONIN (PCT): CPT

## 2022-01-01 PROCEDURE — 74011000250 HC RX REV CODE- 250: Performed by: INTERNAL MEDICINE

## 2022-01-01 PROCEDURE — 97110 THERAPEUTIC EXERCISES: CPT

## 2022-01-01 PROCEDURE — 94760 N-INVAS EAR/PLS OXIMETRY 1: CPT

## 2022-01-01 PROCEDURE — 99285 EMERGENCY DEPT VISIT HI MDM: CPT

## 2022-01-01 PROCEDURE — 0651 HSPC ROUTINE HOME CARE

## 2022-01-01 PROCEDURE — 74011250636 HC RX REV CODE- 250/636: Performed by: INTERNAL MEDICINE

## 2022-01-01 PROCEDURE — 99233 SBSQ HOSP IP/OBS HIGH 50: CPT | Performed by: PHYSICAL MEDICINE & REHABILITATION

## 2022-01-01 PROCEDURE — 77030034842 HC TAMP SPN BN INFL EXP II KYPH -I

## 2022-01-01 PROCEDURE — 65270000032 HC RM SEMIPRIVATE

## 2022-01-01 PROCEDURE — 74011250637 HC RX REV CODE- 250/637: Performed by: PHYSICAL MEDICINE & REHABILITATION

## 2022-01-01 PROCEDURE — 97535 SELF CARE MNGMENT TRAINING: CPT

## 2022-01-01 PROCEDURE — 74011250636 HC RX REV CODE- 250/636: Performed by: EMERGENCY MEDICINE

## 2022-01-01 PROCEDURE — 82962 GLUCOSE BLOOD TEST: CPT

## 2022-01-01 PROCEDURE — A9575 INJ GADOTERATE MEGLUMI 0.1ML: HCPCS | Performed by: NEUROLOGICAL SURGERY

## 2022-01-01 PROCEDURE — 99496 TRANSJ CARE MGMT HIGH F2F 7D: CPT | Performed by: INTERNAL MEDICINE

## 2022-01-01 PROCEDURE — 84300 ASSAY OF URINE SODIUM: CPT

## 2022-01-01 PROCEDURE — 99214 OFFICE O/P EST MOD 30 MIN: CPT | Performed by: INTERNAL MEDICINE

## 2022-01-01 PROCEDURE — 99232 SBSQ HOSP IP/OBS MODERATE 35: CPT | Performed by: SPECIALIST

## 2022-01-01 PROCEDURE — 36415 COLL VENOUS BLD VENIPUNCTURE: CPT

## 2022-01-01 PROCEDURE — 80069 RENAL FUNCTION PANEL: CPT

## 2022-01-01 PROCEDURE — 74011000258 HC RX REV CODE- 258: Performed by: EMERGENCY MEDICINE

## 2022-01-01 PROCEDURE — 74011250637 HC RX REV CODE- 250/637: Performed by: NURSE PRACTITIONER

## 2022-01-01 PROCEDURE — 84100 ASSAY OF PHOSPHORUS: CPT

## 2022-01-01 PROCEDURE — G0299 HHS/HOSPICE OF RN EA 15 MIN: HCPCS

## 2022-01-01 PROCEDURE — 65270000046 HC RM TELEMETRY

## 2022-01-01 PROCEDURE — 80053 COMPREHEN METABOLIC PANEL: CPT

## 2022-01-01 PROCEDURE — 85025 COMPLETE CBC W/AUTO DIFF WBC: CPT

## 2022-01-01 PROCEDURE — 84484 ASSAY OF TROPONIN QUANT: CPT

## 2022-01-01 PROCEDURE — 80048 BASIC METABOLIC PNL TOTAL CA: CPT

## 2022-01-01 PROCEDURE — 005X3ZZ DESTRUCTION OF THORACIC SPINAL CORD, PERCUTANEOUS APPROACH: ICD-10-PCS | Performed by: STUDENT IN AN ORGANIZED HEALTH CARE EDUCATION/TRAINING PROGRAM

## 2022-01-01 PROCEDURE — 74011250636 HC RX REV CODE- 250/636

## 2022-01-01 PROCEDURE — 93010 ELECTROCARDIOGRAM REPORT: CPT | Performed by: SPECIALIST

## 2022-01-01 PROCEDURE — 0202U NFCT DS 22 TRGT SARS-COV-2: CPT

## 2022-01-01 PROCEDURE — 3336500001 HSPC ELECTION

## 2022-01-01 PROCEDURE — 72157 MRI CHEST SPINE W/O & W/DYE: CPT

## 2022-01-01 PROCEDURE — 71250 CT THORAX DX C-: CPT

## 2022-01-01 PROCEDURE — 74011250636 HC RX REV CODE- 250/636: Performed by: NURSE PRACTITIONER

## 2022-01-01 PROCEDURE — 99214 OFFICE O/P EST MOD 30 MIN: CPT | Performed by: SPECIALIST

## 2022-01-01 PROCEDURE — G0155 HHCP-SVS OF CSW,EA 15 MIN: HCPCS

## 2022-01-01 PROCEDURE — 99215 OFFICE O/P EST HI 40 MIN: CPT | Performed by: INTERNAL MEDICINE

## 2022-01-01 PROCEDURE — G8427 DOCREV CUR MEDS BY ELIG CLIN: HCPCS | Performed by: INTERNAL MEDICINE

## 2022-01-01 PROCEDURE — 88341 IMHCHEM/IMCYTCHM EA ADD ANTB: CPT

## 2022-01-01 PROCEDURE — 20982 ABLATE BONE TUMOR(S) PERQ: CPT

## 2022-01-01 PROCEDURE — 78306 BONE IMAGING WHOLE BODY: CPT

## 2022-01-01 PROCEDURE — 65660000001 HC RM ICU INTERMED STEPDOWN

## 2022-01-01 PROCEDURE — 88342 IMHCHEM/IMCYTCHM 1ST ANTB: CPT

## 2022-01-01 PROCEDURE — 74011250636 HC RX REV CODE- 250/636: Performed by: RADIOLOGY

## 2022-01-01 PROCEDURE — 1123F ACP DISCUSS/DSCN MKR DOCD: CPT | Performed by: SPECIALIST

## 2022-01-01 PROCEDURE — 74011000636 HC RX REV CODE- 636: Performed by: STUDENT IN AN ORGANIZED HEALTH CARE EDUCATION/TRAINING PROGRAM

## 2022-01-01 PROCEDURE — HOSPICE MEDICATION HC HH HOSPICE MEDICATION

## 2022-01-01 PROCEDURE — C1750 CATH, HEMODIALYSIS,LONG-TERM: HCPCS

## 2022-01-01 PROCEDURE — 74011000250 HC RX REV CODE- 250: Performed by: STUDENT IN AN ORGANIZED HEALTH CARE EDUCATION/TRAINING PROGRAM

## 2022-01-01 PROCEDURE — 87040 BLOOD CULTURE FOR BACTERIA: CPT

## 2022-01-01 PROCEDURE — 83690 ASSAY OF LIPASE: CPT

## 2022-01-01 PROCEDURE — G8536 NO DOC ELDER MAL SCRN: HCPCS | Performed by: SPECIALIST

## 2022-01-01 PROCEDURE — 74011000636 HC RX REV CODE- 636: Performed by: RADIOLOGY

## 2022-01-01 PROCEDURE — 83935 ASSAY OF URINE OSMOLALITY: CPT

## 2022-01-01 PROCEDURE — 36589 REMOVAL TUNNELED CV CATH: CPT

## 2022-01-01 PROCEDURE — 74011636637 HC RX REV CODE- 636/637: Performed by: INTERNAL MEDICINE

## 2022-01-01 PROCEDURE — 93005 ELECTROCARDIOGRAM TRACING: CPT

## 2022-01-01 PROCEDURE — 97165 OT EVAL LOW COMPLEX 30 MIN: CPT

## 2022-01-01 PROCEDURE — 87086 URINE CULTURE/COLONY COUNT: CPT

## 2022-01-01 PROCEDURE — 77030037492 HC KT BN ACCS OSTEOCOOL MEDT -E

## 2022-01-01 PROCEDURE — G8510 SCR DEP NEG, NO PLAN REQD: HCPCS | Performed by: SPECIALIST

## 2022-01-01 PROCEDURE — 1111F DSCHRG MED/CURRENT MED MERGE: CPT | Performed by: SPECIALIST

## 2022-01-01 PROCEDURE — 84133 ASSAY OF URINE POTASSIUM: CPT

## 2022-01-01 PROCEDURE — 84443 ASSAY THYROID STIM HORMONE: CPT

## 2022-01-01 PROCEDURE — 83880 ASSAY OF NATRIURETIC PEPTIDE: CPT

## 2022-01-01 PROCEDURE — 99233 SBSQ HOSP IP/OBS HIGH 50: CPT | Performed by: NURSE PRACTITIONER

## 2022-01-01 PROCEDURE — 22513 PERQ VERTEBRAL AUGMENTATION: CPT

## 2022-01-01 PROCEDURE — 77030003666 HC NDL SPINAL BD -A

## 2022-01-01 PROCEDURE — 74011000250 HC RX REV CODE- 250: Performed by: RADIOLOGY

## 2022-01-01 PROCEDURE — 76942 ECHO GUIDE FOR BIOPSY: CPT

## 2022-01-01 PROCEDURE — 81001 URINALYSIS AUTO W/SCOPE: CPT

## 2022-01-01 PROCEDURE — 99223 1ST HOSP IP/OBS HIGH 75: CPT | Performed by: INTERNAL MEDICINE

## 2022-01-01 PROCEDURE — 1101F PT FALLS ASSESS-DOCD LE1/YR: CPT | Performed by: INTERNAL MEDICINE

## 2022-01-01 PROCEDURE — 87635 SARS-COV-2 COVID-19 AMP PRB: CPT

## 2022-01-01 PROCEDURE — 85610 PROTHROMBIN TIME: CPT

## 2022-01-01 PROCEDURE — A9576 INJ PROHANCE MULTIPACK: HCPCS | Performed by: RADIOLOGY

## 2022-01-01 PROCEDURE — 3331090004 HSPC SERVICE INTENSITY ADD-ON

## 2022-01-01 PROCEDURE — G8427 DOCREV CUR MEDS BY ELIG CLIN: HCPCS | Performed by: SPECIALIST

## 2022-01-01 PROCEDURE — 96374 THER/PROPH/DIAG INJ IV PUSH: CPT

## 2022-01-01 PROCEDURE — G8510 SCR DEP NEG, NO PLAN REQD: HCPCS | Performed by: INTERNAL MEDICINE

## 2022-01-01 PROCEDURE — 77030021783 HC SYS CEM DEL MEDT -D

## 2022-01-01 PROCEDURE — 74011250637 HC RX REV CODE- 250/637: Performed by: STUDENT IN AN ORGANIZED HEALTH CARE EDUCATION/TRAINING PROGRAM

## 2022-01-01 PROCEDURE — 0PU43JZ SUPPLEMENT THORACIC VERTEBRA WITH SYNTHETIC SUBSTITUTE, PERCUTANEOUS APPROACH: ICD-10-PCS | Performed by: STUDENT IN AN ORGANIZED HEALTH CARE EDUCATION/TRAINING PROGRAM

## 2022-01-01 PROCEDURE — 88311 DECALCIFY TISSUE: CPT

## 2022-01-01 PROCEDURE — 77030037493 HC PRB KT OSTEOCOOL MEDT -I2

## 2022-01-01 PROCEDURE — 74011000636 HC RX REV CODE- 636: Performed by: INTERNAL MEDICINE

## 2022-01-01 PROCEDURE — 96365 THER/PROPH/DIAG IV INF INIT: CPT

## 2022-01-01 PROCEDURE — C1713 ANCHOR/SCREW BN/BN,TIS/BN: HCPCS

## 2022-01-01 PROCEDURE — 74177 CT ABD & PELVIS W/CONTRAST: CPT

## 2022-01-01 PROCEDURE — G0463 HOSPITAL OUTPT CLINIC VISIT: HCPCS | Performed by: SPECIALIST

## 2022-01-01 PROCEDURE — 93306 TTE W/DOPPLER COMPLETE: CPT

## 2022-01-01 PROCEDURE — G8432 DEP SCR NOT DOC, RNG: HCPCS | Performed by: INTERNAL MEDICINE

## 2022-01-01 PROCEDURE — 74011250636 HC RX REV CODE- 250/636: Performed by: STUDENT IN AN ORGANIZED HEALTH CARE EDUCATION/TRAINING PROGRAM

## 2022-01-01 PROCEDURE — 82550 ASSAY OF CK (CPK): CPT

## 2022-01-01 PROCEDURE — 0PS43ZZ REPOSITION THORACIC VERTEBRA, PERCUTANEOUS APPROACH: ICD-10-PCS | Performed by: STUDENT IN AN ORGANIZED HEALTH CARE EDUCATION/TRAINING PROGRAM

## 2022-01-01 PROCEDURE — A9576 INJ PROHANCE MULTIPACK: HCPCS

## 2022-01-01 PROCEDURE — 77030021782 HC SYS CEM CART DEL KYPH -C

## 2022-01-01 PROCEDURE — 83605 ASSAY OF LACTIC ACID: CPT

## 2022-01-01 PROCEDURE — 97161 PT EVAL LOW COMPLEX 20 MIN: CPT

## 2022-01-01 PROCEDURE — 85027 COMPLETE CBC AUTOMATED: CPT

## 2022-01-01 PROCEDURE — 99231 SBSQ HOSP IP/OBS SF/LOW 25: CPT | Performed by: SPECIALIST

## 2022-01-01 PROCEDURE — G0463 HOSPITAL OUTPT CLINIC VISIT: HCPCS | Performed by: INTERNAL MEDICINE

## 2022-01-01 PROCEDURE — 97166 OT EVAL MOD COMPLEX 45 MIN: CPT

## 2022-01-01 PROCEDURE — 20225 BONE BIOPSY TROCAR/NDL DEEP: CPT

## 2022-01-01 PROCEDURE — 77030037491

## 2022-01-01 PROCEDURE — 97116 GAIT TRAINING THERAPY: CPT

## 2022-01-01 PROCEDURE — 72158 MRI LUMBAR SPINE W/O & W/DYE: CPT

## 2022-01-01 PROCEDURE — 77030011218 HC DEV BIOP BN KYPH -C

## 2022-01-01 PROCEDURE — 96375 TX/PRO/DX INJ NEW DRUG ADDON: CPT

## 2022-01-01 PROCEDURE — 99233 SBSQ HOSP IP/OBS HIGH 50: CPT | Performed by: INTERNAL MEDICINE

## 2022-01-01 PROCEDURE — G8536 NO DOC ELDER MAL SCRN: HCPCS | Performed by: INTERNAL MEDICINE

## 2022-01-01 PROCEDURE — 74011250637 HC RX REV CODE- 250/637: Performed by: EMERGENCY MEDICINE

## 2022-01-01 PROCEDURE — 1111F DSCHRG MED/CURRENT MED MERGE: CPT | Performed by: INTERNAL MEDICINE

## 2022-01-01 PROCEDURE — 77030030399

## 2022-01-01 PROCEDURE — 88307 TISSUE EXAM BY PATHOLOGIST: CPT

## 2022-01-01 PROCEDURE — 71045 X-RAY EXAM CHEST 1 VIEW: CPT

## 2022-01-01 PROCEDURE — 86140 C-REACTIVE PROTEIN: CPT

## 2022-01-01 PROCEDURE — 74011250636 HC RX REV CODE- 250/636: Performed by: NEUROLOGICAL SURGERY

## 2022-01-01 PROCEDURE — G8420 CALC BMI NORM PARAMETERS: HCPCS | Performed by: SPECIALIST

## 2022-01-01 PROCEDURE — 77030002996 HC SUT SLK J&J -A

## 2022-01-01 PROCEDURE — 1123F ACP DISCUSS/DSCN MKR DOCD: CPT | Performed by: INTERNAL MEDICINE

## 2022-01-01 PROCEDURE — G8420 CALC BMI NORM PARAMETERS: HCPCS | Performed by: INTERNAL MEDICINE

## 2022-01-01 PROCEDURE — 74011000250 HC RX REV CODE- 250: Performed by: FAMILY MEDICINE

## 2022-01-01 PROCEDURE — 82533 TOTAL CORTISOL: CPT

## 2022-01-01 PROCEDURE — 93306 TTE W/DOPPLER COMPLETE: CPT | Performed by: SPECIALIST

## 2022-01-01 PROCEDURE — 77030010507 HC ADH SKN DERMBND J&J -B

## 2022-01-01 PROCEDURE — 74011000250 HC RX REV CODE- 250: Performed by: EMERGENCY MEDICINE

## 2022-01-01 PROCEDURE — 99223 1ST HOSP IP/OBS HIGH 75: CPT | Performed by: NURSE PRACTITIONER

## 2022-01-01 PROCEDURE — C1894 INTRO/SHEATH, NON-LASER: HCPCS

## 2022-01-01 PROCEDURE — 99205 OFFICE O/P NEW HI 60 MIN: CPT | Performed by: INTERNAL MEDICINE

## 2022-01-01 PROCEDURE — 0PB43ZX EXCISION OF THORACIC VERTEBRA, PERCUTANEOUS APPROACH, DIAGNOSTIC: ICD-10-PCS | Performed by: STUDENT IN AN ORGANIZED HEALTH CARE EDUCATION/TRAINING PROGRAM

## 2022-01-01 PROCEDURE — 71275 CT ANGIOGRAPHY CHEST: CPT

## 2022-01-01 PROCEDURE — 96367 TX/PROPH/DG ADDL SEQ IV INF: CPT

## 2022-01-01 PROCEDURE — 93005 ELECTROCARDIOGRAM TRACING: CPT | Performed by: SPECIALIST

## 2022-01-01 PROCEDURE — 1101F PT FALLS ASSESS-DOCD LE1/YR: CPT | Performed by: SPECIALIST

## 2022-01-01 PROCEDURE — 74011250636 HC RX REV CODE- 250/636: Performed by: FAMILY MEDICINE

## 2022-01-01 RX ORDER — KETOROLAC TROMETHAMINE 30 MG/ML
15 INJECTION, SOLUTION INTRAMUSCULAR; INTRAVENOUS
Status: COMPLETED | OUTPATIENT
Start: 2022-01-01 | End: 2022-01-01

## 2022-01-01 RX ORDER — ONDANSETRON 4 MG/1
4 TABLET, ORALLY DISINTEGRATING ORAL
Status: DISCONTINUED | OUTPATIENT
Start: 2022-01-01 | End: 2022-01-01 | Stop reason: HOSPADM

## 2022-01-01 RX ORDER — ACETAMINOPHEN 650 MG/1
650 SUPPOSITORY RECTAL
Status: DISCONTINUED | OUTPATIENT
Start: 2022-01-01 | End: 2022-01-01 | Stop reason: HOSPADM

## 2022-01-01 RX ORDER — ONDANSETRON 2 MG/ML
8 INJECTION INTRAMUSCULAR; INTRAVENOUS AS NEEDED
Status: CANCELLED | OUTPATIENT
Start: 2022-01-01

## 2022-01-01 RX ORDER — SODIUM CHLORIDE 0.9 % (FLUSH) 0.9 %
5-10 SYRINGE (ML) INJECTION AS NEEDED
Status: DISCONTINUED | OUTPATIENT
Start: 2022-01-01 | End: 2022-01-01 | Stop reason: HOSPADM

## 2022-01-01 RX ORDER — MORPHINE SULFATE 2 MG/ML
2 INJECTION, SOLUTION INTRAMUSCULAR; INTRAVENOUS ONCE
Status: COMPLETED | OUTPATIENT
Start: 2022-01-01 | End: 2022-01-01

## 2022-01-01 RX ORDER — AMIODARONE HYDROCHLORIDE 200 MG/1
200 TABLET ORAL DAILY
Status: DISCONTINUED | OUTPATIENT
Start: 2022-01-01 | End: 2022-01-01 | Stop reason: HOSPADM

## 2022-01-01 RX ORDER — HYDROCORTISONE SODIUM SUCCINATE 100 MG/2ML
100 INJECTION, POWDER, FOR SOLUTION INTRAMUSCULAR; INTRAVENOUS AS NEEDED
Status: CANCELLED | OUTPATIENT
Start: 2022-01-01

## 2022-01-01 RX ORDER — FLUMAZENIL 0.1 MG/ML
0.5 INJECTION INTRAVENOUS
Status: DISCONTINUED | OUTPATIENT
Start: 2022-01-01 | End: 2022-01-01 | Stop reason: ALTCHOICE

## 2022-01-01 RX ORDER — TAMSULOSIN HYDROCHLORIDE 0.4 MG/1
0.4 CAPSULE ORAL EVERY EVENING
Status: DISCONTINUED | OUTPATIENT
Start: 2022-01-01 | End: 2022-01-01 | Stop reason: HOSPADM

## 2022-01-01 RX ORDER — HYDROMORPHONE HYDROCHLORIDE 1 MG/ML
0.5 INJECTION, SOLUTION INTRAMUSCULAR; INTRAVENOUS; SUBCUTANEOUS
Status: DISCONTINUED | OUTPATIENT
Start: 2022-01-01 | End: 2022-01-01

## 2022-01-01 RX ORDER — MAGNESIUM SULFATE 1 G/100ML
1 INJECTION INTRAVENOUS ONCE
Status: COMPLETED | OUTPATIENT
Start: 2022-01-01 | End: 2022-01-01

## 2022-01-01 RX ORDER — SODIUM CHLORIDE 9 MG/ML
25 INJECTION, SOLUTION INTRAVENOUS
Status: DISCONTINUED | OUTPATIENT
Start: 2022-01-01 | End: 2022-01-01 | Stop reason: ALTCHOICE

## 2022-01-01 RX ORDER — ACETAMINOPHEN 325 MG/1
650 TABLET ORAL
Status: DISCONTINUED | OUTPATIENT
Start: 2022-01-01 | End: 2022-01-01 | Stop reason: HOSPADM

## 2022-01-01 RX ORDER — FUROSEMIDE 10 MG/ML
20 INJECTION INTRAMUSCULAR; INTRAVENOUS DAILY
Status: DISCONTINUED | OUTPATIENT
Start: 2022-01-01 | End: 2022-01-01 | Stop reason: HOSPADM

## 2022-01-01 RX ORDER — TRAMADOL HYDROCHLORIDE 50 MG/1
25 TABLET ORAL
Qty: 30 TABLET | Refills: 0 | Status: SHIPPED | OUTPATIENT
Start: 2022-01-01 | End: 2022-01-01

## 2022-01-01 RX ORDER — SODIUM CHLORIDE 9 MG/ML
75 INJECTION, SOLUTION INTRAVENOUS CONTINUOUS
Status: DISCONTINUED | OUTPATIENT
Start: 2022-01-01 | End: 2022-01-01 | Stop reason: HOSPADM

## 2022-01-01 RX ORDER — PREDNISONE 5 MG/1
5 TABLET ORAL 2 TIMES DAILY WITH MEALS
Status: DISCONTINUED | OUTPATIENT
Start: 2022-01-01 | End: 2022-01-01 | Stop reason: HOSPADM

## 2022-01-01 RX ORDER — FENTANYL CITRATE 50 UG/ML
12.5-5 INJECTION, SOLUTION INTRAMUSCULAR; INTRAVENOUS
Status: DISCONTINUED | OUTPATIENT
Start: 2022-01-01 | End: 2022-01-01 | Stop reason: HOSPADM

## 2022-01-01 RX ORDER — HYDROCORTISONE SODIUM SUCCINATE 100 MG/2ML
50 INJECTION, POWDER, FOR SOLUTION INTRAMUSCULAR; INTRAVENOUS EVERY 8 HOURS
Status: DISCONTINUED | OUTPATIENT
Start: 2022-01-01 | End: 2022-01-01

## 2022-01-01 RX ORDER — FERROUS SULFATE, DRIED 160(50) MG
1 TABLET, EXTENDED RELEASE ORAL 2 TIMES DAILY
Status: DISCONTINUED | OUTPATIENT
Start: 2022-01-01 | End: 2022-01-01 | Stop reason: HOSPADM

## 2022-01-01 RX ORDER — LIDOCAINE HYDROCHLORIDE 20 MG/ML
20 INJECTION, SOLUTION INFILTRATION; PERINEURAL
Status: COMPLETED | OUTPATIENT
Start: 2022-01-01 | End: 2022-01-01

## 2022-01-01 RX ORDER — SODIUM CHLORIDE 0.9 % (FLUSH) 0.9 %
5-40 SYRINGE (ML) INJECTION EVERY 8 HOURS
Status: DISCONTINUED | OUTPATIENT
Start: 2022-01-01 | End: 2022-01-01 | Stop reason: HOSPADM

## 2022-01-01 RX ORDER — HYDROMORPHONE HYDROCHLORIDE 1 MG/ML
0.5 INJECTION, SOLUTION INTRAMUSCULAR; INTRAVENOUS; SUBCUTANEOUS ONCE
Status: COMPLETED | OUTPATIENT
Start: 2022-01-01 | End: 2022-01-01

## 2022-01-01 RX ORDER — OXYCODONE HYDROCHLORIDE 5 MG/1
5 TABLET ORAL 3 TIMES DAILY
Qty: 15 TABLET | Refills: 0 | Status: SHIPPED | OUTPATIENT
Start: 2022-01-01 | End: 2022-01-01

## 2022-01-01 RX ORDER — SODIUM CHLORIDE 0.9 % (FLUSH) 0.9 %
5-40 SYRINGE (ML) INJECTION AS NEEDED
Status: DISCONTINUED | OUTPATIENT
Start: 2022-01-01 | End: 2022-01-01 | Stop reason: HOSPADM

## 2022-01-01 RX ORDER — AMIODARONE HYDROCHLORIDE 200 MG/1
200 TABLET ORAL DAILY
Qty: 90 TABLET | Refills: 3 | Status: SHIPPED | OUTPATIENT
Start: 2022-01-01

## 2022-01-01 RX ORDER — HYDROCORTISONE SODIUM SUCCINATE 100 MG/2ML
50 INJECTION, POWDER, FOR SOLUTION INTRAMUSCULAR; INTRAVENOUS EVERY 12 HOURS
Status: DISPENSED | OUTPATIENT
Start: 2022-01-01 | End: 2022-01-01

## 2022-01-01 RX ORDER — SODIUM CHLORIDE 0.9 % (FLUSH) 0.9 %
10 SYRINGE (ML) INJECTION
Status: COMPLETED | OUTPATIENT
Start: 2022-01-01 | End: 2022-01-01

## 2022-01-01 RX ORDER — ONDANSETRON 2 MG/ML
4 INJECTION INTRAMUSCULAR; INTRAVENOUS
Status: DISCONTINUED | OUTPATIENT
Start: 2022-01-01 | End: 2022-01-01 | Stop reason: HOSPADM

## 2022-01-01 RX ORDER — HEPARIN SODIUM 1000 [USP'U]/ML
INJECTION, SOLUTION INTRAVENOUS; SUBCUTANEOUS
Status: COMPLETED
Start: 2022-01-01 | End: 2022-01-01

## 2022-01-01 RX ORDER — AMOXICILLIN 250 MG
2 CAPSULE ORAL
Qty: 60 TABLET | Refills: 0 | Status: SHIPPED | OUTPATIENT
Start: 2022-01-01 | End: 2022-08-13

## 2022-01-01 RX ORDER — AMIODARONE HYDROCHLORIDE 400 MG/1
400 TABLET ORAL 2 TIMES DAILY
Qty: 9 TABLET | Refills: 0 | Status: SHIPPED
Start: 2022-01-01 | End: 2022-01-01

## 2022-01-01 RX ORDER — NALOXONE HYDROCHLORIDE 0.4 MG/ML
0.4 INJECTION, SOLUTION INTRAMUSCULAR; INTRAVENOUS; SUBCUTANEOUS
Status: DISCONTINUED | OUTPATIENT
Start: 2022-01-01 | End: 2022-01-01 | Stop reason: ALTCHOICE

## 2022-01-01 RX ORDER — GADOTERATE MEGLUMINE 376.9 MG/ML
15 INJECTION INTRAVENOUS
Status: COMPLETED | OUTPATIENT
Start: 2022-01-01 | End: 2022-01-01

## 2022-01-01 RX ORDER — ACETAMINOPHEN 325 MG/1
650 TABLET ORAL
Status: DISCONTINUED | OUTPATIENT
Start: 2022-01-01 | End: 2022-01-01

## 2022-01-01 RX ORDER — AMIODARONE HYDROCHLORIDE 200 MG/1
200 TABLET ORAL DAILY
Qty: 30 TABLET | Refills: 0 | Status: SHIPPED
Start: 2022-01-01 | End: 2022-01-01 | Stop reason: SDUPTHER

## 2022-01-01 RX ORDER — DIPHENHYDRAMINE HYDROCHLORIDE 50 MG/ML
25 INJECTION, SOLUTION INTRAMUSCULAR; INTRAVENOUS AS NEEDED
Status: CANCELLED
Start: 2022-01-01

## 2022-01-01 RX ORDER — AMOXICILLIN 250 MG
1 CAPSULE ORAL
Status: DISCONTINUED | OUTPATIENT
Start: 2022-01-01 | End: 2022-01-01

## 2022-01-01 RX ORDER — ACETAMINOPHEN 500 MG
1000 TABLET ORAL ONCE
Status: COMPLETED | OUTPATIENT
Start: 2022-01-01 | End: 2022-01-01

## 2022-01-01 RX ORDER — HYDROCORTISONE SODIUM SUCCINATE 100 MG/2ML
50 INJECTION, POWDER, FOR SOLUTION INTRAMUSCULAR; INTRAVENOUS ONCE
Status: COMPLETED | OUTPATIENT
Start: 2022-01-01 | End: 2022-01-01

## 2022-01-01 RX ORDER — SODIUM CHLORIDE 0.9 % (FLUSH) 0.9 %
5-40 SYRINGE (ML) INJECTION AS NEEDED
Status: CANCELLED | OUTPATIENT
Start: 2022-01-01

## 2022-01-01 RX ORDER — POTASSIUM CHLORIDE 750 MG/1
40 TABLET, FILM COATED, EXTENDED RELEASE ORAL
Status: COMPLETED | OUTPATIENT
Start: 2022-01-01 | End: 2022-01-01

## 2022-01-01 RX ORDER — BUPIVACAINE HYDROCHLORIDE 5 MG/ML
20 INJECTION, SOLUTION EPIDURAL; INTRACAUDAL
Status: COMPLETED | OUTPATIENT
Start: 2022-01-01 | End: 2022-01-01

## 2022-01-01 RX ORDER — MIDAZOLAM HYDROCHLORIDE 1 MG/ML
.5-4 INJECTION, SOLUTION INTRAMUSCULAR; INTRAVENOUS
Status: DISCONTINUED | OUTPATIENT
Start: 2022-01-01 | End: 2022-01-01 | Stop reason: ALTCHOICE

## 2022-01-01 RX ORDER — HEPARIN 100 UNIT/ML
500 SYRINGE INTRAVENOUS AS NEEDED
Status: CANCELLED
Start: 2022-01-01

## 2022-01-01 RX ORDER — AMOXICILLIN 250 MG
2 CAPSULE ORAL
Status: DISCONTINUED | OUTPATIENT
Start: 2022-01-01 | End: 2022-01-01 | Stop reason: HOSPADM

## 2022-01-01 RX ORDER — MIDAZOLAM HYDROCHLORIDE 1 MG/ML
5 INJECTION, SOLUTION INTRAMUSCULAR; INTRAVENOUS
Status: DISCONTINUED | OUTPATIENT
Start: 2022-01-01 | End: 2022-01-01 | Stop reason: HOSPADM

## 2022-01-01 RX ORDER — ENOXAPARIN SODIUM 100 MG/ML
40 INJECTION SUBCUTANEOUS EVERY 24 HOURS
Status: DISCONTINUED | OUTPATIENT
Start: 2022-01-01 | End: 2022-01-01 | Stop reason: HOSPADM

## 2022-01-01 RX ORDER — POLYETHYLENE GLYCOL 3350 17 G/17G
17 POWDER, FOR SOLUTION ORAL DAILY PRN
Status: DISCONTINUED | OUTPATIENT
Start: 2022-01-01 | End: 2022-01-01 | Stop reason: HOSPADM

## 2022-01-01 RX ORDER — HEPARIN SODIUM 1000 [USP'U]/ML
10000 INJECTION, SOLUTION INTRAVENOUS; SUBCUTANEOUS
Status: COMPLETED | OUTPATIENT
Start: 2022-01-01 | End: 2022-01-01

## 2022-01-01 RX ORDER — ONDANSETRON 2 MG/ML
4 INJECTION INTRAMUSCULAR; INTRAVENOUS ONCE
Status: COMPLETED | OUTPATIENT
Start: 2022-01-01 | End: 2022-01-01

## 2022-01-01 RX ORDER — AMIODARONE HYDROCHLORIDE 200 MG/1
400 TABLET ORAL 2 TIMES DAILY
Status: DISCONTINUED | OUTPATIENT
Start: 2022-01-01 | End: 2022-01-01 | Stop reason: HOSPADM

## 2022-01-01 RX ORDER — ACETAMINOPHEN 325 MG/1
650 TABLET ORAL
Status: COMPLETED | OUTPATIENT
Start: 2022-01-01 | End: 2022-01-01

## 2022-01-01 RX ORDER — OXYCODONE HYDROCHLORIDE 5 MG/1
10 TABLET ORAL
Status: DISCONTINUED | OUTPATIENT
Start: 2022-01-01 | End: 2022-01-01 | Stop reason: HOSPADM

## 2022-01-01 RX ORDER — SODIUM CHLORIDE 9 MG/ML
75 INJECTION, SOLUTION INTRAVENOUS CONTINUOUS
Status: DISCONTINUED | OUTPATIENT
Start: 2022-01-01 | End: 2022-01-01

## 2022-01-01 RX ORDER — SODIUM CHLORIDE 9 MG/ML
5-250 INJECTION, SOLUTION INTRAVENOUS AS NEEDED
Status: CANCELLED | OUTPATIENT
Start: 2022-01-01

## 2022-01-01 RX ORDER — DIPHENHYDRAMINE HYDROCHLORIDE 50 MG/ML
50 INJECTION, SOLUTION INTRAMUSCULAR; INTRAVENOUS AS NEEDED
Status: CANCELLED
Start: 2022-01-01

## 2022-01-01 RX ORDER — ACETAMINOPHEN 500 MG
1000 TABLET ORAL
Status: ACTIVE | OUTPATIENT
Start: 2022-01-01 | End: 2022-01-01

## 2022-01-01 RX ORDER — ACETAMINOPHEN 325 MG/1
650 TABLET ORAL 3 TIMES DAILY
Status: DISCONTINUED | OUTPATIENT
Start: 2022-01-01 | End: 2022-01-01 | Stop reason: HOSPADM

## 2022-01-01 RX ORDER — DIPHENHYDRAMINE HYDROCHLORIDE 50 MG/ML
25 INJECTION, SOLUTION INTRAMUSCULAR; INTRAVENOUS
Status: COMPLETED | OUTPATIENT
Start: 2022-01-01 | End: 2022-01-01

## 2022-01-01 RX ORDER — TAMSULOSIN HYDROCHLORIDE 0.4 MG/1
0.4 CAPSULE ORAL EVERY EVENING
Status: DISCONTINUED | OUTPATIENT
Start: 2022-01-01 | End: 2022-01-01

## 2022-01-01 RX ORDER — TAMSULOSIN HYDROCHLORIDE 0.4 MG/1
0.4 CAPSULE ORAL 2 TIMES DAILY
Status: DISCONTINUED | OUTPATIENT
Start: 2022-01-01 | End: 2022-01-01

## 2022-01-01 RX ORDER — CLINDAMYCIN PHOSPHATE 900 MG/50ML
900 INJECTION INTRAVENOUS ONCE
Status: COMPLETED | OUTPATIENT
Start: 2022-01-01 | End: 2022-01-01

## 2022-01-01 RX ORDER — OXYCODONE HYDROCHLORIDE 5 MG/1
5 TABLET ORAL
Status: DISCONTINUED | OUTPATIENT
Start: 2022-01-01 | End: 2022-01-01

## 2022-01-01 RX ORDER — MORPHINE SULFATE 2 MG/ML
2 INJECTION, SOLUTION INTRAMUSCULAR; INTRAVENOUS
Status: DISCONTINUED | OUTPATIENT
Start: 2022-01-01 | End: 2022-01-01

## 2022-01-01 RX ORDER — SODIUM CHLORIDE 9 MG/ML
5-40 INJECTION INTRAMUSCULAR; INTRAVENOUS; SUBCUTANEOUS AS NEEDED
Status: CANCELLED | OUTPATIENT
Start: 2022-01-01

## 2022-01-01 RX ORDER — OXYCODONE HYDROCHLORIDE 5 MG/1
5 TABLET ORAL 3 TIMES DAILY
Status: DISCONTINUED | OUTPATIENT
Start: 2022-01-01 | End: 2022-01-01 | Stop reason: HOSPADM

## 2022-01-01 RX ORDER — ACETAMINOPHEN 500 MG
500 TABLET ORAL
Status: DISCONTINUED | OUTPATIENT
Start: 2022-01-01 | End: 2022-01-01

## 2022-01-01 RX ORDER — FUROSEMIDE 10 MG/ML
20 INJECTION INTRAMUSCULAR; INTRAVENOUS ONCE
Status: COMPLETED | OUTPATIENT
Start: 2022-01-01 | End: 2022-01-01

## 2022-01-01 RX ORDER — FENTANYL CITRATE 50 UG/ML
25-200 INJECTION, SOLUTION INTRAMUSCULAR; INTRAVENOUS
Status: DISCONTINUED | OUTPATIENT
Start: 2022-01-01 | End: 2022-01-01 | Stop reason: ALTCHOICE

## 2022-01-01 RX ORDER — DOXYCYCLINE HYCLATE 100 MG
100 TABLET ORAL 2 TIMES DAILY
COMMUNITY
Start: 2022-01-01 | End: 2022-01-01

## 2022-01-01 RX ORDER — EPINEPHRINE 1 MG/ML
0.3 INJECTION, SOLUTION, CONCENTRATE INTRAVENOUS AS NEEDED
Status: CANCELLED | OUTPATIENT
Start: 2022-01-01

## 2022-01-01 RX ORDER — POLYETHYLENE GLYCOL 3350 17 G/17G
17 POWDER, FOR SOLUTION ORAL DAILY
Qty: 30 PACKET | Refills: 0 | Status: SHIPPED | OUTPATIENT
Start: 2022-01-01 | End: 2022-08-13

## 2022-01-01 RX ORDER — ALBUTEROL SULFATE 0.83 MG/ML
2.5 SOLUTION RESPIRATORY (INHALATION) AS NEEDED
Status: CANCELLED
Start: 2022-01-01

## 2022-01-01 RX ORDER — AZITHROMYCIN 250 MG/1
500 TABLET, FILM COATED ORAL DAILY
Status: DISCONTINUED | OUTPATIENT
Start: 2022-01-01 | End: 2022-01-01

## 2022-01-01 RX ORDER — MAGNESIUM SULFATE 1 G/100ML
1 INJECTION INTRAVENOUS
Status: COMPLETED | OUTPATIENT
Start: 2022-01-01 | End: 2022-01-01

## 2022-01-01 RX ORDER — ACETAMINOPHEN 325 MG/1
650 TABLET ORAL AS NEEDED
Status: CANCELLED
Start: 2022-01-01

## 2022-01-01 RX ADMIN — ACETAMINOPHEN 650 MG: 325 TABLET ORAL at 21:13

## 2022-01-01 RX ADMIN — ACETAMINOPHEN 650 MG: 325 TABLET ORAL at 21:22

## 2022-01-01 RX ADMIN — TAMSULOSIN HYDROCHLORIDE 0.4 MG: 0.4 CAPSULE ORAL at 17:21

## 2022-01-01 RX ADMIN — AMIODARONE HYDROCHLORIDE 200 MG: 200 TABLET ORAL at 07:43

## 2022-01-01 RX ADMIN — OXYCODONE HYDROCHLORIDE 5 MG: 5 TABLET ORAL at 17:14

## 2022-01-01 RX ADMIN — OXYCODONE HYDROCHLORIDE 5 MG: 5 TABLET ORAL at 16:00

## 2022-01-01 RX ADMIN — FENTANYL CITRATE 50 MCG: 50 INJECTION INTRAMUSCULAR; INTRAVENOUS at 09:55

## 2022-01-01 RX ADMIN — APIXABAN 5 MG: 5 TABLET, FILM COATED ORAL at 09:06

## 2022-01-01 RX ADMIN — SODIUM CHLORIDE, PRESERVATIVE FREE 10 ML: 5 INJECTION INTRAVENOUS at 16:28

## 2022-01-01 RX ADMIN — OXYCODONE HYDROCHLORIDE 5 MG: 5 TABLET ORAL at 21:01

## 2022-01-01 RX ADMIN — SODIUM CHLORIDE, PRESERVATIVE FREE 10 ML: 5 INJECTION INTRAVENOUS at 06:00

## 2022-01-01 RX ADMIN — AMIODARONE HYDROCHLORIDE 400 MG: 200 TABLET ORAL at 10:04

## 2022-01-01 RX ADMIN — CEFEPIME 2 G: 2 INJECTION, POWDER, FOR SOLUTION INTRAVENOUS at 06:45

## 2022-01-01 RX ADMIN — PREDNISONE 5 MG: 5 TABLET ORAL at 08:30

## 2022-01-01 RX ADMIN — OXYCODONE HYDROCHLORIDE 5 MG: 5 TABLET ORAL at 21:16

## 2022-01-01 RX ADMIN — CEFEPIME 2 G: 2 INJECTION, POWDER, FOR SOLUTION INTRAVENOUS at 19:16

## 2022-01-01 RX ADMIN — AMIODARONE HYDROCHLORIDE 0.5 MG/MIN: 50 INJECTION, SOLUTION INTRAVENOUS at 00:38

## 2022-01-01 RX ADMIN — AMIODARONE HYDROCHLORIDE 200 MG: 200 TABLET ORAL at 08:45

## 2022-01-01 RX ADMIN — Medication 1 TABLET: at 09:51

## 2022-01-01 RX ADMIN — CEFTRIAXONE SODIUM 1 G: 1 INJECTION, POWDER, FOR SOLUTION INTRAMUSCULAR; INTRAVENOUS at 01:03

## 2022-01-01 RX ADMIN — ACETAMINOPHEN 650 MG: 325 TABLET ORAL at 09:30

## 2022-01-01 RX ADMIN — SODIUM CHLORIDE, PRESERVATIVE FREE 10 ML: 5 INJECTION INTRAVENOUS at 12:28

## 2022-01-01 RX ADMIN — SODIUM CHLORIDE, PRESERVATIVE FREE 10 ML: 5 INJECTION INTRAVENOUS at 21:14

## 2022-01-01 RX ADMIN — AMIODARONE HYDROCHLORIDE 200 MG: 200 TABLET ORAL at 09:30

## 2022-01-01 RX ADMIN — ONDANSETRON 4 MG: 2 INJECTION INTRAMUSCULAR; INTRAVENOUS at 12:02

## 2022-01-01 RX ADMIN — ONDANSETRON 4 MG: 2 INJECTION INTRAMUSCULAR; INTRAVENOUS at 13:17

## 2022-01-01 RX ADMIN — GADOTERIDOL 15 ML: 279.3 INJECTION, SOLUTION INTRAVENOUS at 15:00

## 2022-01-01 RX ADMIN — OXYCODONE HYDROCHLORIDE 5 MG: 5 TABLET ORAL at 09:06

## 2022-01-01 RX ADMIN — SODIUM CHLORIDE, PRESERVATIVE FREE 10 ML: 5 INJECTION INTRAVENOUS at 21:23

## 2022-01-01 RX ADMIN — CEFEPIME 2 G: 2 INJECTION, POWDER, FOR SOLUTION INTRAVENOUS at 06:57

## 2022-01-01 RX ADMIN — CEFEPIME 2 G: 2 INJECTION, POWDER, FOR SOLUTION INTRAVENOUS at 22:14

## 2022-01-01 RX ADMIN — OXYCODONE HYDROCHLORIDE 5 MG: 5 TABLET ORAL at 22:02

## 2022-01-01 RX ADMIN — KETOROLAC TROMETHAMINE 15 MG: 30 INJECTION, SOLUTION INTRAMUSCULAR; INTRAVENOUS at 15:18

## 2022-01-01 RX ADMIN — ONDANSETRON HYDROCHLORIDE 4 MG: 2 SOLUTION INTRAMUSCULAR; INTRAVENOUS at 23:05

## 2022-01-01 RX ADMIN — AMIODARONE HYDROCHLORIDE 400 MG: 200 TABLET ORAL at 08:30

## 2022-01-01 RX ADMIN — OXYCODONE HYDROCHLORIDE 5 MG: 5 TABLET ORAL at 08:45

## 2022-01-01 RX ADMIN — SODIUM CHLORIDE 75 ML/HR: 9 INJECTION, SOLUTION INTRAVENOUS at 03:26

## 2022-01-01 RX ADMIN — ONDANSETRON 4 MG: 2 INJECTION INTRAMUSCULAR; INTRAVENOUS at 22:17

## 2022-01-01 RX ADMIN — ACETAMINOPHEN 650 MG: 325 TABLET ORAL at 17:14

## 2022-01-01 RX ADMIN — IOPAMIDOL 100 ML: 755 INJECTION, SOLUTION INTRAVENOUS at 15:44

## 2022-01-01 RX ADMIN — ACETAMINOPHEN 650 MG: 325 TABLET ORAL at 16:12

## 2022-01-01 RX ADMIN — SODIUM CHLORIDE, PRESERVATIVE FREE 10 ML: 5 INJECTION INTRAVENOUS at 05:45

## 2022-01-01 RX ADMIN — SODIUM CHLORIDE, PRESERVATIVE FREE 10 ML: 5 INJECTION INTRAVENOUS at 16:37

## 2022-01-01 RX ADMIN — IOHEXOL 30 ML: 240 INJECTION, SOLUTION INTRATHECAL; INTRAVASCULAR; INTRAVENOUS; ORAL at 12:46

## 2022-01-01 RX ADMIN — AMIODARONE HYDROCHLORIDE 200 MG: 200 TABLET ORAL at 09:48

## 2022-01-01 RX ADMIN — TAMSULOSIN HYDROCHLORIDE 0.4 MG: 0.4 CAPSULE ORAL at 18:23

## 2022-01-01 RX ADMIN — AMIODARONE HYDROCHLORIDE 200 MG: 200 TABLET ORAL at 08:56

## 2022-01-01 RX ADMIN — TAMSULOSIN HYDROCHLORIDE 0.4 MG: 0.4 CAPSULE ORAL at 18:03

## 2022-01-01 RX ADMIN — APIXABAN 5 MG: 5 TABLET, FILM COATED ORAL at 12:27

## 2022-01-01 RX ADMIN — HYDROCORTISONE SODIUM SUCCINATE 50 MG: 100 INJECTION, POWDER, FOR SOLUTION INTRAMUSCULAR; INTRAVENOUS at 22:15

## 2022-01-01 RX ADMIN — SODIUM CHLORIDE, PRESERVATIVE FREE 10 ML: 5 INJECTION INTRAVENOUS at 05:30

## 2022-01-01 RX ADMIN — AMIODARONE HYDROCHLORIDE 200 MG: 200 TABLET ORAL at 09:06

## 2022-01-01 RX ADMIN — ACETAMINOPHEN 650 MG: 325 TABLET ORAL at 16:35

## 2022-01-01 RX ADMIN — SODIUM CHLORIDE 75 ML/HR: 9 INJECTION, SOLUTION INTRAVENOUS at 12:56

## 2022-01-01 RX ADMIN — SODIUM CHLORIDE, PRESERVATIVE FREE 10 ML: 5 INJECTION INTRAVENOUS at 16:25

## 2022-01-01 RX ADMIN — APIXABAN 5 MG: 5 TABLET, FILM COATED ORAL at 20:08

## 2022-01-01 RX ADMIN — PREDNISONE 5 MG: 5 TABLET ORAL at 17:48

## 2022-01-01 RX ADMIN — Medication 15 MG: at 14:38

## 2022-01-01 RX ADMIN — AMIODARONE HYDROCHLORIDE 0.5 MG/MIN: 50 INJECTION, SOLUTION INTRAVENOUS at 08:11

## 2022-01-01 RX ADMIN — AMIODARONE HYDROCHLORIDE 200 MG: 200 TABLET ORAL at 09:37

## 2022-01-01 RX ADMIN — SODIUM CHLORIDE, PRESERVATIVE FREE 1 G: 5 INJECTION INTRAVENOUS at 05:27

## 2022-01-01 RX ADMIN — ACETAMINOPHEN 650 MG: 325 TABLET ORAL at 17:18

## 2022-01-01 RX ADMIN — SODIUM CHLORIDE, PRESERVATIVE FREE 10 ML: 5 INJECTION INTRAVENOUS at 21:05

## 2022-01-01 RX ADMIN — APIXABAN 5 MG: 5 TABLET, FILM COATED ORAL at 09:47

## 2022-01-01 RX ADMIN — IOPAMIDOL 100 ML: 755 INJECTION, SOLUTION INTRAVENOUS at 22:17

## 2022-01-01 RX ADMIN — Medication 10 ML: at 21:59

## 2022-01-01 RX ADMIN — APIXABAN 5 MG: 5 TABLET, FILM COATED ORAL at 08:56

## 2022-01-01 RX ADMIN — TAMSULOSIN HYDROCHLORIDE 0.4 MG: 0.4 CAPSULE ORAL at 09:30

## 2022-01-01 RX ADMIN — ACETAMINOPHEN 650 MG: 325 TABLET ORAL at 21:37

## 2022-01-01 RX ADMIN — HYDROCORTISONE SODIUM SUCCINATE 50 MG: 100 INJECTION, POWDER, FOR SOLUTION INTRAMUSCULAR; INTRAVENOUS at 22:05

## 2022-01-01 RX ADMIN — FUROSEMIDE 20 MG: 10 INJECTION, SOLUTION INTRAVENOUS at 15:19

## 2022-01-01 RX ADMIN — SODIUM CHLORIDE, PRESERVATIVE FREE 10 ML: 5 INJECTION INTRAVENOUS at 21:00

## 2022-01-01 RX ADMIN — SENNOSIDES AND DOCUSATE SODIUM 1 TABLET: 50; 8.6 TABLET ORAL at 21:00

## 2022-01-01 RX ADMIN — AMIODARONE HYDROCHLORIDE 200 MG: 200 TABLET ORAL at 08:43

## 2022-01-01 RX ADMIN — Medication 10 ML: at 22:00

## 2022-01-01 RX ADMIN — SODIUM CHLORIDE, PRESERVATIVE FREE 10 ML: 5 INJECTION INTRAVENOUS at 06:48

## 2022-01-01 RX ADMIN — SENNOSIDES AND DOCUSATE SODIUM 2 TABLET: 50; 8.6 TABLET ORAL at 22:17

## 2022-01-01 RX ADMIN — SENNOSIDES AND DOCUSATE SODIUM 2 TABLET: 50; 8.6 TABLET ORAL at 22:02

## 2022-01-01 RX ADMIN — OXYCODONE HYDROCHLORIDE 5 MG: 5 TABLET ORAL at 17:18

## 2022-01-01 RX ADMIN — SODIUM CHLORIDE, PRESERVATIVE FREE 10 ML: 5 INJECTION INTRAVENOUS at 22:02

## 2022-01-01 RX ADMIN — APIXABAN 5 MG: 5 TABLET, FILM COATED ORAL at 18:50

## 2022-01-01 RX ADMIN — SENNOSIDES AND DOCUSATE SODIUM 1 TABLET: 50; 8.6 TABLET ORAL at 21:23

## 2022-01-01 RX ADMIN — Medication 1 TABLET: at 08:30

## 2022-01-01 RX ADMIN — ENOXAPARIN SODIUM 40 MG: 100 INJECTION SUBCUTANEOUS at 19:11

## 2022-01-01 RX ADMIN — SODIUM CHLORIDE, PRESERVATIVE FREE 10 ML: 5 INJECTION INTRAVENOUS at 17:24

## 2022-01-01 RX ADMIN — TAMSULOSIN HYDROCHLORIDE 0.4 MG: 0.4 CAPSULE ORAL at 17:53

## 2022-01-01 RX ADMIN — SENNOSIDES AND DOCUSATE SODIUM 2 TABLET: 50; 8.6 TABLET ORAL at 21:14

## 2022-01-01 RX ADMIN — HYDROCORTISONE SODIUM SUCCINATE 50 MG: 100 INJECTION, POWDER, FOR SOLUTION INTRAMUSCULAR; INTRAVENOUS at 09:51

## 2022-01-01 RX ADMIN — CLINDAMYCIN PHOSPHATE 900 MG: 900 INJECTION, SOLUTION INTRAVENOUS at 09:20

## 2022-01-01 RX ADMIN — TAMSULOSIN HYDROCHLORIDE 0.4 MG: 0.4 CAPSULE ORAL at 11:27

## 2022-01-01 RX ADMIN — OXYCODONE HYDROCHLORIDE 5 MG: 5 TABLET ORAL at 21:13

## 2022-01-01 RX ADMIN — HEPARIN SODIUM 3800 UNITS: 1000 INJECTION INTRAVENOUS; SUBCUTANEOUS at 09:56

## 2022-01-01 RX ADMIN — ACETAMINOPHEN 650 MG: 325 TABLET ORAL at 08:56

## 2022-01-01 RX ADMIN — OXYCODONE HYDROCHLORIDE 5 MG: 5 TABLET ORAL at 21:23

## 2022-01-01 RX ADMIN — ACETAMINOPHEN 650 MG: 325 TABLET ORAL at 09:47

## 2022-01-01 RX ADMIN — Medication 1 TABLET: at 10:37

## 2022-01-01 RX ADMIN — IOPAMIDOL 85 ML: 755 INJECTION, SOLUTION INTRAVENOUS at 18:00

## 2022-01-01 RX ADMIN — ACETAMINOPHEN 650 MG: 325 TABLET ORAL at 22:01

## 2022-01-01 RX ADMIN — SODIUM CHLORIDE, PRESERVATIVE FREE 1 G: 5 INJECTION INTRAVENOUS at 05:01

## 2022-01-01 RX ADMIN — GADOTERIDOL 15 ML: 279.3 INJECTION, SOLUTION INTRAVENOUS at 02:06

## 2022-01-01 RX ADMIN — OXYCODONE 5 MG: 5 TABLET ORAL at 07:43

## 2022-01-01 RX ADMIN — OXYCODONE HYDROCHLORIDE 5 MG: 5 TABLET ORAL at 09:47

## 2022-01-01 RX ADMIN — SODIUM CHLORIDE, PRESERVATIVE FREE 1 G: 5 INJECTION INTRAVENOUS at 06:29

## 2022-01-01 RX ADMIN — MIDAZOLAM 1 MG: 1 INJECTION INTRAMUSCULAR; INTRAVENOUS at 13:11

## 2022-01-01 RX ADMIN — WATER 2 G: 1 INJECTION INTRAMUSCULAR; INTRAVENOUS; SUBCUTANEOUS at 12:01

## 2022-01-01 RX ADMIN — APIXABAN 5 MG: 5 TABLET, FILM COATED ORAL at 07:43

## 2022-01-01 RX ADMIN — Medication 1 TABLET: at 18:23

## 2022-01-01 RX ADMIN — MIDAZOLAM 2 MG: 1 INJECTION INTRAMUSCULAR; INTRAVENOUS at 09:50

## 2022-01-01 RX ADMIN — TAMSULOSIN HYDROCHLORIDE 0.4 MG: 0.4 CAPSULE ORAL at 17:48

## 2022-01-01 RX ADMIN — ACETAMINOPHEN 650 MG: 325 TABLET ORAL at 09:07

## 2022-01-01 RX ADMIN — SODIUM CHLORIDE 75 ML/HR: 9 INJECTION, SOLUTION INTRAVENOUS at 02:29

## 2022-01-01 RX ADMIN — OXYCODONE HYDROCHLORIDE 5 MG: 5 TABLET ORAL at 21:14

## 2022-01-01 RX ADMIN — FENTANYL CITRATE 75 MCG: 50 INJECTION, SOLUTION INTRAMUSCULAR; INTRAVENOUS at 12:28

## 2022-01-01 RX ADMIN — APIXABAN 5 MG: 5 TABLET, FILM COATED ORAL at 17:53

## 2022-01-01 RX ADMIN — CEFEPIME 2 G: 2 INJECTION, POWDER, FOR SOLUTION INTRAVENOUS at 15:23

## 2022-01-01 RX ADMIN — APIXABAN 5 MG: 5 TABLET, FILM COATED ORAL at 09:34

## 2022-01-01 RX ADMIN — APIXABAN 5 MG: 5 TABLET, FILM COATED ORAL at 08:45

## 2022-01-01 RX ADMIN — APIXABAN 5 MG: 5 TABLET, FILM COATED ORAL at 17:17

## 2022-01-01 RX ADMIN — SODIUM CHLORIDE, PRESERVATIVE FREE 10 ML: 5 INJECTION INTRAVENOUS at 02:07

## 2022-01-01 RX ADMIN — ACETAMINOPHEN 650 MG: 325 TABLET ORAL at 09:37

## 2022-01-01 RX ADMIN — MIDAZOLAM 2 MG: 1 INJECTION INTRAMUSCULAR; INTRAVENOUS at 12:28

## 2022-01-01 RX ADMIN — FUROSEMIDE 20 MG: 10 INJECTION, SOLUTION INTRAVENOUS at 10:03

## 2022-01-01 RX ADMIN — SODIUM CHLORIDE, PRESERVATIVE FREE 1 G: 5 INJECTION INTRAVENOUS at 06:23

## 2022-01-01 RX ADMIN — Medication 10 ML: at 06:57

## 2022-01-01 RX ADMIN — SODIUM CHLORIDE 319 ML: 9 INJECTION, SOLUTION INTRAVENOUS at 16:18

## 2022-01-01 RX ADMIN — SODIUM CHLORIDE, PRESERVATIVE FREE 10 ML: 5 INJECTION INTRAVENOUS at 05:11

## 2022-01-01 RX ADMIN — KETOROLAC TROMETHAMINE 15 MG: 30 INJECTION, SOLUTION INTRAMUSCULAR; INTRAVENOUS at 12:02

## 2022-01-01 RX ADMIN — POTASSIUM CHLORIDE 40 MEQ: 750 TABLET, EXTENDED RELEASE ORAL at 10:04

## 2022-01-01 RX ADMIN — MORPHINE SULFATE 2 MG: 2 INJECTION, SOLUTION INTRAMUSCULAR; INTRAVENOUS at 00:14

## 2022-01-01 RX ADMIN — TAMSULOSIN HYDROCHLORIDE 0.4 MG: 0.4 CAPSULE ORAL at 19:16

## 2022-01-01 RX ADMIN — ACETAMINOPHEN 650 MG: 325 TABLET ORAL at 21:01

## 2022-01-01 RX ADMIN — SENNOSIDES AND DOCUSATE SODIUM 2 TABLET: 50; 8.6 TABLET ORAL at 21:17

## 2022-01-01 RX ADMIN — APIXABAN 5 MG: 5 TABLET, FILM COATED ORAL at 17:21

## 2022-01-01 RX ADMIN — HEPARIN SODIUM 3800 UNITS: 1000 INJECTION, SOLUTION INTRAVENOUS; SUBCUTANEOUS at 09:56

## 2022-01-01 RX ADMIN — FUROSEMIDE 20 MG: 10 INJECTION, SOLUTION INTRAVENOUS at 11:20

## 2022-01-01 RX ADMIN — SODIUM CHLORIDE 75 ML/HR: 9 INJECTION, SOLUTION INTRAVENOUS at 22:32

## 2022-01-01 RX ADMIN — Medication 10 ML: at 18:23

## 2022-01-01 RX ADMIN — ACETAMINOPHEN 650 MG: 325 TABLET ORAL at 21:16

## 2022-01-01 RX ADMIN — FENTANYL CITRATE 50 MCG: 50 INJECTION, SOLUTION INTRAMUSCULAR; INTRAVENOUS at 13:07

## 2022-01-01 RX ADMIN — MIDAZOLAM 0.5 MG: 1 INJECTION INTRAMUSCULAR; INTRAVENOUS at 12:39

## 2022-01-01 RX ADMIN — ACETAMINOPHEN 650 MG: 325 TABLET ORAL at 11:28

## 2022-01-01 RX ADMIN — SODIUM CHLORIDE, PRESERVATIVE FREE 10 ML: 5 INJECTION INTRAVENOUS at 21:17

## 2022-01-01 RX ADMIN — APIXABAN 5 MG: 5 TABLET, FILM COATED ORAL at 18:49

## 2022-01-01 RX ADMIN — ENOXAPARIN SODIUM 40 MG: 100 INJECTION SUBCUTANEOUS at 18:23

## 2022-01-01 RX ADMIN — ENOXAPARIN SODIUM 40 MG: 100 INJECTION SUBCUTANEOUS at 19:16

## 2022-01-01 RX ADMIN — CEFEPIME 2 G: 2 INJECTION, POWDER, FOR SOLUTION INTRAVENOUS at 15:19

## 2022-01-01 RX ADMIN — MIDAZOLAM 1 MG: 1 INJECTION INTRAMUSCULAR; INTRAVENOUS at 09:55

## 2022-01-01 RX ADMIN — AMIODARONE HYDROCHLORIDE 200 MG: 200 TABLET ORAL at 12:27

## 2022-01-01 RX ADMIN — SODIUM CHLORIDE, PRESERVATIVE FREE 10 ML: 5 INJECTION INTRAVENOUS at 16:15

## 2022-01-01 RX ADMIN — AMIODARONE HYDROCHLORIDE 400 MG: 200 TABLET ORAL at 13:46

## 2022-01-01 RX ADMIN — APIXABAN 5 MG: 5 TABLET, FILM COATED ORAL at 18:00

## 2022-01-01 RX ADMIN — APIXABAN 5 MG: 5 TABLET, FILM COATED ORAL at 09:37

## 2022-01-01 RX ADMIN — AMIODARONE HYDROCHLORIDE 1 MG/MIN: 50 INJECTION, SOLUTION INTRAVENOUS at 18:43

## 2022-01-01 RX ADMIN — APIXABAN 5 MG: 5 TABLET, FILM COATED ORAL at 17:14

## 2022-01-01 RX ADMIN — AMIODARONE HYDROCHLORIDE 150 MG: 50 INJECTION, SOLUTION INTRAVENOUS at 15:29

## 2022-01-01 RX ADMIN — ACETAMINOPHEN 650 MG: 325 TABLET ORAL at 04:27

## 2022-01-01 RX ADMIN — AZITHROMYCIN DIHYDRATE 500 MG: 250 TABLET, FILM COATED ORAL at 19:01

## 2022-01-01 RX ADMIN — MORPHINE SULFATE 2 MG: 2 INJECTION, SOLUTION INTRAMUSCULAR; INTRAVENOUS at 21:22

## 2022-01-01 RX ADMIN — OXYCODONE HYDROCHLORIDE 5 MG: 5 TABLET ORAL at 22:17

## 2022-01-01 RX ADMIN — Medication 1 TABLET: at 19:16

## 2022-01-01 RX ADMIN — Medication 10 ML: at 14:00

## 2022-01-01 RX ADMIN — TAMSULOSIN HYDROCHLORIDE 0.4 MG: 0.4 CAPSULE ORAL at 09:37

## 2022-01-01 RX ADMIN — ACETAMINOPHEN 650 MG: 325 TABLET ORAL at 22:17

## 2022-01-01 RX ADMIN — SODIUM CHLORIDE, PRESERVATIVE FREE 10 ML: 5 INJECTION INTRAVENOUS at 22:17

## 2022-01-01 RX ADMIN — ENOXAPARIN SODIUM 40 MG: 100 INJECTION SUBCUTANEOUS at 18:03

## 2022-01-01 RX ADMIN — AMIODARONE HYDROCHLORIDE 400 MG: 200 TABLET ORAL at 19:16

## 2022-01-01 RX ADMIN — OXYCODONE HYDROCHLORIDE 5 MG: 5 TABLET ORAL at 16:36

## 2022-01-01 RX ADMIN — MIDAZOLAM 1 MG: 1 INJECTION INTRAMUSCULAR; INTRAVENOUS at 12:55

## 2022-01-01 RX ADMIN — GADOTERATE MEGLUMINE 15 ML: 376.9 INJECTION INTRAVENOUS at 14:25

## 2022-01-01 RX ADMIN — AZITHROMYCIN DIHYDRATE 500 MG: 250 TABLET, FILM COATED ORAL at 09:51

## 2022-01-01 RX ADMIN — OXYCODONE HYDROCHLORIDE 5 MG: 5 TABLET ORAL at 09:00

## 2022-01-01 RX ADMIN — SODIUM CHLORIDE, PRESERVATIVE FREE 10 ML: 5 INJECTION INTRAVENOUS at 14:39

## 2022-01-01 RX ADMIN — SODIUM CHLORIDE, PRESERVATIVE FREE 1 G: 5 INJECTION INTRAVENOUS at 05:45

## 2022-01-01 RX ADMIN — MAGNESIUM SULFATE 1 G: 1 INJECTION INTRAVENOUS at 02:29

## 2022-01-01 RX ADMIN — SODIUM CHLORIDE, PRESERVATIVE FREE 10 ML: 5 INJECTION INTRAVENOUS at 21:01

## 2022-01-01 RX ADMIN — SENNOSIDES AND DOCUSATE SODIUM 2 TABLET: 50; 8.6 TABLET ORAL at 21:01

## 2022-01-01 RX ADMIN — AMIODARONE HYDROCHLORIDE 0.5 MG/MIN: 50 INJECTION, SOLUTION INTRAVENOUS at 17:38

## 2022-01-01 RX ADMIN — ACETAMINOPHEN 650 MG: 325 TABLET ORAL at 16:25

## 2022-01-01 RX ADMIN — FENTANYL CITRATE 50 MCG: 50 INJECTION INTRAMUSCULAR; INTRAVENOUS at 09:50

## 2022-01-01 RX ADMIN — FUROSEMIDE 20 MG: 10 INJECTION, SOLUTION INTRAVENOUS at 19:16

## 2022-01-01 RX ADMIN — ACETAMINOPHEN 650 MG: 325 TABLET ORAL at 08:45

## 2022-01-01 RX ADMIN — Medication 1 TABLET: at 10:04

## 2022-01-01 RX ADMIN — HYDROCORTISONE SODIUM SUCCINATE 50 MG: 100 INJECTION, POWDER, FOR SOLUTION INTRAMUSCULAR; INTRAVENOUS at 10:37

## 2022-01-01 RX ADMIN — SODIUM CHLORIDE 1000 ML: 9 INJECTION, SOLUTION INTRAVENOUS at 16:17

## 2022-01-01 RX ADMIN — FENTANYL CITRATE 25 MCG: 50 INJECTION, SOLUTION INTRAMUSCULAR; INTRAVENOUS at 12:36

## 2022-01-01 RX ADMIN — HYDROCORTISONE SODIUM SUCCINATE 50 MG: 100 INJECTION, POWDER, FOR SOLUTION INTRAMUSCULAR; INTRAVENOUS at 10:13

## 2022-01-01 RX ADMIN — ACETAMINOPHEN 650 MG: 325 TABLET ORAL at 22:32

## 2022-01-01 RX ADMIN — SODIUM CHLORIDE 25 ML/HR: 9 INJECTION, SOLUTION INTRAVENOUS at 12:01

## 2022-01-01 RX ADMIN — OXYCODONE HYDROCHLORIDE 5 MG: 5 TABLET ORAL at 16:35

## 2022-01-01 RX ADMIN — ACETAMINOPHEN 650 MG: 325 TABLET ORAL at 08:43

## 2022-01-01 RX ADMIN — MIDAZOLAM 0.5 MG: 1 INJECTION INTRAMUSCULAR; INTRAVENOUS at 12:46

## 2022-01-01 RX ADMIN — OXYCODONE HYDROCHLORIDE 5 MG: 5 TABLET ORAL at 08:43

## 2022-01-01 RX ADMIN — AMIODARONE HYDROCHLORIDE 400 MG: 200 TABLET ORAL at 17:48

## 2022-01-01 RX ADMIN — DIPHENHYDRAMINE HYDROCHLORIDE 25 MG: 50 INJECTION INTRAMUSCULAR; INTRAVENOUS at 12:02

## 2022-01-01 RX ADMIN — OXYCODONE HYDROCHLORIDE 5 MG: 5 TABLET ORAL at 08:56

## 2022-01-01 RX ADMIN — OXYCODONE HYDROCHLORIDE 5 MG: 5 TABLET ORAL at 21:37

## 2022-01-01 RX ADMIN — APIXABAN 5 MG: 5 TABLET, FILM COATED ORAL at 08:43

## 2022-01-01 RX ADMIN — BUPIVACAINE HYDROCHLORIDE 20 ML: 5 INJECTION, SOLUTION EPIDURAL; INTRACAUDAL; PERINEURAL at 13:23

## 2022-01-01 RX ADMIN — SODIUM CHLORIDE, PRESERVATIVE FREE 10 ML: 5 INJECTION INTRAVENOUS at 21:38

## 2022-01-01 RX ADMIN — HYDROCORTISONE SODIUM SUCCINATE 50 MG: 100 INJECTION, POWDER, FOR SOLUTION INTRAMUSCULAR; INTRAVENOUS at 19:12

## 2022-01-01 RX ADMIN — SODIUM CHLORIDE 1000 ML: 9 INJECTION, SOLUTION INTRAVENOUS at 15:06

## 2022-01-01 RX ADMIN — Medication 10 ML: at 06:00

## 2022-01-01 RX ADMIN — SODIUM CHLORIDE, PRESERVATIVE FREE 10 ML: 5 INJECTION INTRAVENOUS at 21:15

## 2022-01-01 RX ADMIN — HYDROMORPHONE HYDROCHLORIDE 0.5 MG: 1 INJECTION, SOLUTION INTRAMUSCULAR; INTRAVENOUS; SUBCUTANEOUS at 12:27

## 2022-01-01 RX ADMIN — MAGNESIUM SULFATE HEPTAHYDRATE 1 G: 1 INJECTION, SOLUTION INTRAVENOUS at 16:30

## 2022-01-01 RX ADMIN — Medication 10 ML: at 08:31

## 2022-01-01 RX ADMIN — SODIUM CHLORIDE, PRESERVATIVE FREE 10 ML: 5 INJECTION INTRAVENOUS at 17:15

## 2022-01-01 RX ADMIN — FENTANYL CITRATE 25 MCG: 50 INJECTION, SOLUTION INTRAMUSCULAR; INTRAVENOUS at 12:55

## 2022-01-01 RX ADMIN — MORPHINE SULFATE 2 MG: 2 INJECTION, SOLUTION INTRAMUSCULAR; INTRAVENOUS at 21:00

## 2022-01-01 RX ADMIN — ACETAMINOPHEN 1000 MG: 500 TABLET ORAL at 23:06

## 2022-01-01 RX ADMIN — SENNOSIDES AND DOCUSATE SODIUM 2 TABLET: 50; 8.6 TABLET ORAL at 21:37

## 2022-01-01 RX ADMIN — AZITHROMYCIN DIHYDRATE 500 MG: 250 TABLET, FILM COATED ORAL at 10:37

## 2022-01-01 RX ADMIN — OXYCODONE HYDROCHLORIDE 5 MG: 5 TABLET ORAL at 09:37

## 2022-01-01 RX ADMIN — SODIUM CHLORIDE, PRESERVATIVE FREE 1 G: 5 INJECTION INTRAVENOUS at 06:06

## 2022-01-01 RX ADMIN — ACETAMINOPHEN 650 MG: 325 TABLET ORAL at 15:18

## 2022-01-01 RX ADMIN — Medication 1 TABLET: at 18:03

## 2022-01-01 RX ADMIN — AMIODARONE HYDROCHLORIDE 400 MG: 200 TABLET ORAL at 18:23

## 2022-01-01 RX ADMIN — ONDANSETRON 4 MG: 2 INJECTION INTRAMUSCULAR; INTRAVENOUS at 09:34

## 2022-01-01 RX ADMIN — OXYCODONE 5 MG: 5 TABLET ORAL at 14:14

## 2022-01-01 RX ADMIN — SODIUM CHLORIDE, PRESERVATIVE FREE 1 G: 5 INJECTION INTRAVENOUS at 06:48

## 2022-01-01 RX ADMIN — Medication 10 ML: at 22:05

## 2022-01-01 RX ADMIN — Medication 10 ML: at 16:52

## 2022-01-01 RX ADMIN — Medication 1 TABLET: at 17:48

## 2022-01-01 RX ADMIN — LIDOCAINE HYDROCHLORIDE 10 ML: 20 INJECTION, SOLUTION INFILTRATION; PERINEURAL at 13:23

## 2022-01-01 RX ADMIN — APIXABAN 5 MG: 5 TABLET, FILM COATED ORAL at 17:23

## 2022-01-01 RX ADMIN — ACETAMINOPHEN 650 MG: 325 TABLET ORAL at 16:36

## 2022-01-01 RX ADMIN — TAMSULOSIN HYDROCHLORIDE 0.4 MG: 0.4 CAPSULE ORAL at 18:49

## 2022-01-01 RX ADMIN — HYDROCORTISONE SODIUM SUCCINATE 50 MG: 100 INJECTION, POWDER, FOR SOLUTION INTRAMUSCULAR; INTRAVENOUS at 00:38

## 2022-01-01 RX ADMIN — ACETAMINOPHEN 650 MG: 325 TABLET ORAL at 21:14

## 2022-01-01 RX ADMIN — CEFEPIME 2 G: 2 INJECTION, POWDER, FOR SOLUTION INTRAVENOUS at 04:22

## 2022-01-01 RX ADMIN — AMIODARONE HYDROCHLORIDE 200 MG: 200 TABLET ORAL at 08:37

## 2022-01-01 RX ADMIN — LIDOCAINE HYDROCHLORIDE 200 MG: 20 INJECTION, SOLUTION INFILTRATION; PERINEURAL at 09:56

## 2022-01-05 NOTE — PROGRESS NOTES
1. Have you been to the ER, urgent care clinic since your last visit? Hospitalized since your last visit? No    2. Have you seen or consulted any other health care providers outside of the [address] [place] [address] since your last visit? Include any pap smears or colon screening.  No   Chief Complaint   Patient presents with    Wound Check     to  left arm

## 2022-01-05 NOTE — PROGRESS NOTES
Problem follow up:  Monse Ward returns for follow up visit regarding er visit. he was seen 6 days ago in [facility] er diagnosed with skin tear Valiant Patron at [facility]  and treated with wound care and p[ressure3. Workup was significant for neg xray  XR Results (most recent):  Results from Hospital Encounter encounter on 12/30/21    XR HAND LT MIN 3 V    Narrative  EXAM: XR HAND LT MIN 3 V    INDICATION: Fall. Left hand pain    COMPARISON: None. FINDINGS: Three views of the left hand demonstrate no acute fracture or  dislocation. Bones are osteopenic with scattered mild DJD. Vascular  calcifications. Impression  No acute abnormality. .  Notes, labs, studies, imaging related to this problem during prior visit were available . Since that visit, he has not changed. he has been compliant with prescribed treatment. Residual symptoms include: trouble wound sticking   New issues associated with this problem include: added vaseline to cover wound as of yesterday. Wound care: Tomorrow 12/31/2021, change dressing and remove gauze but leave Surgifoam pad that is likely adhered to the elbow. Replace with nonstick impregnated dressing, rewrapped with gauze or Covan  Day 3-1/1/2022: Remove impregnated dressing and gauze, leave Surgifoam that is likely adhered to the wound. Apply nonstick dry dressings and gauze. On 1/2/2022 cover the dressing with dry clean gauze.     no apparent distress  Vitals:    01/05/22 1607   BP: (!) 156/81   Pulse: 80   Resp: 16   Temp: 98.2 °F (36.8 °C)   TempSrc: Temporal   SpO2: 98%   Weight: 188 lb 6.4 oz (85.5 kg)   Height: 6' 3\" (1.905 m)         Skin exam - left elbow swelling  No cellulitis  Large skin tears  With missing skin  2 large area    With vaseline easier to remove dressing    We redressed with vaseline and non stick dressing to be removed daily    Elevate arm advised      1.  Skin tear of upper arm without complication, left, subsequent encounter  Complicated and will keep close contact    2. 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  Immunosuppression due to drug therapy ([facility] [address]  From prednisone        Prolonged visit with 15 minutes of time out  of more than a  25 minute visit spent counseling patient and family and formulation of care and doing all the pain medication regulatory requirements   And doing wound care

## 2022-01-06 NOTE — H&P
Interventional and Vascular Radiology History and Physical    Patient: Magdy Mcneil 80 y.o. male       Chief Complaint: No chief complaint on file. History of Present Illness: systemic therapy     History:    Past Medical History:   Diagnosis Date    Cancer [facility])     prostate ca with mets    Prostate cancer ([facility] [address]      No family history on file. Social History     Socioeconomic History    Marital status:      Spouse name: Not on file    Number of children: Not on file    Years of education: Not on file    Highest education level: Not on file   Occupational History    Not on file   Tobacco Use    Smoking status: Never Smoker    Smokeless tobacco: Never Used   Vaping Use    Vaping Use: Never used   Substance and Sexual Activity    Alcohol use: Never    Drug use: Never    Sexual activity: Not on file   Other Topics Concern    Not on file   Social History Narrative    ** Merged History Encounter **          Social Determinants of Health     Financial Resource Strain:     Difficulty of Paying Living Expenses: Not on file   Food Insecurity:     Worried About Running Out of Food in the Last Year: Not on file    Taniya of Food in the Last Year: Not on file   Transportation Needs:     Lack of Transportation (Medical): Not on file    Lack of Transportation (Non-Medical):  Not on file   Physical Activity:     Days of Exercise per Week: Not on file    Minutes of Exercise per Session: Not on file   Stress:     Feeling of Stress : Not on file   Social Connections:     Frequency of Communication with Friends and Family: Not on file    Frequency of Social Gatherings with Friends and Family: Not on file    Attends Adventism Services: Not on file    Active Member of Clubs or Organizations: Not on file    Attends Club or Organization Meetings: Not on file    Marital Status: Not on file   Intimate Partner Violence:     Fear of Current or Ex-Partner: Not on file    Emotionally Abused: Not on file    Physically Abused: Not on file    Sexually Abused: Not on file   Housing Stability:     Unable to Pay for Housing in the Last Year: Not on file    Number of Places Lived in the Last Year: Not on file    Unstable Housing in the Last Year: Not on file       Allergies: Allergies   Allergen Reactions    Penicillins Itching       Current Medications:  Current Facility-Administered Medications   Medication Dose Route Frequency    fentaNYL citrate (PF) injection 12.5-50 mcg  12.5-50 mcg IntraVENous Multiple    midazolam (VERSED) injection 5 mg  5 mg IntraVENous Multiple     Current Outpatient Medications   Medication Sig    hydroCHLOROthiazide (HYDRODIURIL) 25 mg tablet Take 25 mg by mouth as needed (edema).  calcium citrate-vitamin D3 (CITRACAL + D) tablet Take 1 Tab by mouth two (2) times a day.  leuprolide acetate (LUPRON DEPOT IM) by IntraMUSCular route. Every 6 months    abiraterone (ZYTIGA) 250 mg tab Take 1,000 mg by mouth daily.  predniSONE (DELTASONE) 5 mg tablet Take 5 mg by mouth two (2) times a day.  tamsulosin (FLOMAX) 0.4 mg capsule Take 0.4 mg by mouth every evening. Physical Exam:  Blood pressure 135/75, pulse 84, temperature 98 °F (36.7 °C), resp. rate 16, height 6' 3\" (1.905 m), weight 85.3 kg (188 lb), SpO2 100 %. LUNG: clear to auscultation bilaterally, HEART: regular rate and rhythm, S1, S2 normal, no murmur, click, rub or gallop      Alerts:    Hospital Problems  Date Reviewed: 1/25/2021    None          Laboratory:    No results for input(s): HGB, HCT, WBC, PLT, INR, BUN, CREA, K, CRCLT, HGBEXT, HCTEXT, PLTEXT, INREXT in the last 72 hours. No lab exists for component: PTT, PT      Plan of Care/Planned Procedure:  Risks, benefits, and alternatives reviewed with patient and he agrees to proceed with the procedure. Conscious sedation will be performed with IV fentanyl and versed.  Plan is for permcath placement       Waqas Ann MD

## 2022-01-06 NOTE — PROGRESS NOTES
Name of procedure: Permacath    Sedation medications given: 3 mg Versed, 100 mcg Fentanyl    Sedation tolerated: well    Total Sedation time: 10 minutes    Vital Signs: stable    Any complications related to procedure: none    Post Procedure Care Needed/order sets placed in connect care: see orders    Pt tolerated procedure well. VSS. No C/O pain. Dressing to site D&I. No bleeding or hematoma noted to site. HOB elevated. Pt resting comfortably on stretcher. IV D/CD. Discharge instructions given. Pt verbalized understanding. Reviewed instructions with wife. Taken to car by wheelchair and taken home by family. NAD noted at time of discharge.

## 2022-01-06 NOTE — DISCHARGE INSTRUCTIONS
[address] [facility]  Radiology Department  [phone]    Radiologist: Dr. Sincere Campuzano    Date: 1/6/2022      PERM Catheter Discharge Instructions    Go home and rest for the remainder of the day. You have been given sedating medications, so do not drive or make any important decisions. Keep the dressing clean and dry. Keep dressing in place for three (3) days. If you have bleeding from the site -  hold pressure to the area for at least 15 minutes. If you cannot get the bleeding to stop have someone drive you to the nearest Emergency Room. Resume your previous diet and prescribed medications. Watch for any signs of infection at the puncture site:  redness, swelling, pus, fever or chills. If this occurs, call your doctor immediately or go to the nearest Emergency Room. If you have any questions or concerns please call  and ask to speak with a radiology nurse.

## 2022-01-25 NOTE — DISCHARGE INSTRUCTIONS
[address] [facility]  Department of Interventional Radiology  [facility]      CATHETER REMOVAL  DISCHARGE INSTRUCTIONS    General Information:     Your doctor has ordered for us to remove your catheter. This could be that you do not need it anymore, it is not doing its job, your physician has decided on another plan for your treatment and/or it may need replacing. Home Care Instructions: You can resume your regular diet and medication regimen. You will notice a dressing over the site of the removal. This dressing should stay in place until the site is healed. The dressing should stay in place for 24 hours. You should change the dressing sooner if it becomes soiled or wet. The nurse who discharges you to home should review with you any wound care instructions. Resume your normal level of activity slowly. You may shower after 24 hours, but do not take a bath, swim or immerse yourself in water until the site has healed, and keep the dressing dry with plastic wrap while showering. The site may ooze for a couple of days. Call If:     You should call your Physician and/or the Radiology Nurse if you have any bleeding other than a small spot on your bandage. Call if you have any signs of infection, fever, or increased pain at the site of the tube. Call if the oozing increases, if it changes color, or begins to have an odor. Should you experience any of these significant changes, please call [phone] between the hours of 7:30 am and 10 pm or [phone] after hours. After hours, ask the  to page the X-ray Technologist, and describe the problem to the technologist.          Follow-Up Instructions: Please see your ordering doctor as he/she has requested.      To Reach Us:        Patient Signature:  Date: 1/25/2022  Discharging Nurse: Tony Singh RN

## 2022-01-25 NOTE — PROGRESS NOTES
Interventional Radiology Procedure Note        1/25/2022    Provider: Neto Hicks PA-C  Supervising Physician: Jose Miguel Gordon MD      Pre-Procedure Diagnosis: HD catheter no longer needed  Post-Procedure Diagnosis: HD catheter no longer needed    Informed consent obtained    Procedure(s): Tunneled permacath removal     Sedation: none    Specimens removed: none    Complications: none    Recomendations: none    Discharge Disposition: fair      Full dictated report to follow in [address] [place], [place] [address]MICHEAL  Interventional Radiology

## 2022-01-25 NOTE — PROGRESS NOTES
Pt arrives ambulatory to angio department accompanied by wife for perm removal procedure. All assessments completed and consent was reviewed. Education given was regarding procedure, post-procedure care and  management/follow-up. Opportunity for questions was provided and all questions and concerns were addressed.

## 2022-01-25 NOTE — PROGRESS NOTES
Discharge paperwork given to patient by this RN. All questions and concerns addressed. Patient was at baseline orientation. IV D/C'd. Site CDI. Patient walked off unit to POV, steady gate noted.

## 2022-03-18 PROBLEM — M51.16 LUMBAR DISC DISEASE WITH RADICULOPATHY: Status: ACTIVE | Noted: 2018-11-08

## 2022-03-18 PROBLEM — S22.39XA CLOSED FRACTURE OF ONE RIB: Status: ACTIVE | Noted: 2021-01-01

## 2022-03-18 PROBLEM — M51.36 DEGENERATIVE DISC DISEASE, LUMBAR: Status: ACTIVE | Noted: 2018-11-08

## 2022-03-18 PROBLEM — M54.9 ACUTE BACK PAIN: Status: ACTIVE | Noted: 2018-10-07

## 2022-03-18 PROBLEM — C61 PROSTATE CANCER METASTATIC TO MULTIPLE SITES (HCC): Status: ACTIVE | Noted: 2018-11-08

## 2022-03-18 PROBLEM — R33.8 ACUTE RETENTION OF URINE: Status: ACTIVE | Noted: 2021-01-01

## 2022-03-18 PROBLEM — E55.9 VITAMIN D DEFICIENCY: Status: ACTIVE | Noted: 2021-01-01

## 2022-03-19 PROBLEM — C61 PROSTATE CANCER METASTATIC TO PELVIS (HCC): Status: ACTIVE | Noted: 2018-11-08

## 2022-03-19 PROBLEM — C79.89 PROSTATE CANCER METASTATIC TO PELVIS (HCC): Status: ACTIVE | Noted: 2018-11-08

## 2022-03-19 PROBLEM — D84.821 IMMUNOSUPPRESSION DUE TO DRUG THERAPY (HCC): Status: ACTIVE | Noted: 2019-06-06

## 2022-03-19 PROBLEM — R01.1 HEART MURMUR, SYSTOLIC: Status: ACTIVE | Noted: 2018-11-08

## 2022-03-19 PROBLEM — J18.9 COMMUNITY ACQUIRED PNEUMONIA: Status: ACTIVE | Noted: 2019-05-31

## 2022-03-19 PROBLEM — Z79.899 IMMUNOSUPPRESSION DUE TO DRUG THERAPY (HCC): Status: ACTIVE | Noted: 2019-06-06

## 2022-04-08 NOTE — TELEPHONE ENCOUNTER
Received call from Charisse Yusuf at [facility] with [employer] Complaint. Subjective: Caller states \"weakness\"     Current Symptoms: 3/23 COVID+, pt was hospitalized in [facility] pt had afib and pneumonia, pt fell last night and paramedics came to house last night to pick pt up and helped him to bed, difficulty walking since COVID dx, decreased appetite, pt arrived back to [address] [facility] on 4/6/2022, weakness unable to stand, denies chest pain, denies SOB, denies fever, generalized body aches, chills, denies blood in urine,  hx of prostate cancer    Onset: several weeks ago; worsening    Associated Symptoms: reduced activity, reduced appetite, reduced fluid intake, increased sleepiness    Pain Severity:  Wife states that pt has not rated pt but states that pt keeps screaming out that his body hurts    Temperature: 99.2  denies     What has been tried: nothing    Recommended disposition: Go to ED/UCC Now (Or to Office with PCP Approval)    Care advice provided, patient verbalizes understanding; denies any other questions or concerns; instructed to call back for any new or worsening symptoms. Writer provided warm transfer to [facility] at [address] [facility]  for 2nd level triage    Attention Provider: Thank you for allowing me to participate in the care of your patient. The patient was connected to triage in response to information provided to the [facility]. Please do not respond through this encounter as the response is not directed to a shared pool.         Reason for Disposition   Patient sounds very sick or weak to the triager    Protocols used: WEAKNESS (GENERALIZED) AND FATIGUE-ADULT-OH

## 2022-04-09 PROBLEM — A41.9 SEPSIS (HCC): Status: ACTIVE | Noted: 2022-01-01

## 2022-04-09 NOTE — H&P
[address] [facility] Adult  Hospitalist Group  History and Physical    Date of Service:  4/9/2022  Primary Care Provider: Jesus Bauman MD  Source of information: The patient    Chief Complaint: Lethargy      History of Presenting Illness:   Shilo Perez is a 80 y.o. male with pmh of metastatic prostate cancer (bones), recent COVID PNA (in March in [facility] while on Vacation)  who presents with weakness, and unable to urinate. 2 days ago began feeling weak and tired, had [facility] sent to his house. There checked vitals, and checked him out, give him IV fluids. Per report also checked UA, and was negative. Today he felt increased weakness and was unable to urinate so decided to come to the ER. Vaccinated x 3 for COVID, does not rememebr if he got the influenza vaccine. He was on vacation from cruise in [facility] in the middle of March, when he contracted COVID, and had a prolonged hospitalization of 12 days. He denies any h/o blood clots, CVA, or Afib. In the ER pt was found to be in Afib with RVR with HR in the 150's, given amiodarone bolus which subsequently led to hypotension. He was then given IVF boluses with improvement. He now converted to NSR with HR in the low 100's. Pt denies any SOB, CP, N/V/D/C, no focal weakness. Does continue to have difficulty urinating. REVIEW OF SYSTEMS:  A comprehensive review of systems was negative except for that written in the History of Present Illness. Past Medical History:   Diagnosis Date    Cancer [facility])     prostate ca with mets    Prostate cancer [facility])       Past Surgical History:   Procedure Laterality Date    IR INSERT TUNL CVC W/O PORT OVER 5 YR  1/6/2022    IR REMOVE TUNL CVAD W/O PORT / PUMP  1/25/2022     Prior to Admission medications    Medication Sig Start Date End Date Taking? Authorizing Provider   hydroCHLOROthiazide (HYDRODIURIL) 25 mg tablet Take 25 mg by mouth as needed (edema).     Provider, Historical   calcium citrate-vitamin D3 (CITRACAL + D) tablet Take 1 Tab by mouth two (2) times a day. Provider, Historical   leuprolide acetate (LUPRON DEPOT IM) by IntraMUSCular route. Every 6 months    Provider, Historical   abiraterone (ZYTIGA) 250 mg tab Take 1,000 mg by mouth daily. Provider, Historical   predniSONE (DELTASONE) 5 mg tablet Take 5 mg by mouth two (2) times a day. Provider, Historical   tamsulosin (FLOMAX) 0.4 mg capsule Take 0.4 mg by mouth every evening. Provider, Historical     Allergies   Allergen Reactions    Penicillins Itching      No family history on file. Social History:  reports that he has never smoked. He has never used smokeless tobacco. He reports that he does not drink alcohol and does not use drugs. Family and social history were personally reviewed, all pertinent and relevant details are outlined as above. Objective:     Visit Vitals  BP (!) 124/49   Pulse 99   Temp (!) 100.8 °F (38.2 °C)   Resp 20   Ht 6' 3\" (1.905 m)   Wt 77.3 kg (170 lb 6.7 oz)   SpO2 93%   BMI 21.30 kg/m²      O2 Device: None (Room air)    PHYSICAL EXAM:   General: Alert x oriented x 3, awake, no acute distress, resting in bed, pleasant male, appears to be stated age  HEENT: PEERL, EOMI, dry mucus membranes  Neck: Supple,  Chest: Clear to auscultation bilaterally, no increased work of breathing. No wheezing, no rhonchi  CVS: Regular, tachycardic, S1 S2 heard, no murmurs/rubs/gallops  Abd: Soft, non-tender, non-distended, +bowel sounds   Ext: No clubbing, no cyanosis, 1+ edema  Neuro/Psych: Pleasant mood and affect, CN 2-12 grossly intact, no focal deficits   Cap refill: Brisk, less than 3 seconds  Pulses: 2+, symmetric in all extremities  Skin: Warm, dry, without rashes or lesions    Data Review: All diagnostic labs and studies have been reviewed.     Abnormal Labs Reviewed   CBC WITH AUTOMATED DIFF - Abnormal; Notable for the following components:       Result Value    RBC 3.56 (*)     HGB 10.7 (*) HCT 31.6 (*)     RDW 14.8 (*)     PLATELET 075 (*)     NEUTROPHILS 77 (*)     LYMPHOCYTES 8 (*)     IMMATURE GRANULOCYTES 1 (*)     ABS. LYMPHOCYTES 0.6 (*)     ABS. IMM. GRANS. 0.1 (*)     All other components within normal limits   METABOLIC PANEL, COMPREHENSIVE - Abnormal; Notable for the following components:    Sodium 132 (*)     CO2 19 (*)     BUN/Creatinine ratio 9 (*)     Calcium 7.9 (*)     Bilirubin, total 1.1 (*)     AST (SGOT) 14 (*)     Protein, total 5.2 (*)     Albumin 2.2 (*)     A-G Ratio 0.7 (*)     All other components within normal limits   URINALYSIS W/MICROSCOPIC - Abnormal; Notable for the following components:    Ketone 15 (*)     Blood MODERATE (*)     Urobilinogen 2.0 (*)     All other components within normal limits   MAGNESIUM - Abnormal; Notable for the following components:    Magnesium 1.4 (*)     All other components within normal limits       All Micro Results     Procedure Component Value Units Date/Time    RESPIRATORY VIRUS PANEL W/COVID-19, PCR [[id number]]     Order Status: Sent Specimen: NASOPHARYNGEAL SWAB     CULTURE, URINE [[id number]] Collected: 04/09/22 1456    Order Status: Completed Specimen: Cath Urine Updated: 04/09/22 1618    COVID-19 RAPID TEST [[id number]] Collected: 04/09/22 1504    Order Status: Completed Specimen: Nasopharyngeal Updated: 04/09/22 1549     Specimen source Nasopharyngeal        COVID-19 rapid test Not detected        Comment: Rapid Abbott ID Now       Rapid NAAT:  The specimen is NEGATIVE for SARS-CoV-2, the novel coronavirus associated with COVID-19. Negative results should be treated as presumptive and, if inconsistent with clinical signs and symptoms or necessary for patient management, should be tested with an alternative molecular assay. Negative results do not preclude SARS-CoV-2 infection and should not be used as the sole basis for patient management decisions.        This test has been authorized by the FDA under an Emergency Use Authorization (EUA) for use by authorized laboratories. Fact sheet for Healthcare Providers: ConventionUpdate.co.nz  Fact sheet for Patients: ConventionUpdate.co.nz       Methodology: Isothermal Nucleic Acid Amplification         CULTURE, BLOOD, PAIRED [[id number]] Collected: 04/09/22 1456    Order Status: Completed Specimen: Blood Updated: 04/09/22 1549    URINE CULTURE HOLD SAMPLE [[id number]] Collected: 04/09/22 1456    Order Status: Completed Specimen: Urine from Serum Updated: 04/09/22 1526     Urine culture hold       Urine on hold in Microbiology dept for 2 days. If unpreserved urine is submitted, it cannot be used for addtional testing after 24 hours, recollection will be required. CULTURE, BLOOD [[id number]]     Order Status: Sent Specimen: Blood           IMAGING:   XR CHEST PORT   Final Result   Bilateral interstitial infiltrates right greater than left. ECG/ECHO:    Results for orders placed or performed during the hospital encounter of 04/09/22   EKG, 12 LEAD, INITIAL   Result Value Ref Range    Ventricular Rate 101 BPM    Atrial Rate 101 BPM    P-R Interval 182 ms    QRS Duration 86 ms    Q-T Interval 372 ms    QTC Calculation (Bezet) 482 ms    Calculated P Axis 42 degrees    Calculated R Axis 20 degrees    Calculated T Axis 29 degrees    Diagnosis       Sinus tachycardia with premature atrial complexes  Otherwise normal ECG  When compared with ECG of 09-APR-2022 14:42,  MANUAL COMPARISON REQUIRED, DATA IS UNCONFIRMED          Assessment:   Given the patient's current clinical presentation, there is a high level of concern for decompensation if discharged from the emergency department. Complex decision making was performed, which includes reviewing the patient's available past medical records, laboratory results, and imaging studies.     Active Problems:    Sepsis ([address] (4/9/2022)      Plan:   Severe Sepsis (Temp, HR, Hypotension, Afib with RVR POA) - possible CAP vs. Blood stream infection vs. Other   - Continue cefepime, add azithromycin for atypical coverage  - Blood cultures pending  - UA negative, urine culture pending   - Check viral respiratory panel including influenza  - rapid covid negative, diagnosis about 1 month ago   - IVF, s/p 30cc/kg in ER  - Check CTA to rule out PE as a cause of low grade fever, tachy  - procal negative. Likely DC abx if cultures remain negative in 24-48 hr.    Afib with RVR  - New diagnosis   - Continue amiodarone gtt for now   - Echo ordered  - Cardiology consult   - IVF   - Check CTA given recent COVID, prostate cancer    Prostate cancer   Urinary retention   - [facility], will consult for tomorrow  - PVR, and if > 300 straight cath   - continue home Tamsulosin   - Hold lupron, zytiga     Chronic steroid use - per patient as adjunct to treatment for prostate cancer  - Start SDS in setting of sepsis, hypotension. Can wean back to prednisone when stable   - Hold prednisone     Weakness  - Check CK  - PT/OT    Hypomagnesemia - replete and monitor   HTN - hold HCTZ in setting of hypotension    DIET: ADULT DIET Regular   ISOLATION PRECAUTIONS: There are currently no Active Isolations  CODE STATUS: Full Code   DVT PROPHYLAXIS: Lovenox  FUNCTIONAL STATUS PRIOR TO HOSPITALIZATION: Fully active and ambulatory; able to carry on all self-care without restriction. EARLY MOBILITY ASSESSMENT: Recommend routine ambulation while hospitalized with the assistance of nursing staff  ANTICIPATED DISCHARGE: 24-48 hours. EMERGENCY CONTACT/SURROGATE DECISION MAKER: Wife     CRITICAL CARE WAS PERFORMED FOR THIS ENCOUNTER: YES. I had a face to face encounter with the patient, reviewed and interpreted patient data including clinical events, labs, images, vital signs, I/O's, and examined patient.   I have discussed the case and the plan and management of the patient's care with the consulting services, the bedside nurses and necessary ancillary providers. This patient has a high probability of imminent, clinically significant deterioration, which requires the highest level of preparedness to intervene urgently. I participated in the decision-making and personally managed or directed the management of the following life and organ supporting interventions that required my frequent assessment to treat or prevent imminent deterioration. I personally spent 45 minutes of critical care time. This is time spent at this critically ill patient's bedside actively involved in patient care as well as the coordination of care and discussions with the patient's family. This does not include any procedural time which has been billed separately. Signed By: Amil Snellen, MD     April 9, 2022         Please note that this dictation may have been completed with [employer], the computer voice recognition software. Quite often unanticipated grammatical, syntax, homophones, and other interpretive errors are inadvertently transcribed by the computer software. Please disregard these errors. Please excuse any errors that have escaped final proofreading.

## 2022-04-09 NOTE — ED TRIAGE NOTES
Patient arrives to ED via EMS from home complaining of generalized lethargy and weakness for 2 days. Reports being seen by [facility] yesterday, UA negative for UTI. Patient states urgency, but unable to void large amounts. Patient also states he was hospitalized in a hospital in [place] for [address] [place] [address] for a total of 12 days.  Returned to [place] 4/2/2022

## 2022-04-09 NOTE — PROGRESS NOTES
Day #1 of Cefepime  Indication:  sepsis  Current regimen:  1 gram q8h    Recent Labs     22  1444   WBC 8.0   CREA 0.93   BUN 8     Est CrCl: 60 ml/min  Temp (24hrs), Av.3 °F (37.9 °C), Min:99.7 °F (37.6 °C), Max:100.8 °F (38.2 °C)    Plan: Change to 2 grams q12h  per renal dose adjustment protocol [facility]    Hospitalist Progress Note      Assessment & Plan   Rahul Ruffin is a 39 year old male who was admitted on 5/27/2021 with need for placement.    Multiple sclerosis  [facility], was living in care facility in [place] but wants to be placed here. Just got off plane and came to ED for placement. Reports no acute issues. Labs normal.  Mom says patient usually gets care at [facility] in [place]. Not able to access any records from him through epic.  ? Continue PTA medications, including Fingolimod, monitor patients for signs and symptoms of liver injury, such as unexplained vomiting, abdominal pain, fatigue, anorexia, or jaundice and/or dark urine, during treatment with Gilenya with periodic LFTs as recommended during and after treatment. LFTs normal.  ? Social work following to assist with placement.      Chronic indwelling payne  ? UA with heavy pyuria, likely chronic. No culture done.  ? Patient is afebrile with normal WBC count.   ? He is on doxycyline (per chart review by RN at his care facility in [place], Ph [phone]), it is being used for Acne, so continue.      Chronic foot swelling  Neuropathic pain  Present x 8 months.   ? Tylenol PRN  ? He is already on maximal dose of gabapentin given 3 times daily.  Divided the dose to 4 times daily dosing, but he reports ongoing, pain  ? Changed to Lyrica, Started 75 mg 3 times daily on 6/4, increased to 75/75/150 mg on 6/7, increased to 100/100/150 starting on 6/15  ? Nursing notes had indicate patient had bilateral foot jerking and so PTA baclofen increased to 10 mg 3 times daily on 6/5  ? Minimize narcotic, appears neuropathic pain and not relieved by narcotic.    Constipation  - continue PTA lactulose  - add senna 1 tab at bedtime    Chronic wounds  Upper L lateral calf and R heel, states baseline.   ? Wound cares       COVID-19 negative on admission    DVT Prophylaxis: Pneumatic Compression Devices  Code Status: Full  Code  Expected discharge: >7 days once MA in place and bed found    Chelsea Kapoor, DO  Text Page (7am - 6pm)    Interval History   Patient seen and examined. He feels okay today. Reports some sharp shooting pains up from his feet into his knee, thigh and groin that are particularly bothersome. Feels like the lyrica helps. Having trouble with BMs. No chest pain, shortness of breath, nausea, vomiting, fevers, chills.  Discussed care plan with RN and social work    -Data reviewed today: I reviewed all new labs and imaging results over the last 24 hours. I personally reviewed no images or EKG's today.    Physical Exam   Temp: 97.8  F (36.6  C) Temp src: Oral BP: 119/80 Pulse: 71   Resp: 17 SpO2: 100 % O2 Device: None (Room air)    Vitals:    06/12/21 0544   Weight: 69.1 kg (152 lb 4.8 oz)     Vital Signs with Ranges  Temp:  [95.9  F (35.5  C)-98  F (36.7  C)] 97.8  F (36.6  C)  Pulse:  [64-81] 71  Resp:  [16-20] 17  BP: (119-133)/(51-89) 119/80  SpO2:  [97 %-100 %] 100 %  I/O last 3 completed shifts:  In: 380 [P.O.:380]  Out: 1100 [Urine:1100]    Constitutional: Awake, alert, cooperative, no apparent distress. Tremors with movements of arms  Respiratory: Clear to auscultation bilaterally, no crackles or wheezing  Cardiovascular: Regular rate and rhythm, normal S1 and S2, and no murmur noted  GI: Normal bowel sounds, soft, non-distended, non-tender  Skin/Integumen: No rashes, no cyanosis, no edema  Other: Left strabismus noted. Legs 3/5 strength. Right arm is 5/5, left is 4/5    Medications       baclofen  10 mg Oral TID     doxycycline hyclate  100 mg Oral Daily     DULoxetine  30 mg Oral Daily     fingolimod  0.5 mg Oral Daily     furosemide  20 mg Oral Daily     lactulose  30 mL Oral Daily     oxyCODONE  5 mg Oral TID     potassium chloride  10 mEq Oral Daily     [START ON 6/15/2021] pregabalin  100 mg Oral BID     pregabalin  150 mg Oral At Bedtime     pregabalin  25 mg Oral Once     sennosides  1 tablet Oral At  Bedtime       Data   No lab results found in last 7 days.    No results found for this or any previous visit (from the past 24 hour(s)).

## 2022-04-09 NOTE — PROGRESS NOTES
Admission Medication Reconciliation:    Information obtained from:  Patient's spouse, patient  RxQuery data available¹:  yes      Comments/Recommendations: Updated PTA meds/reviewed patient's allergies. 1)  Spoke with patient and spouse to update PTA medication list. Patient seemed to be a great historian. 2)  Medication changes (since last review): Added  -Doxycycline (started yesterday 100 mg BID for 7 days); patient took 2 doses    Adjusted  - none    Removed  - none    3) Prostate cancer treatment:  Patient is on oral  Zytiga and prednisone; also, gets Lupron IM (every 6 months). Lupron IM dose was scheduled in March of 2022 but patient was unable to take as he was stuck in [facility] for COVID treatment. Instructed wife to bring medication with her tomorrow incase they are prescribed. 4) Updated drug allergies:  Removed Penicillins allergies      ¹RxQuery pharmacy benefit data reflects medications filled and processed through the patient's insurance, however   this data does NOT capture whether the medication was picked up or is currently being taken by the patient. Allergies:  Patient has no known allergies. Significant PMH/Disease States:   Past Medical History:   Diagnosis Date    Cancer [facility])     prostate ca with mets    Prostate cancer [facility])      Chief Complaint for this Admission:    Chief Complaint   Patient presents with    Lethargy     Prior to Admission Medications:   Prior to Admission Medications   Prescriptions Last Dose Informant Taking?   abiraterone (ZYTIGA) 250 mg tab 3/9/2022 at Unknown time  Yes   Sig: Take 1,000 mg by mouth daily. calcium citrate-vitamin D3 (CITRACAL + D) tablet 4/6/2022  Yes   Sig: Take 1 Tab by mouth two (2) times a day. doxycycline (VIBRA-TABS) 100 mg tablet 4/9/2022 at AM  Yes   Sig: Take 100 mg by mouth two (2) times a day. hydroCHLOROthiazide (HYDRODIURIL) 25 mg tablet   Yes   Sig: Take 25 mg by mouth as needed (edema).    leuprolide acetate (LUPRON DEPOT IM) 9/30/2021  Yes   Sig: by IntraMUSCular route. Every 6 months   predniSONE (DELTASONE) 5 mg tablet 4/6/2022  Yes   Sig: Take 5 mg by mouth two (2) times a day. tamsulosin (FLOMAX) 0.4 mg capsule 4/8/2022 at Unknown time  Yes   Sig: Take 0.4 mg by mouth every evening. Facility-Administered Medications: None     Please contact the main inpatient pharmacy with any questions or concerns at [phone] and we will direct you to the clinical pharmacist covering this patient's care while in-house.    Omero Mckinney, PHARMD

## 2022-04-09 NOTE — ED PROVIDER NOTES
Catracho Mancuso is an 79 yo M with h/o prostate Cancer and recent admission for COVID who presents to the ED with generalized weakness and lethargy for 2 days. He was hospitalized for COVID while on vacation in [place] and returned to [facility] on 4/2/22. [facility] patient yesterday and patient reports UA was normal at that time. He notes occasional nonproductive cough. Past Medical History:   Diagnosis Date    Cancer [facility])     prostate ca with mets    Prostate cancer [facility])        Past Surgical History:   Procedure Laterality Date    IR INSERT TUNL CVC W/O PORT OVER 5 YR  1/6/2022    IR REMOVE TUNL CVAD W/O PORT / PUMP  1/25/2022         No family history on file. Social History     Socioeconomic History    Marital status:      Spouse name: Not on file    Number of children: Not on file    Years of education: Not on file    Highest education level: Not on file   Occupational History    Not on file   Tobacco Use    Smoking status: Never Smoker    Smokeless tobacco: Never Used   Vaping Use    Vaping Use: Never used   Substance and Sexual Activity    Alcohol use: Never    Drug use: Never    Sexual activity: Not on file   Other Topics Concern    Not on file   Social History Narrative    ** Merged History Encounter **          Social Determinants of Health     Financial Resource Strain:     Difficulty of Paying Living Expenses: Not on file   Food Insecurity:     Worried About Running Out of Food in the Last Year: Not on file    Taniya of Food in the Last Year: Not on file   Transportation Needs:     Lack of Transportation (Medical): Not on file    Lack of Transportation (Non-Medical):  Not on file   Physical Activity:     Days of Exercise per Week: Not on file    Minutes of Exercise per Session: Not on file   Stress:     Feeling of Stress : Not on file   Social Connections:     Frequency of Communication with Friends and Family: Not on file    Frequency of Social Gatherings with Friends and Family: Not on file    Attends [employer] Services: Not on file    Active Member of Clubs or Organizations: Not on file    Attends Club or Organization Meetings: Not on file    Marital Status: Not on file   Intimate Partner Violence:     Fear of Current or Ex-Partner: Not on file    Emotionally Abused: Not on file    Physically Abused: Not on file    Sexually Abused: Not on file   Housing Stability:     Unable to Pay for Housing in the Last Year: Not on file    Number of [place] in the Last Year: Not on file    Unstable Housing in the Last Year: Not on file         ALLERGIES: Penicillins    Review of Systems   Constitutional: Positive for appetite change and fatigue. Negative for fever. HENT: Negative for sore throat. Eyes: Negative for visual disturbance. Respiratory: Negative for cough. Cardiovascular: Negative for chest pain. Gastrointestinal: Negative for abdominal pain. Genitourinary: Positive for urgency. Musculoskeletal: Negative for back pain. Skin: Negative for rash. Neurological: Negative for headaches. Vitals:    04/09/22 1545 04/09/22 1600 04/09/22 1615 04/09/22 1630   BP: (!) 91/55 (!) 98/56 (!) 63/46 (!) 124/49   Pulse: (!) 132 (!) 120 (!) 132 99   Resp: 23 28 20    Temp:       SpO2: 92% 93% 93%    Weight:       Height:                Physical Exam  Vitals and nursing note reviewed. Constitutional:       General: He is not in acute distress. Appearance: He is well-developed. HENT:      Head: Normocephalic and atraumatic. Eyes:      Conjunctiva/sclera: Conjunctivae normal.   Neck:      Trachea: Phonation normal.   Cardiovascular:      Rate and Rhythm: Tachycardia present. Rhythm irregular. Pulmonary:      Effort: Pulmonary effort is normal. No respiratory distress. Abdominal:      General: There is no distension. Tenderness: There is no abdominal tenderness. Musculoskeletal:         General: No tenderness.  Normal range of motion. Cervical back: Normal range of motion. Skin:     General: Skin is warm and dry. Neurological:      Mental Status: He is alert. He is not disoriented. Motor: No abnormal muscle tone. [facility]       ED EKG interpretation:  Rhythm: atrial fib and with RVR; and irregular. Rate (approx.): 153; Axis: normal; P wave: irregular; QRS interval: normal ; ST/T wave: normal; Other findings: abnormal ekg. This EKG was interpreted by Alberto Aranda MD,ED Provider. ED EKG interpretation:  Rhythm: sinus tachycardia and PAC's; and regular . Rate (approx.): 101; Axis: normal; P wave: normal; QRS interval: normal ; ST/T wave: normal; Other findings: otherwise normal. This EKG was interpreted by Alberto Aranda MD,ED Provider. Perfect Serve Consult for Admission  4:39 PM    ED Room Number: ER20/20  Patient Name and age:  Chance Mirza 80 y.o.  male  Working Diagnosis:   1. Fever, unspecified fever cause    2. Atrial fibrillation with RVR ([address]    3. Sepsis without acute organ dysfunction, due to unspecified organism [address] [address]        COVID-19 Suspicion:  no  Sepsis present:  yes  Reassessment needed: no  Code Status:  Full Code  Readmission: no  Isolation Requirements:  no  Recommended Level of Care:  telemetry  Department:[facility] Adult ED - 21   Other:  Patient arrived afebrile and tachycardic with 's in a-fib with RVR. Recently returned from [place] where he was admitted with [facility] while on vacation. Started on Cefepime. HR and BP improved with 30ml/gk IVNS, and amiodarone. Lactic acid 1.2, WBC 8.0, prpcalcitonin 0.17.   CXR with bilateral interstitial infiltrates R>L    Total critical care time spent exclusive of procedures:  35 minutes    Procedures

## 2022-04-10 NOTE — CONSULTS
Alexandra Enrique  [facility] Naveen Molina M.D., [place]  [phone]        NAME: Niko Laughlin   :  10/13/1933   MRN:  [id number]  Date/Time:  4/10/110840:10 PM      ________________________________________________________________________  HPI    80years old male with a past medical history markable paroxysmal atrial fibrillation, prostate cancer and recent admission to hospital for [facility] who presented to the emergency room with generalized weakness and lethargy 2 days prior. Apparently he was hospitalized for Covid location [facility] and he returned to the [facility] on 2022. Information obtained from the chart patient remains Covid positive. Physical examination deferred at this time. Cardiology called for atrial fibrillation. The patient is back in normal sinus rhythm on IV amiodarone      Assessment/Plan  1. Atrial fibrillation: Currently back in normal sinus rhythm. For now continue IV amiodarone and change to p.o. tomorrow. Eventually should consider being on oral anticoagulation. EKG with sinus tachycardia occasional PACs but no acute ischemic changes. Compared to previous EKG few hours earlier normal sinus rhythm has replaced atrial fibrillation. His troponin is only mildly elevated I suspect this is demand ischemia from atrial fibrillation rapid ventricular response. When patient out of isolation proceed with echocardiogram.    2.  Covid positive: As per primary team.         ICD-10-CM ICD-9-CM    1. Fever, unspecified fever cause  R50.9 780.60    2. Atrial fibrillation with RVR ([facility])  I48.91 427.31    3. Sepsis without acute organ dysfunction, due to unspecified organism ([facility] [address]  A41.9 038.9      995.91           CARDIAC STUDIES                         EKG: sinus tachycardia, PAC's noted.         IMAGING      CT Results (most recent):  Results from Hospital Encounter encounter on 22    CTA CHEST W OR W WO CONT    Narrative  EXAM:  CTA CHEST W OR W WO CONT    INDICATION:   Generalized weakness, atrial fibrillation    COMPARISON: 5/30/2019. TECHNIQUE:  Precontrast  images were obtained to localize the volume for acquisition. Multislice helical CT arteriography was performed from the diaphragm to the  thoracic inlet during uneventful rapid bolus of 85 cc Isovue-370. Lung and soft  tissue windows were generated. Coronal and sagittal images were generated and  3D post processing consisting of coronal maximum intensity images was performed. CT dose reduction was achieved through use of a standardized protocol tailored  for this examination and automatic exposure control for dose modulation. FINDINGS:  CHEST:  THYROID: No nodule. MEDIASTINUM: No mass or lymphadenopathy. CINDY: No mass or lymphadenopathy. THORACIC AORTA: Atherosclerotic without evidence of aneurysm. MAIN PULMONARY ARTERY: There is no evidence of pulmonary embolism. TRACHEA/BRONCHI: Patent. ESOPHAGUS: No wall thickening or dilatation. HEART: Normal in size. PLEURA: Small left pleural effusion and more moderate right pleural effusion. LUNGS: Bilateral lower lobe atelectasis. Focal 2.1 cm parenchymal opacity is  noted in the right upper lobe. Small pulmonary nodules are seen in the lung  fields bilaterally. 11 mm pulmonary nodule is seen in the right upper lobe. INCIDENTALLY IMAGED UPPER ABDOMEN: Small hiatal hernia. BONES: Sclerotic lesion left lateral seventh rib. Impression  Bilateral pleural effusions right greater than left with underlying  atelectasis. Rounded pulmonary lesions in the right upper lobe are concerning  for neoplasm. Additional small bilateral pulmonary nodules are seen throughout  the lung fields bilaterally. No evidence of pulmonary embolism. Possible bone  metastasis left lateral rib.         XR Results (most recent):  Results from Hospital Encounter encounter on 04/09/22    XR CHEST PORT    Narrative  Indication: Lethargy and weakness for 2 days    Comparison: 5/30/2019    Portable exam of the chest obtained at 1548 demonstrates normal heart size. Diffuse bilateral interstitial infiltrates are noted greatest in the right  perihilar lung field and right lower lobe. The osseous structures are  unremarkable. Impression  Bilateral interstitial infiltrates right greater than left. MRI Results (most recent):  Results from [place] encounter on 07/08/20    MRI LUMB SPINE WO CONT    Narrative  EXAM: MRI LUMB SPINE WO CONT  Clinical history: Scoliosis and lumbar pain  INDICATION: . Scoliosis and back pain    COMPARISON: None    TECHNIQUE: MR imaging of the lumbar spine was performed using the following  sequences: sagittal T1, T2, STIR;  axial T1, T2.    CONTRAST:  None. FINDINGS:    There is predominant dextro scoliosis the lumbar spine. No significant  lateralization. No acute fracture or dislocation. Multilevel disc desiccation. Vertebral body heights are maintained. No focal marrow lesion    The conus medullaris terminates at L1-L2. Signal and caliber of the distal  spinal cord are within normal limits. The paraspinal soft tissues are within normal limits. Lower thoracic spine: No herniation or stenosis. L1-L2: Facet arthropathy. Ligament flavum hypertrophy. Disc bulge/osteophyte. Canal and foramina are patent. L2-L3: Mild facet arthropathy. Disc protrusion/osteophyte most nodes at the left  neural foramen. Minimal annular fissure at the left neuroforamen. Minimal canal  stenosis. Foramina are patent. Effacement of nerve roots extending to the left  L3-4 foramen. L3-L4: Moderate facet arthropathy greater on the left. Loss of disc height. Moderate central left paracentral left foraminal protrusion with superimposed  cranially oriented extrusion in the left paracentral region. Severe left lateral  recess stenosis.  Moderate right and severe left foraminal stenosis. Moderate  canal stenosis. L4-L5: Mild facet arthropathy. Disc protrusion/osteophyte at the right neural  foramen. The canal is widely patent. Foramina are patent. L5-S1: Mild facet arthropathy greater on the right. Disc desiccation. Right  foraminal protrusion/osteophyte. Moderate to severe right foraminal stenosis. Canal and left foramina are patent. Impression  IMPRESSION:  Multilevel disc and facet degenerative change with dextroscoliosis. Severe left lateral recess stenosis with severe left foraminal and moderate  canal stenosis at L3-4. Moderate to severe right foraminal stenosis at L5-S1. Additional, less severe degenerative findings are as described in detail above. Subjective:   CHIEF COMPLAINT:  \"fatigue\"  Pertinent items are noted in HPI. HISTORY OF PRESENT ILLNESS:     Laly Mcrae is a 80 y.o. male whom presents with complaint noted above. We were asked to evaluation for the above problems. Celia Casiano  Past Medical History:   Diagnosis Date    Cancer [facility])     prostate ca with mets    Prostate cancer [facility])       Past Surgical History:   Procedure Laterality Date    IR INSERT TUNL CVC W/O PORT OVER 5 YR  1/6/2022    IR REMOVE TUNL CVAD W/O PORT / PUMP  1/25/2022     Social History     Tobacco Use    Smoking status: Never Smoker    Smokeless tobacco: Never Used   Substance Use Topics    Alcohol use: Never      No family history on file.    No Known Allergies        Current Facility-Administered Medications   Medication Dose Route Frequency    sodium chloride (NS) flush 5-10 mL  5-10 mL IntraVENous PRN    amiodarone (CORDARONE) 375 mg/250 mL D5W infusion  0.5-1 mg/min IntraVENous CONTINUOUS    sodium chloride (NS) flush 5-40 mL  5-40 mL IntraVENous Q8H    sodium chloride (NS) flush 5-40 mL  5-40 mL IntraVENous PRN    acetaminophen (TYLENOL) tablet 650 mg  650 mg Oral Q6H PRN    Or    acetaminophen (TYLENOL) suppository 650 mg  650 mg Rectal Q6H PRN    polyethylene glycol (MIRALAX) packet 17 g  17 g Oral DAILY PRN    ondansetron (ZOFRAN ODT) tablet 4 mg  4 mg Oral Q8H PRN    Or    ondansetron (ZOFRAN) injection 4 mg  4 mg IntraVENous Q6H PRN    hydrocortisone Sod Succ (PF) (SOLU-CORTEF) injection 50 mg  50 mg IntraVENous Q8H    azithromycin (ZITHROMAX) tablet 500 mg  500 mg Oral DAILY    enoxaparin (LOVENOX) injection 40 mg  40 mg SubCUTAneous Q24H    cefepime (MAXIPIME) 2 g in sterile water (preservative free) 10 mL IV syringe  2 g IntraVENous Q12H    calcium-vitamin D (OS-SEVERINO +D3) 500 mg-200 unit per tablet 1 Tablet  1 Tablet Oral BID    tamsulosin (FLOMAX) capsule 0.4 mg  0.4 mg Oral QPM            Prior to Admission medications    Medication Sig Start Date End Date Taking? Authorizing Provider   doxycycline (VIBRA-TABS) 100 mg tablet Take 100 mg by mouth two (2) times a day. 4/8/22 4/14/22 Yes Provider, Historical   hydroCHLOROthiazide (HYDRODIURIL) 25 mg tablet Take 25 mg by mouth as needed (edema). Yes Provider, Historical   calcium citrate-vitamin D3 (CITRACAL + D) tablet Take 1 Tab by mouth two (2) times a day. Yes Provider, Historical   leuprolide acetate (LUPRON DEPOT IM) by IntraMUSCular route. Every 6 months   Yes Provider, Historical   abiraterone (ZYTIGA) 250 mg tab Take 1,000 mg by mouth daily. Yes Provider, Historical   predniSONE (DELTASONE) 5 mg tablet Take 5 mg by mouth two (2) times a day. Yes Provider, Historical   tamsulosin (FLOMAX) 0.4 mg capsule Take 0.4 mg by mouth every evening.    Yes Provider, Historical                Objective:   VITALS:      Patient Vitals for the past 12 hrs:   Temp Pulse Resp BP SpO2   04/10/22 1000  82      04/10/22 0800  80      04/10/22 0621 98.4 °F (36.9 °C) 81 21 123/79 100 %   04/10/22 0600  78      04/10/22 0400 98.5 °F (36.9 °C) 77 23 106/63 98 %   04/10/22 0200  80      04/10/22 0054 98 °F (36.7 °C) 83 22 (!) 106/59 98 %        Visit Vitals  /79   Pulse 82   Temp 98.4 °F (36.9 °C)   Resp 21   Ht 6' 3\" (1.905 m)   Wt 170 lb 6.7 oz (77.3 kg)   SpO2 100%   BMI 21.30 kg/m²         Extended / Orthostatic Vitals: Wt Readings from Last 3 Encounters:   04/09/22 170 lb 6.7 oz (77.3 kg)   01/06/22 188 lb (85.3 kg)   01/05/22 188 lb 6.4 oz (85.5 kg)         Intake/Output Summary (Last 24 hours) at 4/10/2022 1210  Last data filed at 4/10/2022 0054  Gross per 24 hour   Intake    Output 1100 ml   Net -1100 ml                    LAB DATA REVIEWED:      All Cardiac Markers in the last 24 hours:  No results found for: CPK, CK, CKMMB, CKMB, RCK3, CKMBT, CKMBPOC, CKNDX, CKND1, VIRI, TROPT, TROIQ, BRITTNI, TROPT, TNIPOC, BNP, BNPP, BNPNT          Lab Results   Component Value Date/Time    Cholesterol, total 124 05/31/2019 07:07 AM    HDL Cholesterol 39 05/31/2019 07:07 AM    LDL, calculated 62 05/31/2019 07:07 AM    VLDL, calculated 23 05/31/2019 07:07 AM    Triglyceride 115 05/31/2019 07:07 AM    CHOL/HDL Ratio 3.2 05/31/2019 07:07 AM             Procedures: see electronic medical records for all procedures/Xrays and details which were not copied into this note but were reviewed prior to creation of Plan. Please note that this dictation was completed with [employer], the [employer] voice recognition software. Quite often unanticipated grammatical, syntax, homophones, and other interpretive errors are inadvertently transcribed by the computer software. Please disregard these errors. Please excuse any errors that have escaped final proofreading.         Care Plan discussed with:  Patient    Family    RN    Care Manager    Consultant/Specialist:     ________________________    ______________________________________________      Signed By: Ashleigh Hawley MD     April 10, 2022

## 2022-04-10 NOTE — CONSULTS
PULMONARY MEDICINE    Initial Physician Consultation Note    Name: Rodrigo Elkins   : 10/13/1933   MRN: [id number]   Date: 4/10/2022      Subjective:   Consult Note: 4/10/2022   Requesting Physician: Dr. Rod Hussein  Reason for consult: resp failure, rul mass and bilateral pleural effusions    Medical records and data reviewed. Pulmonary consultation was completed using telemedicine services  Patient is a 80 y.o. male who presented to the hospital with weakness and fall. Patient reports he was recently overseas attending a river cruise conference in [facility] that involved getting off the boat to attend meetings with lunch and dinner. He reports after he attended one such dinner events on March 20 years, he started feeling ill and developed generalized weakness. He was taken off the boat and [place] [facility] [place] [facility] [place] evaluation and was found to be positive for COVID-19. He was transferred from [facility] to [facility] and he was hospitalized for around 12 days and discharged after his COVID-19 test was negative. His wife then contacted the COVID-19 infection and they spent several days in isolation at the hot. They were able to fly back on . He reports he was home but then started feeling weak again that resulted in a couple of falls in the bathroom. He was evaluated by EMS as well as dispatch health at home and continued observation at home was recommended. He was ultimately able to get in touch with his primary care physician who group advised him to come to the ER for evaluation. On arrival he was found to be in atrial fibrillation with rapid ventricular rate. He is requiring oxygen at about 3 to 4 L during this hospitalization. He was able to wean off oxygen completely after his COVID-19 infection in [facility]. Echocardiogram is pending.   CT of the chest that was done shows bilateral pleural effusions with compressive atelectasis as well as areas of patchy airspace disease, most prominent in right upper lobe. Patient denies pleuritic chest pain or cough at present. He does not recall the acute treatments provided to him during his treatment in [facility]. Patient has had a history of metastatic prostate cancer with osseous mets, CT does suggest involvement of left rib    Review of Systems:     A comprehensive 12 system review of systems was negative except for as documented in HPI    Assessment:     Acute hypoxic pulmonary insufficiency  History of recent COVID-19 pneumonia diagnosed on March 20 requiring hospitalization and supplemental oxygen, he did convert to a negative Covid test to be able to fly back to the [address] [place] [address],[address]. Now COVID-19 PCR test is positive again  Abnormal CT scan of the chest with findings that are more suggestive of decompensated heart failure with bilateral pleural effusions and compressive atelectasis, he may have radiographic lag from recent Covid pneumonia, his symptoms do not suggest progressive pneumonitis or ARDS.  He has a history of metastatic prostate cancer with bony mets including left-sided rib and underlying pulmonary metastasis although unlikely cannot be excluded  Organic cardiac disease with recently noted paroxysmal atrial fibrillation, unknown cardiac function, this places him at increased risk for heart failure with preserved ejection fraction with rhythm abnormality  History of metastatic prostate cancer  Chronic anemia  Other medical problems per chart     Recommendations:     Wean oxygen as tolerated  On empiric antibiotics, procalcitonin level is low  Receiving hydrocortisone  Could benefit from diuretics if blood pressure allows  Echocardiogram is pending  Cardiology is following  Incentive spirometer  On Lovenox  Check CRP  Monitor clinical course - acute therapies specifically for COVID 19 does not appear indicated at present  Would recommend repeating chest imaging in 8 to 12 weeks to ensure complete resolution  Discussed in detail with the patient    Active Problem List:     Problem List  Date Reviewed: 2021          Codes Class    Sepsis ([facility] ICD-10-CM: A41.9  ICD-9-CM: 038.9, 995.91         Acute retention of urine ICD-10-CM: R33.8  ICD-9-CM: 788.29         Closed fracture of one rib ICD-10-CM: S22.39XA  ICD-9-CM: 807.01         Vitamin D deficiency ICD-10-CM: E55.9  ICD-9-CM: 268.9         Immunosuppression due to drug therapy [facility]) ICD-10-CM: C30.653, G73.189  ICD-9-CM: V58.69         Community acquired pneumonia ICD-10-CM: J18.9  ICD-9-CM: 714         Prostate cancer metastatic to pelvis ([facility] ICD-10-CM: C61, C79.89  ICD-9-CM: 185, 198.89         Lumbar disc disease with radiculopathy ICD-10-CM: M51.16  ICD-9-CM: 722.10, 724.4         Prostate cancer metastatic to multiple sites [facility]) ICD-10-CM: C61  ICD-9-CM: 185, 199.0         Heart murmur, systolic CIX-19-UL: T26.6  ICD-9-CM: 785.2     Overview Addendum 2019  9:20 AM by Eloisa Wasserman MD      Transthoracic Echocardiogram     Patient: Modesto Parra  MRN: [id number]  ACCT #: [de-identified]  : 13-Oct-1933  Age: 80 years  Gender: Male  Height: 75 in  Weight: 174.7 lb  BSA: 2.07 mï¾²  BP: 136 / 70 mmHg  Study date: 2018  Status: Routine  Location: Out-patient area  [facility] #: [id number]     Allergies: NO KNOWN ALLERGENS     Reading Group:  *CAV Group  Technologist:  Smita Layne RDCS  Reading Physician:  Alhaji Miller. Jose A Nolen MD     SUMMARY:  Left ventricle: Systolic function was normal. Ejection fraction was  estimated in the range of 60 % to 65 %. There were no regional wall motion  abnormalities. Doppler parameters were consistent with abnormal left  ventricular relaxation (grade 1 diastolic dysfunction).     Mitral valve: There was mild to moderate annular calcification.     Tricuspid valve: There was mild regurgitation.     INDICATIONS: Heart Murmur     PROCEDURE: This was a routine study.  The study included complete 2D  imaging, M-mode, complete spectral Doppler, and color Doppler. The heart  rate was 89 bpm, at the start of the study. Systolic blood pressure was  136 mmHg, at the start of the study. Diastolic blood pressure was 70 mmHg,  at the start of the study. Image quality was adequate.     LEFT VENTRICLE: Size was normal. Systolic function was normal. Ejection  fraction was estimated in the range of 60 % to 65 %. There were no  regional wall motion abnormalities. Wall thickness was normal. DOPPLER:  Doppler parameters were consistent with abnormal left ventricular  relaxation (grade 1 diastolic dysfunction).     RIGHT VENTRICLE: The size was normal. Systolic function was normal. Wall  thickness was normal.     LEFT ATRIUM: Size was normal.     RIGHT ATRIUM: Size was normal.     MITRAL VALVE: There was mild to moderate annular calcification. There was  normal leaflet separation. DOPPLER: There was trivial regurgitation.     AORTIC VALVE: The valve was trileaflet. Leaflets exhibited mild  calcification and normal cuspal separation. DOPPLER: There was no stenosis.     TRICUSPID VALVE: Normal valve structure. There was normal leaflet  separation. DOPPLER: The transtricuspid velocity was within the normal  range. There was no evidence for tricuspid stenosis. There was mild  regurgitation.  Pulmonary artery systolic pressure: 35 mmHg.     PULMONIC VALVE: Not well visualized.     AORTA: The root exhibited normal size.     PERICARDIUM: There was no pericardial effusion.     SYSTEM MEASUREMENT TABLES     2D  LAAs A2C: 19.5 cm2  LAAs A4C: 17.2 cm2  LAESV A-L A2C: 59.3 ml  LAESV A-L A4C: 53.4 ml  LAESV Index (A-L): 29.2 ml/m2  LAESV MOD A2C: 57.9 ml  LAESV MOD A4C: 46 ml  LAESV(A-L): 60.5 ml  LALs A2C: 5.4 cm  LALs A4C: 4.7 cm  %FS: 32.9 %  EDV(Teich): 84.9 ml  EF(Teich): 61.7 %  ESV(Teich): 32.5 ml  IVSd: 1.1 cm  LVIDd: 4.3 cm  LVIDs: 2.9 cm  LVPWd: 1.1 cm  SV(Teich): 52.4 ml     CW  AV Vmax: 1.4 m/s  AV maxP.4 mmHg  PV Vmax: 0.7 m/s  PV maxP mmHg  TR Vmax: 2.8 m/s  TR maxP.4 mmHg     MM  Ao Diam: 2.9 cm  LA Diam: 2.8 cm  Ao/LA: 1  LA/Ao: 1  TAPSE: 2.3 cm     PW  LVOT Vmax: 1.2 m/s  LVOT maxP.2 mmHg  E' Lat: 0.1 m/s  E' Sept: 0.1 m/s  E/E' Lat: 12.9  E/E' Sept: 10.5  MV A Gigi: 1.3 m/s  MV Dec Trego: 4.4 m/s2  MV DecT: 219.8 ms  MV E Gigi: 1 m/s  MV E/A Ratio: 0.8  MV PHT: 63.8 ms  MVA By PHT: 3.5 cm2  RV S': 0.2 m/s  E' Av.1 m/s  E/E' Av.6     Prepared and E-signed by  Pao Roberson. Livia Benitez MD  Signed [id number] 16:55:20                             Degenerative disc disease, lumbar ICD-10-CM: M51.36  ICD-9-CM: 722.52         Acute back pain ICD-10-CM: M54.9  ICD-9-CM: 724.5               Past Medical History:      has a past medical history of Cancer ([facility] and Prostate cancer ([facility] Past Surgical History:      has a past surgical history that includes ir insert tunl cvc w/o port over 5 yr (2022) and ir remove tunl cvad w/o port / pump (2022). Home Medications:     Prior to Admission medications    Medication Sig Start Date End Date Taking? Authorizing Provider   doxycycline (VIBRA-TABS) 100 mg tablet Take 100 mg by mouth two (2) times a day. 22 Yes Provider, Historical   hydroCHLOROthiazide (HYDRODIURIL) 25 mg tablet Take 25 mg by mouth as needed (edema). Yes Provider, Historical   calcium citrate-vitamin D3 (CITRACAL + D) tablet Take 1 Tab by mouth two (2) times a day. Yes Provider, Historical   leuprolide acetate (LUPRON DEPOT IM) by IntraMUSCular route. Every 6 months   Yes Provider, Historical   abiraterone (ZYTIGA) 250 mg tab Take 1,000 mg by mouth daily. Yes Provider, Historical   predniSONE (DELTASONE) 5 mg tablet Take 5 mg by mouth two (2) times a day. Yes Provider, Historical   tamsulosin (FLOMAX) 0.4 mg capsule Take 0.4 mg by mouth every evening.    Yes Provider, Historical       Allergies/Social/Family History:     No Known Allergies   Social History     Tobacco Use    Smoking status: Never Smoker    Smokeless tobacco: Never Used   Substance Use Topics    Alcohol use: Never      No family history on file. Objective:   Vital Signs:  Visit Vitals  /79   Pulse 82   Temp 98.4 °F (36.9 °C)   Resp 21   Ht 6' 3\" (1.905 m)   Wt 77.3 kg (170 lb 6.7 oz)   SpO2 100%   BMI 21.30 kg/m²    O2 Flow Rate (L/min): 4 l/min O2 Device: Nasal cannula Temp (24hrs), Av °F (37.2 °C), Min:98 °F (36.7 °C), Max:100.8 °F (38.2 °C)           Intake/Output:     Intake/Output Summary (Last 24 hours) at 4/10/2022 1343  Last data filed at 4/10/2022 0054  Gross per 24 hour   Intake    Output 1100 ml   Net -1100 ml       Physical Exam:   General:  Alert, cooperative, no distress. LABS AND  DATA: Personally reviewed  Recent Labs     04/10/22  04222  1444   WBC 5.8 8.0   HGB 9.7* 10.7*   HCT 29.3* 31.6*   * 144*     Recent Labs     04/10/22  0420 22  1458 22  1444   *  --  132*   K 3.8  --  3.6     --  102   CO2 16*  --  19*   BUN 8  --  8   CREA 0.83  --  0.93   *  --  72   CA 7.9*  --  7.9*   MG 1.8 1.4*  --    PHOS 4.2  --   --      Recent Labs     22  1444   AP 66   TP 5.2*   ALB 2.2*   GLOB 3.0     No results for input(s): INR, PTP, APTT, INREXT in the last 72 hours. No results for input(s): PHI, PCO2I, PO2I, FIO2I in the last 72 hours. No results for input(s): CPK, CKMB, TROIQ, BNPP in the last 72 hours.     MEDS: Reviewed  Current Facility-Administered Medications   Medication Dose Route Frequency    sodium chloride (NS) flush 5-10 mL  5-10 mL IntraVENous PRN    amiodarone (CORDARONE) 375 mg/250 mL D5W infusion  0.5-1 mg/min IntraVENous CONTINUOUS    sodium chloride (NS) flush 5-40 mL  5-40 mL IntraVENous Q8H    sodium chloride (NS) flush 5-40 mL  5-40 mL IntraVENous PRN    acetaminophen (TYLENOL) tablet 650 mg  650 mg Oral Q6H PRN    Or    acetaminophen (TYLENOL) suppository 650 mg  650 mg Rectal Q6H PRN    polyethylene glycol (MIRALAX) packet 17 g  17 g Oral DAILY PRN    ondansetron (ZOFRAN ODT) tablet 4 mg  4 mg Oral Q8H PRN    Or    ondansetron (ZOFRAN) injection 4 mg  4 mg IntraVENous Q6H PRN    hydrocortisone Sod Succ (PF) (SOLU-CORTEF) injection 50 mg  50 mg IntraVENous Q8H    azithromycin (ZITHROMAX) tablet 500 mg  500 mg Oral DAILY    enoxaparin (LOVENOX) injection 40 mg  40 mg SubCUTAneous Q24H    cefepime (MAXIPIME) 2 g in sterile water (preservative free) 10 mL IV syringe  2 g IntraVENous Q12H    calcium-vitamin D (OS-SEVERINO +D3) 500 mg-200 unit per tablet 1 Tablet  1 Tablet Oral BID    tamsulosin (FLOMAX) capsule 0.4 mg  0.4 mg Oral QPM       Chest Imaging: personally reviewed and report checked  Results from Hospital Encounter encounter on 04/09/22    XR CHEST PORT    Narrative  Indication: Lethargy and weakness for 2 days    Comparison: 5/30/2019    Portable exam of the chest obtained at 1548 demonstrates normal heart size. Diffuse bilateral interstitial infiltrates are noted greatest in the right  perihilar lung field and right lower lobe. The osseous structures are  unremarkable. Impression  Bilateral interstitial infiltrates right greater than left. Results from Hospital Encounter encounter on 04/09/22    CTA CHEST W OR W WO CONT    Narrative  EXAM:  CTA CHEST W OR W WO CONT    INDICATION:   Generalized weakness, atrial fibrillation    COMPARISON: 5/30/2019. TECHNIQUE:  Precontrast  images were obtained to localize the volume for acquisition. Multislice helical CT arteriography was performed from the diaphragm to the  thoracic inlet during uneventful rapid bolus of 85 cc Isovue-370. Lung and soft  tissue windows were generated. Coronal and sagittal images were generated and  3D post processing consisting of coronal maximum intensity images was performed.   CT dose reduction was achieved through use of a standardized protocol tailored  for this examination and automatic exposure control for dose modulation. FINDINGS:  CHEST:  THYROID: No nodule. MEDIASTINUM: No mass or lymphadenopathy. CINDY: No mass or lymphadenopathy. THORACIC AORTA: Atherosclerotic without evidence of aneurysm. MAIN PULMONARY ARTERY: There is no evidence of pulmonary embolism. TRACHEA/BRONCHI: Patent. ESOPHAGUS: No wall thickening or dilatation. HEART: Normal in size. PLEURA: Small left pleural effusion and more moderate right pleural effusion. LUNGS: Bilateral lower lobe atelectasis. Focal 2.1 cm parenchymal opacity is  noted in the right upper lobe. Small pulmonary nodules are seen in the lung  fields bilaterally. 11 mm pulmonary nodule is seen in the right upper lobe. INCIDENTALLY IMAGED UPPER ABDOMEN: Small hiatal hernia. BONES: Sclerotic lesion left lateral seventh rib. Impression  Bilateral pleural effusions right greater than left with underlying  atelectasis. Rounded pulmonary lesions in the right upper lobe are concerning  for neoplasm. Additional small bilateral pulmonary nodules are seen throughout  the lung fields bilaterally. No evidence of pulmonary embolism. Possible bone  metastasis left lateral rib. Tele- reviewed    Medical decision making:   I have reviewed the flowsheet and previous day's notes  Patient has acute or chronic illness that poses a threat to life or bodily function  Review and order of Clinical lab tests  Review and Order of Radiology tests  Independent visualization of Image  Reviewed Oxygen  High Risk Drug therapy requiring intensive monitoring for toxicity: eg steroids, pressors, antibiotics        Thank you for allowing me to participate in this patient's care.     Thom Vaz MD      [facility]

## 2022-04-10 NOTE — PROGRESS NOTES
[facility]  Hospitalist Group                                                                                          Hospitalist Progress Note  [facility],   Answering service: [phone] from in house phone        Date of Service:  4/10/2022  NAME:  Laura Caro  :  10/13/1933  MRN:  [id number]      Admission Summary:   80 y.o. male with pmh of metastatic prostate cancer (bones), recent COVID PNA (in March in [facility] while on Vacation)  who presents with weakness, and unable to urinate. 2 days ago began feeling weak and tired, had dispatch health sent to his house. There checked vitals, and checked him out, give him IV fluids. Per report also checked UA, and was negative. Today he felt increased weakness and was unable to urinate so decided to come to the ER. Vaccinated x 3 for COVID, does not rememebr if he got the influenza vaccine. He was on vacation from cruise in [place] in the middle of March, when he contracted COVID, and had a prolonged hospitalization of 12 days. He denies any h/o blood clots, CVA, or Afib. In the ER pt was found to be in Afib with RVR with HR in the 150's, given amiodarone bolus which subsequently led to hypotension. He was then given IVF boluses with improvement. He now converted to NSR with HR in the low 100's. Pt denies any SOB, CP, N/V/D/C, no focal weakness. Does continue to have difficulty urinating. Interval history / Subjective: Follow-up respiratory failure. Patient seen and examined earlier today. States feels better than admission but overall weaker than his baseline. We reviewed admission history. Patient urinating fine. Appreciate subspecialty services. Assessment & Plan:     Acute hypoxic respiratory failure:   -CTA with bilateral pleural effusions, right greater than left with underlying atelectasis.   Rounded pulmonary lesions in the right upper lobe are concerning for neoplasm.  -Pulmonary consulted, appreciate recommendations  -Concern for volume overload in setting of bilateral effusions  -Will trial lasix with BP improvement  -check pro BNP  -echo ordered and pending  -COVID PCR +. Symptoms more consistent with volume overload     Severe Sepsis (Temp, HR, Hypotension, Afib with RVR POA) - possible CAP   - Continue cefepime, azithromycin  - Follow Blood cultures   - s/p IVFs, will hold further due to volume overload   - procal negative. Likely DC abx if cultures remain negative 4/11     Afib with RVR- now in normal sinus  - New diagnosis   - Continue amiodarone gtt for now. Transition to oral per cardiology  - Echo ordered and pending   - Cardiology consulted and following  - s/p IVF      Prostate cancer   Urinary retention - resolved  - [facility] Love consulted  - continue home Tamsulosin   - Hold lupron, zytiga      Chronic steroid use - per patient as adjunct to treatment for prostate cancer  - Wean stress dose steroids- eventually place back on daily prednisone     Weakness  - PT/OT  - Check CK    Hypomagnesemia - replete and monitor   HTN - hold HCTZ in setting of hypotension    PT/OT. May benefit from rehab     Code status:  Full  Prophylaxis: Lovenox   care Plan discussed with: Patient  Anticipated Disposition: 2-3 days      Hospital Problems  Date Reviewed: 1/25/2021          Codes Class Noted POA    Sepsis ([facility] ICD-10-CM: A41.9  ICD-9-CM: 038.9, 995.91  4/9/2022 Unknown                Review of Systems:   Negative unless stated above      Vital Signs:    Last 24hrs VS reviewed since prior progress note.  Most recent are:  Visit Vitals  /63   Pulse 86   Temp 98.4 °F (36.9 °C)   Resp 20   Ht 6' 3\" (1.905 m)   Wt 77.3 kg (170 lb 6.7 oz)   SpO2 98%   BMI 21.30 kg/m²         Intake/Output Summary (Last 24 hours) at 4/10/2022 1458  Last data filed at 4/10/2022 0054  Gross per 24 hour   Intake    Output 1100 ml   Net -1100 ml        Physical Examination:     I had a face to face encounter with this patient and independently examined them on 4/10/2022 as outlined below:          Constitutional:  No acute distress, cooperative, pleasant, appears acutely ill    ENT:  Oral mucosa moist, oropharynx benign. Resp:  Diminished bilaterally. No accessory muscle use. CV:  Regular rhythm, normal rate, no murmurs, gallops, rubs    GI:  Soft, non distended, non tender. normoactive bowel sounds, no hepatosplenomegaly     Musculoskeletal:  No edema, warm, 2+ pulses throughout    Neurologic:  Moves all extremities. AAOx3, nonfocal            Data Review:    Review and/or order of clinical lab test  Review and/or order of tests in the radiology section of CPT  Review and/or order of tests in the medicine section of CPT      Labs:     Recent Labs     04/10/22  0420 04/09/22  1444   WBC 5.8 8.0   HGB 9.7* 10.7*   HCT 29.3* 31.6*   * 144*     Recent Labs     04/10/22  0420 04/09/22  1458 04/09/22  1444   *  --  132*   K 3.8  --  3.6     --  102   CO2 16*  --  19*   BUN 8  --  8   CREA 0.83  --  0.93   *  --  72   CA 7.9*  --  7.9*   MG 1.8 1.4*  --    PHOS 4.2  --   --      Recent Labs     04/09/22  1444   ALT 23   AP 66   TBILI 1.1*   TP 5.2*   ALB 2.2*   GLOB 3.0     No results for input(s): INR, PTP, APTT, INREXT in the last 72 hours. No results for input(s): FE, TIBC, PSAT, FERR in the last 72 hours. No results found for: FOL, RBCF   No results for input(s): PH, PCO2, PO2 in the last 72 hours. No results for input(s): CPK, CKNDX, TROIQ in the last 72 hours.     No lab exists for component: CPKMB  Lab Results   Component Value Date/Time    Cholesterol, total 124 05/31/2019 07:07 AM    HDL Cholesterol 39 05/31/2019 07:07 AM    LDL, calculated 62 05/31/2019 07:07 AM    Triglyceride 115 05/31/2019 07:07 AM    CHOL/HDL Ratio 3.2 05/31/2019 07:07 AM     Lab Results   Component Value Date/Time    Glucose (POC) 92 10/07/2018 02:51 PM     Lab Results   Component Value Date/Time    Color YELLOW/STRAW 04/09/2022 02:56 PM    Appearance CLEAR 04/09/2022 02:56 PM    Specific gravity 1.011 04/09/2022 02:56 PM    pH (UA) 5.5 04/09/2022 02:56 PM    Protein Negative 04/09/2022 02:56 PM    Glucose Negative 04/09/2022 02:56 PM    Ketone 15 (A) 04/09/2022 02:56 PM    Bilirubin Negative 04/09/2022 02:56 PM    Urobilinogen 2.0 (H) 04/09/2022 02:56 PM    Nitrites Negative 04/09/2022 02:56 PM    Leukocyte Esterase Negative 04/09/2022 02:56 PM    Epithelial cells FEW 04/09/2022 02:56 PM    Bacteria Negative 04/09/2022 02:56 PM    WBC 0-4 04/09/2022 02:56 PM    RBC 10-20 04/09/2022 02:56 PM         Medications Reviewed:     Current Facility-Administered Medications   Medication Dose Route Frequency    cefepime (MAXIPIME) 2 g in sterile water (preservative free) 10 mL IV syringe  2 g IntraVENous Q8H    hydrocortisone Sod Succ (PF) (SOLU-CORTEF) injection 50 mg  50 mg IntraVENous Q12H    sodium chloride (NS) flush 5-10 mL  5-10 mL IntraVENous PRN    amiodarone (CORDARONE) 375 mg/250 mL D5W infusion  0.5-1 mg/min IntraVENous CONTINUOUS    sodium chloride (NS) flush 5-40 mL  5-40 mL IntraVENous Q8H    sodium chloride (NS) flush 5-40 mL  5-40 mL IntraVENous PRN    acetaminophen (TYLENOL) tablet 650 mg  650 mg Oral Q6H PRN    Or    acetaminophen (TYLENOL) suppository 650 mg  650 mg Rectal Q6H PRN    polyethylene glycol (MIRALAX) packet 17 g  17 g Oral DAILY PRN    ondansetron (ZOFRAN ODT) tablet 4 mg  4 mg Oral Q8H PRN    Or    ondansetron (ZOFRAN) injection 4 mg  4 mg IntraVENous Q6H PRN    azithromycin (ZITHROMAX) tablet 500 mg  500 mg Oral DAILY    enoxaparin (LOVENOX) injection 40 mg  40 mg SubCUTAneous Q24H    calcium-vitamin D (OS-SEVERINO +D3) 500 mg-200 unit per tablet 1 Tablet  1 Tablet Oral BID    tamsulosin (FLOMAX) capsule 0.4 mg  0.4 mg Oral QPM     ______________________________________________________________________  EXPECTED LENGTH OF STAY: - - -  ACTUAL LENGTH OF STAY:          1 Cas Shown, DO

## 2022-04-10 NOTE — ROUTINE PROCESS
TRANSFER - OUT REPORT:    Verbal report given to vika(name) on Chika Montano  being transferred to [facility](unit) for routine progression of care       Report consisted of patients Situation, Background, Assessment and   Recommendations(SBAR). Information from the following report(s) SBAR, Kardex, ED Summary and MAR was reviewed with the receiving nurse. Lines:   Peripheral IV 04/09/22 Left Antecubital (Active)   Site Assessment Clean, dry, & intact 04/09/22 1443   Phlebitis Assessment 0 04/09/22 1443   Infiltration Assessment 0 04/09/22 1443   Dressing Status Clean, dry, & intact 04/09/22 1443   Hub Color/Line Status Pink;Capped;Flushed 04/09/22 1443   Action Taken Blood drawn 04/09/22 1443       Peripheral IV 04/09/22 Right Forearm (Active)   Site Assessment Clean, dry, & intact 04/09/22 1545   Phlebitis Assessment 0 04/09/22 1545   Infiltration Assessment 0 04/09/22 1545   Dressing Status Clean, dry, & intact 04/09/22 1545   Hub Color/Line Status Pink;Capped;Flushed;Patent 04/09/22 1545   Action Taken Blood drawn 04/09/22 1545        Opportunity for questions and clarification was provided.       Patient transported with:   Registered Nurse

## 2022-04-10 NOTE — PROGRESS NOTES
Renal Dosing/Monitoring  Medication:  Cefepime   Current regimen:  2 grams IV every 12 hr  Recent Labs     04/10/22  0420 04/09/22  1444   CREA 0.83 0.93   BUN 8 8     Estimated CrCl:  67.3 ml/min  Plan: Change to 2 grams IV every 8 hours per [facility] P&T Committee Protocol with respect to renal function. Pharmacy will continue to monitor patient daily and will make dosage adjustments based upon changing renal function.     Reilly Matthews, PharmD, BCPS

## 2022-04-11 PROBLEM — A41.9 SEPSIS (HCC): Status: ACTIVE | Noted: 2022-01-01

## 2022-04-11 NOTE — PROGRESS NOTES
PCCM:    2lpm of supplemental O2    Probnp 959  CRP13.3    Plan:  Acute hypoxic resp failure   Bilateral pulmonary infiltrates    Follow ECHO; query pulmonary edema. Possibility of radiographic lag from recent COVID19 infection.     Low threshold to start steroids   Check procal. Noted on empiric abxs   O2 titration above 90%     Phil Rizo PA-C

## 2022-04-11 NOTE — PROGRESS NOTES
Bedside and Verbal shift change report given to Rajendra Jett RN (oncoming nurse) by Xiang Corona (offgoing nurse). Report included the following information SBAR, Kardex, Intake/Output, MAR, Recent Results and Cardiac Rhythm NSR.

## 2022-04-11 NOTE — PROGRESS NOTES
Day #3 of cefepime  Indication:  PNA  Current regimen:  2 g Q8H  Abx regimen: cefepime + azithromycin  Recent Labs     22  0356 04/10/22  0420 22  1444   WBC 8.1 5.8 8.0   CREA 1.06 0.83 0.93   BUN 17 8 8     Est CrCl: 52 ml/min; UO: 0.8 ml/kg/hr  Temp (24hrs), Av.6 °F (36.4 °C), Min:97.3 °F (36.3 °C), Max:97.9 °F (36.6 °C)      Plan: Change to cefepime 2 g Q12H for crcl 30-60 ml/min

## 2022-04-11 NOTE — PROGRESS NOTES
[facility]                                                                                          Hospitalist Progress Note  [address] [facility]  Answering service: [phone] from in house phone        Date of Service:  2022  NAME:  Checo Li  :  10/13/1933  MRN:  [id number]      Admission Summary:   80 y.o. male with pmh of metastatic prostate cancer (bones), recent COVID PNA (in March in [facility] while on Vacation)  who presents with weakness, and unable to urinate. 2 days ago began feeling weak and tired, had dispatch health sent to his house. There checked vitals, and checked him out, give him IV fluids. Per report also checked UA, and was negative. Today he felt increased weakness and was unable to urinate so decided to come to the ER. Vaccinated x 3 for COVID, does not rememebr if he got the influenza vaccine. He was on vacation from cruise in [facility] in the middle of March, when he contracted COVID, and had a prolonged hospitalization of 12 days. He denies any h/o blood clots, CVA, or Afib. In the ER pt was found to be in Afib with RVR with HR in the 150's, given amiodarone bolus which subsequently led to hypotension. He was then given IVF boluses with improvement. He now converted to NSR with HR in the low 100's. Pt denies any SOB, CP, N/V/D/C, no focal weakness. Does continue to have difficulty urinating. Interval history / Subjective: Follow-up respiratory failure. Patient seen and examined. States he feels better. Oxygen weaned to 1 liter while in the room. PT/OT to evaluate patient today. Required tomlinson placement due to urinary retention. Assessment & Plan:     Acute hypoxic respiratory failure: improving  -CTA with bilateral pleural effusions, right greater than left with underlying atelectasis.   Rounded pulmonary lesions in the right upper lobe are concerning for neoplasm.  -Pulmonary consulted, appreciate recommendations  -Concern for volume overload in setting of bilateral effusions  -Continue lasix daily   -pro   -echo ordered and pending  -COVID PCR +. Symptoms more consistent with volume overload   -Needs repeat CT scan in 8-12 weeks    Severe Sepsis (Temp, HR, Hypotension, Afib with RVR POA) - possible CAP   - Continue cefepime   - Follow Blood cultures   - s/p IVFs, will hold further due to volume overload   - procal negative. Consider DC abx      Afib with RVR- now in normal sinus  - New diagnosis   - Continue amiodarone gtt for now. Transition to oral per cardiology  - Echo ordered and pending   - Cardiology consulted and following  - s/p IVF      Prostate cancer   Urinary retention - s/p tomlinson  - [facility] Hernandez [facility]  - continue home Tamsulosin   - Hold lupron, zytiga   - maintain tomlinson, follow up outpatient      Chronic steroid use - per patient as adjunct to treatment for prostate cancer  - Wean stress dose steroids- eventually place back on daily prednisone     Weakness  - PT/OT    Hypomagnesemia - replete and monitor   HTN - hold HCTZ in setting of hypotension    PT/OT. May benefit from rehab     Code status:  Full  Prophylaxis: Lovenox   care Plan discussed with: Patient  Anticipated Disposition: 2-3 days      Hospital Problems  Date Reviewed: 1/25/2021          Codes Class Noted POA    Sepsis ([facility] [address] ICD-10-CM: A41.9  ICD-9-CM: 038.9, 995.91  4/9/2022 Unknown                Review of Systems:   Negative unless stated above      Vital Signs:    Last 24hrs VS reviewed since prior progress note.  Most recent are:  Visit Vitals  /76 (BP Patient Position: Supine)   Pulse 89   Temp 97.4 °F (36.3 °C)   Resp 20   Ht 6' 3\" (1.905 m)   Wt 77.3 kg (170 lb 6.7 oz)   SpO2 95%   BMI 21.30 kg/m²         Intake/Output Summary (Last 24 hours) at 4/11/2022 1624  Last data filed at 4/11/2022 1131  Gross per 24 hour   Intake    Output 825 ml   Net -825 ml        Physical Examination:     I had a face to face encounter with this patient and independently examined them on 4/11/2022 as outlined below:          Constitutional:  No acute distress, cooperative, pleasant   ENT:  Oral mucosa moist, oropharynx benign. Resp:  Diminished bilaterally. No accessory muscle use. CV:  Regular rhythm, normal rate, no murmurs, gallops, rubs    GI:  Soft, non distended, non tender. normoactive bowel sounds, no hepatosplenomegaly     Musculoskeletal:  No edema, warm, 2+ pulses throughout    Neurologic:  Moves all extremities. AAOx3, nonfocal            Data Review:    Review and/or order of clinical lab test  Review and/or order of tests in the radiology section of CPT  Review and/or order of tests in the medicine section of CPT      Labs:     Recent Labs     04/11/22  0356 04/10/22  0420   WBC 8.1 5.8   HGB 10.0* 9.7*   HCT 29.3* 29.3*   * 134*     Recent Labs     04/11/22  0356 04/10/22  0420 04/09/22  1458 04/09/22  1444   * 131*  --  132*   K 3.8 3.8  --  3.6    104  --  102   CO2 19* 16*  --  19*   BUN 17 8  --  8   CREA 1.06 0.83  --  0.93   * 122*  --  72   CA 8.1* 7.9*  --  7.9*   MG 1.9 1.8 1.4*  --    PHOS 3.0 4.2  --   --      Recent Labs     04/09/22  1444   ALT 23   AP 66   TBILI 1.1*   TP 5.2*   ALB 2.2*   GLOB 3.0     No results for input(s): INR, PTP, APTT, INREXT, INREXT in the last 72 hours. No results for input(s): FE, TIBC, PSAT, FERR in the last 72 hours. No results found for: FOL, RBCF   No results for input(s): PH, PCO2, PO2 in the last 72 hours.   Recent Labs     04/11/22 0356   CPK 37*     Lab Results   Component Value Date/Time    Cholesterol, total 124 05/31/2019 07:07 AM    HDL Cholesterol 39 05/31/2019 07:07 AM    LDL, calculated 62 05/31/2019 07:07 AM    Triglyceride 115 05/31/2019 07:07 AM    CHOL/HDL Ratio 3.2 05/31/2019 07:07 AM     Lab Results   Component Value Date/Time    Glucose (POC) 92 10/07/2018 02:51 PM     Lab Results   Component Value Date/Time    Color YELLOW/STRAW 04/09/2022 02:56 PM    Appearance CLEAR 04/09/2022 02:56 PM    Specific gravity 1.011 04/09/2022 02:56 PM    pH (UA) 5.5 04/09/2022 02:56 PM    Protein Negative 04/09/2022 02:56 PM    Glucose Negative 04/09/2022 02:56 PM    Ketone 15 (A) 04/09/2022 02:56 PM    Bilirubin Negative 04/09/2022 02:56 PM    Urobilinogen 2.0 (H) 04/09/2022 02:56 PM    Nitrites Negative 04/09/2022 02:56 PM    Leukocyte Esterase Negative 04/09/2022 02:56 PM    Epithelial cells FEW 04/09/2022 02:56 PM    Bacteria Negative 04/09/2022 02:56 PM    WBC 0-4 04/09/2022 02:56 PM    RBC 10-20 04/09/2022 02:56 PM         Medications Reviewed:     Current Facility-Administered Medications   Medication Dose Route Frequency    amiodarone (CORDARONE) tablet 400 mg  400 mg Oral BID    [START ON 4/18/2022] amiodarone (CORDARONE) tablet 200 mg  200 mg Oral DAILY    cefepime (MAXIPIME) 2 g in 0.9% sodium chloride 10 mL IV syringe  2 g IntraVENous Q12H    hydrocortisone Sod Succ (PF) (SOLU-CORTEF) injection 50 mg  50 mg IntraVENous Q12H    sodium chloride (NS) flush 5-10 mL  5-10 mL IntraVENous PRN    amiodarone (CORDARONE) 375 mg/250 mL D5W infusion  0.5-1 mg/min IntraVENous CONTINUOUS    sodium chloride (NS) flush 5-40 mL  5-40 mL IntraVENous Q8H    sodium chloride (NS) flush 5-40 mL  5-40 mL IntraVENous PRN    acetaminophen (TYLENOL) tablet 650 mg  650 mg Oral Q6H PRN    Or    acetaminophen (TYLENOL) suppository 650 mg  650 mg Rectal Q6H PRN    polyethylene glycol (MIRALAX) packet 17 g  17 g Oral DAILY PRN    ondansetron (ZOFRAN ODT) tablet 4 mg  4 mg Oral Q8H PRN    Or    ondansetron (ZOFRAN) injection 4 mg  4 mg IntraVENous Q6H PRN    enoxaparin (LOVENOX) injection 40 mg  40 mg SubCUTAneous Q24H    calcium-vitamin D (OS-SEVERINO +D3) 500 mg-200 unit per tablet 1 Tablet  1 Tablet Oral BID    tamsulosin (FLOMAX) capsule 0.4 mg  0.4 mg Oral QPM     ______________________________________________________________________  EXPECTED LENGTH OF STAY: 4d 19h  ACTUAL LENGTH OF STAY:          [address], DO

## 2022-04-11 NOTE — PROGRESS NOTES
Cardiology Progress Note            Admit Date: 4/9/2022  Admit Diagnosis: Sepsis [facility]) [A41.9]  Date: 4/11/2022     Time: 10:19 AM    HPI: 80years old male with PMH of paroxysmal atrial flutter (episode in 2019 when he was hospitalized with pneumonir), prostate cancer with metastasis, and recent admission to hospital for Abdullahi who presented to the emergency room with generalized weakness and lethargy 2 days prior. Was hospitalized for Covid in [place] last month and he returned to the [facility] on April 2, 2022. He now presents with chief c/o weakness, lethargy for 2 days and and a fall. Occasional nonproductive cough. Has test positive for covid again. Cardiology was called yesterday as he was found in afib with RVR on presentation. He reportedly had some hypotension after IV amiodarone bolus but is on infusion now and BP is NL. Carol Colder He has since converted to NSR after treatment with IV amiodarone bolus and infusion. He had tested negative for Covid prior to his return to [facility] but now is + for covid again this admission. Of note: NT pro BNP is low for age (959) CT chest on admission showed bilateral pleural effusions R>l and rounded pulmonary lesions in RUL concerning for neoplasm. Evidence of possible bone met lesion to rib. Pulmonary is following. Of note, pt was seen by Dr. Santos Waite in hospital in 2019 after he developed episode of PAFL when hospitalized with pneumonia. At that time he converted on own to NSR without antiarrhythmic. His [id number] vasc score was 2 at the time and pt was adverse to [facility] due to being on Zytiga and prednisone due to bruising risk at the time so pt was discharge on ASA. He refused recommended event monitor at discharge and did not followup in office. Subjective:  Pt states he feels much better than when he arrived. He denies chest pain, SOB, any history of chest pain. He still has some mild nonproductive cough.  He reports that his urologist may be taking him off of Zytiga and therefore steroids and if that is the case, he may consider [facility] if recommended. Assessment and Plan     1. Afib with RVR: Now remains in NSR since he converted back to NSR.    -History of PAFL in 2019   -check TSH   -Transition to po amiodarone 400 mg BID x 7 days then 200 mg daily thereafter. Plan short term amiodarone course. D/w Dr. Dhaval Bryan- will change zithormax to another antibiotic. Will check Qtc again with 12 lead EKG. -HS troponin low (18), Pro BNP low for age (56)   -Echo 2018: EF 60-65%, mild-moderate MAC, mild TR. Repeat echo pending. -[id number]= 2.(age,). He indicates he may be agreeable to [facility] if recommended and he is taken off of Zytiga and steroids (states his urologist/oncologist is planning to stop zytiga and therefor he would not need steroid). Will d/w Dr. Joe Rider.     2. Prostate cancer with mets   -followed by urology    3. Acute hypoxic respiratory failure:   -Bilateral pleural effusions:on CTA chest R>L   -will check echo but pro BNP low for age. .   -Received lasix 20 mg IV yesterday. -RUL round pulmonary lesions concerning for neoplasm. Pulmonary following. Repeat chest imaging planned in 6-8 weeks.      4. Covid +     Sepsis (on admission)    -Management per primary team.    PMH  Past Medical History:   Diagnosis Date    Cancer [facility])     prostate ca with mets    Prostate cancer ([facility] [address]       Social Hx  Social History     Socioeconomic History    Marital status:      Spouse name: Not on file    Number of children: Not on file    Years of education: Not on file    Highest education level: Not on file   Occupational History    Not on file   Tobacco Use    Smoking status: Never Smoker    Smokeless tobacco: Never Used   Vaping Use    Vaping Use: Never used   Substance and Sexual Activity    Alcohol use: Never    Drug use: Never    Sexual activity: Not on file   Other Topics Concern    Not on file   Social History Narrative    ** Merged History Encounter **          Social Determinants of Health     Financial Resource Strain:     Difficulty of Paying Living Expenses: Not on file   Food Insecurity:     Worried About Running Out of Food in the Last Year: Not on file    Taniya of Food in the Last Year: Not on file   Transportation Needs:     Lack of Transportation (Medical): Not on file    Lack of Transportation (Non-Medical): Not on file   Physical Activity:     Days of Exercise per Week: Not on file    Minutes of Exercise per Session: Not on file   Stress:     Feeling of Stress : Not on file   Social Connections:     Frequency of Communication with Friends and Family: Not on file    Frequency of Social Gatherings with Friends and Family: Not on file    Attends Hoahaoism Services: Not on file    Active Member of [address] [place] [address] or Organizations: Not on file    Attends Club or Organization Meetings: Not on file    Marital Status: Not on file   Intimate Partner Violence:     Fear of Current or Ex-Partner: Not on file    Emotionally Abused: Not on file    Physically Abused: Not on file    Sexually Abused: Not on file   Housing Stability:     Unable to Pay for Housing in the Last Year: Not on file    Number of [place] in the Last Year: Not on file    Unstable Housing in the Last Year: Not on file       ROS:  Pertinent items are noted in HPI. Objective:      Physical Exam:                Visit Vitals  /65 (BP 1 Location: Right upper arm)   Pulse 89   Temp 97.4 °F (36.3 °C)   Resp 18   Ht 6' 3\" (1.905 m)   Wt 77.3 kg (170 lb 6.7 oz)   SpO2 97%   BMI 21.30 kg/m²          General Appearance:   Well developed, well nourished,alert and oriented x 3, and   individual in no acute distress. Ears/Nose/Mouth/Throat:    Hearing grossly normal.         Neck:  Supple. Chest:    Lungs clear, diminished bases. .   Cardiovascular:   Regular rate and rhythm, S1, S2 normal, no murmur.    Abdomen: Soft, non-tender, bowel sounds are active. Extremities:  tr edema bilaterally. Skin:  Warm and dry.      Telemetry: normal sinus rhythm          Data Review:    Labs:    Recent Results (from the past 24 hour(s))   C REACTIVE PROTEIN, QT    Collection Time: 04/11/22  3:56 AM   Result Value Ref Range    C-Reactive protein 13.30 (H) 0.00 - 0.60 mg/dL   CK    Collection Time: 04/11/22  3:56 AM   Result Value Ref Range    CK 37 (L) 39 - 308 U/L   NT-PRO BNP    Collection Time: 04/11/22  3:56 AM   Result Value Ref Range    NT pro- (H) <450 PG/ML   CBC W/O DIFF    Collection Time: 04/11/22  3:56 AM   Result Value Ref Range    WBC 8.1 4.1 - 11.1 K/uL    RBC 3.36 (L) 4.10 - 5.70 M/uL    HGB 10.0 (L) 12.1 - 17.0 g/dL    HCT 29.3 (L) 36.6 - 50.3 %    MCV 87.2 80.0 - 99.0 FL    MCH 29.8 26.0 - 34.0 PG    MCHC 34.1 30.0 - 36.5 g/dL    RDW 14.5 11.5 - 14.5 %    PLATELET 660 (L) 712 - 400 K/uL    MPV 9.7 8.9 - 12.9 FL    NRBC 0.0 0  WBC    ABSOLUTE NRBC 0.00 0.00 - 6.70 K/uL   METABOLIC PANEL, BASIC    Collection Time: 04/11/22  3:56 AM   Result Value Ref Range    Sodium 134 (L) 136 - 145 mmol/L    Potassium 3.8 3.5 - 5.1 mmol/L    Chloride 107 97 - 108 mmol/L    CO2 19 (L) 21 - 32 mmol/L    Anion gap 8 5 - 15 mmol/L    Glucose 153 (H) 65 - 100 mg/dL    BUN 17 6 - 20 MG/DL    Creatinine 1.06 0.70 - 1.30 MG/DL    BUN/Creatinine ratio 16 12 - 20      GFR est AA >60 >60 ml/min/1.73m2    GFR est non-AA >60 >60 ml/min/1.73m2    Calcium 8.1 (L) 8.5 - 10.1 MG/DL   MAGNESIUM    Collection Time: 04/11/22  3:56 AM   Result Value Ref Range    Magnesium 1.9 1.6 - 2.4 mg/dL   PHOSPHORUS    Collection Time: 04/11/22  3:56 AM   Result Value Ref Range    Phosphorus 3.0 2.6 - 4.7 MG/DL          Radiology:        Current Facility-Administered Medications   Medication Dose Route Frequency    cefepime (MAXIPIME) 2 g in sterile water (preservative free) 10 mL IV syringe  2 g IntraVENous Q8H    hydrocortisone Sod Succ (PF) (SOLU-CORTEF) injection 50 mg  50 mg IntraVENous Q12H    sodium chloride (NS) flush 5-10 mL  5-10 mL IntraVENous PRN    amiodarone (CORDARONE) 375 mg/250 mL D5W infusion  0.5-1 mg/min IntraVENous CONTINUOUS    sodium chloride (NS) flush 5-40 mL  5-40 mL IntraVENous Q8H    sodium chloride (NS) flush 5-40 mL  5-40 mL IntraVENous PRN    acetaminophen (TYLENOL) tablet 650 mg  650 mg Oral Q6H PRN    Or    acetaminophen (TYLENOL) suppository 650 mg  650 mg Rectal Q6H PRN    polyethylene glycol (MIRALAX) packet 17 g  17 g Oral DAILY PRN    ondansetron (ZOFRAN ODT) tablet 4 mg  4 mg Oral Q8H PRN    Or    ondansetron (ZOFRAN) injection 4 mg  4 mg IntraVENous Q6H PRN    azithromycin (ZITHROMAX) tablet 500 mg  500 mg Oral DAILY    enoxaparin (LOVENOX) injection 40 mg  40 mg SubCUTAneous Q24H    calcium-vitamin D (OS-SEVERINO +D3) 500 mg-200 unit per tablet 1 Tablet  1 Tablet Oral BID    tamsulosin (FLOMAX) capsule 0.4 mg  0.4 mg Oral QPM          Lakia Ferrer.  ITZEL Nash     [facility] [address] [place] [address], [address] [place] [address] [address]   [phone]

## 2022-04-11 NOTE — CONSULTS
Requesting Provider: Ruchi Grimm DO - Reason for Consultation: \"retention\"  Pre-existing [facility] Patient:   yes                Patient: Vineet Corral MRN: [id number]  SSN: [id number]    YOB: 1933  Age: 80 y.o. Sex: male     Location: 422/01       Code Status: Full Code   PCP: Cookie Galvez MD  - [phone]   Emergency Contact:  Primary Emergency Contact: Aleida Gaona, Home Phone: [phone]   Race/Latter-day/Language: Ovi Knox / Florian Maw / Roopa Figures   Payor: Payor: America Trent / Plan: Magalie Ongage / Product Type: Medicare /    Prior Admission Data: 1/25/22 [address] [place] [address] Jagruti Medrano   Hospitalized:  Hospital Day: 3 - Admitted 4/9/2022  2:28 PM     CONSULTANTS  IP CONSULT TO HOSPITALIST  IP CONSULT TO UROLOGY  IP CONSULT TO CARDIOLOGY  IP CONSULT TO PULMONOLOGY   ADMISSION DIAGNOSES    ICD-10-CM ICD-9-CM   1. Fever, unspecified fever cause  R50.9 780.60   2. Atrial fibrillation with RVR (HCC)  I48.91 427.31   3. Sepsis without acute organ dysfunction, due to unspecified organism ([facility] [address]  A41.9 038.9     995.91         Assessment/Plan:       · Urinary Retention  · Metastatic Prostate CA   · COVID 19     - Insert tomlinson for >800 cc of retention. Maintain tomlinson through dc. Will arrange outpatient follow up for voiding trial. Continue Flomax. - He missed recent imaging for the management of his prostate CA due to Covid 19 infection. I will re-arrange these scans and follow up with his primary Urologist accordingly. Plan discussed with the patient and he is in agreement. Thank you for this consult. Please call for further questions. Supervising Dr. Cece FRANCE      CC: Lethargy   HPI: He is a 80 y.o. male with pmh of metastatic prostate cancer who presents with weakness and difficulty urinating. History of recent COVID-19 pneumonia diagnosed on March 20 requiring hospitalization and supplemental oxygen while on a cruise in [place].  In the ER he was found to be in Afib with RVR and was admitted severe sepsis, acute hypoxic respiratory failure, and urinary retention, therefore, Urology was consulted. He is followed by Dr. 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 Last seen in 2022, see last OV note below. He continues on combination therapy of ADT with 6 month Lupron, Zytiga, and Prednisone. He is also on Xgeva for bone health. He is managed on Flomax for his urinary symptoms. Workup showed Tmax 100.8 F. . WBC wnl. Hgb 10, Cr wnl. Now COVID-19 PCR test is positive again. UA 10-20 RBC, 0-4 WBC, no bacteria. CTA of the chest revealed Bilateral pleural effusions right greater than left with underlying atelectasis. Rounded pulmonary lesions in the right upper lobe are concerning for neoplasm. Additional small bilateral pulmonary nodules are seen throughout the lung fields bilaterally. No evidence of pulmonary embolism. Possible bone metastasis left lateral rib. He has been treated with Zithromax and cefepime and amiodarone gtt. Fevers resolved. Converted to NSR. Remains on home flomax. There are no documented bladder scans. He has been voiding independently with an external catheter. He admits difficulty voiding on arrival requiring I/O cath x 1. He then reports relief of his symptoms with no further difficulty voiding or incomplete emptying. I performed a PVR bladder scan at the bedside that showed >800 cc. No associated symptoms. Problem: retention; Location: bladder; Quality:>800 cc, Severity: mod-severe; Timin days?, Context: hx of prostate CA;  Associated s/s:none      =====last OV note====  Zoie Villagran is an 80year old male who presents today for \"prostate cancer \". He returns for follow-up. Mr. 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 Recently completed Provenge.  PSA is 0.492ng/ml on 2022 from 0.222ng/ml on 11/17/2021. T level is castrate. He continues on combination therapy of ADT with 6 month Lupron, Zytiga, and Prednisone. He is also on Xgeva for bone health. He has one tooth in the front that is loose. He has bone pain in his shoulder and chronic lower back pain. Bone and CT scan were repeated in September which show bony mets. Germline testing is negative for BRCA 1 and 2. 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 Flomax is managing his urinary symptoms well. Leg swelling has returned. UA is clear. DEXA done in February with T score of -1.2. He was diagnosed with prostate cancer on 11/22/2017. A total of 12/12 cores were positive for cancer. The highest core percentage involved was 90%. The measured prostate volume was 261.14 ccs. His clinical stage was T3. Final pathologic staging was T3c  N1  M1. He is using Exercise, Calcium, and Vitamin D for bone density maintenance. PROSTATE CANCER TREATMENT HISTORY:  First Treatment: Androgen deprivation therapy - 11/29/2017   Second Treatment: Jareth Gonzales - 12/07/2017   Third Treatment: Provenge  - 01/10/2022  -- 01/24/2022  end        PAST MEDICAL HISTORY:    Allergies: No known allergies. Medications: abiraterone 250 mg tablet (abiraterone) 4 tablet by mouth once a day   prednisone 5 mg tablet (prednisone) 1 tablet by mouth twice a day as directed   tamsulosin 0.4 mg capsule (tamsulosin) 1 capsule by mouth every night as directed   hydrochlorothiazide 25 mg tablet (hydrochlorothiazide) Take 1 tablet by mouth once a day   Lupron Depot (6 Month) 45 mg syringe kit (leuprolide (6 month))   * CITRACAL MAXIMUM + D3 2 once a day     Illnesses: Cancer. DENIES: Heart Disease, Pacemaker/Defibrillator, Lung Disease, Diabetes, High Blood Pressure, Bowel Problems, Stroke/Seizure, Kidney Problems, Bleeding Problems, HIV, or Hepatitis.      Surgeries: DENIES prior surgeries      Family History: DENIES: Prostate cancer, Kidney cancer, Kidney disease, Kidney stones. Social History: Part Time - [employer]. . Smoking status: never smoker. Does not drink alcohol. System Review: Admits to: Easy Bleeding, Impaired Sex Drive, Leg Swelling, and Lower Extremity Weakness. DENIES: Unexplained Weight Loss, Dry Eyes, Dry Mouth, Shortness of Breath, Constipation, Involuntary Urine Loss, Dry Skin, Psychiatric Problems, Rash. EXAMINATION: Vitals: Pulse: 83 BP: 127/60 Weight: 189 lbs Height: 6' 1\" BMI: 25.03 kg/m^2  Appearance: well-developed NAD     URINALYSIS from Voided specimen  Urine Dip: pH: 7.0, Bld: Neg, LE: Neg, Nit: Neg, Prot: Neg, Ket: Neg, Gluc: Neg  Urine Micro not done    PSA HISTORY  0.492 ng/ml on 02/18/2022  0.222 ng/ml on 11/12/2021  <0.1 ng/ml on 08/11/2021    Prescription(s) Today: prednisone 5 mg tablet (prednisone) 1 tablet by mouth twice a day as directed   #180 tablet x 3,  02/23/2022, Chata Butts RN, SIGNED  tamsulosin 0.4 mg capsule (tamsulosin) Take 1 capsule by mouth every evening   #90 capsule x 3,  02/23/2022, Chata Butts RN, SIGNED    IMPRESSION:    1. ADENOCARCINOMA, PROSTATE (JDK30-G47) - Unchanged    2. MALIGNANT NEOPLASM METASTATIC TO BONE (SEH11-Z41.51) - Unchanged    3. LYMPH NODE METASTASIS (YAB05-F30.9) - Unchanged    4. CANCER WITH PULMONARY METASTASES (KSZ34-D26.00) - Unchanged    PLAN: Continue 6 month Lupron. Next one due in May  We are holding Xgeva for a year due to dental work. Continue Zytiga, Prednisone 5mg BID, and Flomax 0.4mg. Refill all medications. Continue HCTZ 25mg. He does not like taking this every day but likes the skinny ankles. Encouraged to take this daily. Next steps of treatment we visited today with chemotherapy, Lutetium, [address] and Barbara Spray. Risks and benefits reviewed. Pros and cons reviewed as well. I would like to reserve these therapies until we see progression of disease in his imaging.  At that time I would like switch him to Amena Points or Lutetium when this is approved. Get Bone and CT CA in March and see me after, sooner with any questions or concerns. Problem: moderate  Data: moderate  Risk: moderate. The patient was given a verbal/written log of Blood Pressure and recommendations. Borderline Hypertension: Discuss this mildly elevated BP at next routine followup. cc: MD Mindy Tsang MD  Transcribed by Speech to Text Technology  Today's Services  E&M Service, Urinalysis Dipstick    Upcoming Orders  Return Office Visit - with Stepan Mera MD soon after 2022  Bone Scan, CT Cancer A/P (+IV/+PO)        By signing my name below, I, Adilson Manzo, attest that this documentation has been prepared under the direction and in the presence of Dr. Stepan Mera MD. 4    Electronically Signed: Geraldo Ornelas. 2022. 1:00 PM.     I, Dr. Stepan Mera, personally performed the services described in this documentation. All medical record entries made by the scribe were at my direction and in my presence. I have reviewed the chart and discharge instructions and agree that the record reflects my personal performance and is accurate and complete.        Electronically signed by Stepan Mera MD on 2022 at 6:10 PM    ________________________________________________________________________      Temp (24hrs), Av.7 °F (36.5 °C), Min:97.3 °F (36.3 °C), Max:98.1 °F (36.7 °C)    Urinary Status: Voiding (condom  cath)  Creatinine   Date/Time Value Ref Range Status   2022 03:56 AM 1.06 0.70 - 1.30 MG/DL Final   04/10/2022 04:20 AM 0.83 0.70 - 1.30 MG/DL Final   2022 02:44 PM 0.93 0.70 - 1.30 MG/DL Final   2019 07:07 AM 0.92 0.70 - 1.30 MG/DL Final   2019 09:10 PM 1.07 0.70 - 1.30 MG/DL Final     Current Antimicrobial Therapy (168h ago, onward)     Ordered     Start Stop    04/10/22 1409  cefepime (MAXIPIME) 2 g in sterile water (preservative free) 10 mL IV syringe 2 g,   IntraVENous,   EVERY 8 HOURS        References:    Lexicomp    04/10/22 1500 --    04/09/22 1716  azithromycin (ZITHROMAX) tablet 500 mg  500 mg,   Oral,   DAILY        References:    Lexicomp    04/09/22 1733 --              Key Anti-Platelet Anticoagulant Meds     The patient is on no antiplatelet meds or anticoagulants. Diet: ADULT DIET Regular  DIET ONE TIME MESSAGE -       Labs     Lab Results   Component Value Date/Time    Lactic acid 1.2 04/09/2022 02:56 PM    WBC 8.1 04/11/2022 03:56 AM    HCT 29.3 (L) 04/11/2022 03:56 AM    PLATELET 090 (L) 86/73/5440 03:56 AM    Sodium 134 (L) 04/11/2022 03:56 AM    Potassium 3.8 04/11/2022 03:56 AM    Chloride 107 04/11/2022 03:56 AM    CO2 19 (L) 04/11/2022 03:56 AM    BUN 17 04/11/2022 03:56 AM    Creatinine 1.06 04/11/2022 03:56 AM    Glucose 153 (H) 04/11/2022 03:56 AM    Calcium 8.1 (L) 04/11/2022 03:56 AM    Magnesium 1.9 04/11/2022 03:56 AM     UA:   Lab Results   Component Value Date/Time    Color YELLOW/STRAW 04/09/2022 02:56 PM    Appearance CLEAR 04/09/2022 02:56 PM    Specific gravity 1.011 04/09/2022 02:56 PM    pH (UA) 5.5 04/09/2022 02:56 PM    Protein Negative 04/09/2022 02:56 PM    Glucose Negative 04/09/2022 02:56 PM    Ketone 15 (A) 04/09/2022 02:56 PM    Bilirubin Negative 04/09/2022 02:56 PM    Urobilinogen 2.0 (H) 04/09/2022 02:56 PM    Nitrites Negative 04/09/2022 02:56 PM    Leukocyte Esterase Negative 04/09/2022 02:56 PM    Epithelial cells FEW 04/09/2022 02:56 PM    Bacteria Negative 04/09/2022 02:56 PM    WBC 0-4 04/09/2022 02:56 PM    RBC 10-20 04/09/2022 02:56 PM     Imaging     Results for orders placed during the hospital encounter of 04/09/22    CTA CHEST W OR W WO CONT    Narrative  EXAM:  CTA CHEST W OR W WO CONT    INDICATION:   Generalized weakness, atrial fibrillation    COMPARISON: 5/30/2019. TECHNIQUE:  Precontrast  images were obtained to localize the volume for acquisition.   Multislice helical CT arteriography was performed from the diaphragm to the  thoracic inlet during uneventful rapid bolus of 85 cc Isovue-370. Lung and soft  tissue windows were generated. Coronal and sagittal images were generated and  3D post processing consisting of coronal maximum intensity images was performed. CT dose reduction was achieved through use of a standardized protocol tailored  for this examination and automatic exposure control for dose modulation. FINDINGS:  CHEST:  THYROID: No nodule. MEDIASTINUM: No mass or lymphadenopathy. CINDY: No mass or lymphadenopathy. THORACIC AORTA: Atherosclerotic without evidence of aneurysm. MAIN PULMONARY ARTERY: There is no evidence of pulmonary embolism. TRACHEA/BRONCHI: Patent. ESOPHAGUS: No wall thickening or dilatation. HEART: Normal in size. PLEURA: Small left pleural effusion and more moderate right pleural effusion. LUNGS: Bilateral lower lobe atelectasis. Focal 2.1 cm parenchymal opacity is  noted in the right upper lobe. Small pulmonary nodules are seen in the lung  fields bilaterally. 11 mm pulmonary nodule is seen in the right upper lobe. INCIDENTALLY IMAGED UPPER ABDOMEN: Small hiatal hernia. BONES: Sclerotic lesion left lateral seventh rib. Impression  Bilateral pleural effusions right greater than left with underlying  atelectasis. Rounded pulmonary lesions in the right upper lobe are concerning  for neoplasm. Additional small bilateral pulmonary nodules are seen throughout  the lung fields bilaterally. No evidence of pulmonary embolism. Possible bone  metastasis left lateral rib. US Results (most recent):  No results found for this or any previous visit.       Cultures     All Micro Results     Procedure Component Value Units Date/Time    CULTURE, BLOOD, PAIRED [[id number]] Collected: 04/09/22 1456    Order Status: Completed Specimen: Blood Updated: 04/11/22 1645     Special Requests: NO SPECIAL REQUESTS        Culture result: NO GROWTH 2 DAYS       CULTURE, URINE [[id number]] Collected: 04/09/22 1456    Order Status: Completed Specimen: Cath Urine Updated: 04/10/22 1153     Special Requests: NO SPECIAL REQUESTS        Culture result: No growth (<1,000 CFU/ML)       RESPIRATORY VIRUS PANEL W/COVID-19, PCR [[id number]]  (Abnormal) Collected: 04/09/22 2050    Order Status: Completed Specimen: Nasopharyngeal Updated: 04/09/22 2319     Adenovirus Not detected        Coronavirus 229E Not detected        Coronavirus HKU1 Not detected        Coronavirus CVNL63 Not detected        Coronavirus OC43 Not detected        SARS-CoV-2, PCR Detected        Comment: CALLED TO AND READ BACK BY  DANTE DUTTON AT 2317 ON 4/9/22. AT          [facility] Not detected        Rhinovirus and Enterovirus Not detected        Influenza A Not detected        Influenza A, subtype H1 Not detected        Influenza A, subtype H3 Not detected        INFLUENZA A H1N1 PCR Not detected        Influenza B Not detected        Parainfluenza 1 Not detected        Parainfluenza 2 Not detected        Parainfluenza 3 Not detected        Parainfluenza virus 4 Not detected        RSV by PCR Not detected        B. parapertussis, PCR Not detected        Bordetella pertussis - PCR Not detected        Chlamydophila pneumoniae DNA, QL, PCR Not detected        Mycoplasma pneumoniae DNA, QL, PCR Not detected       COVID-19 RAPID TEST [[id number]] Collected: 04/09/22 1504    Order Status: Completed Specimen: Nasopharyngeal Updated: 04/09/22 1549     Specimen source Nasopharyngeal        COVID-19 rapid test Not detected        Comment: Rapid Abbott ID Now       Rapid NAAT:  The specimen is NEGATIVE for SARS-CoV-2, the novel coronavirus associated with COVID-19. Negative results should be treated as presumptive and, if inconsistent with clinical signs and symptoms or necessary for patient management, should be tested with an alternative molecular assay.   Negative results do not preclude SARS-CoV-2 infection and should not be used as the sole basis for patient management decisions. This test has been authorized by the FDA under an Emergency Use Authorization (EUA) for use by authorized laboratories. Fact sheet for Healthcare Providers: ConventionUpdate.co.nz  Fact sheet for Patients: ConventionUpdate.co.nz       Methodology: Isothermal Nucleic Acid Amplification         URINE CULTURE HOLD SAMPLE [[id number]] Collected: 04/09/22 1456    Order Status: Completed Specimen: Urine from Serum Updated: 04/09/22 1526     Urine culture hold       Urine on hold in Microbiology dept for 2 days. If unpreserved urine is submitted, it cannot be used for addtional testing after 24 hours, recollection will be required.           CULTURE, BLOOD [[id number]]     Order Status: Canceled Specimen: Blood            Past History: (Complete 2+/3 areas)   No Known Allergies   Current Facility-Administered Medications   Medication Dose Route Frequency    cefepime (MAXIPIME) 2 g in sterile water (preservative free) 10 mL IV syringe  2 g IntraVENous Q8H    hydrocortisone Sod Succ (PF) (SOLU-CORTEF) injection 50 mg  50 mg IntraVENous Q12H    sodium chloride (NS) flush 5-10 mL  5-10 mL IntraVENous PRN    amiodarone (CORDARONE) 375 mg/250 mL D5W infusion  0.5-1 mg/min IntraVENous CONTINUOUS    sodium chloride (NS) flush 5-40 mL  5-40 mL IntraVENous Q8H    sodium chloride (NS) flush 5-40 mL  5-40 mL IntraVENous PRN    acetaminophen (TYLENOL) tablet 650 mg  650 mg Oral Q6H PRN    Or    acetaminophen (TYLENOL) suppository 650 mg  650 mg Rectal Q6H PRN    polyethylene glycol (MIRALAX) packet 17 g  17 g Oral DAILY PRN    ondansetron (ZOFRAN ODT) tablet 4 mg  4 mg Oral Q8H PRN    Or    ondansetron (ZOFRAN) injection 4 mg  4 mg IntraVENous Q6H PRN    azithromycin (ZITHROMAX) tablet 500 mg  500 mg Oral DAILY    enoxaparin (LOVENOX) injection 40 mg  40 mg SubCUTAneous Q24H    calcium-vitamin D (OS-SEVERINO +D3) 500 mg-200 unit per tablet 1 Tablet  1 Tablet Oral BID    tamsulosin (FLOMAX) capsule 0.4 mg  0.4 mg Oral QPM    Prior to Admission medications    Medication Sig Start Date End Date Taking? Authorizing Provider   doxycycline (VIBRA-TABS) 100 mg tablet Take 100 mg by mouth two (2) times a day. 4/8/22 4/14/22 Yes Provider, Historical   hydroCHLOROthiazide (HYDRODIURIL) 25 mg tablet Take 25 mg by mouth as needed (edema). Yes Provider, Historical   calcium citrate-vitamin D3 (CITRACAL + D) tablet Take 1 Tab by mouth two (2) times a day. Yes Provider, Historical   leuprolide acetate (LUPRON DEPOT IM) by IntraMUSCular route. Every 6 months   Yes Provider, Historical   abiraterone (ZYTIGA) 250 mg tab Take 1,000 mg by mouth daily. Yes Provider, Historical   predniSONE (DELTASONE) 5 mg tablet Take 5 mg by mouth two (2) times a day. Yes Provider, Historical   tamsulosin (FLOMAX) 0.4 mg capsule Take 0.4 mg by mouth every evening. Yes Provider, Historical        PMHx:  has a past medical history of Cancer ([facility] [address] and Prostate cancer ([facility] [address] PSurgHx:  has a past surgical history that includes ir insert tunl cvc w/o port over 5 yr (1/6/2022) and ir remove tunl cvad w/o port / pump (1/25/2022). PSocHx:  reports that he has never smoked. He has never used smokeless tobacco. He reports that he does not drink alcohol and does not use drugs.    ROS:  (Complete - 10 systems) - DENIES: Weightloss (Constitutional), Dry mouth (ENMT), Chest pain (CV), SOB (Respiratory), Constipation (GI), Weakness (MS), Pallor (Skin), TIA Sx (Neuro), Confusion (Psych), Easy bruising (Heme)    Physical Exam: (Comprehesive - 8+ 1995 Systems)     (1) Constitutional:  NAD; pleasant  FIO2:   on SpO2: O2 Sat (%): 97 %  O2 Device: Nasal cannula O2 Flow Rate (L/min): 2 l/min  Patient Vitals for the past 24 hrs:   BP Temp Pulse Resp SpO2   04/11/22 0552   81     04/11/22 0400   91     04/11/22 0341 125/65 97.4 °F (36.3 °C) 83 18 97 %   04/11/22 0200   81     04/11/22 0154 125/66  84 18 96 %   04/10/22 2211 124/61 97.3 °F (36.3 °C) 92 16    04/10/22 2157   89     04/10/22 2000   93     04/10/22 1800 130/67 97.9 °F (36.6 °C) 95 23 97 %   04/10/22 1600   91     04/10/22 1519 135/70 97.8 °F (36.6 °C) 89 21 97 %   04/10/22 1400   86     04/10/22 1200 138/63 97.9 °F (36.6 °C) 89 20 98 %   04/10/22 1000 139/73 98.1 °F (36.7 °C) 80 24 98 %       Date 04/10/22 0700 - 04/11/22 0659 04/11/22 0700 - 04/12/22 0659   Shift 0906-3433 8006-1987 24 Hour Total 4350-5823 9818-4381 24 Hour Total   INTAKE   Shift Total(mL/kg)         OUTPUT   Urine(mL/kg/hr) 750(0.8)  750(0.4)        Urine Occurrence(s)  1 x 1 x        Urine Output (mL) (External Urinary Catheter 04/10/22) 750  750      Stool           Stool Occurrence(s)  1 x 1 x      Shift Total(mL/kg) 750(9.7)  750(9.7)      NET -750  -750      Weight (kg) 77.3 77.3 77.3 77.3 77.3 77.3      (2) ENMT:   moist mucous membranes, normal sinuses   (3) Respiratory:  breathing easily, no distress   (4) GI:  no abdominal masses, no tenderness   (5) :   Voiding independently, yellow UA, no CVA tenderness   (6) Lymphatic:  no adenopathy, neck supple   (7) Muscloskeletal:  no gross deformity, normal ROM   (8) Skin:  no rash, warm & dry   (9) Neuro:  Alert, no focal deficits, normal speech      Signed By: Kristen Khalil NP  - April 11, 2022

## 2022-04-11 NOTE — PROGRESS NOTES
FUNCTIONAL STATUS PRIOR TO ADMISSION: Patient was independent and active without use of DME. Patient was independent for basic and instrumental ADLs. L forearm brace due to thumb pain and inability to complete ROM s/p fall last Friday via [place]. Baseline back pain. Works from home for [employer]. Just got back from river boat cruise and 12 days in the hospital bed in [facility]. Retired [employer] . Stressed 2* was supposed to close on house today, already moved into [facility]. HOME SUPPORT: The patient lived alone with wife to provide assistance. Wife works, elderly, and utilizes RW for functional mobility. Has children throughout [place], all work. Walk in shower    Occupational Therapy Goals  Initiated 4/11/2022  1. Patient will perform bathing with moderate assistance  within 7 day(s). 2.  Patient will perform gathering ADL item high and low 4/4 with moderate assistance  within 7 day(s). 3.  Patient will perform lower body dressing with moderate assistance  within 7 day(s). 4.  Patient will perform toilet transfers with minimal assistance/contact guard assist within 7 day(s). 5.  Patient will perform shower transfer in prep for shower with min assistance within 7 day(s). 6.  Patient will participate in upper extremity therapeutic exercise/activities with supervision/set-up with light resistance within 7 day(s). 7.  Patient will utilize energy conservation techniques during functional activities with verbal cues within 7 day(s). OCCUPATIONAL THERAPY EVALUATION  Patient: Chance Mirza (44 y.o. male)  Date: 4/11/2022  Primary Diagnosis: Sepsis ([facility] [address] [A41.9]       Precautions:    (droplet plus)    ASSESSMENT  Based on the objective data described below, the patient presents with ADL independence limited by cardiopulmonary tolerance (1 L, orthostatic hypotension, elevated HR), dynamic standing balance, functional reach to lower body, standing tolerance, and tomlinson cath to now manage.       Current Level Received refill request for Simvastatin, triamterene-hctz and levothyroxine  Last refill: simvastatin 04/29/2019 #90 R-0, triamterene-hctz and levothyroxine 05/05/2019 #90 R-0  Last office visit: 11/30/2018      Order prepared and set to E-prescribe upon approval/denial.   Please sign if appropriate.    of Function Impacting Discharge (ADLs/self-care): moderate assistance upper body ADLs; max assistance lower body ADLs, bed mobility supervision, bathroom mobility moderate assistance, standing tolerance 7 mins    Other factors to consider for discharge: wife, family     Patient will benefit from skilled therapy intervention to address the above noted impairments. PLAN :  Recommendations and Planned Interventions: self care training, functional mobility training, therapeutic exercise, balance training, therapeutic activities, endurance activities, patient education, home safety training and family training/education    Frequency/Duration: Patient will be followed by occupational therapy 3 times a week to address goals. Recommendation for discharge: (in order for the patient to meet his/her long term goals)  To be determined: per patient and wife asking for IPR. Patient can tolerate 3 hours of therapy. Patient is highly motivated to be independent and return back to work. Requires external cues and a schedule to focus on own health and management. If discharges home will need assistance. This discharge recommendation:  Has not yet been discussed the attending provider and/or case management    IF patient discharges home will need the following DME: AE: long handled bathing and AE: long handled dressing       SUBJECTIVE:   Patient stated Oh I am more wobbly than I thought I would be.     OBJECTIVE DATA SUMMARY:   HISTORY:   Past Medical History:   Diagnosis Date    Cancer [facility])     prostate ca with mets    Prostate cancer [facility])      Past Surgical History:   Procedure Laterality Date    IR INSERT TUNL CVC W/O PORT OVER 5 YR  1/6/2022    IR REMOVE TUNL CVAD W/O PORT / PUMP  1/25/2022       Expanded or extensive additional review of patient history:     Home Situation  Home Environment: Private residence  One/Two Story Residence: One story  Living Alone: No  Support Systems: Spouse/Significant Other,Child(elizabeth)  Patient Expects to be Discharged to[de-identified] Home with home health  Tub or Shower Type: Shower    Hand dominance: Right    EXAMINATION OF PERFORMANCE DEFICITS:  Cognitive/Behavioral Status:  Neurologic State: Alert  Orientation Level: Oriented X4  Cognition: Appropriate decision making; Appropriate for age attention/concentration; Appropriate safety awareness  Perception: Appears intact  Perseveration: No perseveration noted  Safety/Judgement: Awareness of environment;Good awareness of safety precautions    Skin: intact    Edema: intact    Hearing: Auditory  Auditory Impairment: None    Vision/Perceptual:                                     Range of Motion:  B UEs WDL  AROM: Within functional limits                         Strength:  B UEs WDL  Strength: Within functional limits                Coordination:  Coordination: Within functional limits  Fine Motor Skills-Upper: Left Impaired;Right Impaired    Gross Motor Skills-Upper: Left Intact; Right Intact    Tone & Sensation:  WDL  Tone: Normal  Sensation: Intact                      Balance:  Sitting: Intact; Without support  Standing: Impaired; Without support  Standing - Static: Fair  Standing - Dynamic : Poor    Functional Mobility and Transfers for ADLs:  Bed Mobility: exit L side  Supine to Sit: Supervision  Sit to Supine: Minimum assistance    Transfers: EOB  Sit to Stand: Minimum assistance  Stand to Sit: Minimum assistance  Bathroom Mobility: Moderate assistance  Toilet Transfer : Moderate assistance  Shower Transfer: Total assistance (not safe at this time)    ADL Assessment:  Feeding: Minimum assistance FM assistance containers    Oral Facial Hygiene/Grooming: Moderate assistance standing at sink    Bathing: Maximum assistance infer    Upper Body Dressing: Minimum assistance infer    Lower Body Dressing: total assistance    Toileting:  Moderate assistance standing for BM hygiene; new tomlinson cath to manage as well                ADL Intervention and task modifications:                       lower body: instruction on benefits attempting on own, tailor sit in bed; doffed socks with much effort and cues PLB. Donned with total A. Standing balance moderate assistance during lower body ADLs. Toileting: instruction PLB, grab bar to steady self, use wash cloth to maximize hygiene and decrease endurance required      Patient instructed and indicated understanding the benefits of maintaining activity tolerance, functional mobility, and independence with self care tasks during acute stay  to ensure safe return home and to baseline. Encouraged patient to increase frequency and duration OOB, be out of bed for all meals, perform daily ADLs (as approved by RN/MD regarding bathing etc), and performing functional mobility to/from bathroom with nurse. Cognitive Retraining  Safety/Judgement: Awareness of environment;Good awareness of safety precautions    Therapeutic Exercise:  Written on wipe board. Functional Measure:      Pain Rating:      Activity Tolerance:   Fair   1L O2  HR 96  sats 96%  RR 30 20  /86 supine  133/79 sitting    After treatment patient left in no apparent distress:    Sitting in chair, Call bell within reach, Caregiver / family present and Side rails x 3    COMMUNICATION/EDUCATION:   The patients plan of care was discussed with: Registered nurse. Home safety education was provided and the patient/caregiver indicated understanding., Patient/family have participated as able in goal setting and plan of care. and Patient/family agree to work toward stated goals and plan of care. This patients plan of care is appropriate for delegation to [facility] [place].     Thank you for this referral.  Cait Brandon  Time Calculation: 63 mins

## 2022-04-11 NOTE — PROGRESS NOTES
0800 Bedside shift change report given to  [address] [place] [address] (oncoming nurse) by Bobby Cho (offgoing nurse).  Report included the following information SBAR, Kardex, ED Summary, OR Summary, Procedure Summary, Intake/Output, MAR, Accordion, Recent Results, Med Rec Status, and Cardiac Rhythm  SR AV  BLOCK

## 2022-04-11 NOTE — PROGRESS NOTES
Bedside and Verbal shift change report given to Naif Marinelli RN (oncoming nurse) by Felipa Kitchen RN (offgoing nurse). Report included the following information SBAR, Procedure Summary, Intake/Output, MAR, Accordion and Recent Results.

## 2022-04-11 NOTE — PROGRESS NOTES
Transition of Care Plan  RUR 16%    COVID 19 Positive   Requiring 2 liters 02-- no 02 at baseline  COVID 19 vaccinated. Therapy recommending IPR     Medicare pt has received, reviewed, and signed 1st IM letter informing them of their right to appeal the discharge. Signed copy has been placed on pt bedside chart. Disposition  IPR vs  Home   Referral sent via [facility] to [facility]  No authorization needed as patient has Medicare    Transportation   \[facility]\""/ [facility]  Will arrange if ordered if he does not discharge to [facility]   No preference of [facility]  Medical follow up  PCP and specialist    Contact   Wife  Afsaneh Koenig  [phone]        Reason for Admission:  Sepsis   Came to ER due to weakness and lethargy  Hx prostate cancer with osseous mets                     RUR Score:      16%               Plan for utilizing home health: Will arrange if patient discharges home and it is ordered      PCP: First and Last name:  Tio Tena MD     Name of Practice:    Are you a current patient: Yes/No: yes   Approximate date of last visit: 1/5/22   Can you participate in a virtual visit with your PCP: yes                    Current Advanced Directive/Advance Care Plan: Full Code      Healthcare Decision Maker:   Click here to complete [address] including selection of the Healthcare Decision Maker Relationship (ie \"Primary\")                           Transition of Care Plan:     IPR  And medical follow up    CM met with patient's wife in patient's room to introduce self and explain role. Patient was in the bathroom. New address provided  [place] [address] and changed in chart. PCP and insurance confirmed. Secures medications for [facility] and [facility]  No hx of HH  No hx of Rehab/SNF  Self care and independent with adl's and iadl's prior to admission    Patient lives with his wife in two level condo with living area on the first floor.   He was self care and independent--driving and working part time prior to going on a [employer] last month and contracted COVID resulting in a 12 night stay in [place]--returned home April 2,22. He has been weak and tired since he returned home. Patient and wife have 3 adult children    Two live out of town but one is in [address] and is supportive. Patient is retired from the [employer]-- he was a . He works for the [employer] from home part time. Wife is a --  The couple enjoy traveling     Therapy recommending IPR-- Patient and wife in agreement   Chose [facility]. Referral sent via [facility]. Dr Wei Peace informed and in agreement. If patient does not discharge to [facility], they are in agreement with home health-- No preference of agency. .     The Plan for Transition of Care is related to the following treatment goals: IPR/HH  The Patient and/or patient representative  Wife was provided with a choice of provider and agrees   with the discharge plan. [x] Yes [] No  Freedom of choice list was provided with basic dialogue that supports the patient's individualized plan of care/goals, treatment preferences and shares the quality data associated with the providers. [x] Yes [] No    CM will follow and assist with transition of care plan--[facility]  [address] Management Interventions  PCP Verified by CM: Yes  Mode of Transport at Discharge:  (TBD pending  progress-- wife vs BLS)  Transition of Care Consult (CM Consult):  Other  Discharge Durable Medical Equipment: No  Physical Therapy Consult: Yes  Occupational Therapy Consult: Yes  Support Systems: Spouse/Significant Other,Child(elizabeth)  The Plan for Transition of Care is Related to the Following Treatment Goals : IPR  The Patient and/or Patient Representative was Provided with a Choice of Provider and Agrees with the Discharge Plan?: Yes  Freedom of Choice List was Provided with Basic Dialogue that Supports the Patient's Individualized Plan of Care/Goals, Treatment Preferences and Shares the Quality Data Associated with the Providers?: Yes  Discharge Location  Patient Expects to be Discharged to[de-identified] Rehab hospital/unit acute (will arrange [facility] if patient does not discharge to [facility])

## 2022-04-12 NOTE — PROGRESS NOTES
Cardiology Progress Note     Seen by Hawa NP, discussed with attending       Admit Date: 4/9/2022  Admit Diagnosis: Sepsis ([facility] [address] [A41.9]  Date: 4/12/2022     Time: 0800 AM        HPI: 80years old male with PMH of paroxysmal atrial flutter (episode in 2019 when he was hospitalized with pneumonia- saw Dr. Christina Felton inpatient but did not follow up or wear recommended event monitor), prostate cancer with metastasis,recent hospitalization for COVID in [place]. Presented to [facility] ER with generalized weakness and lethargy 2 days prior. Cardiology consulted over weekend for Afib with RVR. Had hypotension after IV amiodarone bolus but was treated with infusion and subsequently converted to NSR. CT chest on admission showed bilateral pleural effusions R>l and rounded pulmonary lesions in RUL concerning for neoplasm. Evidence of possible bone met lesion to rib. Pulmonary is following. Subjective:  Pt states he feels good. Denies chest pain, SOB, dizziness, palpitations and orthopnea. He still has some occasional mild nonproductive cough. He again reports that his urologist may be taking him off of Zytiga and therefore steroids and if that is the case, he may consider [address] if recommended. Assessment and Plan     1. Paroxysmal afib    -Remains in NSR since he converted back to NSR.    -History of PAFL in 2019   -TSH 0.68   -Amiodarone po (400 mg BID po x 7 days then 200 mg daily thereafter). Plan short term amiodarone course. .    -Echo 2018: EF 60-65%, mild-moderate MAC, mild TR. Repeat echo pending. -[id number]= 2.(age,). He may be agreeable to [address] if recommended and he is taken off of Zytiga and steroids (states his urologist/oncologist is planning to stop zytiga and therefor he would not need steroid). .     2. Prostate cancer with mets   -followed by urology    3. Acute hypoxic respiratory failure:   -O2 being weaned.    -Bilateral pleural effusions:on CTA chest R>L   -Echo pending but pro BNP was low for age   -IV lasix 20 mg daily per primary team..   -RUL round pulmonary lesions concerning for neoplasm. Pulmonary following. Repeat chest imaging planned in 6-8 weeks. 4. Covid +     Sepsis (on admission)    -Management per primary team.    5. Heart murmur: diastolic murmur noted. Echo pending  6. Hypokalemia: repleted by primary team.        Remains in NSR. Continue amiodarone. Await echo. [facility]  Past Medical History:   Diagnosis Date    Cancer [facility])     prostate ca with mets    Prostate cancer ([facility] [address]       Social Hx  Social History     Socioeconomic History    Marital status:      Spouse name: Not on file    Number of children: Not on file    Years of education: Not on file    Highest education level: Not on file   Occupational History    Not on file   Tobacco Use    Smoking status: Never Smoker    Smokeless tobacco: Never Used   Vaping Use    Vaping Use: Never used   Substance and Sexual Activity    Alcohol use: Never    Drug use: Never    Sexual activity: Not on file   Other Topics Concern    Not on file   Social History Narrative    ** Merged History Encounter **          Social Determinants of Health     Financial Resource Strain:     Difficulty of Paying Living Expenses: Not on file   Food Insecurity:     Worried About [address] in the Last Year: Not on file    [address] in the Last Year: Not on file   Transportation Needs:     Lack of Transportation (Medical): Not on file    Lack of Transportation (Non-Medical):  Not on file   Physical Activity:     Days of Exercise per Week: Not on file    Minutes of Exercise per Session: Not on file   Stress:     Feeling of Stress : Not on file   Social Connections:     Frequency of Communication with Friends and Family: Not on file    Frequency of Social Gatherings with Friends and Family: Not on file    Attends Islam Services: Not on file  Active Member of Clubs or Organizations: Not on file    Attends Club or Organization Meetings: Not on file    Marital Status: Not on file   Intimate Partner Violence:     Fear of Current or Ex-Partner: Not on file    Emotionally Abused: Not on file    Physically Abused: Not on file    Sexually Abused: Not on file   Housing Stability:     Unable to Pay for Housing in the Last Year: Not on file    Number of [place] in the Last Year: Not on file    Unstable Housing in the Last Year: Not on file       ROS:  Pertinent items are noted in HPI. Objective:      Physical Exam:                Visit Vitals  /76 (BP 1 Location: Right upper arm, BP Patient Position: Supine)   Pulse 76   Temp 97.5 °F (36.4 °C)   Resp 16   Ht 6' 3\" (1.905 m)   Wt 77.8 kg (171 lb 9.6 oz)   SpO2 98%   BMI 21.45 kg/m²          General Appearance:   Well developed, well nourished,alert and oriented x 3, and   individual in no acute distress. Ears/Nose/Mouth/Throat:    Hearing grossly normal.         Neck:  Supple. Chest:    Lungs clear, few faint crackle right base clear left base. Cardiovascular:   Regular rate and rhythm, S1, S2 normal, II/VI diastolic murmur best at LLSB. Abdomen:    Soft, non-tender, bowel sounds are active. Extremities:  tr edema bilaterally. Skin:  Warm and dry.      Telemetry: normal sinus rhythm          Data Review:    Labs:    Recent Results (from the past 24 hour(s))   CBC W/O DIFF    Collection Time: 04/12/22  1:25 AM   Result Value Ref Range    WBC 7.4 4.1 - 11.1 K/uL    RBC 3.30 (L) 4.10 - 5.70 M/uL    HGB 9.7 (L) 12.1 - 17.0 g/dL    HCT 29.1 (L) 36.6 - 50.3 %    MCV 88.2 80.0 - 99.0 FL    MCH 29.4 26.0 - 34.0 PG    MCHC 33.3 30.0 - 36.5 g/dL    RDW 14.8 (H) 11.5 - 14.5 %    PLATELET 655 (L) 701 - 400 K/uL    MPV 10.3 8.9 - 12.9 FL    NRBC 0.0 0  WBC    ABSOLUTE NRBC 0.00 0.00 - 4.91 K/uL   METABOLIC PANEL, BASIC    Collection Time: 04/12/22  1:25 AM   Result Value Ref Range Sodium 138 136 - 145 mmol/L    Potassium 3.3 (L) 3.5 - 5.1 mmol/L    Chloride 108 97 - 108 mmol/L    CO2 22 21 - 32 mmol/L    Anion gap 8 5 - 15 mmol/L    Glucose 123 (H) 65 - 100 mg/dL    BUN 18 6 - 20 MG/DL    Creatinine 0.98 0.70 - 1.30 MG/DL    BUN/Creatinine ratio 18 12 - 20      GFR est AA >60 >60 ml/min/1.73m2    GFR est non-AA >60 >60 ml/min/1.73m2    Calcium 8.5 8.5 - 10.1 MG/DL   MAGNESIUM    Collection Time: 04/12/22  1:25 AM   Result Value Ref Range    Magnesium 2.1 1.6 - 2.4 mg/dL   PHOSPHORUS    Collection Time: 04/12/22  1:25 AM   Result Value Ref Range    Phosphorus 2.2 (L) 2.6 - 4.7 MG/DL   PROCALCITONIN    Collection Time: 04/12/22  1:27 AM   Result Value Ref Range    Procalcitonin 0.18 ng/mL          Radiology:        Current Facility-Administered Medications   Medication Dose Route Frequency    potassium chloride SR (KLOR-CON 10) tablet 40 mEq  40 mEq Oral NOW    amiodarone (CORDARONE) tablet 400 mg  400 mg Oral BID    [START ON 4/18/2022] amiodarone (CORDARONE) tablet 200 mg  200 mg Oral DAILY    cefepime (MAXIPIME) 2 g in 0.9% sodium chloride 10 mL IV syringe  2 g IntraVENous Q12H    furosemide (LASIX) injection 20 mg  20 mg IntraVENous DAILY    [START ON 4/13/2022] predniSONE (DELTASONE) tablet 5 mg  5 mg Oral BID WITH MEALS    sodium chloride (NS) flush 5-10 mL  5-10 mL IntraVENous PRN    sodium chloride (NS) flush 5-40 mL  5-40 mL IntraVENous Q8H    sodium chloride (NS) flush 5-40 mL  5-40 mL IntraVENous PRN    acetaminophen (TYLENOL) tablet 650 mg  650 mg Oral Q6H PRN    Or    acetaminophen (TYLENOL) suppository 650 mg  650 mg Rectal Q6H PRN    polyethylene glycol (MIRALAX) packet 17 g  17 g Oral DAILY PRN    ondansetron (ZOFRAN ODT) tablet 4 mg  4 mg Oral Q8H PRN    Or    ondansetron (ZOFRAN) injection 4 mg  4 mg IntraVENous Q6H PRN    enoxaparin (LOVENOX) injection 40 mg  40 mg SubCUTAneous Q24H    calcium-vitamin D (OS-SEVERINO +D3) 500 mg-200 unit per tablet 1 Tablet  1 Tablet Oral BID    tamsulosin (FLOMAX) capsule 0.4 mg  0.4 mg Oral QPM          Vandana Lloyd.  ITZEL Nash     [facility] [address], [address] [place] [address]   [address]   [phone]

## 2022-04-12 NOTE — PROGRESS NOTES
[address] [facility] Adult  Hospitalist Group                                                                                          Hospitalist Progress Note  [facility],   Answering service: [phone] from in house phone        Date of Service:  2022  NAME:  Rock Sales  :  10/13/1933  MRN:  [id number]      Admission Summary:   80 y.o. male with pmh of metastatic prostate cancer (bones), recent COVID PNA (in March in [facility] while on Vacation)  who presents with weakness, and unable to urinate. 2 days ago began feeling weak and tired, had dispatch health sent to his house. There checked vitals, and checked him out, give him IV fluids. Per report also checked UA, and was negative. Today he felt increased weakness and was unable to urinate so decided to come to the ER. Vaccinated x 3 for COVID, does not rememebr if he got the influenza vaccine. He was on vacation from cruise in [facility] in the middle of March, when he contracted COVID, and had a prolonged hospitalization of 12 days. He denies any h/o blood clots, CVA, or Afib. In the ER pt was found to be in Afib with RVR with HR in the 150's, given amiodarone bolus which subsequently led to hypotension. He was then given IVF boluses with improvement. He now converted to NSR with HR in the low 100's. Pt denies any SOB, CP, N/V/D/C, no focal weakness. Does continue to have difficulty urinating. Interval history / Subjective: Follow-up respiratory failure. Patient seen and examined. Doing much better. Oxygenation improved. His son is at bedside. Assessment & Plan:     Acute hypoxic respiratory failure: improving  -CTA with bilateral pleural effusions, right greater than left with underlying atelectasis.   Rounded pulmonary lesions in the right upper lobe are concerning for neoplasm.  -Pulmonary consulted, appreciate recommendations  -Concern for volume overload in setting of bilateral effusions  -Continue lasix daily -pro   -echo ordered, pending read   -COVID PCR +. Symptoms more consistent with volume overload   -Needs repeat CT scan in 8-12 weeks    SIRS (Temp, HR, Hypotension, Afib with RVR POA)   Sepsis and CAP ruled out   -discontinue antibiotics. Clinical course more consistent with volume overload     Afib with RVR- now in normal sinus  - New diagnosis   - oral amiodarone   - Echo ordered, pending read   - Cardiology consulted and following  - s/p IVF      Prostate cancer   Urinary retention - s/p tomlinson  - [facility] Vall[facility]  - continue home Tamsulosin   - Zytiga and prednisone  - maintain tomlinson, follow up outpatient   - rescheduled staging scans as outpatient, per urology      Chronic steroid use - per patient as adjunct to treatment for prostate cancer  - stress dose steroids weaned, continue daily prednisone      Weakness  - PT/OT    Hypomagnesemia - replete and monitor   HTN - hold HCTZ in setting of hypotension    PT/OT. Recommend IPR     Code status:  Full  Prophylaxis: Lovenox   care Plan discussed with: Patient  Anticipated Disposition: 1-2 days pending clinical improvement      Hospital Problems  Date Reviewed: 1/25/2021          Codes Class Noted POA    Sepsis ([facility] [address] ICD-10-CM: A41.9  ICD-9-CM: 038.9, 995.91  4/9/2022 Unknown                Review of Systems:   Negative unless stated above      Vital Signs:    Last 24hrs VS reviewed since prior progress note.  Most recent are:  Visit Vitals  BP (!) 146/88 (BP 1 Location: Right upper arm, BP Patient Position: Lying)   Pulse 82   Temp 97 °F (36.1 °C)   Resp 16   Ht 6' 3\" (1.905 m)   Wt 77.8 kg (171 lb 9.6 oz)   SpO2 98%   BMI 21.45 kg/m²         Intake/Output Summary (Last 24 hours) at 4/12/2022 1454  Last data filed at 4/12/2022 1141  Gross per 24 hour   Intake    Output 1300 ml   Net -1300 ml        Physical Examination:     I had a face to face encounter with this patient and independently examined them on 4/12/2022 as outlined below:          Constitutional:  No acute distress, cooperative, pleasant   ENT:  Oral mucosa moist, oropharynx benign. Resp:  Diminished bilaterally. No accessory muscle use. CV:  Regular rhythm, normal rate, no murmurs, gallops, rubs    GI:  Soft, non distended, non tender. normoactive bowel sounds, no hepatosplenomegaly     Musculoskeletal:  No edema, warm, 2+ pulses throughout    Neurologic:  Moves all extremities. AAOx3, nonfocal            Data Review:    Review and/or order of clinical lab test  Review and/or order of tests in the radiology section of CPT  Review and/or order of tests in the medicine section of [facility]      Labs:     Recent Labs     04/12/22 0125 04/11/22  0356   WBC 7.4 8.1   HGB 9.7* 10.0*   HCT 29.1* 29.3*   * 142*     Recent Labs     04/12/22 0125 04/11/22  0356 04/10/22  0420    134* 131*   K 3.3* 3.8 3.8    107 104   CO2 22 19* 16*   BUN 18 17 8   CREA 0.98 1.06 0.83   * 153* 122*   CA 8.5 8.1* 7.9*   MG 2.1 1.9 1.8   PHOS 2.2* 3.0 4.2     No results for input(s): ALT, AP, TBIL, TBILI, TP, ALB, GLOB, GGT, AML, LPSE in the last 72 hours. No lab exists for component: SGOT, GPT, AMYP, HLPSE  No results for input(s): INR, PTP, APTT, INREXT, INREXT in the last 72 hours. No results for input(s): FE, TIBC, PSAT, FERR in the last 72 hours. No results found for: FOL, RBCF   No results for input(s): PH, PCO2, PO2 in the last 72 hours.   Recent Labs     04/11/22 0356   CPK 37*     Lab Results   Component Value Date/Time    Cholesterol, total 124 05/31/2019 07:07 AM    HDL Cholesterol 39 05/31/2019 07:07 AM    LDL, calculated 62 05/31/2019 07:07 AM    Triglyceride 115 05/31/2019 07:07 AM    CHOL/HDL Ratio 3.2 05/31/2019 07:07 AM     Lab Results   Component Value Date/Time    Glucose (POC) 92 10/07/2018 02:51 PM     Lab Results   Component Value Date/Time    Color YELLOW/STRAW 04/09/2022 02:56 PM    Appearance CLEAR 04/09/2022 02:56 PM    Specific gravity 1.011 04/09/2022 02:56 PM    pH (UA) 5.5 04/09/2022 02:56 PM    Protein Negative 04/09/2022 02:56 PM    Glucose Negative 04/09/2022 02:56 PM    Ketone 15 (A) 04/09/2022 02:56 PM    Bilirubin Negative 04/09/2022 02:56 PM    Urobilinogen 2.0 (H) 04/09/2022 02:56 PM    Nitrites Negative 04/09/2022 02:56 PM    Leukocyte Esterase Negative 04/09/2022 02:56 PM    Epithelial cells FEW 04/09/2022 02:56 PM    Bacteria Negative 04/09/2022 02:56 PM    WBC 0-4 04/09/2022 02:56 PM    RBC 10-20 04/09/2022 02:56 PM         Medications Reviewed:     Current Facility-Administered Medications   Medication Dose Route Frequency    amiodarone (CORDARONE) tablet 400 mg  400 mg Oral BID    [START ON 4/18/2022] amiodarone (CORDARONE) tablet 200 mg  200 mg Oral DAILY    furosemide (LASIX) injection 20 mg  20 mg IntraVENous DAILY    [START ON 4/13/2022] predniSONE (DELTASONE) tablet 5 mg  5 mg Oral BID WITH MEALS    sodium chloride (NS) flush 5-10 mL  5-10 mL IntraVENous PRN    sodium chloride (NS) flush 5-40 mL  5-40 mL IntraVENous Q8H    sodium chloride (NS) flush 5-40 mL  5-40 mL IntraVENous PRN    acetaminophen (TYLENOL) tablet 650 mg  650 mg Oral Q6H PRN    Or    acetaminophen (TYLENOL) suppository 650 mg  650 mg Rectal Q6H PRN    polyethylene glycol (MIRALAX) packet 17 g  17 g Oral DAILY PRN    ondansetron (ZOFRAN ODT) tablet 4 mg  4 mg Oral Q8H PRN    Or    ondansetron (ZOFRAN) injection 4 mg  4 mg IntraVENous Q6H PRN    enoxaparin (LOVENOX) injection 40 mg  40 mg SubCUTAneous Q24H    calcium-vitamin D (OS-SEVERINO +D3) 500 mg-200 unit per tablet 1 Tablet  1 Tablet Oral BID    tamsulosin (FLOMAX) capsule 0.4 mg  0.4 mg Oral QPM     ______________________________________________________________________  EXPECTED LENGTH OF STAY: 4d 19h  ACTUAL LENGTH OF STAY:          [address] [facility], DO

## 2022-04-12 NOTE — PROGRESS NOTES
Orders received, chart reviewed and patient evaluated by physical therapy. Pending progression with skilled acute physical therapy, recommend:  Therapy 3 hours per day 5-7 days per week    Recommend with nursing OOB to chair 3x/day and walking daily with assist X 1 assist and amb assist X 1 using gait belt. Thank you for completing as able in order to maintain patient strength, endurance and independence. Full evaluation to follow.  Michelle Rider, PT    Vitals:    04/12/22 8694 04/12/22 0905 04/12/22 0913 04/12/22 0922   BP: 130/76 (!) 150/88 (!) 146/76 (!) 143/74   BP 1 Location: Right upper arm Right upper arm Right upper arm Right upper arm   BP Patient Position: Supine Sitting Standing Sitting  Comment: post amb   Pulse: 76 84 88 90   Temp:       Resp:       Height:       Weight:       SpO2: on 1 liter of 02 98% 92% 98% 97%

## 2022-04-12 NOTE — PROGRESS NOTES
Problem: Mobility Impaired (Adult and Pediatric)  Goal: *Acute Goals and Plan of Care (Insert Text)  Description:   FUNCTIONAL STATUS PRIOR TO ADMISSION: Patient was independent and active without use of DME. Patient was independent for basic and instrumental ADLs. L forearm brace due to thumb pain and inability to complete ROM s/p fall last Friday via [employer]. Baseline back pain. Works from home for [employer]. Just got back from [place] boat cruise and 12 days in the hospital bed in [facility] with COVID and PNA. [employer] . Was to close on house yesterday, already moved into [employer] (without any stairs). When asked reported only the 1 fall in the last 12 months and baseline is room air. HOME SUPPORT: The patient lived alone with wife to provide assistance. Wife works, elderly, and utilizes RW for functional mobility. Has children throughout [place], all work. Walk in shower        Physical Therapy Goals  Initiated 4/12/2022  1. Patient will move from supine to sit and sit to supine , scoot up and down, and roll side to side in bed with independence within 7 day(s). 2.  Patient will transfer from bed to chair and chair to bed with modified independence using the least restrictive device within 7 day(s). 3.  Patient will perform sit to stand with modified independence within 7 day(s). 4.  Patient will ambulate with modified independence for 150 feet with the least restrictive device within 7 day(s). Outcome: Progressing Towards Goal   PHYSICAL THERAPY EVALUATION  Patient: Briseida Lopez (71 y.o. male)  Date: 4/12/2022  Primary Diagnosis: Sepsis ([facility] [address] [A41.9]        Precautions: alarm pad  Fall (droplet plus)    ASSESSMENT  Based on the objective data described below, the patient presents with impaired standing balance and mobility not at his baseline of independent admitted with sepsis and tested positive for COVID 19 (PCR test on 4/9/2022).  Per chart review,he had previously (last month) been admitted to a hospital in [place] for 12 days after testing positive for COIVD and PNA while on vacation. After returning home he had a fall injuring his left hand. At the time of this eval he was on supplemental 02 at 1 liter. Anticipate steady gains and likely need for rehab. Vitals:     04/12/22 0856 04/12/22 0905 04/12/22 0913 04/12/22 0922   BP: 130/76 (!) 150/88 (!) 146/76 (!) 143/74   BP 1 Location: Right upper arm Right upper arm Right upper arm Right upper arm   BP Patient Position: Supine Sitting Standing Sitting  Comment: post amb   Pulse: 76 84 88 90   Temp:           Resp:           Height:           Weight:           SpO2: on 1 liter of 02 98% 92% 98% 97%          Current Level of Function Impacting Discharge (mobility/balance): up to min assist and impaired standing balance. Functional Outcome Measure: The patient scored 23/28 on the Tinetti outcome measure which is indicative of high fall risk. .      Other factors to consider for discharge:  recent travel and hospitalization abroad with COVID and PNA, prostate CA and today has indwelling catheter, baseline is room air     Patient will benefit from skilled therapy intervention to address the above noted impairments. PLAN :  Recommendations and Planned Interventions: bed mobility training, transfer training, gait training, therapeutic exercises, patient and family training/education, and therapeutic activities      Frequency/Duration: Patient will be followed by physical therapy:  5 times a week to address goals.     Recommendation for discharge: (in order for the patient to meet his/her long term goals)  Therapy 3 hours per day 5-7 days per week    This discharge recommendation:  A follow-up discussion with the attending provider and/or case management is planned    IF patient discharges home will need the following DME: patient owns DME required for discharge         SUBJECTIVE:   Patient stated that he was happy to be up to the chair    OBJECTIVE DATA SUMMARY:   Consult received chart reviewed, pt cleared by nursing  HISTORY:    Past Medical History:   Diagnosis Date    Cancer [facility])     prostate ca with mets    Prostate cancer [facility])      Past Surgical History:   Procedure Laterality Date    IR INSERT TUNL CVC W/O PORT OVER 5 YR  1/6/2022    IR REMOVE TUNL CVAD W/O PORT / PUMP  1/25/2022       Personal factors and/or comorbidities impacting plan of care: recent travel and hospitalization abroad with COVID and PNA, prostate CA and today has indwelling catheter, baseline is room air    Home Situation  Home Environment: Private residence  # Steps to Enter: 0  One/Two Story Residence: One story  Living Alone: No  Support Systems: Spouse/Significant Other,Child(elizabeth)  Patient Expects to be Discharged to[de-identified] Rehab hospital/unit acute (will arrange [facility] if patient does not discharge to [facility])  Current DME Used/Available at Home: Walker, rolling,Cane, straight (brace left hand)  Tub or Shower Type: Shower    EXAMINATION/PRESENTATION/DECISION MAKING:   Critical Behavior:  Neurologic State: Alert  Orientation Level: Oriented to person,Oriented to place,Oriented to situation (nearly oriented to time)  Cognition: Appropriate decision making,Appropriate for age attention/concentration,Appropriate safety awareness,Follows commands  Safety/Judgement: Awareness of environment,Good awareness of safety precautions  Hearing: Auditory  Auditory Impairment: None  Skin:  refer to MD and nursing notes (note bruising RUE)   Edema: refer to MD and nursing notes  Range Of Motion:  AROM: Within functional limits                       Strength:    Strength:  Within functional limits                    Tone & Sensation:                  Sensation: Intact               Coordination:     Vision:      Functional Mobility:  Bed Mobility:     Supine to Sit: Stand-by assistance        Transfers:  Sit to Stand: Contact guard assistance  Stand to Sit: Minimum assistance Balance:   Sitting: Intact; Without support  Standing: Impaired  Standing - Static: Constant support; Fair  Standing - Dynamic : Constant support; Fair  Ambulation/Gait Training:  Distance (ft): 20 Feet (ft)  Assistive Device: Gait belt  Ambulation - Level of Assistance: Contact guard assistance (intermittent min assist)        Gait Abnormalities: Decreased step clearance        Base of Support: Narrowed     Speed/Sejal: Slow;Pace decreased (<100 feet/min)  Step Length: Left shortened;Right shortened                     Stairs: Therapeutic Exercises:       Functional Measure:  Tinetti test:    Sitting Balance: 1  Arises: 1  Attempts to Rise: 2  Immediate Standing Balance: 0  Standing Balance: 1  Nudged: 2  Eyes Closed: 1  Turn 360 Degrees - Continuous/Discontinuous: 1  Turn 360 Degrees - Steady/Unsteady: 1  Sitting Down: 1  Balance Score: 11 Balance total score  Indication of Gait: 1  R Step Length/Height: 1  L Step Length/Height: 1  R Foot Clearance: 1  L Foot Clearance: 1  Step Symmetry: 1  Step Continuity: 1  Path: 2  Trunk: 2  Walking Time: 1  Gait Score: 12 Gait total score  Total Score: 23/28 Overall total score         Tinetti Tool Score Risk of Falls  <19 = High Fall Risk  19-24 = Moderate Fall Risk  25-28 = Low Fall Risk  Tinetti ME. Performance-Oriented Assessment of Mobility Problems in Elderly Patients. Brown 66; [id number].  (Scoring Description: PT Bulletin Feb. 10, 1993)    Older adults: Alcira Whitmore et al, 2009; n = 1000 [facility] elderly evaluated with ABC, KATHY, ADL, and IADL)  · Mean KATHY score for males aged 69-68 years = 26.21(3.40)  · Mean KATHY score for females age 69-68 years = 25.16(4.30)  · Mean KATHY score for males over 80 years = 23.29(6.02)  · Mean KATHY score for females over 80 years = 17.20(8.32)           Physical Therapy Evaluation Charge Determination   History Examination Presentation Decision-Making   HIGH Complexity :3+ comorbidities / personal factors will impact the outcome/ POC  MEDIUM Complexity : 3 Standardized tests and measures addressing body structure, function, activity limitation and / or participation in recreation  LOW Complexity : Stable, uncomplicated  LOW Complexity : FOTO score of       Based on the above components, the patient evaluation is determined to be of the following complexity level: LOW     Pain Rating:  None rated    Activity Tolerance:   Good and see assessment    After treatment patient left in no apparent distress:   Sitting in chair, Call bell within reach, and Bed / chair alarm activated    COMMUNICATION/EDUCATION:   The patients plan of care was discussed with: Registered nurse. Fall prevention education was provided and the patient/caregiver indicated understanding., Patient/family have participated as able in goal setting and plan of care. , and Patient/family agree to work toward stated goals and plan of care.     Thank you for this referral.  Carlo Shaw   Time Calculation: 46 mins

## 2022-04-12 NOTE — PROGRESS NOTES
Transition of Care Plan  RUR 16%    COVID 19 Positive   Requiring 2 liters 02-- no 02 at baseline  COVID 19 vaccinated. Therapy recommending IPR     Disposition  IPR vs  Home   Referral sent via [facility] to [facility] [place]  No authorization needed as patient has Medicare  CM spoke with [facility], dary, [phone]     Transportation   [facility]/ [facility]  Will arrange if ordered if he does not discharge to [facility]   No preference of [facility]    Medical follow up  PCP and specialist    Contact   Wife  Laurie Cos  [phone]      CM spoke with liaison [facility] regarding status of referral for patient for rehab-- She will review medicals and therapy notes and call CM in the am with status-- She is not sure when the facility will have a bed if accepted. CM informed Dr Dhaval Bryan. .      Patient will be provided with 2nd medicare letter prior to discharge.

## 2022-04-12 NOTE — PROGRESS NOTES
PCCM:    Minimal supplemental O2    procal 0.18    Plan:  Acute hypoxic resp failure   Bilateral pulmonary infiltrates    Follow ECHO; query pulmonary edema.  In addition possible of radiographic lag from recent COVID19 infection   Clinically improving without steroids   Lasix per primary team   O2 titration above 90%; almost off O2   Follow up chest film in 4 wks    We will be available again to see if needed     Nilda Chowdary PA-C

## 2022-04-13 NOTE — PROGRESS NOTES
Cardiology Progress Note            Admit Date: 4/9/2022  Admit Diagnosis: Sepsis ([facility] [address] [A41.9]  Date: 4/13/2022     Time: 0800 AM    HPI: 80years old male with PMH of PAF in 2019 when he had pneumonia recent covid pneumonia. Presented with generalized weakness and lethargy. Cardiology consulted for Afib with RVR. Had hypotension after IV amiodarone bolus but was treated with infusion and subsequently converted to NSR. CT chest on admission showed bilateral pleural effusions R>l and rounded pulmonary lesions in RUL concerning for neoplasm. Subjective:  Pt again states he feels good. He denies chest pain, SOB,  palpitations and orthopnea. He does report that when he initially got up yesterday that he was briefly dizzy but this resolved quickly. No SOB with walking to bathroom and cleaning up. He remains in NSR with 1st degree AVB. No PAF noted. Assessment and Plan     1. Paroxysmal afib    -Remains in NSR (1st degree AVB) since he converted back to NSR    -History of PAFL in 2019   -Amiodarone po (400 mg BID po x 7 days total then 200 mg daily thereafter). -Plan short term amiodarone course. .    -Echo 2018: EF 60-65%, mild-moderate MAC, mild TR. Repeat echo read pending-Dr. Javier Garcia to read. -[id number]= 2.(age,). He may be agreeable to [facility] if recommended and he is taken off of Zytiga and steroids (states his urologist/oncologist is planning to stop zytiga and therefor he would not need steroid). .     2. Prostate cancer with mets   -followed by urology    3. Acute hypoxic respiratory failure:   -O2 being weaned. -Bilateral pleural effusions:on CTA chest R>L   -Echo pending    -IV lasix 20 mg daily per primary team..   -RUL round pulmonary lesions concerning for neoplasm. Per Pulmonary,repeat chest imaging planned in 6-8 weeks.      4. Covid +     Sepsis (on admission)    -Management per primary team.    5. Heart murmur: diastolic murmur again noted. Echo pending  6. Hypokalemia:repeat AM labs pending. Recommend keep K at 4, Mg at 2      Remains in NSR, 1st degree AVB. Braulio Molina Continue amiodarone. Await echo read. Dr. Phillip Goldberg to see today. [facility]  Past Medical History:   Diagnosis Date    Cancer [facility])     prostate ca with mets    Prostate cancer ([address]       Social Hx  Social History     Socioeconomic History    Marital status:      Spouse name: Not on file    Number of children: Not on file    Years of education: Not on file    Highest education level: Not on file   Occupational History    Not on file   Tobacco Use    Smoking status: Never Smoker    Smokeless tobacco: Never Used   Vaping Use    Vaping Use: Never used   Substance and Sexual Activity    Alcohol use: Never    Drug use: Never    Sexual activity: Not on file   Other Topics Concern    Not on file   Social History Narrative    ** Merged History Encounter **          Social Determinants of Health     Financial Resource Strain:     Difficulty of Paying Living Expenses: Not on file   Food Insecurity:     Worried About [email or web address] in the Last Year: Not on file    [address] in the Last Year: Not on file   Transportation Needs:     Lack of Transportation (Medical): Not on file    Lack of Transportation (Non-Medical):  Not on file   Physical Activity:     Days of Exercise per Week: Not on file    Minutes of Exercise per Session: Not on file   Stress:     Feeling of Stress : Not on file   Social Connections:     Frequency of Communication with Friends and Family: Not on file    Frequency of Social Gatherings with Friends and Family: Not on file    Attends [employer] Services: Not on file    Active Member of Clubs or Organizations: Not on file    Attends Club or Organization Meetings: Not on file    Marital Status: Not on file   Intimate Partner Violence:     Fear of Current or Ex-Partner: Not on file    Emotionally Abused: Not on file    Physically Abused: Not on file    Sexually Abused: Not on file   Housing Stability:     Unable to Pay for Housing in the Last Year: Not on file    Number of Places Lived in the Last Year: Not on file    Unstable Housing in the Last Year: Not on file       ROS:  Pertinent items are noted in HPI. Objective:      Physical Exam:                Visit Vitals  /65   Pulse 81   Temp 97.3 °F (36.3 °C)   Resp 18   Ht 6' 3\" (1.905 m)   Wt 77.8 kg (171 lb 9.6 oz)   SpO2 98%   BMI 21.45 kg/m²          General Appearance:   Well developed, well nourished,alert and oriented x 3, and   individual in no acute distress. Ears/Nose/Mouth/Throat:    Hearing grossly normal.         Neck:  Supple. Chest:    Lungs clear, few faint crackle right base clear left base. Cardiovascular:   Regular rate and rhythm, S1, S2 normal, II/VI diastolic murmur best at LLSB. Abdomen:    Soft, non-tender, bowel sounds are active. Extremities:  tr edema bilaterally. Skin:  Warm and dry. multiple areas of ecchymosis. Telemetry: normal sinus rhythm with 1st degree AV Block.           Data Review:    Labs:    Recent Results (from the past 24 hour(s))   ECHO ADULT COMPLETE    Collection Time: 04/12/22 11:31 AM   Result Value Ref Range    IVSd 0.8 0.6 - 1.0 cm    LVIDd 5.4 4.2 - 5.9 cm    LVIDs 3.1 cm    LVPWd 1.0 0.6 - 1.0 cm    IVSd M-mode 0.9 0.6 - 1.0 cm    IVSs M-mode 1.5 cm    LVIDd M-mode 6.1 (A) 4.2 - 5.9 cm    LVIDs M-mode 3.9 cm    LVPWd M-mode 1.2 (A) 0.6 - 1.0 cm    LVOT Peak Gradient 6 mmHg    LVOT Peak Velocity 1.2 m/s    AV Peak Gradient 16 mmHg    AV Peak Velocity 2.0 m/s    MV A Velocity 1.04 m/s    MV E Velocity 0.66 m/s    MV Peak Gradient 7 mmHg    MV Mean Gradient 4 mmHg    MV PHT 58.1 ms    MV Max Velocity 1.3 m/s    MV Mean Velocity 1.0 m/s    MV VTI 31.8 cm    MV Area by PHT 3.8 cm2    TAPSE 3.0 1.7 cm    TR Peak Gradient 40 mmHg    TR Max Velocity 3.17 m/s    Fractional Shortening 2D 43 28 - 44 %    LVIDd Index 2.62 cm/m2    LVIDs Index 1.50 cm/m2    LV RWT Ratio 0.37     LV Mass 2D 180.1 88 - 224 g    LV Mass 2D Index 87.4 49 - 115 g/m2    MV E/A 0.63     AV Velocity Ratio 0.60           Radiology:        Current Facility-Administered Medications   Medication Dose Route Frequency    amiodarone (CORDARONE) tablet 400 mg  400 mg Oral BID    [START ON 4/18/2022] amiodarone (CORDARONE) tablet 200 mg  200 mg Oral DAILY    furosemide (LASIX) injection 20 mg  20 mg IntraVENous DAILY    predniSONE (DELTASONE) tablet 5 mg  5 mg Oral BID WITH MEALS    sodium chloride (NS) flush 5-10 mL  5-10 mL IntraVENous PRN    sodium chloride (NS) flush 5-40 mL  5-40 mL IntraVENous Q8H    sodium chloride (NS) flush 5-40 mL  5-40 mL IntraVENous PRN    acetaminophen (TYLENOL) tablet 650 mg  650 mg Oral Q6H PRN    Or    acetaminophen (TYLENOL) suppository 650 mg  650 mg Rectal Q6H PRN    polyethylene glycol (MIRALAX) packet 17 g  17 g Oral DAILY PRN    ondansetron (ZOFRAN ODT) tablet 4 mg  4 mg Oral Q8H PRN    Or    ondansetron (ZOFRAN) injection 4 mg  4 mg IntraVENous Q6H PRN    enoxaparin (LOVENOX) injection 40 mg  40 mg SubCUTAneous Q24H    calcium-vitamin D (OS-SEVERINO +D3) 500 mg-200 unit per tablet 1 Tablet  1 Tablet Oral BID    tamsulosin (FLOMAX) capsule 0.4 mg  0.4 mg Oral QPM          Sheldon Garcia.  ITZEL Nash     [facility] [facility] [address]   [place]   [phone]

## 2022-04-13 NOTE — PROGRESS NOTES
Will plan event monitor once pt off amiodarone in outpatient setting.   Office will call pt with appointment to see Dr. Arcelia Lacey.

## 2022-04-13 NOTE — DISCHARGE INSTRUCTIONS
Discharge Instructions       PATIENT ID: Winsome Dumas  MRN: [id number]   YOB: 1933    DATE OF ADMISSION: 4/9/2022  2:28 PM    DATE OF DISCHARGE: 4/13/2022    PRIMARY CARE PROVIDER: Ranjit Wilson MD     ATTENDING PHYSICIAN: Kimberly Goldmann, DO  DISCHARGING PROVIDER: Montse Cespedes DO    To contact this individual call [phone] and ask the  to page. If unavailable ask to be transferred the Adult Hospitalist Department. DISCHARGE DIAGNOSES     Acute hypoxic respiratory failure  Volume overload with pleural effusions  Atrial fibrillation     CONSULTATIONS: IP CONSULT TO HOSPITALIST  IP CONSULT TO UROLOGY  IP CONSULT TO CARDIOLOGY  IP CONSULT TO PULMONOLOGY    PROCEDURES/SURGERIES: * No surgery found *    PENDING TEST RESULTS:   At the time of discharge the following test results are still pending: none    FOLLOW UP APPOINTMENTS:   Follow-up Information     Follow up With Specialties Details Why Contact Info    Isabella Alegria MD Internal Medicine   [address] [place] [address]  [phone]      [facility]   Follow up for voiding trial on 4/21/22 at 8:30 AM [address] [place] [address] [address] MD Internal Medicine   [address] [place] [address] [address]kw[address]  [phone]             ADDITIONAL CARE RECOMMENDATIONS:     New medications:   1. Lasix 20 mg. Take once daily   2. Amiodarone. Take 400 twice daily until 4/18/2022 then 200 mg daily. Follow up with cardiology. You will need to discuss anticoagulation. DIET: heart healthy    ACTIVITY: as tolerated    DISCHARGE MEDICATIONS:   See Medication Reconciliation Form    · It is important that you take the medication exactly as they are prescribed. · Keep your medication in the bottles provided by the pharmacist and keep a list of the medication names, dosages, and times to be taken in your wallet.    · Do not take other medications without consulting your doctor. NOTIFY YOUR PHYSICIAN FOR ANY OF THE FOLLOWING:   Fever over 101 degrees for 24 hours. Chest pain, shortness of breath, fever, chills, nausea, vomiting, diarrhea, change in mentation, falling, weakness, bleeding. Severe pain or pain not relieved by medications. Or, any other signs or symptoms that you may have questions about.       Signed:   Myra Salguero DO  4/13/2022  2:07 PM

## 2022-04-13 NOTE — PROGRESS NOTES
Transition Plan of Care  RUR 11%-Med  Disposition-has been accepted by [facility] but they will not have a bed until Friday. Attending and this CM spoke with patient regarding going to another [facility] if they have a bed available. He agrees and CM also spoke with his wife Stefanie Kelley [phone] and she is agreeable to this disposition. Spoke with Gladys Schroeder, clinical liaison from [facility] and they have beds available today. Referral sent via allscripts to [facility] and if accepted he would not need authorization. Hoping to discharge today. He is clinically ready to discharge. Medicare pt has received, reviewed, and signed 2nd IM letter informing them of their right to appeal the discharge. Signed copy has been placed on pt bedside chart. Accepted at [facility]. Transport for 1900. Wife aware and in agreement. Nursing to call report to [phone]-  Nursing complete Emtala and place in envelope provided. Along with AVS and MARs.

## 2022-04-13 NOTE — PROGRESS NOTES
TRANSITION OF CARE  RUR--11%  Disposition--To [facility]/ [place]. Transport--BLS with [facility]    CM follow up. CM spoke with Cem Reyes, [facility] Liaison, who confirmed that patient is accepted for admission and bed is available today-- Room 109. Receving physician is Magali Wakefield MD. Nurse Report to be called to [phone]. CM gave update to staff nurse.

## 2022-04-13 NOTE — PROGRESS NOTES
[address] visit attempted. Mr. Leroy Rose is unable to receive communion due to medical retrictions. Prayer for spiritual communion offered outside his room.     EMMA Sharpe, RN, ACSW, LCSW   Page:  [phone]

## 2022-04-13 NOTE — DISCHARGE SUMMARY
Discharge Summary       PATIENT ID: Rodrigo Elkins  MRN: [id number]   YOB: 1933    DATE OF ADMISSION: 4/9/2022  2:28 PM    DATE OF DISCHARGE: 4/13/2022  PRIMARY CARE PROVIDER: Nydia Ibanez MD     ATTENDING PHYSICIAN: Delia Machuca DO   DISCHARGING PROVIDER: Delia Machuca DO    To contact this individual call [phone] and ask the  to page. If unavailable ask to be transferred the Adult Hospitalist Department. CONSULTATIONS: IP CONSULT TO HOSPITALIST  IP CONSULT TO UROLOGY  IP CONSULT TO CARDIOLOGY  IP CONSULT TO PULMONOLOGY    PROCEDURES/SURGERIES: * No surgery found *    ADMISSION SUMMARY AND HOSPITAL COURSE:   Admission History: H&P  \"88 y. o. male with pmh of metastatic prostate cancer (bones), recent COVID PNA (in March in [place] on [place] presents with weakness, and unable to urinate. 2 days ago began feeling weak and tired, had dispatch health sent to his house. There checked vitals, and checked him out, give him IV fluids. Per report also checked UA, and was negative. Today he felt increased weakness and was unable to urinate so decided to come to the ER. Vaccinated x 3 for COVID, does not rememebr if he got the influenza vaccine. He was on vacation from cruise in [facility] in the middle of March, when he contracted COVID, and had a prolonged hospitalization of 12 days. He denies any h/o blood clots, CVA, or Afib. In the ER pt was found to be in Afib with RVR with HR in the 150's, given amiodarone bolus which subsequently led to hypotension. He was then given IVF boluses with improvement. He now converted to NSR with HR in the low 100's. Pt denies any SOB, CP, N/V/D/C, no focal weakness. Does continue to have difficulty urinating. \"       CTA chest:  IMPRESSION  Bilateral pleural effusions right greater than left with underlying  atelectasis. Rounded pulmonary lesions in the right upper lobe are concerning  for neoplasm.  Additional small bilateral pulmonary nodules are seen throughout  the lung fields bilaterally. No evidence of pulmonary embolism. Possible bone  metastasis left lateral rib. Echo:    Left Ventricle: Left ventricle size is normal. Normal wall thickness. Normal wall motion. Normal left ventricular systolic function with a visually estimated EF of 55 - 60%.   Aortic Valve: Trileaflet valve. Moderate sclerosis of the aortic valve cusp. Mild regurgitation. No stenosis.   Mitral Valve: Findings consistent with myxomatous degeneration. Mildly thickened leaflets. Mild to moderate transvalvular regurgitation.   Pulmonary Arteries: Mild pulmonary hypertension present. The estimated pulmonary artery systolic pressure is 44 mmHg. DISCHARGE DIAGNOSES / PLAN:      Acute hypoxic respiratory failure: resolved  Volume overload with bilateral pleural effusions  Heart failure with preserved EF, NHYA Class II on admission and discharge  -CTA with bilateral pleural effusions, right greater than left with underlying atelectasis. Rounded pulmonary lesions in the right upper lobe are concerning for neoplasm.  -Pulmonary consulted, appreciate recommendations  -Concern for volume overload in setting of bilateral effusions  -Continue lasix daily   -pro   -echo as above   -COVID PCR +. Symptoms more consistent with volume overload   -**Needs repeat CT scan in 8-12 weeks to evaluate pulmonary lesions     SIRS (Temp, HR, Hypotension, Afib with RVR POA)   Sepsis and CAP ruled out   -discontinue antibiotics.  Clinical course more consistent with volume overload     Afib with RVR- now in normal sinus  - New diagnosis   - oral amiodarone taper  - Echo ordered, pending read   - Cardiology consulted and following  - outpatient follow up to discuss anticoagulation  - s/p IVF     Prostate cancer   Urinary retention - s/p tomlinson  - [facility] No consulted  - continue home Tamsulosin   - Zytiga and prednisone- recommend continuing and then follow up outpatient once restaging scans are completed to assess medications   - maintain tomlinson for now, follow up outpatient      Chronic steroid use - per patient as adjunct to treatment for prostate cancer  - stress dose steroids weaned, continue daily prednisone      Weakness  - PT/OT- recommend IPR     Hypomagnesemia - replete and monitor   HTN - now with low normal blood pressures. Stop home hydrochlorothiazide           BMI: Body mass index is 21.45 kg/m². . This patient: Has a BMI within normal limits. PENDING TEST RESULTS:   At the time of discharge the following test results are still pending: none     ADDITIONAL CARE RECOMMENDATIONS:   New medications:   1. Lasix 20 mg. Take once daily   2. Amiodarone. Take 400 twice daily until 4/18/2022 then 200 mg daily. Follow up with cardiology. You will need to discuss anticoagulation. DIET: heart healthy    ACTIVITY: as tolerated     NOTIFY YOUR PHYSICIAN FOR ANY OF THE FOLLOWING:   Fever over 101 degrees for 24 hours. Chest pain, shortness of breath, fever, chills, nausea, vomiting, diarrhea, change in mentation, falling, weakness, bleeding. Severe pain or pain not relieved by medications, as well as any other signs or symptoms that you may have questions about. FOLLOW UP APPOINTMENTS:    Follow-up Information     Follow up With Specialties Details Why Contact Info    Maria Esther Shaw MD Internal Medicine   [address] [place] [address] Urology   Follow up for voiding trial on 4/21/22 at 8:30 [phone]    Lissy Bishop MD Internal Medicine   [address] [address]  [phone]             DISCHARGE MEDICATIONS:  Current Discharge Medication List      START taking these medications    Details   !! amiodarone (PACERONE) 400 mg tablet Take 1 Tablet by mouth two (2) times a day for 9 doses.   Qty: 9 Tablet, Refills: 0  Start date: 4/13/2022, End date: 4/18/2022      !! amiodarone (CORDARONE) 200 mg tablet Take 1 Tablet by mouth daily. Qty: 30 Tablet, Refills: 0  Start date: 4/18/2022       !! - Potential duplicate medications found. Please discuss with provider. CONTINUE these medications which have NOT CHANGED    Details   calcium citrate-vitamin D3 (CITRACAL + D) tablet Take 1 Tab by mouth two (2) times a day. leuprolide acetate (LUPRON DEPOT IM) by IntraMUSCular route. Every 6 months      abiraterone (ZYTIGA) 250 mg tab Take 1,000 mg by mouth daily. predniSONE (DELTASONE) 5 mg tablet Take 5 mg by mouth two (2) times a day. tamsulosin (FLOMAX) 0.4 mg capsule Take 0.4 mg by mouth every evening. STOP taking these medications       doxycycline (VIBRA-TABS) 100 mg tablet Comments:   Reason for Stopping:         hydroCHLOROthiazide (HYDRODIURIL) 25 mg tablet Comments:   Reason for Stopping:               DISPOSITION:    Home With:   OT  PT  HH  RN      x Long term SNF/Inpatient Rehab    Independent/assisted living    Hospice    Other:       PATIENT CONDITION AT DISCHARGE:     Functional status    Poor    x Deconditioned     Independent      Cognition    x Lucid     Forgetful     Dementia      Catheters/lines (plus indication)   x Serna     PICC     PEG     None      Code status   x  Full code     DNR      PHYSICAL EXAMINATION AT DISCHARGE:  Constitutional:  No acute distress, cooperative, pleasant   ENT:  Oral mucosa moist, oropharynx benign. Resp:  Diminished bilaterally. No accessory muscle use. CV:  Regular rhythm, normal rate, no murmurs, gallops, rubs    GI:  Soft, non distended, non tender. normoactive bowel sounds, no hepatosplenomegaly     Musculoskeletal:  No edema, warm, 2+ pulses throughout    Neurologic:  Moves all extremities.   AAOx3, nonfocal                                 CHRONIC MEDICAL DIAGNOSES:  Problem List as of 4/13/2022 Date Reviewed: 1/25/2021          Codes Class Noted - Resolved Sepsis ([facility] [address] ICD-10-CM: A41.9  ICD-9-CM: 038.9, 995.91  2022 - Present        Acute retention of urine ICD-10-CM: R33.8  ICD-9-CM: 788.29  2021 - Present        Closed fracture of one rib ICD-10-CM: S22.39XA  ICD-9-CM: 807.01  2021 - Present        Vitamin D deficiency ICD-10-CM: E55.9  ICD-9-CM: 268.9  2021 - Present        Immunosuppression due to drug therapy [facility]) ICD-10-CM: N24.878, Z79.899  ICD-9-CM: V58.69  2019 - Present        Community acquired pneumonia ICD-10-CM: J18.9  ICD-9-CM: 486  2019 - Present        Prostate cancer metastatic to pelvis [facility]) ICD-10-CM: S99, C79.89  ICD-9-CM: 185, 198.89  2018 - Present        Lumbar disc disease with radiculopathy ICD-10-CM: M51.16  ICD-9-CM: 722.10, 724.4  2018 - Present        Prostate cancer metastatic to multiple sites [facility]) ICD-10-CM: C61  ICD-9-CM: 185, 199.0  2018 - Present        Heart murmur, systolic NQR-69-LZ: I50.1  ICD-9-CM: 785.2  2018 - Present    Overview Addendum 2019  9:20 AM by Kaitlynn Sultana MD      Transthoracic Echocardiogram     Patient: Kate Coronado  MRN: [id number]  ACCT #: [de-identified]  : 13-Oct-1933  Age: 80 years  Gender: Male  Height: 75 in  Weight: 174.7 lb  BSA: 2.07 mï¾²  BP: 136 / 70 mmHg  Study date: 2018  Status: Routine  Location: Out-patient area  [facility] #: [id number]     Allergies: NO KNOWN ALLERGENS     Reading Group:  *[facility] Group  Technologist:  Navarro Strange RDCS  Reading Physician:  Shamika Brown. Anamaria Ramirez MD     SUMMARY:  Left ventricle: Systolic function was normal. Ejection fraction was  estimated in the range of 60 % to 65 %. There were no regional wall motion  abnormalities. Doppler parameters were consistent with abnormal left  ventricular relaxation (grade 1 diastolic dysfunction).     Mitral valve: There was mild to moderate annular calcification.     Tricuspid valve:  There was mild regurgitation.     INDICATIONS: Heart Murmur     PROCEDURE: This was a routine study. The study included complete 2D  imaging, M-mode, complete spectral Doppler, and color Doppler. The heart  rate was 89 bpm, at the start of the study. Systolic blood pressure was  136 mmHg, at the start of the study. Diastolic blood pressure was 70 mmHg,  at the start of the study. Image quality was adequate.     LEFT VENTRICLE: Size was normal. Systolic function was normal. Ejection  fraction was estimated in the range of 60 % to 65 %. There were no  regional wall motion abnormalities. Wall thickness was normal. DOPPLER:  Doppler parameters were consistent with abnormal left ventricular  relaxation (grade 1 diastolic dysfunction).     RIGHT VENTRICLE: The size was normal. Systolic function was normal. Wall  thickness was normal.     LEFT ATRIUM: Size was normal.     RIGHT ATRIUM: Size was normal.     MITRAL VALVE: There was mild to moderate annular calcification. There was  normal leaflet separation. DOPPLER: There was trivial regurgitation.     AORTIC VALVE: The valve was trileaflet. Leaflets exhibited mild  calcification and normal cuspal separation. DOPPLER: There was no stenosis.     TRICUSPID VALVE: Normal valve structure. There was normal leaflet  separation. DOPPLER: The transtricuspid velocity was within the normal  range. There was no evidence for tricuspid stenosis. There was mild  regurgitation.  Pulmonary artery systolic pressure: 35 mmHg.     PULMONIC VALVE: Not well visualized.     AORTA: The root exhibited normal size.     PERICARDIUM: There was no pericardial effusion.     SYSTEM MEASUREMENT TABLES     2D  LAAs A2C: 19.5 cm2  LAAs A4C: 17.2 cm2  LAESV A-L A2C: 59.3 ml  LAESV A-L A4C: 53.4 ml  LAESV Index (A-L): 29.2 ml/m2  LAESV MOD A2C: 57.9 ml  LAESV MOD A4C: 46 ml  LAESV(A-L): 60.5 ml  LALs A2C: 5.4 cm  LALs A4C: 4.7 cm  %FS: 32.9 %  EDV(Teich): 84.9 ml  EF(Teich): 61.7 %  ESV(Teich): 32.5 ml  IVSd: 1.1 cm  LVIDd: 4.3 cm  LVIDs: 2.9 cm  LVPWd: 1.1 cm  SV(Teich): 52.4 ml     CW  AV Vmax: 1.4 m/s  AV maxP.4 mmHg  PV Vmax: 0.7 m/s  PV maxP mmHg  TR Vmax: 2.8 m/s  TR maxP.4 mmHg     MM  Ao Diam: 2.9 cm  LA Diam: 2.8 cm  Ao/LA: 1  LA/Ao: 1  TAPSE: 2.3 cm     PW  LVOT Vmax: 1.2 m/s  LVOT maxP.2 mmHg  E' Lat: 0.1 m/s  E' Sept: 0.1 m/s  E/E' Lat: 12.9  E/E' Sept: 10.5  MV A Gigi: 1.3 m/s  MV Dec Cottle: 4.4 m/s2  MV DecT: 219.8 ms  MV E Gigi: 1 m/s  MV E/A Ratio: 0.8  MV PHT: 63.8 ms  MVA By PHT: 3.5 cm2  RV S': 0.2 m/s  E' Av.1 m/s  E/E' Av.6     Prepared and E-signed by  Anamaria Degroot.  Braxton Smyth MD  Signed [id number] 16:55:20                             Degenerative disc disease, lumbar ICD-10-CM: M51.36  ICD-9-CM: 722.52  2018 - Present        Acute back pain ICD-10-CM: M54.9  ICD-9-CM: 724.5  10/7/2018 - Present        RESOLVED: Atrial flutter ([facility] ICD-10-CM: N59.23  ICD-9-CM: 427.32  2019 - 2020              Greater than 30 minutes were spent with the patient on counseling and coordination of care    Signed:   Cas Lucio DO  2022  4:29 PM

## 2022-04-13 NOTE — PROGRESS NOTES
Verbal shift change report given to David Mcdaniel (oncoming nurse) by Eddie Tate (offgoing nurse). Report included the following information SBAR, Procedure Summary, Intake/Output, MAR and Cardiac Rhythm NSR.

## 2022-04-13 NOTE — PROGRESS NOTES
Discharge order placed. Transport arranged by [employer]. [phone] - report called to nurse Reyes at receiving facility    Discharge instructions reviewed with pt and all questions answered. PIVs removed. Serna left in place per order. IVONNE paperwork completed. 1930 - Patient transported off of floor by transportation service in [facility].

## 2022-04-21 NOTE — PROGRESS NOTES
Physician Progress Note      Rojelio Frank  [facility] #:                  [id number]  :                       10/13/1933  ADMIT DATE:       2022 2:28 PM  [address] [place] DATE:        2022 7:30 PM  RESPONDING  PROVIDER #:        Khoi BROWN DO          QUERY TEXT:    Pt has CHF documented. EF 55-60% per Echo. Was treated for Volume overload with IV Lasix. If possible, please document in progress notes and discharge summary further specificity regarding the type and acuity of CHF:    The medical record reflects the following:  Risk Factors: new onset A-Fib, volume overload  Clinical Indicators: admitted with SOB, lethargy and weakness. .8, 132, , 23, 91/35 on admission. , Chest CT showing bilateral pleural effusions concerning for volume overload. Echo was done with EF 55-60%. Documentation in the D/C Summ of CHF. Treatment: Lasix IV, Cordarone, I/O, Cardiology following. Thank you,  Bradley Lazo RN  Clinical Documentation  [phone]  Options provided:  -- Acute on Chronic Diastolic CHF/HFpEF  -- Acute Diastolic CHF/HFpEF  -- Chronic Diastolic CHF/HFpEF  -- Other - I will add my own diagnosis  -- Disagree - Not applicable / Not valid  -- Disagree - Clinically unable to determine / Unknown  -- Refer to Clinical Documentation Reviewer    PROVIDER RESPONSE TEXT:    This patient is in acute on chronic diastolic CHF/HFpEF. Query created by: Bell Lincoln on 2022 11:47 AM      QUERY TEXT:    Noted documentation of Acute Respiratory Failure in the Progress Notes. Pt lethargic, and tachycardic with O2 Sat dropping to 85% on room air. However, there is documentation that pt is in no acute resp distress, with no increase in work of breathing or accessory muscle use. In order to support the diagnosis of acute respiratory failure, please include additional clinical indicators in your documentation that show an increase in work of breathing.   Or please document if the diagnosis of acute respiratory failure has been ruled out after further study. The medical record reflects the following:  Risk Factors: Covid+, volume overload/ pleural effusions  Clinical Indicators: admitted with lethargy and weakness, , RR 21, O2 Sat 94 on room air on admission, then dropped to 85% requiring supplemental O2. Acute Hypoxic Resp Failure is documented, however there is also documentation of no acute resp distress, no accessory muscle use, and 'no increased work of breathing'. Treatment: O2 4L and weaned down, Solu cortef IV, Lasix IV, Pulmonary consult. Acute Respiratory Failure Clinical Indicators per  [facility] Training Guide and Quick Reference Guide:  pO2 < 60 mmHg or SpO2 (pulse oximetry) < 91% breathing room air  pCO2 > 50 and pH < 7.35  P/F ratio (pO2 / FIO2) < 300  pO2 decrease or pCO2 increase by 10 mmHg from baseline (if known)  Supplemental oxygen of 40% or more  Presence of respiratory distress, tachypnea, dyspnea, shortness of breath, wheezing  Unable to speak in complete sentences  Use of accessory muscles to breathe  Extreme anxiety and feeling of impending doom  Tripod position  Confusion/altered mental status/obtunded    Thank you,  Mireya Wilson RN  Clinical Documentation  [phone]  Options provided:  -- Acute Respiratory Failure as evidenced by, Please document evidence.   -- Acute Respiratory Failure ruled out after study  -- Other - I will add my own diagnosis  -- Disagree - Not applicable / Not valid  -- Disagree - Clinically unable to determine / Unknown  -- Refer to Clinical Documentation Reviewer    PROVIDER RESPONSE TEXT:    This patient is in acute respiratory failure as evidenced by nursing documentation on 4/9/22- \"Pt started on 4L of O2 due to sat 84%\"    Query created by: Felix Marley on 4/14/2022 12:07 PM      Electronically signed by:  Flako Cuello DO 4/21/2022 2:09 PM

## 2022-04-28 PROBLEM — I48.3 TYPICAL ATRIAL FLUTTER (HCC): Status: ACTIVE | Noted: 2022-01-01

## 2022-04-28 PROBLEM — I48.20 CHRONIC ATRIAL FIBRILLATION, UNSPECIFIED (HCC): Status: ACTIVE | Noted: 2022-01-01

## 2022-04-28 NOTE — PROGRESS NOTES
This is a transitional care visit for Crispin Longo, 80 y.o. gentleman, who was discharged from [facility] on Monday of this week which would have been April 25th and today is April 28th. So, we are reconciling his medications today within three days of discharge. He has a long story. I have been involved in his care some about a month ago while on vacation in [facility], he came down in [facility] with Abdullahi. He was sent directly to a hospital in [facility] which was a 3 hour drive. He was very sick. He had fever and short of breath. He was hospitalized in the hospital in [facility] for ten days. His wife stayed in a hotel. Eventually he flew home and then shortly after getting home he started feeling very weak and could not walk again. I was contacted by the wife. [facility]. He eventually was diagnosed with new onset AFib rapid response. He was hospitalized at [facility]. The discharge summary is enclosed and he was seen there for several days and got stabilized, got back into normal rhythm, was placed on Cordarone. Was anticoagulated with Eliquis and then he was transferred to [facility] where he was treated with aggressive PT and OT. He got off of oxygen after two or three days there. He has got a Serna catheter because he was not urinating while in the hospital at [facility]. He has an appointment with his urologist next week. He has an appointment with Dr. Ryne Franklin next week from Cardiology. Had a CT scan of his abdomen, pelvis and a bone scan done this week. The bone scan showed a new lesion at L4 and a CT scan was abnormal.  They are going to discuss his prostate cancer medications. He is feeling better. He has got a home PT, home OT which I signed off on. He is up-to-date with his medicines. He is having no trouble. He asked me to send in a 90 day prescription for Eliquis.   He is weak, he has lost his taste buds and appetite has been a little bit reduced, but he has been eating because he knows he has to. Denies any serious pain although he has had some very slight lower back pain which has been mild. His exam, he is an 80 y.o. gentleman, who looks thin, not wasted, came in a walker. His blood pressure was normal at 122/55. Pulse was regular at 70. Lungs were clear. S1, S2 was regular. He had a Serna catheter in place. He had no edema, but he was thin although he was back to baseline weight. CT Results (most recent):  Results from Hospital Encounter encounter on 04/26/22    CT ABD PELV W CONT    Narrative  EXAM: CT ABD PELV W CONT    INDICATION: Dx: Prostate cancer ([address] Iyar.Gaucher (ICD-10-CM)]    COMPARISON: CT abdomen pelvis 9/28/2021    CONTRAST: 100 mL of Isovue-370. ORAL CONTRAST: None    TECHNIQUE:  Following the uneventful intravenous administration of contrast, thin axial  images were obtained through the abdomen and pelvis. Coronal and sagittal  reconstructions were generated. CT dose reduction was achieved through use of a  standardized protocol tailored for this examination and automatic exposure  control for dose modulation. FINDINGS:  LOWER THORAX: Cardiomegaly. Mitral annulus calcification  LIVER: No mass. BILIARY TREE: Gallbladder is within normal limits. CBD is not dilated. SPLEEN: within normal limits. PANCREAS: Stable 9 mm hypodensity in the tail (2-19), too small to characterize. No ductal dilatation. ADRENALS: Unremarkable. KIDNEYS: No mass, calculus, or hydronephrosis. Stable 2.3 cm right upper pole  bilobed cyst/2 adjacent cysts with thin partially calcified septation  STOMACH: Small hiatal hernia. SMALL BOWEL: No dilatation or wall thickening. COLON: No dilatation or wall thickening. APPENDIX: Normal  PERITONEUM: No ascites or pneumoperitoneum. RETROPERITONEUM: No lymphadenopathy or aortic aneurysm. REPRODUCTIVE ORGANS: Enlarged heterogeneous prostate  URINARY BLADDER: Serna catheter in place.  Circumferential wall thickening  BONES: New 3.1 cm x 2.2 cm expansile lytic lesion in the L4 right transverse  process. Decreasing 4.9 cm region of sclerosis in the right inferior pubic  ramus, previously 5.7 cm. Stable 1.7 cm mixed lucent and sclerotic lesion in the  left ischial tuberosity. Several additional scattered mixed lucent and sclerotic  lesions in the bony pelvis and spine are less dense compared to prior (e.g., 2.6  cm left sacral ala lesion on 2-46, 1.2 cm L1 vertebral body lesion on 602-64)  ABDOMINAL WALL: No mass or hernia. ADDITIONAL COMMENTS: N/A    Impression  1. Compared to 9/28/2021 CT, new expansile lytic metastatic lesion in the L4  right transverse process. Other scattered sclerotic osseous metastases in the  spine and pelvis are decreased in conspicuity/density compared to prior. 2.  Stable indeterminate right renal lesion. Dedicated renal mass protocol CT or  MRI can be obtained for further evaluation. 23X    Impression:  THILU-09 with pneumonia, loss of taste buds. I discussed with him the fact that he has had three vaccines, but being on prednisone and may be Zytiga he probably had no good immune response to the vaccine. I did tell him in three months from now he should get another vaccine and he should be considered for prep pre-exposure prophylaxis if he does any further travel. He will see his oncologist, urologist next week in reference to the worsening prostate cancer. He will have follow up with Cardiology in a week. I mentioned that thyroid function needs to be monitored with amiodarone. He has not been told that yet.     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 Livia Benitez MD     SUMMARY:  Left ventricle: Systolic function was normal. Ejection fraction was  estimated in the range of 60 % to 65 %. There were no regional wall motion  abnormalities. Doppler parameters were consistent with abnormal left  ventricular relaxation (grade 1 diastolic dysfunction).     Mitral valve: There was mild to moderate annular calcification.     Tricuspid valve: There was mild regurgitation.     INDICATIONS: Heart Murmur     PROCEDURE: This was a routine study. The study included complete 2D  imaging, M-mode, complete spectral Doppler, and color Doppler. The heart  rate was 89 bpm, at the start of the study. Systolic blood pressure was  136 mmHg, at the start of the study. Diastolic blood pressure was 70 mmHg,  at the start of the study. Image quality was adequate.     LEFT VENTRICLE: Size was normal. Systolic function was normal. Ejection  fraction was estimated in the range of 60 % to 65 %. There were no  regional wall motion abnormalities. Wall thickness was normal. DOPPLER:  Doppler parameters were consistent with abnormal left ventricular  relaxation (grade 1 diastolic dysfunction).     RIGHT VENTRICLE: The size was normal. Systolic function was normal. Wall  thickness was normal.     LEFT ATRIUM: Size was normal.     RIGHT ATRIUM: Size was normal.     MITRAL VALVE: There was mild to moderate annular calcification. There was  normal leaflet separation. DOPPLER: There was trivial regurgitation.     AORTIC VALVE: The valve was trileaflet.  Leaflets exhibited mild  calcification and normal cuspal separation. DOPPLER: There was no stenosis.     TRICUSPID VALVE: Normal valve structure. There was normal leaflet  separation. DOPPLER: The transtricuspid velocity was within the normal  range. There was no evidence for tricuspid stenosis. There was mild  regurgitation. Pulmonary artery systolic pressure: 35 mmHg.     PULMONIC VALVE: Not well visualized.     AORTA: The root exhibited normal size.     PERICARDIUM: There was no pericardial effusion.     SYSTEM MEASUREMENT TABLES     2D  LAAs A2C: 19.5 cm2  LAAs A4C: 17.2 cm2  LAESV A-L A2C: 59.3 ml  LAESV A-L A4C: 53.4 ml  LAESV Index (A-L): 29.2 ml/m2  LAESV MOD A2C: 57.9 ml  LAESV MOD A4C: 46 ml  LAESV(A-L): 60.5 ml  LALs A2C: 5.4 cm  LALs A4C: 4.7 cm  %FS: 32.9 %  EDV(Teich): 84.9 ml  EF(Teich): 61.7 %  ESV(Teich): 32.5 ml  IVSd: 1.1 cm  LVIDd: 4.3 cm  LVIDs: 2.9 cm  LVPWd: 1.1 cm  SV(Teich): 52.4 ml     CW  AV Vmax: 1.4 m/s  AV maxP.4 mmHg  PV Vmax: 0.7 m/s  PV maxP mmHg  TR Vmax: 2.8 m/s  TR maxP.4 mmHg     MM  Ao Diam: 2.9 cm  LA Diam: 2.8 cm  Ao/LA: 1  LA/Ao: 1  TAPSE: 2.3 cm     PW  LVOT Vmax: 1.2 m/s  LVOT maxP.2 mmHg  E' Lat: 0.1 m/s  E' Sept: 0.1 m/s  E/E' Lat: 12.9  E/E' Sept: 10.5  MV A Gigi: 1.3 m/s  MV Dec Hanson: 4.4 m/s2  MV DecT: 219.8 ms  MV E Gigi: 1 m/s  MV E/A Ratio: 0.8  MV PHT: 63.8 ms  MVA By PHT: 3.5 cm2  RV S': 0.2 m/s  E' Av.1 m/s  E/E' Av.6     Prepared and E-signed by  Carolina López. 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 Pneumonia due to COVID-19 virus    2. Chronic atrial fibrillation, unspecified    3. 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 Immunosuppression due to drug therapy ([facility] [address] [facility]    5.  Hospital discharge follow-up    Other orders  -     apixaban (ELIQUIS) 5 mg tablet; Take 1 Tablet by mouth two (2) times a day. Prolonged visit with 15 minutes of time out  of more than a  25 minute visit spent counseling patient and family and formulation of care  1. Chronic atrial fibrillation, unspecified  In sinus now    2. 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 ORAL CONTRAST: None    TECHNIQUE:  Following the uneventful intravenous administration of contrast, thin axial  images were obtained through the abdomen and pelvis. Coronal and sagittal  reconstructions were generated. CT dose reduction was achieved through use of a  standardized protocol tailored for this examination and automatic exposure  control for dose modulation. FINDINGS:  LOWER THORAX: Cardiomegaly. Mitral annulus calcification  LIVER: No mass. BILIARY TREE: Gallbladder is within normal limits. CBD is not dilated. SPLEEN: within normal limits. PANCREAS: Stable 9 mm hypodensity in the tail (2-19), too small to characterize. No ductal dilatation. ADRENALS: Unremarkable. KIDNEYS: No mass, calculus, or hydronephrosis. Stable 2.3 cm right upper pole  bilobed cyst/2 adjacent cysts with thin partially calcified septation  STOMACH: Small hiatal hernia. SMALL BOWEL: No dilatation or wall thickening. COLON: No dilatation or wall thickening. APPENDIX: Normal  PERITONEUM: No ascites or pneumoperitoneum. RETROPERITONEUM: No lymphadenopathy or aortic aneurysm. REPRODUCTIVE ORGANS: Enlarged heterogeneous prostate  URINARY BLADDER: Serna catheter in place. Circumferential wall thickening  BONES: New 3.1 cm x 2.2 cm expansile lytic lesion in the L4 right transverse  process.  Decreasing 4.9 cm region of sclerosis in the right inferior pubic  ramus, previously 5.7 cm. Stable 1.7 cm mixed lucent and sclerotic lesion in the  left ischial tuberosity. Several additional scattered mixed lucent and sclerotic  lesions in the bony pelvis and spine are less dense compared to prior (e.g., 2.6  cm left sacral ala lesion on 2-46, 1.2 cm L1 vertebral body lesion on 602-64)  ABDOMINAL WALL: No mass or hernia. ADDITIONAL COMMENTS: N/A    Impression  1. Compared to 9/28/2021 CT, new expansile lytic metastatic lesion in the L4  right transverse process. Other scattered sclerotic osseous metastases in the  spine and pelvis are decreased in conspicuity/density compared to prior. 2.  Stable indeterminate right renal lesion. Dedicated renal mass protocol CT or  MRI can be obtained for further evaluation. 23X        3. Immunosuppression due to drug therapy ([address]  Explained prednisone effect    4. Pneumonia due to COVID-19 virus  better    5.  Hospital discharge follow-up  Reviewed has adequate supplies

## 2022-04-28 NOTE — PROGRESS NOTES
1. Have you been to the ER, urgent care clinic since your last visit? Hospitalized since your last visit? No    2. Have you seen or consulted any other health care providers outside of the [address] [place] [address] since your last visit? Include any pap smears or colon screening.  No   Chief Complaint   Patient presents with    Follow-up

## 2022-05-09 NOTE — PROGRESS NOTES
Chika Montano     10/13/1933       Shay Lambert MD, [facility]  Date of Visit-5/9/2022   PCP is Elaina Da Silva MD   [facility]  [facility]  HPI:  Chika Montano is a 80 y.o. male   Hospital follow up, see consult and progress notes  Pt was hospitalized with Sepsis in April 9th  For 4 nights, previously had COVID Pneumonia in March in [place] while vacation. afib with RVR. Had myxomatous mitral valve. Had diuresis and improved with supportive therapy including steroids were being weaned. Saw PCP on 4/28/22. Pt continues on Amio and Eliquis. · Atrial fibrillation RVR, hypotension, IV amiodarone, April 2022 admission with CT- bilateral pleural effusions possible bony mets to rib and recent persistent  COVID, TSH was 0.68  · [id number] 2 with age  · Echo 4/12/2022 = EF 55-60%; moderate sclerosis of the aortic valve without stenosis. Myxomatous mitral valve, mild to moderate MR, mild pulmonary hypertension at 44 mmHg  · Atrial flutter 2019  · Prostate cancer with mets  · Acute hypoxic respiratory failure effusions  ·   Today. .. + lower extremity edema  - very mild  Pt is accompanied by his wife and ambulates with a walker. Overall the pt states he is doing well. He notes he has a therapist that comes to his home and exercises him. He gets some tiredness from walking. He does not use his walker in the house, and takes his walker for safety precautions. He believes that his Clarice Oz has been losing effectiveness, and was off meds when he was at [facility]. He says his PSI levels were 3 the other day, but were previously 0 for significant time before that. Pt states he could barely get up when he went to rehab on 4/25. Pt has taken Amiodarone this morning. Denies chest pain,syncope or shortness of breath at rest, has no tachycardia, palpitations or sense of arrhythmia.     EKG: SR WNL normal axis and intervals     Assessment/Plan:     Patient Instructions We will see you back in July with an echocardiogram and office visit. 1. PAF (paroxysmal atrial fibrillation) (HCC)  Pt had significant Atrial fibrillation  with difficult to control rate, sepsis post COVID. He was started on amiodarone because of low BP. tolerating that and Eliquis well. Noted  complexities of prostate CA. Will continue Amio and [address]. Would like to repeat echo and visit in three months. 04/09/22  ECHO ADULT COMPLETE 04/13/2022 4/13/2022  Interpretation Summary    Left Ventricle: Left ventricle size is normal. Normal wall thickness. Normal wall motion. Normal left ventricular systolic function with a visually estimated EF of 55 - 60%.   Aortic Valve: Trileaflet valve. Moderate sclerosis of the aortic valve cusp. Mild regurgitation. No stenosis.   Mitral Valve: Findings consistent with myxomatous degeneration. Mildly thickened leaflets. Mild to moderate transvalvular regurgitation.   Pulmonary Arteries: Mild pulmonary hypertension present. The estimated pulmonary artery systolic pressure is 44 mmHg. 2. Typical atrial flutter  Prior aflutter in 2019 with pneumonia. clearly has substrate with any stressors for arrhythmia. TSH after next visit and usual labs. 3. Sepsis with acute organ dysfunction and septic shock, due to unspecified organism, unspecified type ([address]  Significant sepsis syndrome Post COVID, BP now 130. Progress with stay after [facility] with therapy. 4. Prostate cancer metastatic to multiple sites [facility])  Complicated with METS and meds, managed by Dr. Taurus Love at . Unclear outlook. F/u in July/August with echo same day     Impression:   1. PAF (paroxysmal atrial fibrillation) ([address]    2. Typical atrial flutter    3. Sepsis with acute organ dysfunction and septic shock, due to unspecified organism, unspecified type ([address]    4. Prostate cancer metastatic to multiple sites [facility])       Cardiac History:   No specialty comments available.      Future Appointments   Date Time Provider Rick Villai   7/8/2022 10:30 AM [facility] CT ER 3 [facility]   7/28/2022 10:00 AM DHEERAJ SAXENA AMB   7/28/2022 11:00 AM MD LEONEL Fuentes  AMB        Patient Care Team:  Lissette Mohamud MD as PCP - General (Internal Medicine Physician)  Lissette Mohamud MD as PCP - [facility] Empaneled Provider  Kenia Walden MD as Consulting Provider (Cardiovascular Disease Physician)    ROS-except as noted above. . A complete cardiac and respiratory are reviewed and negative except as above ; Resp-denies wheezing  or productive cough,. Const- No unusual weight loss or fever; Neuro-no recent seizure or CVA ; GI- No BRBPR, abdom pain, bloating ; - no  hematuria   see supplement sheet, initialed and to be scanned by staff  Past Medical History:   Diagnosis Date    Cancer [facility])     prostate ca with mets    Prostate cancer [facility])       Social Hx= reports that he has never smoked. He has never used smokeless tobacco. He reports that he does not drink alcohol and does not use drugs. Exam and Labs:  /80   Pulse 77   Resp 16   Ht 6' 3\" (1.905 m)   Wt 171 lb (77.6 kg)   SpO2 97%   BMI 21.37 kg/m² Constitutional:  NAD, comfortable  Head: NC,AT. Eyes: No scleral icterus. Neck:  Neck supple. No JVD present. Throat: moist mucous membranes. Chest: Effort normal & normal respiratory excursion . Neurological: alert, conversant and oriented . Skin: Skin is not cold. No obvious systemic rash noted. Not diaphoretic. No erythema. Psychiatric:  Grossly normal mood and affect. Behavior appears normal. Extremities:  no clubbing or cyanosis. Abdomen: non distended    Lungs:breath sounds normal. No stridor. distress, wheezes or  Rales. Heart: normal rate, regular rhythm, normal S1, S2, no murmurs, rubs, clicks or gallops , PMI non displaced. Edema: Edema is none.   Lab Results   Component Value Date/Time    Cholesterol, total 124 05/31/2019 07:07 AM    HDL Cholesterol 39 05/31/2019 07:07 AM    LDL, calculated 62 05/31/2019 07:07 AM    Triglyceride 115 05/31/2019 07:07 AM    CHOL/HDL Ratio 3.2 05/31/2019 07:07 AM     Lab Results   Component Value Date/Time    Sodium 139 04/13/2022 08:27 AM    Potassium 3.6 04/13/2022 08:27 AM    Chloride 111 (H) 04/13/2022 08:27 AM    CO2 22 04/13/2022 08:27 AM    Anion gap 6 04/13/2022 08:27 AM    Glucose 79 04/13/2022 08:27 AM    BUN 18 04/13/2022 08:27 AM    Creatinine 0.86 04/13/2022 08:27 AM    BUN/Creatinine ratio 21 (H) 04/13/2022 08:27 AM    GFR est AA >60 04/13/2022 08:27 AM    GFR est non-AA >60 04/13/2022 08:27 AM    Calcium 8.3 (L) 04/13/2022 08:27 AM      Wt Readings from Last 3 Encounters:   05/09/22 171 lb (77.6 kg)   04/28/22 171 lb (77.6 kg)   04/12/22 171 lb 9.6 oz (77.8 kg)      BP Readings from Last 3 Encounters:   05/09/22 130/80   04/28/22 (!) 122/55   04/13/22 130/87      Current Outpatient Medications   Medication Sig    amiodarone (CORDARONE) 200 mg tablet Take 1 Tablet by mouth daily.  apixaban (ELIQUIS) 5 mg tablet Take 1 Tablet by mouth two (2) times a day.  calcium citrate-vitamin D3 (CITRACAL + D) tablet Take 1 Tab by mouth two (2) times a day.  leuprolide acetate (LUPRON DEPOT IM) by IntraMUSCular route. Every 6 months    abiraterone (ZYTIGA) 250 mg tab Take 1,000 mg by mouth daily.  predniSONE (DELTASONE) 5 mg tablet Take 5 mg by mouth two (2) times a day.  tamsulosin (FLOMAX) 0.4 mg capsule Take 0.4 mg by mouth every evening. No current facility-administered medications for this visit. Impression see above.       Written by Kaur Noel, as dictated by Mamie Elias MD.

## 2022-05-09 NOTE — Clinical Note
5/9/2022    Patient: Palmer Mancilla   YOB: 1933   Date of Visit: 5/9/2022     Eusebio Alamo MD  [facility], [address]  Via In Basket    Dear Eusebio Alamo MD,      Thank you for referring Mr. Oxana Hawley to [facility] for evaluation. My notes for this consultation are attached. If you have questions, please do not hesitate to call me. I look forward to following your patient along with you.       Sincerely,    Evelia Brooks MD

## 2022-05-13 NOTE — TELEPHONE ENCOUNTER
Spoke with patient advised patient an appointment is need for Dr Enrique Camp to assess him for driving.  Scheduled an appointment for 5/19/22

## 2022-06-10 PROBLEM — C79.51 BONE METASTASIS (HCC): Status: ACTIVE | Noted: 2017-11-27

## 2022-06-10 PROBLEM — C79.51 BONE METASTASIS: Status: ACTIVE | Noted: 2017-11-27

## 2022-06-20 PROBLEM — E88.3 TUMOR LYSIS SYNDROME: Status: ACTIVE | Noted: 2022-01-01

## 2022-06-20 NOTE — ROUTINE PROCESS
Pt arrives via [facility] to angio department accompanied by wife for T7 kypho procedure. All assessments completed and consent was reviewed. Education given was regarding procedure, conscious sedation, post-procedure care and  management/follow-up. Opportunity for questions was provided and all questions and concerns were addressed.

## 2022-06-20 NOTE — PROGRESS NOTES
Transition of Care Plan   RUR: n/a   Disposition: The disposition plan is home with family assistance   F/U with PCP/Specialist     Transport: family     Reason for Admission:  Tumor lysis syndrome                     RUR Score:          n/a           Plan for utilizing home health:      No recommendations    PCP: First and Last name:  Josh Akhtar MD     Name of Practice:    Are you a current patient: Yes/No:    Approximate date of last visit:    Can you participate in a virtual visit with your PCP:                     Current Advanced Directive/Advance Care Plan: Full Code      Healthcare Decision Maker:                Transition of Care Plan:                      CRM spoke with member's wife, introduced self, explained role, verified demographics, and offered assistance. The patient lives with his wife in a home with no steps to enter. The patient is independent in his ADL's/IADL's and does have a walker to assist with ambulation. The patient uses mail order and his local [employer] for his prescriptions. Care Management Interventions  PCP Verified by CM: Yes  Palliative Care Criteria Met (RRAT>21 & CHF Dx)?: No  Mode of Transport at Discharge:  Other (see comment) (family)  Transition of Care Consult (CM Consult): Discharge Planning  MyChart Signup: No  Discharge Durable Medical Equipment: No  Health Maintenance Reviewed: Yes  Physical Therapy Consult: No  Occupational Therapy Consult: No  Speech Therapy Consult: No  Support Systems: Spouse/Significant Other  Confirm Follow Up Transport: Family  [employer] Information Provided?: No  Discharge Location  Patient Expects to be Discharged to[de-identified] Home with family assistance    7:44 PM  SALMA Hilario

## 2022-06-20 NOTE — H&P
Radiology History and Physical    Patient: Celia Morgan 80 y.o. male       Chief Complaint: No chief complaint on file. History of Present Illness: 80year old male with pathologic T7 fracture, history of metastatic prostate cancer. History:    Past Medical History:   Diagnosis Date    Cancer [facility])     prostate ca with mets    Prostate cancer ([facility] [address]      No family history on file. Social History     Socioeconomic History    Marital status:      Spouse name: Not on file    Number of children: Not on file    Years of education: Not on file    Highest education level: Not on file   Occupational History    Not on file   Tobacco Use    Smoking status: Never Smoker    Smokeless tobacco: Never Used   Vaping Use    Vaping Use: Never used   Substance and Sexual Activity    Alcohol use: Never    Drug use: Never    Sexual activity: Not on file   Other Topics Concern    Not on file   Social History Narrative    ** Merged History Encounter **          Social Determinants of Health     Financial Resource Strain:     Difficulty of Paying Living Expenses: Not on file   Food Insecurity:     Worried About Running Out of Food in the Last Year: Not on file    Taniya of Food in the Last Year: Not on file   Transportation Needs:     Lack of Transportation (Medical): Not on file    Lack of Transportation (Non-Medical):  Not on file   Physical Activity:     Days of Exercise per Week: Not on file    Minutes of Exercise per Session: Not on file   Stress:     Feeling of Stress : Not on file   Social Connections:     Frequency of Communication with Friends and Family: Not on file    Frequency of Social Gatherings with Friends and Family: Not on file    Attends [employer] Services: Not on file    Active Member of Clubs or Organizations: Not on file    Attends Club or Organization Meetings: Not on file    Marital Status: Not on file   Intimate Partner Violence:     Fear of Current or Ex-Partner: Not on file    Emotionally Abused: Not on file    Physically Abused: Not on file    Sexually Abused: Not on file   Housing Stability:     Unable to Pay for Housing in the Last Year: Not on file    Number of Places Lived in the Last Year: Not on file    Unstable Housing in the Last Year: Not on file       Allergies: No Known Allergies    Current Medications:  Current Facility-Administered Medications   Medication Dose Route Frequency    0.9% sodium chloride infusion  25 mL/hr IntraVENous RAD CONTINUOUS    fentaNYL citrate (PF) injection  mcg   mcg IntraVENous Rad Multiple    flumazeniL (ROMAZICON) 0.1 mg/mL injection 0.5 mg  0.5 mg IntraVENous RAD PRN    midazolam (VERSED) injection 0.5-4 mg  0.5-4 mg IntraVENous Rad Multiple    naloxone (NARCAN) injection 0.4 mg  0.4 mg IntraVENous RAD PRN    lidocaine (XYLOCAINE) 20 mg/mL (2 %) injection 400 mg  20 mL SubCUTAneous RAD ONCE    iohexoL (OMNIPAQUE) 240 mg iodine/mL solution 30 mL  30 mL Intra artICUlar RAD ONCE    bupivacaine (PF) (MARCAINE) 0.5 % (5 mg/mL) injection 100 mg  20 mL Intra artICUlar RAD ONCE        Physical Exam:  Blood pressure (!) 165/78, pulse 80, temperature 98.9 °F (37.2 °C), resp. rate 17, height 6' 3\" (1.905 m), weight 77.1 kg (170 lb), SpO2 100 %. GENERAL: alert, cooperative, no distress, appears stated age,   LUNG: Nonlabored respiration on room air  HEART: regular rate and rhythm    Alerts:    Hospital Problems  Date Reviewed: 1/25/2021    None          Laboratory:      Recent Labs     06/20/22  1111   HGB 12.5   HCT 38.3   WBC 4.6      INR 1.0         Plan of Care/Planned Procedure:  Risks, benefits, and alternatives reviewed with patient and he agrees to proceed with the procedure. T7 biopsy, ablation, and kyphoplasty    Deemed appropriate for moderate sedation with versed and fentanyl.     Porsche Garcia MD  Interventional Radiology  [facility]  12:19 PM, 6/20/2022

## 2022-06-20 NOTE — PROGRESS NOTES
TRANSFER - IN REPORT:    Verbal report received from Lalitha RN (name) on Marina Gonzalez  being received from [facility] (unit) for routine post - op      Report consisted of patients Situation, Background, Assessment and   Recommendations(SBAR). Information from the following report(s) SBAR, Kardex, Procedure Summary, Intake/Output, MAR, Recent Results and Cardiac Rhythm NSR was reviewed with the receiving nurse. Opportunity for questions and clarification was provided.       MD paged regarding NPO status, shift report given to New Edgar RN to assume care and complete assessment

## 2022-06-20 NOTE — PROGRESS NOTES
Intra- procedure pt developed hematomas under both puncture sites, MD Nena Wright aware. Pressure held once procedure was over to control any active bleeding. Dressings CDI, will continue to monitor. 2:15 PM  Pt remains resting in bed, VSS, will continue to monitor. Hematoma's still present and unchanged. 2:45 PM  Pt remain's resting in bed, slight oozing noted to L dressing. Hematoma's decreased in size. Pt reports increased pain across entire upper back. MD notified. 3:15 PM  Wife brought to bedside. Pt reports pain 5/10. MD notified. 650 tylenol and 15mg toradol given per MD order. 3:45 PM  Pt reports chills, attempted to take oral temp with two thermometers, temp was 95.0, unable to confirm with rectal temp (no rectal probe in IR). MD aware. BP 87/50, pt asymptomatic. Retaken 97/52. STAT CBC and CMP drawn per Dr. Nena Wright. 4:15 PM  Pt resting in bed with wife at bedside, BP remains soft. Consulted Dr. Willie Campbell for admission. 5:48 PM  BP remains low, pt denies dizziness or any complaints except for being cold. Pt assisted to stand to use urinal. Urine sent to lab on hold.

## 2022-06-20 NOTE — PROGRESS NOTES
TRANSFER - OUT REPORT:    Verbal report given to Compa Frausto RN(name) on Humphrey Masters  being transferred to [facility]) for routine progression of care       Report consisted of patients Situation, Background, Assessment and   Recommendations(SBAR). Information from the following report(s) SBAR, Kardex, Procedure Summary, Intake/Output, MAR and Recent Results was reviewed with the receiving nurse. Lines:   Peripheral IV 06/20/22 Right Antecubital (Active)        Opportunity for questions and clarification was provided.       Patient transported with:   [employer]

## 2022-06-20 NOTE — PROCEDURES
Interventional Radiology  Procedure Note        6/20/2022 2:08 PM    Patient: Alysha Mason     Informed consent obtained    Diagnosis: Pathologic fracture of T7    Procedure(s): T7 biopsy, ablation, and kyphoplasty    Specimens removed:  2x core samples of the T7 bone    Complications: None    Primary Physician: Delicia Moe MD    Recommendations: N/A    Discharge Disposition: Stable; discharge to home    Full dictated report to follow    Delicia Moe MD  Interventional Radiology  [facility]  2:08 PM, 6/20/2022

## 2022-06-21 NOTE — H&P
History & Physical    Primary Care Provider: Robert Chavez MD  Source of Information: Patient and chart review    History of Presenting Illness:   Sara Andrade is a 80 y.o. male history of metastatic prostate cancer, A. fib a flutter who is postop day 0 status post T7 biopsy, ablation and kyphoplasty with IR. Postoperative course complicated by hematomas and puncture sites and subsequent hypotension requiring volume resuscitation. Decision was made to keep patient in-house for further evaluation and medicine consulted. The patient denies any fever, chills, chest or abdominal pain, nausea, vomiting, cough, congestion, recent illness, palpitations, or dysuria. Remarkable vitals: 90s/40s --> 131/75  Labs Remarkable for: unremarkable       Review of Systems:  Pertinent items are noted in the History of Present Illness. Past Medical History:   Diagnosis Date    Cancer [facility])     prostate ca with mets    Prostate cancer [facility])       Past Surgical History:   Procedure Laterality Date    IR INSERT TUNL CVC W/O PORT OVER 5 YR  1/6/2022    IR KYPHOPLASTY THORACIC  6/20/2022    IR REMOVE TUNL CVAD W/O PORT / PUMP  1/25/2022     Prior to Admission medications    Medication Sig Start Date End Date Taking? Authorizing Provider   amiodarone (CORDARONE) 200 mg tablet Take 1 Tablet by mouth daily. 5/9/22  Yes Lionel Kennedy MD   apixaban (ELIQUIS) 5 mg tablet Take 1 Tablet by mouth two (2) times a day. 4/28/22  Yes Robert Chavez MD   calcium citrate-vitamin D3 (CITRACAL + D) tablet Take 1 Tab by mouth two (2) times a day. Yes Provider, Historical   predniSONE (DELTASONE) 5 mg tablet Take 5 mg by mouth two (2) times a day. Yes Provider, Historical   tamsulosin (FLOMAX) 0.4 mg capsule Take 0.4 mg by mouth every evening. Yes Provider, Historical   leuprolide acetate (LUPRON DEPOT IM) by IntraMUSCular route.  Every 6 months    Provider, Historical   abiraterone (ZYTIGA) 250 mg tab Take 1,000 mg by mouth daily. Patient not taking: Reported on 6/20/2022    Provider, Historical     No Known Allergies   No family history on file. SOCIAL HISTORY:  Patient resides:  Independently x   Assisted Living    SNF    With family care x      Smoking history:   None x   Former    Chronic      Alcohol history:   None x   Social    Chronic      Ambulates:   Independently x   w/cane    w/walker    w/wc    CODE STATUS:  DNR    Full x   Other      Objective:     Physical Exam:     Visit Vitals  /75 (BP 1 Location: Left upper arm, BP Patient Position: At rest)   Pulse 81   Temp 98.4 °F (36.9 °C)   Resp 20   Ht 6' 3\" (1.905 m)   Wt 77.1 kg (170 lb)   SpO2 99%   BMI 21.25 kg/m²    O2 Flow Rate (L/min): 4 l/min O2 Device: None (Room air)    General:  Alert, cooperative, no distress, appears stated age. Head:  Normocephalic, without obvious abnormality, atraumatic. Eyes:  Conjunctivae/corneas clear. PERRL, EOMs intact. Nose: Nares normal. Septum midline. Mucosa normal.        Neck: Supple, symmetrical, trachea midline,. Lungs:   Clear to auscultation bilaterally. Chest wall:  No tenderness or deformity. Heart:  Regular rate and rhythm, S1, S2 normal   Abdomen:   Soft, non-tender. Bowel sounds normal. No masses,  No organomegaly. Extremities: Extremities normal, atraumatic, no cyanosis or edema. Pulses: 2+ and symmetric all extremities. Skin: Skin color, texture, turgor normal. No rashes or lesions. Low back bruising noted w/ clean wound bandages. Neurologic: CNII-XII intact.         Data Review:     Recent Days:  Recent Labs     06/20/22  1600 06/20/22  1111   WBC 4.9 4.6   HGB 10.9* 12.5   HCT 34.1* 38.3    205     Recent Labs     06/20/22  1600 06/20/22  1111     --    K 3.9  --      --    CO2 26  --    *  --    BUN 14  --    CREA 1.09  --    CA 8.9  --    ALB 2.6*  --    ALT 20  --    INR  --  1.0     No results for input(s): PH, PCO2, PO2, HCO3, FIO2 in the last 72 hours. 24 Hour Results:  Recent Results (from the past 24 hour(s))   CBC WITH AUTOMATED DIFF    Collection Time: 06/20/22 11:11 AM   Result Value Ref Range    WBC 4.6 4.1 - 11.1 K/uL    RBC 4.14 4.10 - 5.70 M/uL    HGB 12.5 12.1 - 17.0 g/dL    HCT 38.3 36.6 - 50.3 %    MCV 92.5 80.0 - 99.0 FL    MCH 30.2 26.0 - 34.0 PG    MCHC 32.6 30.0 - 36.5 g/dL    RDW 15.9 (H) 11.5 - 14.5 %    PLATELET 808 056 - 842 K/uL    MPV 9.3 8.9 - 12.9 FL    NRBC 0.0 0  WBC    ABSOLUTE NRBC 0.00 0.00 - 0.01 K/uL    NEUTROPHILS 71 32 - 75 %    LYMPHOCYTES 10 (L) 12 - 49 %    MONOCYTES 15 (H) 5 - 13 %    EOSINOPHILS 2 0 - 7 %    BASOPHILS 1 0 - 1 %    IMMATURE GRANULOCYTES 1 (H) 0.0 - 0.5 %    ABS. NEUTROPHILS 3.3 1.8 - 8.0 K/UL    ABS. LYMPHOCYTES 0.5 (L) 0.8 - 3.5 K/UL    ABS. MONOCYTES 0.7 0.0 - 1.0 K/UL    ABS. EOSINOPHILS 0.1 0.0 - 0.4 K/UL    ABS. BASOPHILS 0.0 0.0 - 0.1 K/UL    ABS. IMM. GRANS. 0.0 0.00 - 0.04 K/UL    DF SMEAR SCANNED      RBC COMMENTS ANISOCYTOSIS  1+       PROTHROMBIN TIME + INR    Collection Time: 06/20/22 11:11 AM   Result Value Ref Range    INR 1.0 0.9 - 1.1      Prothrombin time 10.6 9.0 - 11.1 sec   CBC WITH AUTOMATED DIFF    Collection Time: 06/20/22  4:00 PM   Result Value Ref Range    WBC 4.9 4.1 - 11.1 K/uL    RBC 3.62 (L) 4.10 - 5.70 M/uL    HGB 10.9 (L) 12.1 - 17.0 g/dL    HCT 34.1 (L) 36.6 - 50.3 %    MCV 94.2 80.0 - 99.0 FL    MCH 30.1 26.0 - 34.0 PG    MCHC 32.0 30.0 - 36.5 g/dL    RDW 15.8 (H) 11.5 - 14.5 %    PLATELET 823 925 - 011 K/uL    MPV 9.8 8.9 - 12.9 FL    NRBC 0.0 0  WBC    ABSOLUTE NRBC 0.00 0.00 - 0.01 K/uL    NEUTROPHILS 73 32 - 75 %    LYMPHOCYTES 10 (L) 12 - 49 %    MONOCYTES 14 (H) 5 - 13 %    EOSINOPHILS 2 0 - 7 %    BASOPHILS 0 0 - 1 %    IMMATURE GRANULOCYTES 1 (H) 0.0 - 0.5 %    ABS. NEUTROPHILS 3.5 1.8 - 8.0 K/UL    ABS. LYMPHOCYTES 0.5 (L) 0.8 - 3.5 K/UL    ABS. MONOCYTES 0.7 0.0 - 1.0 K/UL    ABS.  EOSINOPHILS 0.1 0.0 - 0.4 K/UL ABS. BASOPHILS 0.0 0.0 - 0.1 K/UL    ABS. IMM. GRANS. 0.1 (H) 0.00 - 0.04 K/UL    DF SMEAR SCANNED      RBC COMMENTS ANISOCYTOSIS  1+       METABOLIC PANEL, COMPREHENSIVE    Collection Time: 06/20/22  4:00 PM   Result Value Ref Range    Sodium 136 136 - 145 mmol/L    Potassium 3.9 3.5 - 5.1 mmol/L    Chloride 106 97 - 108 mmol/L    CO2 26 21 - 32 mmol/L    Anion gap 4 (L) 5 - 15 mmol/L    Glucose 128 (H) 65 - 100 mg/dL    BUN 14 6 - 20 MG/DL    Creatinine 1.09 0.70 - 1.30 MG/DL    BUN/Creatinine ratio 13 12 - 20      GFR est AA >60 >60 ml/min/1.73m2    GFR est non-AA >60 >60 ml/min/1.73m2    Calcium 8.9 8.5 - 10.1 MG/DL    Bilirubin, total 0.4 0.2 - 1.0 MG/DL    ALT (SGPT) 20 12 - 78 U/L    AST (SGOT) 30 15 - 37 U/L    Alk. phosphatase 77 45 - 117 U/L    Protein, total 5.2 (L) 6.4 - 8.2 g/dL    Albumin 2.6 (L) 3.5 - 5.0 g/dL    Globulin 2.6 2.0 - 4.0 g/dL    A-G Ratio 1.0 (L) 1.1 - 2.2     SAMPLES BEING HELD    Collection Time: 06/20/22  6:05 PM   Result Value Ref Range    SAMPLES BEING HELD 1UA     COMMENT        Add-on orders for these samples will be processed based on acceptable specimen integrity and analyte stability, which may vary by analyte. Imaging:     Assessment:     Yan Murguia is a 80 y.o. male history of metastatic prostate cancer, A. fib a flutter who is postop day 0 status post T7 biopsy, ablation and kyphoplasty with IR. Plan:       Metastatic Prostate Cancer  POD 0 s/p T7 biopsy, ablation and kyphoplasty   -Continue flomax  -PT/OT eval in am  -Pain control    Afib  -Continue amio  -Hold eliquis given post op hematoma    Hypothermia / Hypotension  -Likely due to anesthetic effect and resolved.   -Monitor on remote tele          FEN/GI -  [email or web address]  Activity - AS tolerated  DVT prophylaxis - SCDs  GI prophylaxis -  NI  Disposition - Home    CODE STATUS:  Full code       Signed By: Fatuma Caro MD     June 20, 2022

## 2022-06-21 NOTE — PROGRESS NOTES
AVS reviewed regarding f/u MD and f/u  Appointments including current and new medications. Patient verbalized understanding. Pt Wc to front medical doors, wife to take home. Patient discharged home with all belongings.

## 2022-06-21 NOTE — PROGRESS NOTES
Transition Plan of Care  RUR 17%-Med  Disposition-discharging home with wife. No skilled needs identified.

## 2022-06-21 NOTE — PROGRESS NOTES
2000 Received patient from [facility], [address] [place] [address].    8181 Bedside shift change report given to Kenneth Briggs RN (oncoming nurse) by Norman Montoya RN. Report included the following information SBAR, Kardex, MAR, Recent Results, and Cardiac Rhythm NSR. Problem: Falls - Risk of  Goal: *Absence of Falls  Description: Document Carolann Don Fall Risk and appropriate interventions in the flowsheet.   Outcome: Progressing Towards Goal  Note: Fall Risk Interventions:  Mobility Interventions: Assess mobility with egress test         Medication Interventions: Bed/chair exit alarm         History of Falls Interventions: Consult care management for discharge planning

## 2022-06-21 NOTE — PROGRESS NOTES
Problem: Discharge Planning  Goal: *Discharge to safe environment  Outcome: Resolved/Met     Problem: Falls - Risk of  Goal: *Absence of Falls  Description: Document Bharath Garcia Fall Risk and appropriate interventions in the flowsheet.   Outcome: Resolved/Met  Note: Fall Risk Interventions:  Mobility Interventions: Patient to call before getting OOB         Medication Interventions: Patient to call before getting OOB         History of Falls Interventions: Door open when patient unattended,Room close to nurse's station,Bed/chair exit alarm         Problem: Patient Education: Go to Patient Education Activity  Goal: Patient/Family Education  Outcome: Resolved/Met

## 2022-06-21 NOTE — DISCHARGE INSTRUCTIONS
[address] of Interventional Radiology  [facility]  [phone] (after 5:30 PM)       Radiofrequency Ablation Discharge Instructions    General Information:   Percutaneous radiofrequency ablation uses radio waves to heat and destroy tumors. A needle is inserted through your skin and into the tumor. The heating process then kills the tumor cells while preserving the healthy cells. Once the tumor cells have been destroyed they are reabsorbed by the patients immune system. You have received radiofrequency ablation in your spine at T7. Home Care Instructions: You can resume your regular diet and medication regimen. Do not drink alcohol, drive, or make any important legal decisions in the next 24 hours. Do not lift anything heavier than a gallon of milk, or do anything strenuous for the next 24 hours. You will notice a dressing at the insertion sites for the procedure. Keep the sites covered until the puncture sites have totally healed. You make take a shower after 24 hours but do not immerse yourself in water until the wound has totally healed. After your puncture wound heals, there is no special care that is needed. You may resume your normal level of activity slowly, after about one week of light activity or per your Physicians instructions. Call If:     You should call your Physician and/or the Radiology Nurse if you have any questions or concerns about the sites where the needles were inserted. Call if you have any signs of infection, fever, swelling, or increased pain at the site. Call if you have any questions of how to take care of your wound. Follow-Up Instructions: Please see your ordering doctor as he/she has requested. To Reach Us: Side effects of sedation medications and other medications used today have been reviewed.   Notify us of nausea, itching, hives, dizziness, or anything else out of the ordinary. Should you experience any of these significant changes, please call [phone]. After hours, ask the  to page the [facility] Technologist, and describe the problem to the technologist.          KYPHOPLASTY/VERTEBROPLASTY DISCHARGE INSTRUCTIONS    General Information: This procedure is done to help with the back pain that is associated with compression fractures in the spine. The kyphoplasty involves placing a balloon into the space of the vertebrae that is fractured, blowing up the balloon, therefore realigning the broken pieces of bone, and then injecting cement into the space to strengthen the vertebrae. The vertebroplasty is only slightly different in that there is no balloon used. The Interventional Radiologist will speak with you and decide which procedure is best for you. The pain experienced from compression fractures is caused by the vertebrae not being stabilized. The cement stabilizes the bone, therefore reducing the pain. Home Care Instructions: You can resume your regular diet and medication regimen. Do not drink alcohol, drive, or make any important legal decisions in the next 24 hours. Do not lift anything heavier than a gallon of milk, or do anything strenuous for the next 24 hours. You will notice a dressing on your lower back after your procedure. This dressing can be removed in 24 hours. Showering is acceptable in 24 hours, but you should refrain from tub baths or swimming for 5 days. You will be asked to come back in for a recheck about 30 days after your procedure. At that time, our physician will ask you questions about your pain, and if it has improved. Call If:     You should call your Physician and/or the Radiology Nurse if you have any bleeding other than a small spot on your bandage. Call if you have any signs of infection, fever, or increased pain at the site.  Call if you should have new or worsening pain in your back, or if you lose control of your bladder or bowel. Any tingling or loss of feeling or movement in your legs should also be reported. Follow-Up Instructions: Please see your ordering doctor as he/she has requested. Patient Signature:  Date: 6/20/2022  Discharging Nurse: Sarabjit Boyce RN          Discharge Instructions       PATIENT ID: Jeffery Jones  MRN: [id number]   YOB: 1933    DATE OF ADMISSION: 6/20/2022 10:00 AM    DATE OF DISCHARGE: 6/21/2022    PRIMARY CARE PROVIDER: Jorge Cruz MD     ATTENDING PHYSICIAN: Sybil Mabry MD  DISCHARGING PROVIDER: Rickie Lewis MD    To contact this individual call [phone] and ask the  to page. If unavailable ask to be transferred the Adult Hospitalist Department. DISCHARGE DIAGNOSES and CARE RECOMMENDATIONS:  Episode of hypotension and hypothermia post procedure suspected to be due to medications which resolved spontaneously. You were observed overnight and there was no recurrence of these symptoms/signs. DIET: Regular Diet      ACTIVITY: Activity as tolerated      PENDING TEST RESULTS:   At the time of discharge the following test results are still pending: none    FOLLOW UP APPOINTMENTS:   Follow-up Information     Follow up With Specialties Details Why Contact Info    Jorge Cruz MD Internal Medicine Physician   [address] [place] [address]  [phone]               YOU WERE SEEN BY THE FOLLOWING CONSULTATIONS:   None    YOU HAD THE FOLLOWING PROCEDURES/SURGERIES  :   * No procedures listed *              DISCHARGE MEDICATIONS:   See Medication Reconciliation Form    · It is important that you take the medication exactly as they are prescribed. · Keep your medication in the bottles provided by the pharmacist and keep a list of the medication names, dosages, and times to be taken in your wallet. · Do not take other medications without consulting your doctor.        NOTIFY YOUR PHYSICIAN FOR ANY OF THE FOLLOWING:   Fever over 101 degrees for 24 hours. Chest pain, shortness of breath, fever, chills, nausea, vomiting, diarrhea, change in mentation, falling, weakness, bleeding. Severe pain or pain not relieved by medications. Or, any other signs or symptoms that you may have questions about. Signed:    Deangelo Singleton MD  6/21/2022  12:48 PM

## 2022-06-23 NOTE — TELEPHONE ENCOUNTER
Physical therapy called stated Patient fell on Tuesday and the only injury was a skin tear. Physical therapy will continue to see patient

## 2022-06-24 NOTE — PROGRESS NOTES
Physician Progress Note      Rula MAGAÑA #:                  [id number]  :                       10/13/1933  ADMIT DATE:       2022 10:00 AM  DISCH DATE:        2022 2:25 PM  RESPONDING  PROVIDER #:        Dalton King MD          QUERY TEXT:    Patient admitted with fracture, noted to have atrial fibrillation. If possible, please document in progress notes and discharge summary further specificity regarding the type of atrial fibrillation: The medical record reflects the following:  Risk Factors: Afib  Clinical Indicators:  Patient was noted to have a history of Afib/Aflutter. Treatment: Amiodarone    Thank you,  Raina Russo RN, CDI, MOIRA MoralesS  Certified Clinical Documentation Integrity  Specialist  [phone]  You can also contact me via [facility]. Chronic: nonspecific term that could be referring to paroxysmal, persistent, or permanent  Longstanding persistent: persistent and continuous, lasting > 1 year. Paroxysmal - self-terminating or intermittent; resolves with or without intervention within 7 days of onset; may recur with various frequency. Persistent - Fails to terminate within 7 days; Often requires meds or cardioversion to restore to NSR. Permanent - longstanding & persistent; Medication has been ineffective in restoring NSR &/or cardioversion is contraindicated    Definitions per MS-DRG Training Guide and Quick Reference Guide, Jefferson Almanza 112 5 Diseases and Disorders of the Circulatory System; 2019; [employer]. Software content from the [employer]? Advanced CDI Transformation Program.  Options provided:  -- Persistent Atrial Fibrillation  -- Chronic Atrial Fibrillation, unspecified  -- Other - I will add my own diagnosis  -- Disagree - Not applicable / Not valid  -- Disagree - Clinically unable to determine / Unknown  -- Refer to Clinical Documentation Reviewer    PROVIDER RESPONSE TEXT:    This patient has unspecified chronic atrial fibrillation.     Query created by: Davie Willard on 6/24/2022 6:34 AM      QUERY TEXT:    Patient admitted with fracture , noted to have atrial flutter. If possible, please document in progress notes and discharge summary further specificity regarding the type of atrial fibrillation: The medical record reflects the following:  Risk Factors: a fib/a flutter  Clinical Indicators:  Patient was noted to have a history of Afib/Aflutter. Treatment: Amiodarone    Thank you,  Martir Godoy RN, CDI, MOIRA MoralesS  Certified Clinical Documentation Integrity  Specialist  [phone]  You can also contact me via [address] [place] [address]. Chronic: nonspecific term that could be referring to paroxysmal, persistent, or permanent  Longstanding persistent: persistent and continuous, lasting > 1 year. Paroxysmal - self-terminating or intermittent; resolves with or without intervention within 7 days of onset; may recur with various frequency. Persistent - Fails to terminate within 7 days; Often requires meds or cardioversion to restore to NSR. Permanent - longstanding & persistent; Medication has been ineffective in restoring NSR &/or cardioversion is contraindicated    Definitions per MS-DRG Training Guide and Quick Reference Guide, Jefferson Garciamar 112 5 Diseases and Disorders of the Circulatory System; 2019; [employer]. Software content from the [employer]? Advanced CDI Transformation Program.  Options provided:  -- under current treatment for a-flutter  -- not under current treatment for a-flutter  -- Other - I will add my own diagnosis  -- Disagree - Not applicable / Not valid  -- Disagree - Clinically unable to determine / Unknown  -- Refer to Clinical Documentation Reviewer    PROVIDER RESPONSE TEXT:    This patient is under current treatment for atrial flutter .     Query created by: Davie Willard on 6/24/2022 6:39 AM      Electronically signed by:  Bharat Sanabria MD 6/24/2022 12:22 PM

## 2022-06-24 NOTE — DISCHARGE SUMMARY
Physician Discharge Summary     Patient ID:    Genaro Nunez  [id number]  [unfilled]  10/13/1933    Admit date: 6/20/2022    Discharge date and time: 6/24/2022    Hospital Diagnoses and Treatment Rendered:   Genaro Nunez is a 80 y.o. male history of metastatic prostate cancer, A. fib a flutter who is postop day 0 status post T7 biopsy, ablation and kyphoplasty with IR. Postoperative course complicated by hematomas and puncture sites and subsequent hypotension requiring volume resuscitation. Decision was made to keep patient in-house for further evaluation and medicine consulted. Transient hypothermia and hypotension. This is likely due to the effect of anesthesia/sedating medications. This had already resolved with however decision was made to admit and observe the patient overnight. Patient was observed with overnight, no recurrence of hypothermia, hypotension and remained clinically stable. Metastatic Prostate Cancer  Status post T7 biopsy, ablation and kyphoplasty     Afib  -Continue amio  -Eliquis was he-year-old overnight given post op hematoma. Hemoglobin stable     I saw and examined patient on the day of discharge. He stated he is feeling fine and he is eager to go. He is hemodynamically and clinically stable, labs are stable including hemoglobin. Patient discharged home was advised to follow-up with his outpatient providers.              Chronic Diagnoses:    Problem List as of 6/21/2022 Date Reviewed: 1/25/2021            Codes Class Noted - Resolved    Tumor lysis syndrome ICD-10-CM: E88.3  ICD-9-CM: 277.88  6/20/2022 - Present        Chronic atrial fibrillation, unspecified ICD-10-CM: I48.20  ICD-9-CM: 427.31  4/28/2022 - Present        Typical atrial flutter ICD-10-CM: I48.3  ICD-9-CM: 427.32  4/28/2022 - Present        Sepsis ([address] ICD-10-CM: A41.9  ICD-9-CM: 038.9, 995.91  4/9/2022 - Present        Acute retention of urine ICD-10-CM: R33.8  ICD-9-CM: 788.29 2021 - Present        Closed fracture of one rib ICD-10-CM: S22.39XA  ICD-9-CM: 807.01  2021 - Present        Vitamin D deficiency ICD-10-CM: E55.9  ICD-9-CM: 268.9  2021 - Present        Immunosuppression due to drug therapy [facility]) ICD-10-CM: A20.173, Z79.899  ICD-9-CM: V58.69  2019 - Present        Community acquired pneumonia ICD-10-CM: J18.9  ICD-9-CM: 486  2019 - Present        Prostate cancer metastatic to pelvis [facility]) ICD-10-CM: G47, C79.89  ICD-9-CM: 185, 198.89  2018 - Present        Lumbar disc disease with radiculopathy ICD-10-CM: M51.16  ICD-9-CM: 722.10, 724.4  2018 - Present        Prostate cancer metastatic to multiple sites [facility]) ICD-10-CM: C61  ICD-9-CM: 185, 199.0  2018 - Present        Heart murmur, systolic UBC-80-LO: A89.4  ICD-9-CM: 785.2  2018 - Present    Overview Addendum 2019  9:20 AM by Klaus Hernandez MD      Transthoracic Echocardiogram     Patient: Faisal Paez  MRN: [id number]  ACCT #: [de-identified]  : 13-Oct-1933  Age: 80 years  Gender: Male  Height: 75 in  Weight: 174.7 lb  BSA: 2.07 mï¾²  BP: 136 / 70 mmHg  Study date: 2018  Status: Routine  Location: Out-patient area  [facility] #: [id number]     Allergies: NO KNOWN ALLERGENS     Reading Group:  *CAV Group  Technologist:  Denise Dominguez RDCS  Reading Physician:  Anil Packer. Chang Parr MD     SUMMARY:  Left ventricle: Systolic function was normal. Ejection fraction was  estimated in the range of 60 % to 65 %. There were no regional wall motion  abnormalities. Doppler parameters were consistent with abnormal left  ventricular relaxation (grade 1 diastolic dysfunction). Mitral valve: There was mild to moderate annular calcification. Tricuspid valve: There was mild regurgitation. INDICATIONS: Heart Murmur     PROCEDURE: This was a routine study. The study included complete 2D  imaging, M-mode, complete spectral Doppler, and color Doppler.  The heart  rate was 89 bpm, at the start of the study. Systolic blood pressure was  136 mmHg, at the start of the study. Diastolic blood pressure was 70 mmHg,  at the start of the study. Image quality was adequate. LEFT VENTRICLE: Size was normal. Systolic function was normal. Ejection  fraction was estimated in the range of 60 % to 65 %. There were no  regional wall motion abnormalities. Wall thickness was normal. DOPPLER:  Doppler parameters were consistent with abnormal left ventricular  relaxation (grade 1 diastolic dysfunction). RIGHT VENTRICLE: The size was normal. Systolic function was normal. Wall  thickness was normal.     LEFT ATRIUM: Size was normal.     RIGHT ATRIUM: Size was normal.     MITRAL VALVE: There was mild to moderate annular calcification. There was  normal leaflet separation. DOPPLER: There was trivial regurgitation. AORTIC VALVE: The valve was trileaflet. Leaflets exhibited mild  calcification and normal cuspal separation. DOPPLER: There was no stenosis. TRICUSPID VALVE: Normal valve structure. There was normal leaflet  separation. DOPPLER: The transtricuspid velocity was within the normal  range. There was no evidence for tricuspid stenosis. There was mild  regurgitation. Pulmonary artery systolic pressure: 35 mmHg. PULMONIC VALVE: Not well visualized. AORTA: The root exhibited normal size. PERICARDIUM: There was no pericardial effusion.      SYSTEM MEASUREMENT TABLES     2D  LAAs A2C: 19.5 cm2  LAAs A4C: 17.2 cm2  LAESV A-L A2C: 59.3 ml  LAESV A-L A4C: 53.4 ml  LAESV Index (A-L): 29.2 ml/m2  LAESV MOD A2C: 57.9 ml  LAESV MOD A4C: 46 ml  LAESV(A-L): 60.5 ml  LALs A2C: 5.4 cm  LALs A4C: 4.7 cm  %FS: 32.9 %  EDV(Teich): 84.9 ml  EF(Teich): 61.7 %  ESV(Teich): 32.5 ml  IVSd: 1.1 cm  LVIDd: 4.3 cm  LVIDs: 2.9 cm  LVPWd: 1.1 cm  SV(Teich): 52.4 ml     CW  AV Vmax: 1.4 m/s  AV maxP.4 mmHg  PV Vmax: 0.7 m/s  PV maxP mmHg  TR Vmax: 2.8 m/s  TR maxP.4 mmHg     MM  Ao Diam: 2.9 cm  LA Diam: 2.8 cm  Ao/LA: 1  LA/Ao: 1  TAPSE: 2.3 cm     PW  LVOT Vmax: 1.2 m/s  LVOT maxP.2 mmHg  E' Lat: 0.1 m/s  E' Sept: 0.1 m/s  E/E' Lat: 12.9  E/E' Sept: 10.5  MV A Gigi: 1.3 m/s  MV Dec [place]: 4.4 m/s2  MV DecT: 219.8 ms  MV E Gigi: 1 m/s  MV E/A Ratio: 0.8  MV PHT: 63.8 ms  MVA By PHT: 3.5 cm2  RV S': 0.2 m/s  E' Av.1 m/s  E/E' Av.6     Prepared and E-signed by     Bertram Miller. Joyce Ryan MD  Signed [id number] 16:55:20                             Degenerative disc disease, lumbar ICD-10-CM: M51.36  ICD-9-CM: 722.52  2018 - Present        Acute back pain ICD-10-CM: M54.9  ICD-9-CM: 724.5  10/7/2018 - Present        Bone metastasis ([facility] ICD-10-CM: C79.51  ICD-9-CM: 198.5  2017 - Present        RESOLVED: Atrial flutter ([facility] ICD-10-CM: I48.92  ICD-9-CM: 427.32  2019 - 2020                Discharge Medications:   Discharge Medication List as of 2022  1:37 PM        START taking these medications    Details   lidocaine HCL 4 % ptmd 1 Patch by Apply Externally route every twelve (12) hours. , Normal, Disp-5 Each, R-0           CONTINUE these medications which have NOT CHANGED    Details   amiodarone (CORDARONE) 200 mg tablet Take 1 Tablet by mouth daily. , Normal, Disp-90 Tablet, R-3      apixaban (ELIQUIS) 5 mg tablet Take 1 Tablet by mouth two (2) times a day., Normal, Disp-180 Tablet, R-1      calcium citrate-vitamin D3 (CITRACAL + D) tablet Take 1 Tab by mouth two (2) times a day., Historical Med      leuprolide acetate (LUPRON DEPOT IM) by IntraMUSCular route. Every 6 months, Historical Med      abiraterone (ZYTIGA) 250 mg tab Take 1,000 mg by mouth daily. , Historical Med      predniSONE (DELTASONE) 5 mg tablet Take 5 mg by mouth two (2) times a day., Historical Med      tamsulosin (FLOMAX) 0.4 mg capsule Take 0.4 mg by mouth every evening., Historical Med               Follow up Care:    1. John Ko MD in 1-2 weeks.   Please call to set up an appointment shortly after discharge. Diet:  Regular Diet    Disposition:  Home. Advanced Directive:   FULL x   DNR      Discharge Exam:  GENERAL:  Alert, oriented, cooperative, no apparent distress  HEENT:  Normocephalic, atraumatic, non icteric sclerae, non pallor conjuctivae, EOMs intact, PERRLA. NECK: Supple, trachea midline, no adenopathy, no thyromegally or tenderness, no carotid bruit and no JVD. LUNGS:   Vesicular breath sounds bilaterally, no added sounds. HEART:   S1 and S2 well heard,RRR,  no murmur, click, rub or gallop. ABDOMEB:   Soft, non-tender. Normoactive bowel sounds. No masses,  No organomegaly. EXTREMETIES:  Atraumatic, acyanotic, no edema  PULSES: 2+ and symmetric all extremities. SKIN: There was bruising on the back. NEUROLOGY: Alert and oriented to PPT, CNII-XII intact. Motor and sensory exam grossly intact. CONSULTATIONS: None    Significant Diagnostic Studies:   6/20/2022: BUN 14 MG/DL (Ref range: 6 - 20 MG/DL); Calcium 8.9 MG/DL (Ref range: 8.5 - 10.1 MG/DL); CO2 26 mmol/L (Ref range: 21 - 32 mmol/L); Creatinine 1.09 MG/DL (Ref range: 0.70 - 1.30 MG/DL); Glucose 128 mg/dL (H; Ref range: 65 - 100 mg/dL); HCT 38.3 % (Ref range: 36.6 - 50.3 %); HCT 34.1 % (L; Ref range: 36.6 - 50.3 %); HGB 12.5 g/dL (Ref range: 12.1 - 17.0 g/dL); HGB 10.9 g/dL (L; Ref range: 12.1 - 17.0 g/dL); Potassium 3.9 mmol/L (Ref range: 3.5 - 5.1 mmol/L); Sodium 136 mmol/L (Ref range: 136 - 145 mmol/L)  6/21/2022: BUN 18 MG/DL (Ref range: 6 - 20 MG/DL); Calcium 8.3 MG/DL (L; Ref range: 8.5 - 10.1 MG/DL); CO2 23 mmol/L (Ref range: 21 - 32 mmol/L); Creatinine 1.00 MG/DL (Ref range: 0.70 - 1.30 MG/DL); Glucose 88 mg/dL (Ref range: 65 - 100 mg/dL); HCT 30.6 % (L; Ref range: 36.6 - 50.3 %); HGB 10.0 g/dL (L; Ref range: 12.1 - 17.0 g/dL); Potassium 4.3 mmol/L (Ref range: 3.5 - 5.1 mmol/L); Sodium 138 mmol/L (Ref range: 136 - 145 mmol/L)  No results for input(s): WBC, HGB, HCT, PLT, HGBEXT, HCTEXT, PLTEXT in the last 72 hours.   No results for input(s): NA, K, CL, CO2, BUN, CREA, GLU, CA, MG, PHOS, URICA in the last 72 hours. No results for input(s): AP, TBIL, TP, ALB, GLOB, GGT, AML, LPSE in the last 72 hours. No lab exists for component: SGOT, GPT, AMYP, HLPSE  No results for input(s): INR, PTP, APTT, INREXT in the last 72 hours. No results for input(s): FE, TIBC, PSAT, FERR in the last 72 hours. No results for input(s): PH, PCO2, PO2 in the last 72 hours. No results for input(s): CPK, CKMB in the last 72 hours. No lab exists for component: TROPONINI  No components found for: [place]    I have spent more than 30 patients with patient on the day of discharge on counseling and discussing discharge care plan as well as coordination of care. Signed:   Tiffany Lopez MD  6/24/2022  6:54 AM

## 2022-06-27 NOTE — PROGRESS NOTES
[facility] at [address], [address] [place] [address] Road, [address] [place] [address] [place]  W: [phone]  F: [phone]    Reason for Visit:   Nancy Edmonds is a 80 y.o. male who is seen in consultation at the request of Dr. Lilibeth White  for evaluation of Prostate cancer    Treatment History:   · 11-12/2017: ADT+ Zytiga stage IV CSPC  · 1/2022: Provenge for castrate resistant disease  · 6/2022-palliative radiation to L2-L5-Dr. Lilibeth White. Started Xtandi    History of Present Illness:   Patient is a 80 y.o.male who presented with urinary obstruction in 2017 when he was found to have an elevated PSA of 56. He then had a prostate biopsy that showed [place] 5+5 prostate cancer in all 12 cores. Scan showed kiersten disease in the pelvis as well as numerous bone metastases. He was diagnosed with metastatic prostate cancer 11/2017. He was also found to have lung nodules at the time of diagnosis. Started on androgen deprivation therapy and Zytiga 12/2017. He had disease progression by January 2022. 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 By April 2022 his systemic staging scans showed progression in bony metastases. He also reported R leg pain by June 2022. He was seen by Dr. Claudene Robin with NSG. PSA continued to rise to 15.3 . He had an MRI of thoracic and lumbar spine 6/13/2022 that showed a moderate T7 pathological compression deformity, multiple new liver lesions likely representing metastatic disease, recent right L4 transverse process, S5 vertebral metastatic lesion, several other lesions in T12- L5 and S2. Underwent kyphoplasty to T7 on 6/20/2022 with biopsy. Biopsy was consistent with adenocarcinoma. Genomic sequencing from primary of 2017 showed tumor mutational burden of 19, MLH1 and MSH2. He comes with his wife Chanelle Scanlon. He states that he has noted that R leg pain is improved by 75%. He states that he had T7 Kyphoplasty 6/20.  He still has noted mid back pain that radiates to his epigastrium. He takes tylenol every 6 hours , takes advil 500 mg BID. His R leg has becomes weaker, had a fall 2 weeks ago, he has been using a walker. He has a decreased appetite. No incontinence. He switched to Adrian 1 month ago. Past Medical History:   Diagnosis Date    Cancer [facility])     prostate ca with mets    Prostate cancer [facility])       Past Surgical History:   Procedure Laterality Date    IR INSERT TUNL CVC W/O PORT OVER 5 YR  1/6/2022    IR KYPHOPLASTY THORACIC  6/20/2022    IR REMOVE TUNL CVAD W/O PORT / PUMP  1/25/2022      Social History     Tobacco Use    Smoking status: Never Smoker    Smokeless tobacco: Never Used   Substance Use Topics    Alcohol use: Never      No family history on file. Current Outpatient Medications   Medication Sig    lidocaine HCL 4 % ptmd 1 Patch by Apply Externally route every twelve (12) hours.  amiodarone (CORDARONE) 200 mg tablet Take 1 Tablet by mouth daily.  apixaban (ELIQUIS) 5 mg tablet Take 1 Tablet by mouth two (2) times a day.  calcium citrate-vitamin D3 (CITRACAL + D) tablet Take 1 Tab by mouth two (2) times a day.  leuprolide acetate (LUPRON DEPOT IM) by IntraMUSCular route. Every 6 months    abiraterone (ZYTIGA) 250 mg tab Take 1,000 mg by mouth daily. (Patient not taking: Reported on 6/20/2022)    predniSONE (DELTASONE) 5 mg tablet Take 5 mg by mouth two (2) times a day.  tamsulosin (FLOMAX) 0.4 mg capsule Take 0.4 mg by mouth every evening. No current facility-administered medications for this visit. No Known Allergies     Review of Systems: A complete review of systems was obtained, negative except as described above.     Physical Exam:     Visit Vitals  Resp 18   Wt 172 lb (78 kg)   BMI 21.50 kg/m²     ECOG PS: 2  General: No distress  Eyes: PERRLA, anicteric sclerae  HENT: Atraumatic, OP clear  Neck: Supple  Lymphatic: No cervical, supraclavicular, or inguinal adenopathy  Respiratory: normal respiratory effort  CV: Normal rate,no peripheral edema  GI: Soft, nontender  Neuro: R leg 4/5  Skin: No rashes, ecchymoses, or petechiae. Normal temperature, turgor, and texture. Psych: Alert, oriented, appropriate affect, normal judgment/insight    Results:     Lab Results   Component Value Date/Time    WBC 5.1 06/21/2022 04:20 AM    HGB 10.0 (L) 06/21/2022 04:20 AM    HCT 30.6 (L) 06/21/2022 04:20 AM    PLATELET 800 31/42/9995 04:20 AM    MCV 92.2 06/21/2022 04:20 AM    ABS. NEUTROPHILS 3.6 06/21/2022 04:20 AM    Hematocrit (POC) 37 10/07/2018 02:51 PM     Lab Results   Component Value Date/Time    Sodium 138 06/21/2022 04:20 AM    Potassium 4.3 06/21/2022 04:20 AM    Chloride 108 06/21/2022 04:20 AM    CO2 23 06/21/2022 04:20 AM    Glucose 88 06/21/2022 04:20 AM    BUN 18 06/21/2022 04:20 AM    Creatinine 1.00 06/21/2022 04:20 AM    GFR est AA >60 06/21/2022 04:20 AM    GFR est non-AA >60 06/21/2022 04:20 AM    Calcium 8.3 (L) 06/21/2022 04:20 AM    Sodium (POC) 139 10/07/2018 02:51 PM    Potassium (POC) 4.0 10/07/2018 02:51 PM    Chloride (POC) 101 10/07/2018 02:51 PM    Glucose (POC) 92 10/07/2018 02:51 PM    BUN (POC) 19 10/07/2018 02:51 PM    Creatinine (POC) 1.00 09/28/2021 12:42 PM    Calcium, ionized (POC) 1.12 10/07/2018 02:51 PM     Lab Results   Component Value Date/Time    Bilirubin, total 0.3 06/21/2022 04:20 AM    ALT (SGPT) 17 06/21/2022 04:20 AM    Alk. phosphatase 87 06/21/2022 04:20 AM    Protein, total 4.8 (L) 06/21/2022 04:20 AM    Albumin 2.2 (L) 06/21/2022 04:20 AM    Globulin 2.6 06/21/2022 04:20 AM       No results found for: PSA, [id number]    Lab Results   Component Value Date/Time    C-Reactive protein 13.30 (H) 04/11/2022 03:56 AM    TSH 0.68 04/11/2022 03:56 AM       Lab Results   Component Value Date/Time    INR 1.0 06/20/2022 11:11 AM       Records reviewed and summarized above. Pathology report(s) reviewed above. Radiology report(s) reviewed above.   MRI reviewed    Assessment:   1) Prostate cancer- stage IV    2017- PSA 53, [place] 5+5 denovo metastasis to multiple bones, nodes, lung  ADT+ Zytiga  1/2022: mCRPC- Provenge  6/2022- Rapidly progressive mCRPC with lung, liver, bone metastasis- Xtandi  TMB High    Understands that t/t are palliative  Disease is an aggressive variant with visceral metastasis  Options reviewed for management. Despite period of disease control on Zytiga previously I do not think that with volume of disease Xtandi would be effective. In general I recommend chemotherapy for quick cytoreduction. Given his age and high tumor mutational burden discussed the option of palliative Keytruda. I discussed that immunotherapy in general is well tolerated and may afford disease control in patients with high TMB disease. Responses may be delayed and aggressive disease may be at risk for hyper progression    He agrees to starting Vinh Garnica  Discussed the caveats of concordance between primary and metastatic sited  Discussed immune mediated side effects    2) Bone metastasis    T7- Kyphoplasy 6/20/2022  L2-L5- RT     3) Pain    4) Aflutter  Amiodarone, Eliquis    5) Psychosocial  Coping well  Supportive wife      Prognosis: Guarded     This is our best current assessment. Cancers respond differently to treatment. Overall prognosis depends on many factors including other conditions, cancer stage, side effects, and other unforeseen events. Goal of therapy: Palliative    Expected response to treatment:  Intermediate: Anticipate cancer shrinking or slowed growth but not remission    Treatment benefits and harms:  We discussed potential short term side effects to include:colitis, rash, low thyroid, arthritis, liver damage, lung irritation    Long term side effects of treatment:  endocrinopathy    Quality of life: Quality of life concerns have been addressed. Treatment as outlined is expected to have moderate impact on patients quality of life.         Plan: · [id number]- with cycle 1  · Discuss genetic testing later  · Stop Xtandi and Prednisone  · Approval for Keytruda every 3 weeks until progression  · CBC, CMP , TSH  · Stop Advil  · Tramadol every 6 hours as needed- 1/2 tab      RTC C1D1    I appreciate the opportunity to participate in Mr. Arun Riley's care.     Signed By: Dunia Lacey MD

## 2022-06-28 NOTE — PROGRESS NOTES
Pharmacy Note- Immunotherapy Education    Ally Hernandez is a  80 y.o.male  diagnosed with prostate cancer here today for  immunotherapy counseling and consent. Mr. Denton Wilkinson is being treated with pembrolizumab. Mr. Denton Wilkinson was provided information regarding the risks of immune-mediated adverse reactions secondary to pembrolizumab that may require interruption/delay of treatment and possible use of corticosteroids. These reactions include, but are not limited to: colitis (diarrhea or severe abdominal pain); hepatitis (jaundice, severe nausea, or vomiting, easy bruising, and/or bleeding); hypophysitis (persistent or unusual headache, extreme weakness, dizziness/fainting, and/or vision changes); dermatitis (skin rash, mouth sores, skin blisters, and/or skin peeling); ocular toxicity (uveitis, iritis, and/or episcleritis); neuropathy (motor or sensory); hyper/hypothyroidism. Patient given ways to manage these side effects and when to contact office. [address] Handout of medications provided to patient. Mr. Denton Wilkinson verbalized understanding of the information presented and all of the patient's questions were answered.     Liset Miles, PharmD, BCPS, BCOP    For Pharmacy Admin Tracking Only     CPA in place: No   Recommendation Provided To: Patient/Caregiver: 1 via In person      Intervention Accepted By: Patient/Caregiver: 1   Time Spent (min): 30

## 2022-06-28 NOTE — PROGRESS NOTES
Gabriela Kelley is a 80 y.o. male    Chief Complaint   Patient presents with    New Patient     Prostate Cancer       1. Have you been to the ER, urgent care clinic since your last visit? Hospitalized since your last visit? Yes, [facility]    2. Have you seen or consulted any other health care providers outside of the [address] [place] [address] since your last visit? Include any pap smears or colon screening.  Yes, Kristina Wu, Dr. Daysi Snowden., Dr. Mikayla Stover

## 2022-07-05 PROBLEM — R10.9 INTRACTABLE ABDOMINAL PAIN: Status: ACTIVE | Noted: 2022-01-01

## 2022-07-05 NOTE — ED TRIAGE NOTES
Pt arrives via EMS from home for 8/10 abd pain. EMS reports pt has hx of prostate cancer that has spread to his spine. Pt A&Ox4.

## 2022-07-05 NOTE — PROGRESS NOTES
Rounded on Gnostic[facility] patients and provided Anointing of the Sick and Communion at request of patient.     Teodoro Webb

## 2022-07-05 NOTE — ED PROVIDER NOTES
Date of Service:  7/4/2022    Patient:  Stephanie Nguyen    Chief Complaint:  Abdominal Pain       HPI:  Stephanie Nguyen is a 80 y.o. 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 Patient notes epigastric abdominal pain over the last 2 weeks, but worse for the last 2-3 days. Endorses associated nausea, with one episode of non-bilious/non-bloody emesis today. No constipation or diarrhea. He has a chronic indwelling tomlinson catheter in place. Denies any urinary discomfort or other tomlinson concerns. No known fevers. Past Medical History:   Diagnosis Date    Cancer [facility])     prostate ca with mets    Prostate cancer [facility])        Past Surgical History:   Procedure Laterality Date    IR INSERT TUNL CVC W/O PORT OVER 5 YR  1/6/2022    IR KYPHOPLASTY THORACIC  6/20/2022    IR REMOVE TUNL CVAD W/O PORT / PUMP  1/25/2022         No family history on file. Social History     Socioeconomic History    Marital status:      Spouse name: Not on file    Number of children: Not on file    Years of education: Not on file    Highest education level: Not on file   Occupational History    Not on file   Tobacco Use    Smoking status: Never Smoker    Smokeless tobacco: Never Used   Vaping Use    Vaping Use: Never used   Substance and Sexual Activity    Alcohol use: Never    Drug use: Never    Sexual activity: Not on file   Other Topics Concern    Not on file   Social History Narrative    ** Merged History Encounter **          Social Determinants of Health     Financial Resource Strain:     Difficulty of Paying Living Expenses: Not on file   Food Insecurity:     Worried About Running Out of Food in the Last Year: Not on file    Taniya of Food in the Last Year: Not on file   Transportation Needs:     Lack of Transportation (Medical): Not on file    Lack of Transportation (Non-Medical):  Not on file Physical Activity:     Days of Exercise per Week: Not on file    Minutes of Exercise per Session: Not on file   Stress:     Feeling of Stress : Not on file   Social Connections:     Frequency of Communication with Friends and Family: Not on file    Frequency of Social Gatherings with Friends and Family: Not on file    Attends Roman [employer] Services: Not on file    Active Member of [address] [place] [address] or Organizations: Not on file    Attends Club or Organization Meetings: Not on file    Marital Status: Not on file   Intimate Partner Violence:     Fear of Current or Ex-Partner: Not on file    Emotionally Abused: Not on file    Physically Abused: Not on file    Sexually Abused: Not on file   Housing Stability:     Unable to Pay for Housing in the Last Year: Not on file    Number of [place] in the Last Year: Not on file    Unstable Housing in the Last Year: Not on file         ALLERGIES: Patient has no known allergies. Review of Systems   Constitutional: Negative for chills and fever. HENT: Negative for congestion and rhinorrhea. Eyes: Negative for discharge and redness. Respiratory: Negative for cough and shortness of breath. Cardiovascular: Positive for leg swelling. Negative for chest pain. Gastrointestinal: Positive for abdominal pain, nausea and vomiting. Negative for diarrhea. Genitourinary: Negative for dysuria and hematuria. Musculoskeletal: Positive for arthralgias and back pain. Skin: Negative for color change and rash. Neurological: Negative for speech difficulty and headaches. Psychiatric/Behavioral: Negative for agitation and confusion. Vitals:    07/04/22 2100   BP: 139/68   Pulse: 97   Resp: 16   Temp: 97.9 °F (36.6 °C)   SpO2: 98%   Weight: 78.1 kg (172 lb 2.9 oz)   Height: 6' 3\" (1.905 m)            Physical Exam  Vitals and nursing note reviewed. Constitutional:       General: He is in acute distress. Appearance: Normal appearance. He is not diaphoretic. HENT:      Head: Normocephalic. Eyes:      General: No scleral icterus. Extraocular Movements: Extraocular movements intact. Conjunctiva/sclera: Conjunctivae normal.   Cardiovascular:      Rate and Rhythm: Normal rate and regular rhythm. Pulses: Normal pulses. Pulmonary:      Effort: Pulmonary effort is normal. No respiratory distress. Abdominal:      General: There is no distension. Palpations: Abdomen is soft. Tenderness: There is abdominal tenderness in the epigastric area. Musculoskeletal:         General: Normal range of motion. Right lower leg: Edema present. Left lower leg: Edema present. Skin:     General: Skin is warm and dry. Capillary Refill: Capillary refill takes less than 2 seconds. Comments: Scattered ecchymosis over b/l UE   Neurological:      General: No focal deficit present. Mental Status: He is alert and oriented to person, place, and time. Psychiatric:         Mood and Affect: Mood normal.         Behavior: Behavior normal.          MDM  Number of Diagnoses or Management Options  Malignant neoplasm metastatic to other site [facility])  Diagnosis management comments: VITAL SIGNS:  Empty flowsheet group.         LABS:  Recent Results (from the past 6 hour(s))  -CBC WITH AUTOMATED DIFF:   Collection Time: 07/04/22  9:15 PM       Result                      Value             Ref Range           WBC                         7.8               4.1 - 11.1 K*       RBC                         3.81 (L)          4.10 - 5.70 *       HGB                         11.5 (L)          12.1 - 17.0 *       HCT                         33.8 (L)          36.6 - 50.3 %       MCV                         88.7              80.0 - 99.0 *       MCH                         30.2              26.0 - 34.0 *       MCHC                        34.0              30.0 - 36.5 *       RDW                         15.6 (H)          11.5 - 14.5 %       PLATELET                    212 150 - 400 K/*       MPV                         9.7               8.9 - 12.9 FL       NRBC                        0.0               0  WBC       ABSOLUTE NRBC               0.00              0.00 - 0.01 *       NEUTROPHILS                 80 (H)            32 - 75 %           LYMPHOCYTES                 4 (L)             12 - 49 %           MONOCYTES                   14 (H)            5 - 13 %            EOSINOPHILS                 1                 0 - 7 %             BASOPHILS                   0                 0 - 1 %             IMMATURE GRANULOCYTES       1 (H)             0.0 - 0.5 %         ABS. NEUTROPHILS            6.2               1.8 - 8.0 K/*       ABS. LYMPHOCYTES            0.3 (L)           0.8 - 3.5 K/*       ABS. MONOCYTES              1.1 (H)           0.0 - 1.0 K/*       ABS. EOSINOPHILS            0.1               0.0 - 0.4 K/*       ABS. BASOPHILS              0.0               0.0 - 0.1 K/*       ABS. IMM.  GRANS.            0.1 (H)           0.00 - 0.04 *       DF                          SMEAR SCANNED                         RBC COMMENTS                                                  ANISOCYTOSIS 1+   -METABOLIC PANEL, COMPREHENSIVE:   Collection Time: 07/04/22  9:15 PM       Result                      Value             Ref Range           Sodium                      127 (L)           136 - 145 mm*       Potassium                   3.8               3.5 - 5.1 mm*       Chloride                    93 (L)            97 - 108 mmo*       CO2                         22                21 - 32 mmol*       Anion gap                   12                5 - 15 mmol/L       Glucose                     69                65 - 100 mg/*       BUN                         14                6 - 20 MG/DL        Creatinine                  0.99              0.70 - 1.30 *       BUN/Creatinine ratio        14                12 - 20             GFR est AA                  >60 >60 ml/min/1*       GFR est non-AA              >60               >60 ml/min/1*       Calcium                     9.3               8.5 - 10.1 M*       Bilirubin, total            1.2 (H)           0.2 - 1.0 MG*       ALT (SGPT)                  17                12 - 78 U/L         AST (SGOT)                  37                15 - 37 U/L         Alk. phosphatase            117               45 - 117 U/L        Protein, total              5.9 (L)           6.4 - 8.2 g/*       Albumin                     2.6 (L)           3.5 - 5.0 g/*       Globulin                    3.3               2.0 - 4.0 g/*       A-G Ratio                   0.8 (L)           1.1 - 2.2      -SAMPLES BEING HELD:   Collection Time: 07/04/22  9:18 PM       Result                      Value             Ref Range           SAMPLES BEING HELD          1 KEVIN                                 COMMENT                                                       Add-on orders for these samples will be processed based on acceptable specimen integrity and analyte stability, which may vary by analyte.   -LIPASE:   Collection Time: 07/04/22  9:24 PM       Result                      Value             Ref Range           Lipase                      52 (L)            73 - 393 U/L   -TROPONIN-HIGH SENSITIVITY:   Collection Time: 07/04/22  9:24 PM       Result                      Value             Ref Range           Troponin-High Sensitiv*     9                 0 - 76 ng/L    -NT-PRO BNP:   Collection Time: 07/04/22  9:24 PM       Result                      Value             Ref Range           NT pro-BNP                  293               <450 PG/ML     -MAGNESIUM:   Collection Time: 07/04/22  9:24 PM       Result                      Value             Ref Range           Magnesium                   1.5 (L)           1.6 - 2.4 mg*  -EKG, 12 LEAD, INITIAL:   Collection Time: 07/04/22  9:27 PM       Result                      Value             Ref Range Ventricular Rate            89                BPM                 Atrial Rate                 89                BPM                 P-R Interval                210               ms                  QRS Duration                92                ms                  Q-T Interval                366               ms                  QTC Calculation (Bezet)     445               ms                  Calculated P Axis           25                degrees             Calculated R Axis           7                 degrees             Calculated T Axis           29                degrees             Diagnosis                                                     Sinus rhythm with 1st degree AV block Otherwise normal ECG When compared with ECG of 09-APR-2022 16:41, premature atrial complexes are no longer present   -URINALYSIS W/ RFLX MICROSCOPIC:   Collection Time: 07/04/22 10:03 PM       Result                      Value             Ref Range           Color                       DARK YELLOW                           Appearance                  TURBID (A)        CLEAR               Specific gravity            1.020             1.003 - 1.03*       pH (UA)                     6.5               5.0 - 8.0           Protein                     100 (A)           NEG mg/dL           Glucose                     Negative          NEG mg/dL           Ketone                      40 (A)            NEG mg/dL           Bilirubin                   Negative          NEG                 Blood                       MODERATE (A)      NEG                 Urobilinogen                1.0               0.2 - 1.0 EU*       Nitrites                    Positive (A)      NEG                 Leukocyte Esterase          LARGE (A)         NEG                 WBC                         >100 (H)          0 - 4 /hpf          RBC                         0-5               0 - 5 /hpf          Epithelial cells            FEW               FEW /lpf Bacteria                    4+ (A)            NEG /hpf            Yeast                       PRESENT (A)       NEG                 Budding yeast               PRESENT (A)       NEG                 Yeast w/hyphae              PRESENT (A)       NEG            -URINE CULTURE HOLD SAMPLE:   Collection Time: 07/04/22 10:03 PM  Specimen: Serum       Result                      Value             Ref Range           Urine culture hold                                            Urine on hold in Microbiology dept for 2 days. If unpreserved urine is submitted, it cannot be used for addtional testing after 24 hours, recollection will be required. IMAGING:  CT ABD PELV W CONT   Final Result    1. Osseous metastatic disease. Interval increase in size of destructive soft     tissue mass adjacent to the right aspect of the L4 vertebral body, now measuring    4.3 cm x 5.2 cm, as described above. 2. Hepatic metastatic disease. Medications During Visit:  Medications  iopamidoL (ISOVUE-370) 76 % injection 100 mL (100 mL IntraVENous Given 7/4/22 2217)  ondansetron (ZOFRAN) injection 4 mg (4 mg IntraVENous Given 7/4/22 2305)  morphine injection 2 mg (2 mg IntraVENous Given 7/5/22 0014)  acetaminophen (TYLENOL) tablet 1,000 mg (1,000 mg Oral Given 7/4/22 2306)      DECISION MAKING:  Lele Ybarra is a 80 y.o. male who comes in as above. On arrival to the emergency department patient is afebrile and vital signs are stable. On examination he does appear in mild distress secondary to pain. Abdomen is soft, there is epigastric tenderness palpation but no guarding or rebound. Differential diagnosis includes but not limited to SBO, cholelithiasis, cholecystitis, peptic ulcer disease, metastatic disease. Patient will be evaluated with laboratory work and CT imaging of the abdomen and pelvis.   I initially ordered IV morphine for pain control, patient declined as he was worried about addiction and subsequently given Tylenol. EKG: Sinus rhythm, rate 89 bpm.  First-degree AV block, NM interval 210 milliseconds. Intervals otherwise normal.  No ST elevations. CBC is without leukocytosis or significant anemia. Patient is hyponatremic at 127. Electrolytes otherwise stable. BUN and creatinine within normal limits. No LFT or lipase elevation. CT of the abdomen pelvis subsequently reveals metastases to the liver as well as worsening osseous metastatic disease of the spine. Results were discussed with patient and his wife. He is still having pain after Tylenol, he is agreeable to trying morphine. Given patient's worsening metastatic disease, worsening pain and weakness will admit to the hospital for further management. Perfect Serve Consult for Admission  12:18 AM    ED Room Number: ER10/10  Patient Name and age:  Josse Nunn 80 y.o.  male  Working Diagnosis: Malignant neoplasm metastatic to other site [facility])  (primary encounter diagnosis)    COVID-19 Suspicion:  no  Sepsis present:  no  Reassessment needed: no  Code Status:  Full Code  Readmission: no  Isolation Requirements:  no  Recommended Level of Care:  med/surg  Department:[facility] Adult ED - 21   Other:  79 y/o male with history of prostate cancer with known mets to the spine, now with worsening abd pain, worsening back pain and weakness. CT shows worsening metastatic disease now with mets to lymph nodes and liver. Sodium 128, neuro intact.      IMPRESSION:  Malignant neoplasm metastatic to other site [facility])  (primary encounter diagnosis)       DISPOSITION:  [address] [place] [address]

## 2022-07-05 NOTE — ED NOTES
Bedside shift change report given to Sherine (oncoming nurse) by Jeraldine Closs (offgoing nurse). Report included the following information SBAR, ED Summary, Intake/Output, MAR and Recent Results.

## 2022-07-05 NOTE — PROGRESS NOTES
Spiritual Care Assessment/Progress Note  [address]      NAME: Cami Chambers      MRN: [id number]  AGE: 80 y.o.  SEX: male  Evangelical Affiliation: Tenriism   Language: English     7/5/2022     Total Time (in minutes): 13     Spiritual Assessment begun in [address] [facility] [address] through conversation with:         [x]Patient        [x] Family    [] Friend(s)        Reason for Consult: Palliative Care, Initial/Spiritual Assessment     Spiritual beliefs: (Please include comment if needed)     [x] Identifies with a pedro tradition: Tenriism        [] Supported by a pedro community:            [] Claims no spiritual orientation:           [] Seeking spiritual identity:                [] Adheres to an individual form of spirituality:           [] Not able to assess:                           Identified resources for coping:      [] Prayer                               [] Music                  [] Guided Imagery     [x] Family/friends                 [] Pet visits     [] Devotional reading                         [] Unknown     [x] Other: Sacramental Support                                            Interventions offered during this visit: (See comments for more details)    Patient Interventions: Affirmation of emotions/emotional suffering,Affirmation of pedro,Initial/Spiritual assessment, patient floor,Prayer (assurance of)     Family/Friend(s): Catharsis/review of pertinent events in supportive environment,Initial Assessment,Prayer (assurance of)     Plan of Care:     [] Support spiritual and/or cultural needs    [] Support AMD and/or advance care planning process      [] Support grieving process   [] Coordinate Rites and/or Rituals    [] Coordination with community clergy   [] No spiritual needs identified at this time   [] Detailed Plan of Care below (See Comments)  [] Make referral to Music Therapy  [] Make referral to Pet Therapy     [] Make referral to Addiction services  [] Make referral to [facility]  [] Make referral to Spiritual Care Partner  [] No future visits requested        [x] Contact Spiritual Care for further referrals     Comments: Provided support for this pt in [facility]. Reviewed pt's chart prior to this visit. Facilitated life review to assess potential support needs or coping strategies. Pt offered review of current situation. Pt expressed request for Eucharist.  Advised that [address] had been offered today, but would be available tomorrow. Also advised that Alvarado Masters is rounding and may visit today. Assured pt of prayers. Brenda Ordonez MDiv.  Staff   Request  Support/Spiritual Care Services on [phone])

## 2022-07-05 NOTE — PROGRESS NOTES
[address] [facility] Adult  Hospitalist Group                                                                                          Hospitalist Progress Note  Hafsa Barbosa MD  Answering service: [phone] from in house phone        Date of Service:  2022  NAME:  Kayley Arellano  :  10/13/1933  MRN:  [id number]      Admission Summary: This is an 27-year-old man with past medical history significant for metastatic prostate cancer, paroxysmal atrial fibrillation; on Eliquis was in his usual state of health until couple of days ago when the patient developed abdominal pain. The pain is diffuse in location and 8/10 in severity with no known aggravating or relieving factors. No associated nausea or vomiting. The patient described the pain as dull ache. Interval history / Subjective:   Seen while eating lunch. Complaing of dull ache in the stomach. Did try some tramadol without relief at home. States he lost his appetite and sense of taste and smell 5 days ago as well. Assessment & Plan:     Metastatic prostate cancer  Liver mets  Osseous metastasis   Abdominal pain   - Suspect that pain is related to liver mets  - Add oral oxycodone, IV dilaudid PRN  - Pt would likely benefit from palliative given large disease burden  - Palliative consult  - Oncology consult  - Planned to start o/p Keytruda on     Chronic tomlinson with possible catheter associated UTI   - Catheter was not changed in the ER, so this is a contaminated sample. - Continue ceftriaxone  - FU urine culture  - Will have RN reach out to urology RN to change tomlinson and obtain a new sample. Hypomagnesemia - replete and monitor  Hyponatremia - monitor, repeat now improved.    Paroxysmal Afib - eliquis      Code status: FULL  Prophylaxis: Eliquis  Care Plan discussed with: pt, RN  Anticipated Disposition: Home in 24-48 hours      Hospital Problems  Date Reviewed: 2022          Codes Class Noted POA * (Principal) Intractable abdominal pain ICD-10-CM: R10.9  ICD-9-CM: 789.00  7/5/2022 Yes                Review of Systems:   A comprehensive review of systems was negative except for that written in the HPI. Vital Signs:    Last 24hrs VS reviewed since prior progress note. Most recent are:  Visit Vitals  BP (!) 146/68 (BP 1 Location: Right upper arm, BP Patient Position: At rest;Lying)   Pulse 79   Temp 97.8 °F (36.6 °C)   Resp 16   Ht 6' 3\" (1.905 m)   Wt 78.1 kg (172 lb 2.9 oz)   SpO2 100%   BMI 21.52 kg/m²         Intake/Output Summary (Last 24 hours) at 7/5/2022 1506  Last data filed at 7/5/2022 1000  Gross per 24 hour   Intake 100 ml   Output 775 ml   Net -675 ml        Physical Examination:     I had a face to face encounter with this patient and independently examined them on 7/5/2022 as outlined below:          Constitutional:  No acute distress, cooperative, pleasant    ENT:  Oral mucosa moist, oropharynx benign. Resp:  CTA bilaterally. No wheezing/rhonchi/rales. No accessory muscle use. CV:  Regular rhythm, normal rate, no murmurs, gallops, rubs    GI:  Soft, slightly distended, slightly tender. normoactive bowel sounds, no hepatosplenomegaly     Musculoskeletal:  No edema, warm, 2+ pulses throughout    Neurologic:  Moves all extremities.   AAOx3, CN II-XII reviewed            Data Review:    Review and/or order of clinical lab test  Review and/or order of tests in the radiology section of CPT  Review and/or order of tests in the medicine section of CPT      Labs:     Recent Labs     07/05/22 0617 07/04/22 2115   WBC 5.0 7.8   HGB 8.9* 11.5*   HCT 26.5* 33.8*    212     Recent Labs     07/05/22 0617 07/04/22 2124 07/04/22 2115   *  --  127*   K 3.7  --  3.8   CL 96*  --  93*   CO2 21  --  22   BUN 12  --  14   CREA 0.87  --  0.99   GLU 65  --  69   CA 8.1*  --  9.3   MG 1.8 1.5*  --    PHOS 4.1  --   --      Recent Labs     07/05/22  0617 07/04/22 2124 07/04/22 2115   ALT 12  --  17   AP 90  --  117   TBILI 0.6  --  1.2*   TP 4.7*  --  5.9*   ALB 1.9*  --  2.6*   GLOB 2.8  --  3.3   LPSE  --  52*  --      No results for input(s): INR, PTP, APTT, INREXT in the last 72 hours. No results for input(s): FE, TIBC, PSAT, FERR in the last 72 hours. No results found for: FOL, RBCF   No results for input(s): PH, PCO2, PO2 in the last 72 hours. No results for input(s): CPK, CKNDX, TROIQ in the last 72 hours.     No lab exists for component: CPKMB  Lab Results   Component Value Date/Time    Cholesterol, total 124 05/31/2019 07:07 AM    HDL Cholesterol 39 05/31/2019 07:07 AM    LDL, calculated 62 05/31/2019 07:07 AM    Triglyceride 115 05/31/2019 07:07 AM    CHOL/HDL Ratio 3.2 05/31/2019 07:07 AM     Lab Results   Component Value Date/Time    Glucose (POC) 92 10/07/2018 02:51 PM     Lab Results   Component Value Date/Time    Color DARK YELLOW 07/04/2022 10:03 PM    Appearance TURBID (A) 07/04/2022 10:03 PM    Specific gravity 1.020 07/04/2022 10:03 PM    pH (UA) 6.5 07/04/2022 10:03 PM    Protein 100 (A) 07/04/2022 10:03 PM    Glucose Negative 07/04/2022 10:03 PM    Ketone 40 (A) 07/04/2022 10:03 PM    Bilirubin Negative 07/04/2022 10:03 PM    Urobilinogen 1.0 07/04/2022 10:03 PM    Nitrites Positive (A) 07/04/2022 10:03 PM    Leukocyte Esterase LARGE (A) 07/04/2022 10:03 PM    Epithelial cells FEW 07/04/2022 10:03 PM    Bacteria 4+ (A) 07/04/2022 10:03 PM    WBC >100 (H) 07/04/2022 10:03 PM    RBC 0-5 07/04/2022 10:03 PM         Medications Reviewed:     Current Facility-Administered Medications   Medication Dose Route Frequency    0.9% sodium chloride infusion  75 mL/hr IntraVENous CONTINUOUS    HYDROmorphone (DILAUDID) injection 0.5 mg  0.5 mg IntraVENous Q4H PRN    amiodarone (CORDARONE) tablet 200 mg  200 mg Oral DAILY    apixaban (ELIQUIS) tablet 5 mg  5 mg Oral BID    tamsulosin (FLOMAX) capsule 0.4 mg  0.4 mg Oral QPM    sodium chloride (NS) flush 5-40 mL  5-40 mL IntraVENous Q8H    sodium chloride (NS) flush 5-40 mL  5-40 mL IntraVENous PRN    acetaminophen (TYLENOL) tablet 650 mg  650 mg Oral Q6H PRN    Or    acetaminophen (TYLENOL) suppository 650 mg  650 mg Rectal Q6H PRN    polyethylene glycol (MIRALAX) packet 17 g  17 g Oral DAILY PRN    ondansetron (ZOFRAN ODT) tablet 4 mg  4 mg Oral Q8H PRN    Or    ondansetron (ZOFRAN) injection 4 mg  4 mg IntraVENous Q6H PRN    cefTRIAXone (ROCEPHIN) 1 g in 0.9% sodium chloride 10 mL IV syringe  1 g IntraVENous Q24H    oxyCODONE IR (ROXICODONE) tablet 5 mg  5 mg Oral Q4H PRN     ______________________________________________________________________  EXPECTED LENGTH OF STAY: - - -  ACTUAL LENGTH OF STAY:          0                 Bernardo Kenny MD

## 2022-07-05 NOTE — ROUTINE PROCESS
TRANSFER - OUT REPORT:    Verbal report given to Prerna(name) on Huma Riley  being transferred to (unit) for routine progression of care       Report consisted of patients Situation, Background, Assessment and   Recommendations(SBAR). Information from the following report(s) SBAR, ED Summary, Procedure Summary, MAR and Recent Results was reviewed with the receiving nurse. Lines:   Peripheral IV 07/04/22 Left Antecubital (Active)   Site Assessment Clean, dry, & intact 07/04/22 2116   Phlebitis Assessment 0 07/04/22 2116   Infiltration Assessment 0 07/04/22 2116   Dressing Status Clean, dry, & intact 07/04/22 2116   Dressing Type Transparent 07/04/22 2116        Opportunity for questions and clarification was provided.       Patient transported with:   Registered Nurse

## 2022-07-05 NOTE — CONSULTS
Palliative Medicine Consult  Ronaldo: [phone]    Patient Name: Breann Quigley  YOB: 1933    Date of Initial Consult: July 5, 2022  Reason for Consult: End-stage disease  Requesting Provider: Dr. Cody Pineda  Primary Care Physician: Lakisha Velarde MD     SUMMARY:   Breann Quigley is a 80 y.o. with a past history of *metastatic prostate cancer (bone)~radiation therapy, s/p ablation and kyphoplasty, A. Fib (eliquis), chronic indwelling Serna catheter, who was admitted on 7/4/2022 from home due to worsening abdominal pain. Other acute issues include UTI, paroxysmal A. Fib, bilateral lower extremity venous stasis, hyponatremia. Imaging suggests worsening metastatic disease now with mets to the lymph nodes and liver    Current medical issues leading to Palliative Medicine involvement include: End-stage disease. Full code    Social: Patient is  to [employer] yordy and they have 3 sons (Bob Lino, [place]). 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 He served in [employer] during the [email or web address]. He was working up until a couple months ago. PALLIATIVE DIAGNOSES:   1. Palliative Care Encounter  2. Metastatic prostate cancer  3. Hypoalbuminemia   4. Abdominal Pain  5. Drug induced constipation  6. Physical debility      PLAN:   1. Met with patient and wife at the bedside, services introduced  2. Patient alert and demonstrates capacity for decisions. We discussed his understanding of his condition, goals, concerns, questions. 3. Patient verbalizes goal to obtain immunotherapy as they have discussed with Dr. Lars Martin. The plan was to initiate therapy next week. 4. Upon discharge the pt would like to return home vs facility for rehab. Wife understands that the pt would not benefit greatly from skilled rehab. They report that the pt was walking 2 weeks ago with a walker. 5. Family still waiting to meet with Dr. Lars Martin.   I explained that the latest imaging may impact the plan for immunotherapy and we will wait to hear from Dr. Daphne Sebastian on that. I explained the hospice benefit as another option. 6. The patient had many questions as the only thing he knows about hospice is that people die. I explained that this service is for patients who have a probable prognosis of 6 months or less. Explained that some are enrolled in hospice for over a year. I also explained that hospice is a service for people who are no longer seeking aggressive management of their disease. 7. Patient is concerned because he can no longer walk. He contracted COVID in March while in [facility] and his previous functional status has yet to return. He says that immunotherapies would only be every 3 weeks and wife feels they can probably make that work. They have secured a friend to help with transporting the pt to [facility]. 8. We agreed to wait until options are presented to plan  9. Code status addressed. Pt expressed his desire to maintain full code status. I explained the risks and recommendation of DNR. Wife and pt would like to discuss more before making any decisions  10. Continue current care  11. Symptoms: I added morphine 2mg hs as pt said it really helped him yesterday. Agree with oxycodone and titrate based on clinical response  12. Added pericolace for drug induced constipation  13. Will follow up with them tomorrow  14. Initial consult note routed to primary continuity provider and/or primary health care team members  15. Communicated plan of care with: Palliative [facility] Team     GOALS OF CARE / TREATMENT PREFERENCES:     GOALS OF CARE:  Patient/Health Care Proxy Stated Goals:  Other (comment) (immunotherapy)    TREATMENT PREFERENCES:   Code Status: Full Code    Patient and family's personal goals include: treatment    Advance Care Planning:  [] The [facility] Interdisciplinary Team has updated the ACP Navigator with Health Care Decision Maker and Patient Capacity  . tito Easley Gist spouse [phone]    Advance Care Planning 5/31/2019   Patient's Healthcare Decision Maker is: -   Primary Decision Maker Name -   Primary Decision Maker Phone Number -   Confirm Advance Directive Yes, not on file       Medical Interventions: Full interventions       Other:    As far as possible, the palliative care team has discussed with patient / health care proxy about goals of care / treatment preferences for patient.      HISTORY:     History obtained from: chart, team    CHIEF COMPLAINT: pt admitted with abdominal pain    HPI/SUBJECTIVE:    The patient is:   [x] Verbal and participatory  [] Non-participatory due to:   See below     Clinical Pain Assessment (nonverbal scale for severity on nonverbal patients):   Clinical Pain Assessment  Severity: 5  Location: epigastric area, lower back,  Character: information not provided  Duration: weeks  Effect: limited mobility  Factors: pain worse with reposiitioning  Frequency: all the time          Duration: for how long has pt been experiencing pain (e.g., 2 days, 1 month, years)  Frequency: how often pain is an issue (e.g., several times per day, once every few days, constant)     FUNCTIONAL ASSESSMENT:     Palliative Performance Scale (PPS):  PPS: 30       PSYCHOSOCIAL/SPIRITUAL SCREENING:     Palliative IDT has assessed this patient for cultural preferences / practices and a referral made as appropriate to needs (Cultural Services, Patient Advocacy, Ethics, etc.)    Any spiritual / Baptist concerns:  [] Yes /  [x] No   If \"Yes\" to discuss with pastoral care during IDT     Does caregiver feel burdened by caring for their loved one:   [] Yes /  [x] No /  [] No Caregiver Present/Available [] No Caregiver [] Pt [facility]  If \"Yes\" to discuss with social work during IDT    Anticipatory grief assessment:   [x] Normal  / [] Maladaptive     If \"Maladaptive\" to discuss with social work during IDT    ESAS Anxiety: Anxiety: 0    ESAS Depression: Depression: 0        REVIEW OF SYSTEMS:     Positive and pertinent negative findings in ROS are noted above in HPI. The following systems were [x] reviewed / [] unable to be reviewed as noted in HPI  Other findings are noted below. Systems: constitutional, ears/nose/mouth/throat, respiratory, gastrointestinal, genitourinary, musculoskeletal, integumentary, neurologic, psychiatric, endocrine. Positive findings noted below. Modified ESAS Completed by: provider   Fatigue: 4 Drowsiness: 0   Depression: 0 Pain: 5   Anxiety: 0 Nausea: 0     Dyspnea: 0                    PHYSICAL EXAM:     From RN flowsheet:  Wt Readings from Last 3 Encounters:   07/04/22 172 lb 2.9 oz (78.1 kg)   06/28/22 172 lb (78 kg)   06/21/22 158 lb 3.2 oz (71.8 kg)     Blood pressure 128/70, pulse 84, temperature 98.4 °F (36.9 °C), resp. rate 16, height 6' 3\" (1.905 m), weight 172 lb 2.9 oz (78.1 kg), SpO2 100 %.     Pain Scale 1: Numeric (0 - 10)  Pain Intensity 1: 0  Pain Onset 1: last 5 or 6 days  Pain Location 1: Abdomen  Pain Orientation 1: Upper  Pain Description 1: Constant  Pain Intervention(s) 1: Position  Last bowel movement, if known:     Constitutional: alert, nad, conversant  Eyes: pupils equal, anicteric  ENMT: no nasal discharge, moist mucous membranes  Cardiovascular:   Respiratory: breathing not labored, symmetric  Gastrointestinal:   Musculoskeletal: no deformity, no tenderness to palpation  Skin: thin, poor turgor   Neurologic: following commands, moving all extremities  Psychiatric: full affect, no hallucinations  Other:       HISTORY:     Principal Problem:    Intractable abdominal pain (7/5/2022)      Past Medical History:   Diagnosis Date    Cancer [facility])     prostate ca with mets    Prostate cancer ([facility] [address]       Past Surgical History:   Procedure Laterality Date    IR INSERT TUNL CVC W/O PORT OVER 5 YR  1/6/2022    IR KYPHOPLASTY THORACIC  6/20/2022    IR REMOVE TUNL CVAD W/O PORT / PUMP 1/25/2022      History reviewed. No pertinent family history. History reviewed, no pertinent family history. Social History     Tobacco Use    Smoking status: Never Smoker    Smokeless tobacco: Never Used   Substance Use Topics    Alcohol use: Never     No Known Allergies   Current Facility-Administered Medications   Medication Dose Route Frequency    0.9% sodium chloride infusion  75 mL/hr IntraVENous CONTINUOUS    HYDROmorphone (DILAUDID) injection 0.5 mg  0.5 mg IntraVENous Q4H PRN    amiodarone (CORDARONE) tablet 200 mg  200 mg Oral DAILY    apixaban (ELIQUIS) tablet 5 mg  5 mg Oral BID    tamsulosin (FLOMAX) capsule 0.4 mg  0.4 mg Oral QPM    sodium chloride (NS) flush 5-40 mL  5-40 mL IntraVENous Q8H    sodium chloride (NS) flush 5-40 mL  5-40 mL IntraVENous PRN    acetaminophen (TYLENOL) tablet 650 mg  650 mg Oral Q6H PRN    Or    acetaminophen (TYLENOL) suppository 650 mg  650 mg Rectal Q6H PRN    polyethylene glycol (MIRALAX) packet 17 g  17 g Oral DAILY PRN    ondansetron (ZOFRAN ODT) tablet 4 mg  4 mg Oral Q8H PRN    Or    ondansetron (ZOFRAN) injection 4 mg  4 mg IntraVENous Q6H PRN    cefTRIAXone (ROCEPHIN) 1 g in 0.9% sodium chloride 10 mL IV syringe  1 g IntraVENous Q24H    oxyCODONE IR (ROXICODONE) tablet 5 mg  5 mg Oral Q4H PRN          LAB AND IMAGING FINDINGS:     Lab Results   Component Value Date/Time    WBC 5.0 07/05/2022 06:17 AM    HGB 8.9 (L) 07/05/2022 06:17 AM    PLATELET 284 86/00/5432 06:17 AM     Lab Results   Component Value Date/Time    Sodium 130 (L) 07/05/2022 06:17 AM    Potassium 3.7 07/05/2022 06:17 AM    Chloride 96 (L) 07/05/2022 06:17 AM    CO2 21 07/05/2022 06:17 AM    BUN 12 07/05/2022 06:17 AM    Creatinine 0.87 07/05/2022 06:17 AM    Calcium 8.1 (L) 07/05/2022 06:17 AM    Magnesium 1.8 07/05/2022 06:17 AM    Phosphorus 4.1 07/05/2022 06:17 AM      Lab Results   Component Value Date/Time    Alk.  phosphatase 90 07/05/2022 06:17 AM    Protein, total 4.7 (L) 07/05/2022 06:17 AM    Albumin 1.9 (L) 07/05/2022 06:17 AM    Globulin 2.8 07/05/2022 06:17 AM     Lab Results   Component Value Date/Time    INR 1.0 06/20/2022 11:11 AM    Prothrombin time 10.6 06/20/2022 11:11 AM      No results found for: IRON, FE, TIBC, IBCT, PSAT, FERR   No results found for: PH, PCO2, PO2  No components found for: [place]   Lab Results   Component Value Date/Time    CK 37 (L) 04/11/2022 03:56 AM                Total time: 70 min  Counseling / coordination time, spent as noted above: 60 min  > 50% counseling / coordination?: y    Prolonged service was provided for  []30 min   []75 min in face to face time in the presence of the patient, spent as noted above. Time Start:   Time End:   Note: this can only be billed with 47312 (initial) or 08818 (follow up). If multiple start / stop times, list each separately.

## 2022-07-05 NOTE — PROGRESS NOTES
Dr. Sumeet Ibarra unit inquiring about the tomlinson placement; advised placed last week by Johanna Mena office. The office is willing to replace the tomlinson with a consult. Sakshi served Dr. Mireya Randall. 16:22 NP [facility] called and advised she will be able to replace the patient's tomlinson tomorrow.   Advised Dr. Mireya Randall by way of Sakshi Gautam

## 2022-07-05 NOTE — PROGRESS NOTES
TRANSFER - IN REPORT:    Verbal report received from Tonia(name) on Alka Johnson  being received from ED (unit) for routine progression of care      Report consisted of patients Situation, Background, Assessment and   Recommendations(SBAR). Information from the following report(s) SBAR, ED Summary, Procedure Summary, Intake/Output, MAR and Cardiac Rhythm Afib was reviewed with the receiving nurse. Opportunity for questions and clarification was provided. Assessment completed upon patients arrival to unit and care assumed.

## 2022-07-05 NOTE — H&P
[address] [facility]  HISTORY AND PHYSICAL    Name:  Cordelia Alvarado  MR#:  [id number]  :  10/13/1933  ACCOUNT #:  [de-identified]  ADMIT DATE:  2022      The patient was seen, evaluated, and admitted by me on 2022. PRIMARY CARE PHYSICIAN:  Bia Levy MD    SOURCE OF INFORMATION:  Patient and review of ED and old electronic medical record. CHIEF COMPLAINT:  Abdominal pain. HISTORY OF PRESENT ILLNESS:  This is an 70-year-old man with past medical history significant for metastatic prostate cancer, paroxysmal atrial fibrillation; on Eliquis was in his usual state of health until couple of days ago when the patient developed abdominal pain. The pain is diffuse in location and 8/10 in severity with no known aggravating or relieving factors. No associated nausea or vomiting. The patient described the pain as dull ache. He came to the emergency room for further evaluation. When the patient arrived at the emergency room, CT scan of the abdomen and pelvis was obtained that shows worsening osseous metastatic disease as well as hepatic metastatic disease. The patient was referred to the hospitalist service for evaluation for admission. His urinalysis was also consistent with urinary tract infection. The patient has chronic indwelling Serna catheter. The urinary tract infection is most likely Serna catheter associated. He was recently admitted to this hospital from 2022 to 2022. The patient underwent biopsy of T7 as well as ablation and kyphoplasty by interventional radiologist.  This became complicated with hypotension and hyperthermia for which the patient was admitted and treated. He was discharged home in stable condition. The abdominal pain is not associated with fever, rigors, or chills. PAST MEDICAL HISTORY:  Metastatic prostate cancer and paroxysmal atrial fibrillation, on Eliquis. ALLERGIES:  NO KNOWN DRUG ALLERGIES. MEDICATIONS:  1.   Amiodarone 200 mg daily. 2.  Eliquis 5 mg twice daily. 3.  Lupron, dosage as directed. 4.  Prednisone 5 mg twice daily. 5.  Flomax 0.4 mg daily in the evening. FAMILY HISTORY:  This was reviewed. Father had hypertension. PAST SURGICAL HISTORY:  This is significant for kyphoplasty and insertion of    MediPort. SOCIAL HISTORY:  No history of alcohol or tobacco abuse. REVIEW OF SYSTEMS:  HEAD, EYES, EARS, NOSE AND THROAT:  No headache, no dizziness, no blurring of vision, no photophobia. RESPIRATORY SYSTEM:  No cough, no shortness of breath, and no hemoptysis. CARDIOVASCULAR SYSTEM:  No chest pain, no orthopnea, and no palpitation. GASTROINTESTINAL SYSTEM:  This is positive for abdominal pain. No nausea, no diarrhea, no constipation, and no vomiting. GENITOURINARY SYSTEM:  No dysuria, no urgency and no frequency. All other systems are reviewed and they are negative. PHYSICAL EXAMINATION:  GENERAL APPEARANCE:  The patient appeared ill and in moderate distress. VITAL SIGNS:  On arrival at the emergency room; temperature 97.9, pulse 97, respiratory rate 16, blood pressure 139/68, and oxygen saturation 98%. HEAD:  Normocephalic, atraumatic. EYES:  Normal eye movement. No redness, no drainage, and no discharge. EARS:  Normal external ears with no obvious drainage. NOSE:  No deformity and no drainage. MOUTH AND THROAT:  No visible oral lesion. Dry oral mucosa. NECK:  Neck is supple. No JVD and no thyromegaly. CHEST:  Clear breath sounds. No wheezing and no crackles. HEART:  Normal S1 and S2, regular. No clinically appreciable murmur. ABDOMEN:  Soft. Diffuse tenderness. No rebound tenderness. No guarding. Normal bowel sounds. CNS:  Alert and oriented x3. No gross focal neurological deficit. EXTREMITIES:  Edema 2+. Pulses 2+ bilaterally. MUSCULOSKELETAL SYSTEM:  No obvious joint deformity and swelling.   SKIN:  Bilateral lower extremity venous stasis noted and present on admission. PSYCHIATRY:  Normal mood and affect. LYMPHATIC SYSTEM:  No cervical lymphadenopathy. DIAGNOSTIC DATA:  The EKG shows sinus rhythm and no significant ST and T-waves abnormalities. The CT scan of the abdomen and pelvis with contrast shows hepatic metastatic disease. LABORATORY DATA:  Hematology; WBC 7.8, hemoglobin 11.5, hematocrit 33.8, platelets  808. Chemistry; sodium 127, potassium 3.8, chloride 93, CO2 is 22, glucose 69, BUN 14, creatinine 0.99, calcium 9.3, total bilirubin 1.2, ALT 17, AST 37, alkaline phosphatase 117, total protein 5.9, albumin level 2.6, and globulin 3.3. Magnesium level 1.5. Pro-BNP level 293. Lipase 52. Cardiac profile, troponin high-sensitivity 9. Urinalysis is significant for negative nitrites, large leukocyte esterase, moderate blood, and 4+ bacteria. ASSESSMENT:  1. Intractable abdominal pain. 2.  Osseous metastatic disease. 3.  Hepatic metastatic disease. 4.  Prostate cancer with metastasis. 5.  Chronic indwelling Serna catheter. 6.  Acute cystitis with hematuria. 7.  Hypomagnesemia. 8.  Hyponatremia. 9.  Paroxysmal atrial fibrillation. 10.  Bilateral lower extremity venous stasis. PLAN:  1. Intractable abdominal pain. We will admit the patient for further evaluation and treatment. The treatment will consist of pain control, fluid therapy, and any other supportive therapy. This is most likely due to the patient's metastatic disease, specifically the hepatic metastasis. 2.  Osseous metastatic disease. This is coming from the patient's prostate cancer. We will carry out pain control. 3.  Hepatic metastasis. This is also coming from the patient's prostate cancer and is the most likely cause of the patient's intractable abdominal pain. 4.  Prostate cancer with metastasis. We will continue supportive treatment. The patient's Oncology will be consulted to assist in further evaluation and treatment. 5.  Chronic indwelling Serna catheter. We will continue with Serna catheter care. 6.  Acute cystitis with hematuria. This is most likely Serna catheter associated. We will start the patient on Rocephin. We will await urine culture. 7.  Hypomagnesemia. We will replace magnesium and repeat magnesium level. 8.  Hyponatremia. This may be due to volume depletion. There is also a concern for SIADH because of the presence of metastatic disease. We will obtain noncontrast CT scan of the chest to evaluate the patient for mass lesion. We will carry out fluid therapy and repeat the patient's sodium level. The patient may require Nephrology consult if this is getting worse. 9.  Paroxysmal atrial fibrillation. We will continue with Eliquis for anticoagulation. 10.  Bilateral lower extremity venous stasis. BNP level is within normal limit. The patient is already on Eliquis for anticoagulation. We will carry out supportive treatment. 11.  Other issues. Code status, the patient is a full code. The patient is on Eliquis. Because of that there is no need for DVT prophylaxis. FUNCTIONAL STATUS PRIOR TO ADMISSION:  The patient came from home. The patient is ambulatory with assistive device. COVID PRECAUTION:  The patient was wearing a face mask. I was wearing a face mask and gloves for this patient's encounter.       Taiwo Chan MD      [id number]  D:  07/05/2022 5:32  T:  07/05/2022 6:29  JOB #:  [id number]  CC:  Bia Levy MD

## 2022-07-06 NOTE — CONSULTS
Requesting Provider: Anna Mata, [address] for Consultation: \"catheter change\"  Pre-existing [facility] Patient:   yes                Patient: Brendan Miller MRN: [id number]  SSN: [id number]    YOB: 1933  Age: 80 y.o. Sex: male     Location: [facility]       Code Status: Full Code   PCP: Debbie Arriaga MD  - [phone]   Emergency Contact:  Primary Emergency Contact: Jett Isidro, Home Phone: [phone]   Race/Cheondoism/Language: [address] [facility] / Shreyas Craft / Mata Pike   Payor: Payor: Cloteal Foots / Plan: Susanna Bark / Product Type: Medicare /    Prior Admission Data: 6/21/22 [facility], [facility]   Hospitalized:  Hospital Day: 3 - Admitted 7/4/2022  8:58 PM     CONSULTANTS  IP CONSULT TO PALLIATIVE CARE - PROVIDER  IP CONSULT TO ONCOLOGY  IP CONSULT TO UROLOGY   ADMISSION DIAGNOSES    ICD-10-CM ICD-9-CM   1. Malignant neoplasm metastatic to other site ([facility] [address]  C79.89 198.89   2. Palliative care encounter  Z51.5 V66.7   3. Prostate cancer metastatic to bone (HCC)  C61 185    C79.51 198.5   4. Hypoalbuminemia  E88.09 273.8   5. Physical debility  R53.81 799.3   6. Cancer related pain  G89.3 338.3   7. Bone metastasis (HCC)  C79.51 198.5   8. Drug induced constipation  K59.03 564.09     E980.5         Assessment/Plan:       81 yo male with hx of Metastatic castrate resistant prostate cancer followed by Dr. Suazo Persons admitted to the hospital with abdominal pain and found to have worsening osseous metastatic disease as well as hepatic metastatic disease on CT. Recent voiding difficulties with urinary retention requiring indwelling tomlinson. Urine qPCR 6/29 grew candida albicans. · Tomlinson changed bedside without issues. Recommend treatment of candiuria with fluconazole due to immunocomprimised status. Otherwise maintain tomlinson without outpt follow up with his primary urologist as scheduled. Prostate CA management per Oncology.       Supervising Dr. Mynor FRANCE CC: Abdominal Pain   HPI: He is a 80 y.o. male seen in consult by Urology for catheter change. He is followed by Dr. Benedict Karimi at  for hx of of metastatic castrate resistant prostate cancer. See office note below for details. His disease is an aggressive variant with visceral metastasis. Followed by Dr. Colt Duncan with plans to start palliative Keytruda. He was most recently seen in our office on 6/29 for voiding issues, on Flomax bid,  and catheter was placed. He was started on empiric cipro. He took 2-3 doses PTA. Urine sent for qPCR which detected candida albicans. He was feeling well until the day of his admission when he developed abdominal pain. The pain is located in his upper abdomen and 8/10 in severity with no known aggravating or relieving factors. No associated fevers, chills, flank pain, nausea or vomiting, or dysuria. The patient described the pain as dull ache. CT scan of the abdomen and pelvis was obtained that shows worsening osseous metastatic disease as well as hepatic metastatic disease which is presumed to be the cause of his symptoms. He is AF with stable VS. WBC and cr wnl. A urine sample was sent in the ER from his indwelling tomlinson. We have been asked to change the catheter for a urine sample. He has been started on empiric Rocephin.       =====last OV note===  Zackery Parker is an 80year old male who presents today for \"Prostate cancer/Incom. bladdery emptying\". He returns for follow-up. Patient of Dr. Jerome Baez's with a history of metastatic castrate resistant prostate cancer. Was switched from [facility] to [place] by Dr. Awa Gamez. He saw him on 6/8/2022 and PSA was 15.32. He had a UTI on 6/9/2029. MRI performed on 6/13/2022 by Dr. Reese Justice shows T7 pathological compression deformity likely related to metastatic disease. Also multiple new liver lesions likely representing metastatic disease. Had a kyphoplasty on 6/20/2022.   Taking tamsulosin twice daily for his BPH and he takes Eliquis on a regular basis. Reviewing Dr. Edmundo Gomez note his BRCA 1 and 2 were negative. 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 Former [employer]. Having frequency urgency lower abdominal pain discomfort and distention. Bladder scan showed 450 mL but may be an accurate secondary to ascites but we will send it for Q PCR. Stop Xtandi and prednisone and Dr. Belen Cedeno is planning to start what sounds like Provenge. PAST MEDICAL HISTORY:    Allergies: No known allergies.       Medications: Cipro 500 mg tablet (ciprofloxacin hcl) Take 1 tablet by mouth twice a day   Xtandi 40 mg tablet (enzalutamide) Take 4 tablet by mouth once a day Can be taken with or without food  tamsulosin 0.4 mg capsule (tamsulosin) Take 2 capsule by mouth every evening   Eliquis 5 mg tablet (apixaban)   amiodarone 200 mg tablet (amiodarone)   prednisone 5 mg tablet (prednisone) 1 tablet by mouth twice a day as directed   Lupron Depot (6 Month) 45 mg syringe kit (leuprolide (6 month))   * CITRACAL MAXIMUM + D3 2 once a day     Problems: Urinary Tract Infection (UTI), recurrent (ICD-599.0) (KOP04-F07.0)  Hormone resistant malignancy status (MCK80-D18.2)  Rising PSA following treatment for malignant neoplasm of prostate (HZK77-T56.21)  Encounter for nonprocreative genetic counseling (VFV73-L54.83)  Screening for genetic disease carrier status (ICD-V82.71) (RUG76-K16.71)  Malignant neoplasm metastatic to bone (ICD-198.5) (WRU12-C89.51)  Androgen deprivation therapy (ICD-V07.59) (BLV99-L41.818)  Osteopenia (ICD-733.90) (PNB34-P75.80)  Bilateral lower extremity edema (ICD-782.3) (MVO59-D72.0)  Osteopenia (ICD-733.90) (OTX62-T42.80)  Swelling of lower extremity (ICD-729.81) (MMM15-X86.89)  Other retention of urine (ICD-788.29) (SNF86-W00.8)  Cancer with pulmonary metastases (ICD-197.0) (MGP70-C55.00)  Indeterminate pulmonary nodules (ICD-518.89) (WQJ85-J25.8)  Lymph Node Metastasis (ICD-196.9) (PJB21-Q20. 9)  Bone Metastasis (ICD-198.5) (JUD06-W32.51)  Prostate adenocarcinoma (ICD-185) (VQG40-I91)  Abnormal prostate on physical examination (ICD-602.9) (YEX82-T09.9)  Elevated PSA, greater than or equal to 20 ng/ml (ICD-790.93) (WWN13-G49.20)  Urinary retention (ICD-788.20) (QDL04-C60.9)  Incomplete bladder emptying (XMS-739.03) (XQP81-M32.9)  Benign prostatic hyperplasia with lower urinary tract symptoms (ICD-600.01) (RQT30-A72.1)    Illnesses: Cancer. DENIES: Heart Disease, Pacemaker/Defibrillator, Lung Disease, Diabetes, High Blood Pressure, Bowel Problems, Stroke/Seizure, Kidney Problems, Bleeding Problems, HIV, or Hepatitis. Surgeries: DENIES prior surgeries      Family History: DENIES: Prostate cancer, Kidney cancer, Kidney disease, Kidney stones. Social History: Part Time - [employer]. . Smoking status: never smoker. Does not drink alcohol. System Review: Admits to: Easy Bleeding, Impaired Sex Drive, Leg Swelling, and Lower Extremity Weakness. DENIES: Unexplained Weight Loss, Dry Eyes, Dry Mouth, Shortness of Breath, Constipation, Involuntary Urine Loss, Dry Skin, Psychiatric Problems, Rash. URINALYSIS from Voided specimen, sent for qPCR  Urine Dip: pH: 6.0, Bld: Trace NH, LE: +++, Nit: Pos, Prot: Neg, Ket: Neg, Gluc: Neg  Urine Micro: WBC: 6-10, RBC: 1-2, Bacteria: 11-25    PSA HISTORY  15.32 ng/ml on 06/08/2022  3.48 ng/ml on 05/03/2022  0.492 ng/ml on 02/18/2022    Prescription(s) Today: Cipro 500 mg tablet (ciprofloxacin hcl) Take 1 tablet by mouth twice a day   #14 tablet x 0,  06/29/2022, Daivd [place] LPN, SIGNED    IMPRESSION:    1. URINARY TRACT INFECTION (UTI) - RECURRENT (XZP35-P00.0) - Deteriorated: Urine for Q PCR today. Empirically start Cipro 5 mg p.o. twice daily for the next week. Patient underwent catheterization today. 2. LYMPH NODE METASTASIS (WKW39-Q61.9) - Unchanged: Follow Dr. Mario Martines as scheduled. 3.  BONE METASTASIS (ZRF09-S79.51) - Unchanged: Please kyphoplasty. Follow-up with Dr. Jeannette Bernstein as scheduled. cc: MD Eva Ramos MD Carlo Maker, MD   Transcribed by Speech to [address]  Cath Indwelling Complicated (25011), E&M Service, E&M Service    Upcoming Orders  qPCR        ]      Electronically signed by Eda Potts MD on 2022 at 1:41 PM    ________________________________________________________________________        Temp (24hrs), Av °F (36.7 °C), Min:97.6 °F (36.4 °C), Max:98.4 °F (36.9 °C)    Urinary Status: Serna  Creatinine   Date/Time Value Ref Range Status   2022 03:31 AM 0.76 0.70 - 1.30 MG/DL Final   2022 06:17 AM 0.87 0.70 - 1.30 MG/DL Final   2022 09:15 PM 0.99 0.70 - 1.30 MG/DL Final   2022 04:20 AM 1.00 0.70 - 1.30 MG/DL Final   2022 04:00 PM 1.09 0.70 - 1.30 MG/DL Final     Current Antimicrobial Therapy (168h ago, onward)     Ordered     Start Stop    2238  cefTRIAXone (ROCEPHIN) 1 g in 0.9% sodium chloride 10 mL IV syringe  1 g,   IntraVENous,   EVERY 24 HOURS        References:    Lexicomp    22 0539 --              Key Anti-Platelet Anticoagulant Meds             apixaban (ELIQUIS) 5 mg tablet Take 1 Tablet by mouth two (2) times a day.         Diet: ADULT DIET Regular; Low Fat/Low Chol/High Fiber/SOLIS -       Labs     Lab Results   Component Value Date/Time    Lactic acid 1.2 2022 02:56 PM    WBC 5.9 2022 03:31 AM    HCT 27.4 (L) 2022 03:31 AM    PLATELET 302  03:31 AM    Sodium 132 (L) 2022 03:31 AM    Potassium 3.9 2022 03:31 AM    Chloride 99 2022 03:31 AM    CO2 23 2022 03:31 AM    BUN 8 2022 03:31 AM    Creatinine 0.76 2022 03:31 AM    Glucose 92 2022 03:31 AM    Calcium 7.8 (L) 2022 03:31 AM    Magnesium 1.6 2022 03:31 AM    INR 1.0 2022 11:11 AM     UA:   Lab Results   Component Value Date/Time    Color DARK YELLOW 07/04/2022 10:03 PM    Appearance TURBID (A) 07/04/2022 10:03 PM    Specific gravity 1.020 07/04/2022 10:03 PM    pH (UA) 6.5 07/04/2022 10:03 PM    Protein 100 (A) 07/04/2022 10:03 PM    Glucose Negative 07/04/2022 10:03 PM    Ketone 40 (A) 07/04/2022 10:03 PM    Bilirubin Negative 07/04/2022 10:03 PM    Urobilinogen 1.0 07/04/2022 10:03 PM    Nitrites Positive (A) 07/04/2022 10:03 PM    Leukocyte Esterase LARGE (A) 07/04/2022 10:03 PM    Epithelial cells FEW 07/04/2022 10:03 PM    Bacteria 4+ (A) 07/04/2022 10:03 PM    WBC >100 (H) 07/04/2022 10:03 PM    RBC 0-5 07/04/2022 10:03 PM     Imaging     Results for orders placed during the hospital encounter of 07/04/22    CT CHEST WO CONT    Narrative  INDICATION: Prostate cancer, evaluate for mass    COMPARISON: April 9, 2022    CONTRAST: None. TECHNIQUE:  5 mm axial images were obtained through the chest. Coronal and  sagittal reformats were generated. CT dose reduction was achieved through use  of a standardized protocol tailored for this examination and automatic exposure  control for dose modulation. The absence of intravenous contrast reduces the sensitivity for evaluation of  the mediastinum, cindy, vasculature, and upper abdominal organs. FINDINGS:    CHEST WALL: No mass or axillary lymphadenopathy. THYROID: No nodule. MEDIASTINUM: No mass or lymphadenopathy. CINDY: No mass or lymphadenopathy. THORACIC AORTA: No aneurysm. MAIN PULMONARY ARTERY: Normal in caliber. TRACHEA/BRONCHI: Patent. ESOPHAGUS: No wall thickening or dilatation. HEART: Normal in size. PLEURA: Trace bilateral pleural effusions. LUNGS: Bibasilar atelectasis. Scattered areas of scarring in the right middle  lobe and lingula. Sub-5 mm left upper lobe nodules (4:38). These nodules are  stable since April. INCIDENTALLY IMAGED UPPER ABDOMEN: Numerous hypodense foci throughout the liver  concerning for metastatic disease.   BONES: Vertebral augmentation at T7 with cement extruded into the spinal canal  resulting in severe canal stenosis. There is a sclerotic focus in the right  posterior 11th rib. Impression  1. Stable sub-5 mm left upper lobe lung nodules compared to April. 2.  Sclerotic focus in the right posterior 11th rib and post vertebral  augmentation changes at T7.  3.  No lymphadenopathy. 4.  Hypodense liver lesions concerning for metastatic disease. US Results (most recent):  No results found for this or any previous visit. Cultures     All Micro Results     Procedure Component Value Units Date/Time    CULTURE, URINE [[id number]]  (Abnormal) Collected: 07/04/22 2203    Order Status: Completed Specimen: Cath Urine Updated: 07/06/22 0803     Special Requests: NO SPECIAL REQUESTS        [place] Count --        >100,000  COLONIES/mL       Culture result: YEAST               POSSIBLE GRAM NEGATIVE RODS          COVID-19 RAPID TEST [[id number]] Collected: 07/05/22 1302    Order Status: Completed Specimen: Nasopharyngeal Updated: 07/05/22 1347     Specimen source Nasopharyngeal        COVID-19 rapid test Not detected        Comment: Rapid Abbott ID Now       Rapid NAAT:  The specimen is NEGATIVE for SARS-CoV-2, the novel coronavirus associated with COVID-19. Negative results should be treated as presumptive and, if inconsistent with clinical signs and symptoms or necessary for patient management, should be tested with an alternative molecular assay. Negative results do not preclude SARS-CoV-2 infection and should not be used as the sole basis for patient management decisions. This test has been authorized by the FDA under an Emergency Use Authorization (EUA) for use by authorized laboratories.    Fact sheet for Healthcare Providers: ConventionUpdate.co.nz  Fact sheet for Patients: ConventionUpdate.co.nz       Methodology: Isothermal Nucleic Acid Amplification         CULTURE, URINE [[id number]]     Order Status: Canceled Specimen: [facility] Urine     URINE CULTURE HOLD SAMPLE [[id number]] Collected: 07/04/22 2203    Order Status: Completed Specimen: Serum Updated: 07/04/22 2209     Urine culture hold       Urine on hold in Microbiology dept for 2 days. If unpreserved urine is submitted, it cannot be used for addtional testing after 24 hours, recollection will be required. Past History: (Complete 2+/3 areas)   No Known Allergies   Current Facility-Administered Medications   Medication Dose Route Frequency    oxyCODONE IR (ROXICODONE) tablet 10 mg  10 mg Oral Q4H PRN    oxyCODONE IR (ROXICODONE) tablet 5 mg  5 mg Oral TID    acetaminophen (TYLENOL) tablet 650 mg  650 mg Oral TID    amiodarone (CORDARONE) tablet 200 mg  200 mg Oral DAILY    apixaban (ELIQUIS) tablet 5 mg  5 mg Oral BID    tamsulosin (FLOMAX) capsule 0.4 mg  0.4 mg Oral QPM    sodium chloride (NS) flush 5-40 mL  5-40 mL IntraVENous Q8H    sodium chloride (NS) flush 5-40 mL  5-40 mL IntraVENous PRN    acetaminophen (TYLENOL) tablet 650 mg  650 mg Oral Q6H PRN    Or    acetaminophen (TYLENOL) suppository 650 mg  650 mg Rectal Q6H PRN    polyethylene glycol (MIRALAX) packet 17 g  17 g Oral DAILY PRN    ondansetron (ZOFRAN ODT) tablet 4 mg  4 mg Oral Q8H PRN    Or    ondansetron (ZOFRAN) injection 4 mg  4 mg IntraVENous Q6H PRN    cefTRIAXone (ROCEPHIN) 1 g in 0.9% sodium chloride 10 mL IV syringe  1 g IntraVENous Q24H    morphine injection 2 mg  2 mg IntraVENous QHS    senna-docusate (PERICOLACE) 8.6-50 mg per tablet 1 Tablet  1 Tablet Oral QHS    Prior to Admission medications    Medication Sig Start Date End Date Taking? Authorizing Provider   lidocaine HCL 4 % ptmd 1 Patch by Apply Externally route every twelve (12) hours. 6/21/22   Marbella Lerma MD   amiodarone (CORDARONE) 200 mg tablet Take 1 Tablet by mouth daily. 5/9/22   Shay Rios MD   apixaban (ELIQUIS) 5 mg tablet Take 1 Tablet by mouth two (2) times a day.  4/28/22 Devin Andre MD   calcium citrate-vitamin D3 (CITRACAL + D) tablet Take 1 Tab by mouth two (2) times a day. Provider, Historical   leuprolide acetate (LUPRON DEPOT IM) by IntraMUSCular route. Every 6 months    Provider, Historical   abiraterone (ZYTIGA) 250 mg tab Take 1,000 mg by mouth daily. Provider, Historical   predniSONE (DELTASONE) 5 mg tablet Take 5 mg by mouth two (2) times a day. Provider, Historical   tamsulosin (FLOMAX) 0.4 mg capsule Take 0.4 mg by mouth every evening. Provider, Historical        PMHx:  has a past medical history of Cancer ([facility] [address] and Prostate cancer ([facility] [address] PSurgHx:  has a past surgical history that includes ir insert tunl cvc w/o port over 5 yr (1/6/2022); ir remove tunl cvad w/o port / pump (1/25/2022); and ir kyphoplasty thoracic (6/20/2022). PSocHx:  reports that he has never smoked. He has never used smokeless tobacco. He reports that he does not drink alcohol and does not use drugs.    ROS:  (Complete - 10 systems) - DENIES: Weightloss (Constitutional), Dry mouth (ENMT), Chest pain (CV), SOB (Respiratory), Constipation (GI), Weakness (MS), Pallor (Skin), TIA Sx (Neuro), Confusion (Psych), Easy bruising (Heme)    Physical Exam: (Comprehesive - 8+ 1995 Systems)     (1) Constitutional:  NAD; pleasant  FIO2:   on SpO2: O2 Sat (%): 99 %  O2 Device: None (Room air)    Patient Vitals for the past 24 hrs:   BP Temp Pulse Resp SpO2 Weight   07/06/22 1000 -- -- 88 -- -- --   07/06/22 0736 (!) 112/58 98.2 °F (36.8 °C) 89 16 99 % --   07/06/22 0500 -- -- 86 -- -- --   07/06/22 0400 -- -- 85 -- -- --   07/06/22 0329 -- -- -- -- -- 79.2 kg (174 lb 9.6 oz)   07/06/22 0327 116/61 98 °F (36.7 °C) 90 16 99 % --   07/06/22 0152 -- -- 86 -- -- --   07/06/22 0127 -- -- -- -- 99 % --   07/06/22 0015 113/63 98 °F (36.7 °C) 89 14 100 % --   07/05/22 2158 -- -- 84 -- -- --   07/05/22 2101 134/63 98.1 °F (36.7 °C) 88 15 100 % --   07/05/22 2005 -- -- 86 -- -- --   07/05/22 1811 -- -- 82 -- -- --   07/05/22 1559 128/70 98.4 °F (36.9 °C) 84 16 100 % --   07/05/22 1439 -- -- 79 -- -- --   07/05/22 1208 -- -- 74 -- -- --   07/05/22 1155 (!) 146/68 97.8 °F (36.6 °C) 76 16 100 % --   07/05/22 1015 (!) 141/69 97.6 °F (36.4 °C) 77 16 100 % --       Date 07/05/22 0700 - 07/06/22 0659 07/06/22 0700 - 07/07/22 0659   Shift 8115-0003 1553-9967 24 Hour Total 7947-9396 4890-3775 24 Hour Total   INTAKE   I.V.(mL/kg/hr)  300(0.3) 300(0.2) 288.8  288.8     Volume (0.9% sodium chloride infusion)  300 300 268.8  268.8     Volume (cefTRIAXone (ROCEPHIN) 1 g in 0.9% sodium chloride 10 mL IV syringe)    20  20   Shift Total(mL/kg)  300(3.8) 300(3.8) 288.8(3.6)  288.8(3.6)   OUTPUT   Urine(mL/kg/hr) 1100(1.2) 425(0.4) 1525(0.8) 150  150     Urine Output (mL) (Urinary Catheter 06/29/22) 7163 279 9416 150  150   Shift Total(mL/kg) 1100(14.1) 425(5.4) 1525(19.3) 150(1.9)  150(1.9)   NET -1100 125 1220 138.8  138.8   Weight (kg) 78.1 79.2 79.2 79.2 79.2 79.2      (2) ENMT:   moist mucous membranes, normal sinuses   (3) Respiratory:  breathing easily, no distress   (4) GI:  no abdominal masses, upper abd pain with mild palpation, bilateral    (5) :   Serna, pink to yellow UA, no CVA tenderness   (6) Lymphatic:  no adenopathy, neck supple   (7) Muscloskeletal:  no gross deformity, weakness    (8) Skin:  no rash, warm & dry   (9) Neuro:  Alert, no focal deficits, normal speech      Signed By: Mauro Reynolds NP  - July 6, 2022

## 2022-07-06 NOTE — PROGRESS NOTES
TRANSFER - IN REPORT:    Verbal report received from Apoorva Vera RN (name) on Araine Macdonald  being received from [place]   (unit) for routine progression of care      Report consisted of patients Situation, Background, Assessment and   Recommendations(SBAR). Information from the following report(s) SBAR and Kardex was reviewed with the receiving nurse. Opportunity for questions and clarification was provided. Assessment completed upon patients arrival to unit and care assumed. Pt transferred to unit via bed. Arrived just after  Maggy Blevins NP replacing tomlinson cath. Apoorva Vera called to inform this nurse that urine needed to be collected and sent. Urine collected but no orders found. Perfect serve sent to Dr Montse Guevara for orders. Pt noted to have multiple dressings to bilat. Arms. Dressings to both elbows, and posterior forearms. Also noted to have multiple scabbed areas to bilat lower legs. Skin with multiple old scaring to bilat arms, skin very fragile and thin. 1300 -- pt called out to report that his dressing over the left ac saline lock was bleeding. Dressing had just been changed prior to pt coming to the unit. When removing tegaderm over IV site, pt received a small skin tear. New tegaderm placed over iv site. Xeroform gauze along with telfa pad placed over skin tear area, paper tape used.

## 2022-07-06 NOTE — PROGRESS NOTES
Bedside and Verbal shift change report given to Kiki Sanders (oncoming nurse) by Brendan Piper (offgoing nurse).  Report included the following information SBAR, Kardex, ED Summary, Procedure Summary, Intake/Output, MAR, Recent Results and Cardiac Rhythm SR.

## 2022-07-06 NOTE — PROGRESS NOTES
Occupational Therapy    Orders received, attempted to see for OT evaluation, RN reporting patient in transport to new room, will follow up as able for evaluation. Melquiades Marti.  MS Ai OTR/L

## 2022-07-06 NOTE — PROGRESS NOTES
[facility] [place] at [address] [place], [place] [address], [address] [place] [address] Road, [facility]: [phone]  F: [phone]    Reason for Visit:   Lele Ybarra is a 80 y.o. male who is seen in hospital for metastatic Prostate cancer    Treatment History:   · 11-12/2017: ADT+ Zytiga stage IV CSPC  · 1/2022: Provenge for castrate resistant disease  · 6/2022-palliative radiation to L2-L5-Dr. Octavio Kate. Started Xtandi    History of Present Illness:     Seen today in hospital as a new patient to me for metastatic prostate cancer. Pt of Dr Ashley Hdz and I am covering. Pt is in bed and states he has leg weakness and cannot walk. Hx of recent spine XRT. To start immunotherapy next week. Family at bedside. No fevers/ chills/ chest pain/ SOB/ nausea/ vomiting/diarrhea/        Last visit:  Patient is a 80 y.o.male who presented with urinary obstruction in 2017 when he was found to have an elevated PSA of 56. He then had a prostate biopsy that showed Croton 5+5 prostate cancer in all 12 cores. Scan showed kiersten disease in the pelvis as well as numerous bone metastases. He was diagnosed with metastatic prostate cancer 11/2017. He was also found to have lung nodules at the time of diagnosis. Started on androgen deprivation therapy and Zytiga 12/2017. He had disease progression by January 2022. 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 By April 2022 his systemic staging scans showed progression in bony metastases. He also reported R leg pain by June 2022. He was seen by Dr. Camille French with NSG. PSA continued to rise to 15.3 . He had an MRI of thoracic and lumbar spine 6/13/2022 that showed a moderate T7 pathological compression deformity, multiple new liver lesions likely representing metastatic disease, recent right L4 transverse process, S5 vertebral metastatic lesion, several other lesions in T12- L5 and S2. Underwent kyphoplasty to T7 on 6/20/2022 with biopsy. Biopsy was consistent with adenocarcinoma. Genomic sequencing from primary of 2017 showed tumor mutational burden of 19, MLH1 and MSH2. He comes with his wife Gypsy William. He states that he has noted that R leg pain is improved by 75%. He states that he had T7 Kyphoplasty 6/20. He still has noted mid back pain that radiates to his epigastrium. He takes tylenol every 6 hours , takes advil 500 mg BID. His R leg has becomes weaker, had a fall 2 weeks ago, he has been using a walker. He has a decreased appetite. No incontinence. He switched to Blanquita Karlee 1 month ago. Past Medical History:   Diagnosis Date    Cancer [facility])     prostate ca with mets    Prostate cancer [facility])       Past Surgical History:   Procedure Laterality Date    IR INSERT TUNL CVC W/O PORT OVER 5 YR  1/6/2022    IR KYPHOPLASTY THORACIC  6/20/2022    IR REMOVE TUNL CVAD W/O PORT / PUMP  1/25/2022      Social History     Tobacco Use    Smoking status: Never Smoker    Smokeless tobacco: Never Used   Substance Use Topics    Alcohol use: Never      History reviewed. No pertinent family history.   Current Facility-Administered Medications   Medication Dose Route Frequency    oxyCODONE IR (ROXICODONE) tablet 10 mg  10 mg Oral Q4H PRN    oxyCODONE IR (ROXICODONE) tablet 5 mg  5 mg Oral TID    acetaminophen (TYLENOL) tablet 650 mg  650 mg Oral TID    tamsulosin (FLOMAX) capsule 0.4 mg  0.4 mg Oral BID    amiodarone (CORDARONE) tablet 200 mg  200 mg Oral DAILY    apixaban (ELIQUIS) tablet 5 mg  5 mg Oral BID    sodium chloride (NS) flush 5-40 mL  5-40 mL IntraVENous Q8H    sodium chloride (NS) flush 5-40 mL  5-40 mL IntraVENous PRN    acetaminophen (TYLENOL) tablet 650 mg  650 mg Oral Q6H PRN    Or    acetaminophen (TYLENOL) suppository 650 mg  650 mg Rectal Q6H PRN    polyethylene glycol (MIRALAX) packet 17 g  17 g Oral DAILY PRN    ondansetron (ZOFRAN ODT) tablet 4 mg  4 mg Oral Q8H PRN    Or    ondansetron (ZOFRAN) injection 4 mg 4 mg IntraVENous Q6H PRN    cefTRIAXone (ROCEPHIN) 1 g in 0.9% sodium chloride 10 mL IV syringe  1 g IntraVENous Q24H    morphine injection 2 mg  2 mg IntraVENous QHS    senna-docusate (PERICOLACE) 8.6-50 mg per tablet 1 Tablet  1 Tablet Oral QHS      No Known Allergies     Review of Systems: A complete review of systems was obtained, negative except as described above. Physical Exam:     Visit Vitals  /64   Pulse 87   Temp 98.2 °F (36.8 °C)   Resp 18   Ht 6' 3\" (1.905 m)   Wt 174 lb 9.6 oz (79.2 kg)   SpO2 94%   BMI 21.82 kg/m²     ECOG PS: 3  General: No distress  Eyes:  anicteric sclerae  HENT: Atraumatic  Neck: Supple  Respiratory:  normal respiratory effort, lungs clear  CV: Normal rate  GI: Soft, nontender  Neuro:  Leg weakness  Skin: No rashes, ecchymoses, or petechiae. Normal temperature, turgor, and texture. Psych: Alert, conversant    Results:     Lab Results   Component Value Date/Time    WBC 5.9 07/06/2022 03:31 AM    HGB 9.1 (L) 07/06/2022 03:31 AM    HCT 27.4 (L) 07/06/2022 03:31 AM    PLATELET 270 66/44/1784 03:31 AM    MCV 91.3 07/06/2022 03:31 AM    ABS.  NEUTROPHILS 4.2 07/06/2022 03:31 AM    Hematocrit (POC) 37 10/07/2018 02:51 PM     Lab Results   Component Value Date/Time    Sodium 132 (L) 07/06/2022 03:31 AM    Potassium 3.9 07/06/2022 03:31 AM    Chloride 99 07/06/2022 03:31 AM    CO2 23 07/06/2022 03:31 AM    Glucose 92 07/06/2022 03:31 AM    BUN 8 07/06/2022 03:31 AM    Creatinine 0.76 07/06/2022 03:31 AM    GFR est AA >60 07/06/2022 03:31 AM    GFR est non-AA >60 07/06/2022 03:31 AM    Calcium 7.8 (L) 07/06/2022 03:31 AM    Sodium (POC) 139 10/07/2018 02:51 PM    Potassium (POC) 4.0 10/07/2018 02:51 PM    Chloride (POC) 101 10/07/2018 02:51 PM    Glucose (POC) 92 10/07/2018 02:51 PM    BUN (POC) 19 10/07/2018 02:51 PM    Creatinine (POC) 1.00 09/28/2021 12:42 PM    Calcium, ionized (POC) 1.12 10/07/2018 02:51 PM     Lab Results   Component Value Date/Time    Bilirubin, total 0.6 07/05/2022 06:17 AM    ALT (SGPT) 12 07/05/2022 06:17 AM    Alk. phosphatase 90 07/05/2022 06:17 AM    Protein, total 4.7 (L) 07/05/2022 06:17 AM    Albumin 1.9 (L) 07/05/2022 06:17 AM    Globulin 2.8 07/05/2022 06:17 AM       No results found for: PSA, [id number]    Lab Results   Component Value Date/Time    C-Reactive protein 13.30 (H) 04/11/2022 03:56 AM    TSH 0.95 07/05/2022 06:17 AM    Lipase 52 (L) 07/04/2022 09:24 PM       Lab Results   Component Value Date/Time    INR 1.0 06/20/2022 11:11 AM       Records reviewed and summarized above. Pathology report(s) reviewed above. Radiology report(s) reviewed above. MRI reviewed    Assessment:   1) Prostate cancer- stage IV  2017- PSA 53, Ricardo 5+5 denovo metastasis to multiple bones, nodes, lung  ADT+ Zytiga  1/2022: mCRPC- Provenge  6/2022- Rapidly progressive mCRPC with lung, liver, bone metastasis- Xtandi  TMB High  Disease is an aggressive variant with visceral metastasis  For [id number]- with cycle 1   genetic testing later  Stop Xtandi and Prednisone  He agrees to starting Afanian    Seen today in hospital / covering for Dr Tyler Miller  Records and history reviewed with pt and family at bedside. Admitted with leg weakness. Stopped steroids last week. Took for 4 years. ? Adrenal insuff causing weakness. For immunotherapy start as outpt next week. Hx of recent spine XRT. Consult Rad/ONC per pt / family request.   Palliative care for symptom mgmt. Discussed hospice and pt/ family not ready for hospice. Plan is for outpt fu with Dr Tyler Miller next week for treatment. Consider decadron 4mg po bid. D/w hospitalist.     2) Bone metastasis  T7- Kyphoplasy 6/20/2022  L2-L5- RT     3) Pain. Per palliative care. 4) Aflutter per IM/ cardio. Amiodarone, Eliquis    5) Psychosocial  Coping well  Supportive wife/ family at bedside.      We will follow as needed  Call if questions   d/w IM  Fu with Dr Tyler Miller as outpt    I appreciate the opportunity to participate in  Amara Meseret Riley's care.     Signed By: Triston Pike DO

## 2022-07-06 NOTE — PROGRESS NOTES
[address] [facility]                                                                                          Hospitalist Progress Note  Michelle Vargas MD  Answering service: [phone] or [phone] from in house phone        Date of Service:  2022  NAME:  Francisco Callejas  :  10/13/1933  MRN:  [id number]      Admission Summary: This is an 60-year-old man with past medical history significant for metastatic prostate cancer, paroxysmal atrial fibrillation; on Eliquis was in his usual state of health until couple of days ago when the patient developed abdominal pain. The pain is diffuse in location and 8/10 in severity with no known aggravating or relieving factors. No associated nausea or vomiting. The patient described the pain as dull ache. Interval history / Subjective:   Pt had tried some oxycodone today, has not yet kicked in. Otherwise eating ok. Discussed options of SNF vs. IPR vs. Home vs. Hospice. Pt is not yet ready for hospice. He would like to try Keytruda. PT/OT to eval today. We discussed that going to SNF/IPR may be able to get him a bit stronger, but also very possible that his weakness is secondary to his malignancy in which case it may not help. Additionally going to SNF/IPR will delay his treatment for cancer, as they do not allow you to transport to clinic for chemo. Pt likely will need to go home with as much support as we are able to provide. He will let me know if oxycodone, will help.       Assessment & Plan:     Metastatic prostate cancer  Liver mets  Osseous metastasis   Abdominal pain   - Suspect that pain is related to liver mets  - Add oral oxycodone, IV dilaudid PRN  - Palliative following, likely would benefit from o/p follow up   - Oncology consult pending   - Planned to start o/p Keytruda on     Chronic tomlinson with possible catheter associated UTI - pt with no symptoms.   - Catheter was not changed in the ER, so this is a contaminated sample. - Continue ceftriaxone, started on admission  - FU urine culture  - Urology consulted to change catheter and provide new UA and new urine culture sample. Hypomagnesemia - replete and monitor  Hyponatremia - monitor, repeat now improved. Paroxysmal Afib - eliquis      Code status: FULL  Prophylaxis: Eliquis  Care Plan discussed with: pt, RN  Anticipated Disposition: Home in 24-48 hours, likely will need to go home despite being weak, as he will need [facility] Problems  Date Reviewed: 7/5/2022          Codes Class Noted POA    * (Principal) Intractable abdominal pain ICD-10-CM: R10.9  ICD-9-CM: 789.00  7/5/2022 Yes                Review of Systems:   A comprehensive review of systems was negative except for that written in the HPI. Vital Signs:    Last 24hrs VS reviewed since prior progress note. Most recent are:  Visit Vitals  BP (!) 112/58 (BP 1 Location: Left upper arm, BP Patient Position: At rest)   Pulse 89   Temp 98.2 °F (36.8 °C)   Resp 16   Ht 6' 3\" (1.905 m)   Wt 79.2 kg (174 lb 9.6 oz)   SpO2 99%   BMI 21.82 kg/m²         Intake/Output Summary (Last 24 hours) at 7/6/2022 0859  Last data filed at 7/6/2022 0736  Gross per 24 hour   Intake 588.75 ml   Output 1675 ml   Net -1086.25 ml        Physical Examination:     I had a face to face encounter with this patient and independently examined them on 7/6/2022 as outlined below:          Constitutional:  No acute distress, cooperative, pleasant    ENT:  Oral mucosa moist, oropharynx benign. Resp:  CTA bilaterally. No wheezing/rhonchi/rales. No accessory muscle use. CV:  Regular rhythm, normal rate, no murmurs, gallops, rubs    GI:  Soft, slightly distended, slightly tender. normoactive bowel sounds, no hepatosplenomegaly     Musculoskeletal:  No edema, warm, 2+ pulses throughout    Neurologic:  Moves all extremities.   AAOx3, CN II-XII reviewed            Data Review:    Review and/or order of clinical lab test  Review and/or order of tests in the radiology section of CPT  Review and/or order of tests in the medicine section of CPT      Labs:     Recent Labs     07/06/22 0331 07/05/22 0617   WBC 5.9 5.0   HGB 9.1* 8.9*   HCT 27.4* 26.5*    171     Recent Labs     07/06/22  0331 07/05/22 0617 07/04/22 2124 07/04/22 2115   * 130*  --  127*   K 3.9 3.7  --  3.8   CL 99 96*  --  93*   CO2 23 21  --  22   BUN 8 12  --  14   CREA 0.76 0.87  --  0.99   GLU 92 65  --  69   CA 7.8* 8.1*  --  9.3   MG 1.6 1.8 1.5*  --    PHOS 3.7 4.1  --   --      Recent Labs     07/05/22 0617 07/04/22 2124 07/04/22 2115   ALT 12  --  17   AP 90  --  117   TBILI 0.6  --  1.2*   TP 4.7*  --  5.9*   ALB 1.9*  --  2.6*   GLOB 2.8  --  3.3   LPSE  --  52*  --      No results for input(s): INR, PTP, APTT, INREXT, INREXT in the last 72 hours. No results for input(s): FE, TIBC, PSAT, FERR in the last 72 hours. No results found for: FOL, RBCF   No results for input(s): PH, PCO2, PO2 in the last 72 hours. No results for input(s): CPK, CKNDX, TROIQ in the last 72 hours.     No lab exists for component: CPKMB  Lab Results   Component Value Date/Time    Cholesterol, total 124 05/31/2019 07:07 AM    HDL Cholesterol 39 05/31/2019 07:07 AM    LDL, calculated 62 05/31/2019 07:07 AM    Triglyceride 115 05/31/2019 07:07 AM    CHOL/HDL Ratio 3.2 05/31/2019 07:07 AM     Lab Results   Component Value Date/Time    Glucose (POC) 92 10/07/2018 02:51 PM     Lab Results   Component Value Date/Time    Color DARK YELLOW 07/04/2022 10:03 PM    Appearance TURBID (A) 07/04/2022 10:03 PM    Specific gravity 1.020 07/04/2022 10:03 PM    pH (UA) 6.5 07/04/2022 10:03 PM    Protein 100 (A) 07/04/2022 10:03 PM    Glucose Negative 07/04/2022 10:03 PM    Ketone 40 (A) 07/04/2022 10:03 PM    Bilirubin Negative 07/04/2022 10:03 PM    Urobilinogen 1.0 07/04/2022 10:03 PM    Nitrites Positive (A) 07/04/2022 10:03 PM    Leukocyte Esterase LARGE (A) 07/04/2022 10:03 PM    Epithelial cells FEW 07/04/2022 10:03 PM    Bacteria 4+ (A) 07/04/2022 10:03 PM    WBC >100 (H) 07/04/2022 10:03 PM    RBC 0-5 07/04/2022 10:03 PM         Medications Reviewed:     Current Facility-Administered Medications   Medication Dose Route Frequency    HYDROmorphone (DILAUDID) injection 0.5 mg  0.5 mg IntraVENous Q4H PRN    amiodarone (CORDARONE) tablet 200 mg  200 mg Oral DAILY    apixaban (ELIQUIS) tablet 5 mg  5 mg Oral BID    tamsulosin (FLOMAX) capsule 0.4 mg  0.4 mg Oral QPM    sodium chloride (NS) flush 5-40 mL  5-40 mL IntraVENous Q8H    sodium chloride (NS) flush 5-40 mL  5-40 mL IntraVENous PRN    acetaminophen (TYLENOL) tablet 650 mg  650 mg Oral Q6H PRN    Or    acetaminophen (TYLENOL) suppository 650 mg  650 mg Rectal Q6H PRN    polyethylene glycol (MIRALAX) packet 17 g  17 g Oral DAILY PRN    ondansetron (ZOFRAN ODT) tablet 4 mg  4 mg Oral Q8H PRN    Or    ondansetron (ZOFRAN) injection 4 mg  4 mg IntraVENous Q6H PRN    cefTRIAXone (ROCEPHIN) 1 g in 0.9% sodium chloride 10 mL IV syringe  1 g IntraVENous Q24H    oxyCODONE IR (ROXICODONE) tablet 5 mg  5 mg Oral Q4H PRN    morphine injection 2 mg  2 mg IntraVENous QHS    senna-docusate (PERICOLACE) 8.6-50 mg per tablet 1 Tablet  1 Tablet Oral QHS     ______________________________________________________________________  EXPECTED LENGTH OF STAY: - - -  ACTUAL LENGTH OF STAY:          1                 Nancy Amin MD

## 2022-07-06 NOTE — PROGRESS NOTES
[address] visit attempted twice. First time, Mr. Yudi Pyle moved to another room. This afternoon he was not available because of a meeting with staff. Prayer for spiritual communion offered and will ask Fr. Callejas to see Mr. Yudi Pyle tomorrow.     EMMA Hendrickson, RN, ACSW, LCSW   Page:  [phone]

## 2022-07-06 NOTE — PROGRESS NOTES
Palliative Medicine Consult  Ronaldo: [phone] ([phone])    Patient Name: aPt Valente  YOB: 1933    Date of Initial Consult: July 5, 2022  Reason for Consult: End-stage disease  Requesting Provider: Dr. Mills Lung  Primary Care Physician: Dung Baldwin MD     SUMMARY:   Pat Valente is a 80 y.o. with a past history of *metastatic prostate cancer (bone)~radiation therapy, s/p ablation and kyphoplasty, A. Fib (eliquis), chronic indwelling Serna catheter, who was admitted on 7/4/2022 from home due to worsening abdominal pain. Other acute issues include UTI, paroxysmal A. Fib, bilateral lower extremity venous stasis, hyponatremia. Imaging suggests worsening metastatic disease now with mets to the lymph nodes and liver    Current medical issues leading to Palliative Medicine involvement include: End-stage disease. Full code    Social: Patient is  to Garrett Sanchez and they have 3 sons (Candy Escamilla, [place]). 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 He served in [employer] during the [employer]. He was working up until a couple months ago. PALLIATIVE DIAGNOSES:   1. Palliative Care Encounter  2. Cancer Related pain  3. Metastatic prostate cancer~bones, liver  4. Hypoalbuminemia   5. Drug induced constipation  6. Physical debility      PLAN:   1. Pt seen without family. He does not recall meeting me yesterday or much of the discussion. He affirms his wish to continue current level of care and any palliative treatment options for his cancer  2. Clinically appears without distress. He is unable to provide a comprehensive pain history as it relates to oxycodone vs morphine. He sleeps well with the morphine at night. Seems to tolerate the oxycodone. In an effort to keep regimen simple, will keep the oxycodone. He is only taking about 1 dose of oxycodone in 24 hour period which may be contributing to suboptimal relief  3.  Will dc iv dilaudid 0.5mg (not taking)  4. Schedule oxycodone ir 5mg three times daily (includes bedtime dose) along with tylenol 650mg three times daily. This may help with the pain related to bone mets  5. Oxycodone ir 10mg every 4 as needed for rescue  6. He is appropriate for our outpatient clinic but concerned with the burden of transport. He could be seen virtually after an initial in person visit. I will discuss with wife before placing the referral.  7. Will follow up on pain tomorrow  8. Continue current care  9. Oncology consult pending      July 5, 2022  10. Met with patient and wife at the bedside, services introduced  11. Patient alert and demonstrates capacity for decisions. We discussed his understanding of his condition, goals, concerns, questions. 12. Patient verbalizes goal to obtain immunotherapy as they have discussed with Dr. Juliette Camacho. The plan was to initiate therapy next week. 13. Upon discharge the pt would like to return home vs facility for rehab. Wife understands that the pt would not benefit greatly from skilled rehab. They report that the pt was walking 2 weeks ago with a walker. 15. Family still waiting to meet with Dr. Juliette Camacho. I explained that the latest imaging may impact the plan for immunotherapy and we will wait to hear from Dr. Juliette Camacho on that. I explained the hospice benefit as another option. 15. The patient had many questions as the only thing he knows about hospice is that people die. I explained that this service is for patients who have a probable prognosis of 6 months or less. Explained that some are enrolled in hospice for over a year. I also explained that hospice is a service for people who are no longer seeking aggressive management of their disease. 16. Patient is concerned because he can no longer walk. He contracted COVID in March while in [facility] and his previous functional status has yet to return.   He says that immunotherapies would only be every 3 weeks and wife feels they can probably make that work. They have secured a friend [facility]. 17. We agreed to wait until options are presented to plan  18. Code status addressed. Pt expressed his desire to maintain full code status. I explained the risks and recommendation of DNR. Wife and pt would like to discuss more before making any decisions  19. Continue current care  20. Symptoms: I added morphine 2mg hs as pt said it really helped him yesterday. Agree with oxycodone and titrate based on clinical response  21. Added pericolace for drug induced constipation  22. Will follow up with them tomorrow  23. Initial consult note routed to primary continuity provider and/or primary health care team members  24. Communicated plan of care with: Palliative [facility] Team     GOALS OF CARE / TREATMENT PREFERENCES:     GOALS OF CARE:  Patient/Health Care Proxy Stated Goals: Other (comment) (immunotherapy)    TREATMENT PREFERENCES:   Code Status: Full Code    Patient and family's personal goals include: treatment    Advance Care Planning:  [] [facility] Interdisciplinary Team has updated the ACP Navigator with [address] and Patient Capacity    Dylan Larkin spouse [phone]    Advance Care Planning 5/31/2019   Patient's Healthcare Decision Maker is: -   Primary Decision Maker Name -   Primary Decision Maker Phone Number -   Confirm Advance Directive Yes, not on file       Medical Interventions: Full interventions       Other:    As far as possible, the palliative care team has discussed with patient / health care proxy about goals of care / treatment preferences for patient.      HISTORY:     History obtained from: chart, team    CHIEF COMPLAINT: pt admitted with abdominal pain    HPI/SUBJECTIVE:    The patient is:   [x] Verbal and participatory  [] Non-participatory due to:   See below     Clinical Pain Assessment (nonverbal scale for severity on nonverbal patients):   Clinical Pain Assessment  Severity: 2  Location: epigastric, left inguinal, L knee, and L leg  Character: left leg weakness  Duration: weeks  Effect: limited mobility  Factors: pain worse with reposiitioning  Frequency: all the time          Duration: for how long has pt been experiencing pain (e.g., 2 days, 1 month, years)  Frequency: how often pain is an issue (e.g., several times per day, once every few days, constant)     FUNCTIONAL ASSESSMENT:     Palliative Performance Scale (PPS):  PPS: 30       PSYCHOSOCIAL/SPIRITUAL SCREENING:     Palliative IDT has assessed this patient for cultural preferences / practices and a referral made as appropriate to needs (Cultural Services, Patient Advocacy, Ethics, etc.)    Any spiritual / Zoroastrianism concerns:  [] Yes /  [x] No   If \"Yes\" to discuss with pastoral care during IDT     Does caregiver feel burdened by caring for their loved one:   [] Yes /  [x] No /  [] No Caregiver Present/Available [] No Caregiver [] Pt Lives at [place] [address]  If \"Yes\" to discuss with social work during IDT    Anticipatory grief assessment:   [x] Normal  / [] Maladaptive     If \"Maladaptive\" to discuss with social work during IDT    ESAS Anxiety: Anxiety: 0    ESAS Depression: Depression: 0        REVIEW OF SYSTEMS:     Positive and pertinent negative findings in ROS are noted above in HPI. The following systems were [x] reviewed / [] unable to be reviewed as noted in HPI  Other findings are noted below. Systems: constitutional, ears/nose/mouth/throat, respiratory, gastrointestinal, genitourinary, musculoskeletal, integumentary, neurologic, psychiatric, endocrine. Positive findings noted below.   Modified ESAS Completed by: provider   Fatigue: 1 Drowsiness: 0   Depression: 0 Pain: 2   Anxiety: 0 Nausea: 0   Anorexia: 4 Dyspnea: 0                    PHYSICAL EXAM:     From RN flowsheet:  Wt Readings from Last 3 Encounters:   07/06/22 174 lb 9.6 oz (79.2 kg)   06/28/22 172 lb (78 kg) 06/21/22 158 lb 3.2 oz (71.8 kg)     Blood pressure (!) 112/58, pulse 89, temperature 98.2 °F (36.8 °C), resp. rate 16, height 6' 3\" (1.905 m), weight 174 lb 9.6 oz (79.2 kg), SpO2 99 %. Pain Scale 1: Numeric (0 - 10)  Pain Intensity 1: 7 (awaken and stated in pain)  Pain Onset 1: last 5 or 6 days  Pain Location 1: Abdomen  Pain Orientation 1: Upper  Pain Description 1: Aching  Pain Intervention(s) 1: Medication (see MAR)  Last bowel movement, if known:     Constitutional: alert, nad, conversant  Eyes: pupils equal, anicteric  ENMT: no nasal discharge, moist mucous membranes  Cardiovascular:   Respiratory: breathing not labored, symmetric  Gastrointestinal:   Musculoskeletal: no deformity, no tenderness to palpation  Skin: thin, poor turgor   Neurologic: following commands, moving all extremities  Psychiatric: full affect, no hallucinations  Other:       HISTORY:     Principal Problem:    Intractable abdominal pain (7/5/2022)      Past Medical History:   Diagnosis Date    Cancer [facility])     prostate ca with mets    Prostate cancer [facility])       Past Surgical History:   Procedure Laterality Date    IR INSERT TUNL CVC W/O PORT OVER 5 YR  1/6/2022    IR KYPHOPLASTY THORACIC  6/20/2022    IR REMOVE TUNL CVAD W/O PORT / PUMP  1/25/2022      History reviewed. No pertinent family history. History reviewed, no pertinent family history.   Social History     Tobacco Use    Smoking status: Never Smoker    Smokeless tobacco: Never Used   Substance Use Topics    Alcohol use: Never     No Known Allergies   Current Facility-Administered Medications   Medication Dose Route Frequency    HYDROmorphone (DILAUDID) injection 0.5 mg  0.5 mg IntraVENous Q4H PRN    amiodarone (CORDARONE) tablet 200 mg  200 mg Oral DAILY    apixaban (ELIQUIS) tablet 5 mg  5 mg Oral BID    tamsulosin (FLOMAX) capsule 0.4 mg  0.4 mg Oral QPM    sodium chloride (NS) flush 5-40 mL  5-40 mL IntraVENous Q8H    sodium chloride (NS) flush 5-40 mL 5-40 mL IntraVENous PRN    acetaminophen (TYLENOL) tablet 650 mg  650 mg Oral Q6H PRN    Or    acetaminophen (TYLENOL) suppository 650 mg  650 mg Rectal Q6H PRN    polyethylene glycol (MIRALAX) packet 17 g  17 g Oral DAILY PRN    ondansetron (ZOFRAN ODT) tablet 4 mg  4 mg Oral Q8H PRN    Or    ondansetron (ZOFRAN) injection 4 mg  4 mg IntraVENous Q6H PRN    cefTRIAXone (ROCEPHIN) 1 g in 0.9% sodium chloride 10 mL IV syringe  1 g IntraVENous Q24H    oxyCODONE IR (ROXICODONE) tablet 5 mg  5 mg Oral Q4H PRN    morphine injection 2 mg  2 mg IntraVENous QHS    senna-docusate (PERICOLACE) 8.6-50 mg per tablet 1 Tablet  1 Tablet Oral QHS          LAB AND IMAGING FINDINGS:     Lab Results   Component Value Date/Time    WBC 5.9 07/06/2022 03:31 AM    HGB 9.1 (L) 07/06/2022 03:31 AM    PLATELET 381 79/17/4402 03:31 AM     Lab Results   Component Value Date/Time    Sodium 132 (L) 07/06/2022 03:31 AM    Potassium 3.9 07/06/2022 03:31 AM    Chloride 99 07/06/2022 03:31 AM    CO2 23 07/06/2022 03:31 AM    BUN 8 07/06/2022 03:31 AM    Creatinine 0.76 07/06/2022 03:31 AM    Calcium 7.8 (L) 07/06/2022 03:31 AM    Magnesium 1.6 07/06/2022 03:31 AM    Phosphorus 3.7 07/06/2022 03:31 AM      Lab Results   Component Value Date/Time    Alk.  phosphatase 90 07/05/2022 06:17 AM    Protein, total 4.7 (L) 07/05/2022 06:17 AM    Albumin 1.9 (L) 07/05/2022 06:17 AM    Globulin 2.8 07/05/2022 06:17 AM     Lab Results   Component Value Date/Time    INR 1.0 06/20/2022 11:11 AM    Prothrombin time 10.6 06/20/2022 11:11 AM      No results found for: IRON, FE, TIBC, IBCT, PSAT, FERR   No results found for: PH, PCO2, PO2  No components found for: [place]   Lab Results   Component Value Date/Time    CK 37 (L) 04/11/2022 03:56 AM                Total time: 35 min  Counseling / coordination time, spent as noted above: 30 min  > 50% counseling / coordination?: y    Prolonged service was provided for  []30 min   []75 min in face to face time in the presence of the patient, spent as noted above. Time Start:   Time End:   Note: this can only be billed with 09884 (initial) or 30259 (follow up). If multiple start / stop times, list each separately.

## 2022-07-06 NOTE — PROGRESS NOTES
TRANSFER - OUT REPORT:    Verbal report given to Phylicia(name) on [place]  being transferred to [facility](unit) for routine progression of care       Report consisted of patients Situation, Background, Assessment and   Recommendations(SBAR). Information from the following report(s) SBAR, ED Summary, Intake/Output, MAR and Cardiac Rhythm afib was reviewed with the receiving nurse. Lines:   Peripheral IV 07/04/22 Left Antecubital (Active)   Site Assessment Intact; Bleeding 07/06/22 0736   Phlebitis Assessment 0 07/06/22 0736   Infiltration Assessment 0 07/06/22 0736   Dressing Status Intact 07/06/22 0736   Dressing Type Transparent 07/06/22 0736   Hub Color/Line Status Pink; Infusing 07/06/22 0400        Opportunity for questions and clarification was provided. Patient transported with:   Aparna Medina replaced the tomlinson, Dr. Joshua Zavala advised, and nurse receiving.   Elena Urrutia

## 2022-07-06 NOTE — PROGRESS NOTES
Problem: Self Care Deficits Care Plan (Adult)  Goal: *Acute Goals and Plan of Care (Insert Text)  Description: FUNCTIONAL STATUS PRIOR TO ADMISSION: prior to 2 weeks ago, was mod I with RW and mod I for ADLs with increased time, in past two week sedentary mostly in bed due to progressive weakness, wife assisting with ADLs as needed. Very active prior to this. HOME SUPPORT: The patient lived with wife who was assisting as needed and driving to appointments. Occupational Therapy Goals  Initiated 7/6/2022  1. Patient will perform grooming with supervision/set-up within 7 day(s). 2.  Patient will perform anterior UB bathing with supervision/set-up within 7 day(s). 3.  Patient will perform lower body dressing with Min A within 7 day(s). 5.  Patient will perform all aspects of toileting with minimal assistance/contact guard assist within 7 day(s). 6.  Patient will participate in Bupper extremity therapeutic exercise/activities with supervision/set-up for 10 minutes within 7 day(s). 7.  Patient will utilize energy conservation techniques during functional activities with visual/verbal cues within 7 day(s).     Outcome: Progressing Towards Goal   OCCUPATIONAL THERAPY EVALUATION  Patient: Odilon Issa (42 y.o. male)  Date: 7/6/2022  Primary Diagnosis: Intractable abdominal pain [R10.9]        Precautions:   Fall    ASSESSMENT  Based on the objective data described below, the patient presents with decreased global strength, sitting and standing balance, poor activity tolerance, symptomatic orthostatic hypotension, and decreased endurance s/p admission with abdominal pain which patient reports is epigastric/mid sternum for ~2-3 weeks with progressive weakness, noted PMH of prostate CA with mets receiving chemo now with progression of mets, currently up with PT upon arrival with poor endurance requiring sit to supine in bed due to wooziness with drop in BP noted with activity, improved once supine and at rest. Overall mod A due to unsteadiness and weakness and up to max A for ADLs due to fatigue, pain and poor functional reach. Will progress as able to SPT to wheelchair/BSC/chair in order to safely discharge home with help. Wife unable to care physically for patient, will need hired care for functional mobility/transfers for car/wheelchair in order for patient to complete OP Chemo/tx. Son is able to help in 2 weeks per family. At this time patient wishing to go to CA tx appointment next week by car. Patient would need to be able to transfer to car and to wheelchair and have DME in home. If patient/family unable to provide physical A, then rehab would be recommended prior to CA tx. Current Level of Function Impacting Discharge (ADLs/self-care): up to max A for ADLs, poor  balance sitting and standing     Functional Outcome Measure: The patient scored Total: 35/100 on the Barthel Index outcome measure which is indicative of being below baseline in basic self-care. Other factors to consider for discharge: CA tx with mets with need to go to IV OP center for tx, wife unable to care physically for patient     Patient will benefit from skilled therapy intervention to address the above noted impairments. PLAN :  Recommendations and Planned Interventions: self care training, functional mobility training, therapeutic exercise, balance training, therapeutic activities, endurance activities, patient education, and home safety training    Frequency/Duration: Patient will be followed by occupational therapy 5 times a week to address goals.     Recommendation for discharge: (in order for the patient to meet his/her long term goals)  Occupational therapy at least 2 days/week in the home AND ensure assist and/or supervision for safety with hired AIDE and family A, 24/7 A for balance/transfers and ADL assist    This discharge recommendation:  A follow-up discussion with the attending provider and/or case management is planned    IF patient discharges home will need the following DME: bedside commode, shower chair, and wheelchair       SUBJECTIVE:   Patient stated What about my appointment next week.     OBJECTIVE DATA SUMMARY:   HISTORY:   Past Medical History:   Diagnosis Date    Cancer ([address]     prostate ca with mets    Prostate cancer [facility])      Past Surgical History:   Procedure Laterality Date    IR INSERT TUNL CVC W/O PORT OVER 5 YR  1/6/2022    IR KYPHOPLASTY THORACIC  6/20/2022    IR REMOVE TUNL CVAD W/O PORT / PUMP  1/25/2022       Expanded or extensive additional review of patient history: independent and working until [address] [place]: Private residence  # Steps to Enter: 0  One/Two Story Residence: One story  Living Alone: No  Support Systems: Spouse/Significant Other  Patient Expects to be Discharged to[de-identified] Home with family assistance  Current DME Used/Available at Home: Walker, rolling,Cane, straight    Hand dominance: Right    EXAMINATION OF PERFORMANCE DEFICITS:  Cognitive/Behavioral Status:  Neurologic State: Alert  Orientation Level: Oriented X4  Cognition: Appropriate decision making  Perception: Appears intact  Perseveration: No perseveration noted  Safety/Judgement: Fall prevention    Skin: intact    Edema: none noted    Hearing: Auditory  Auditory Impairment: None    Vision/Perceptual:                         intact            Range of Motion:  B UE  AROM: Generally decreased, functional  PROM: Within functional limits                      Strength:  B UE  Strength: Generally decreased, functional                Coordination:  Coordination: Within functional limits  Fine Motor Skills-Upper: Left Intact; Right Intact         Tone & Sensation:  B UE  Tone: Normal  Sensation: Impaired (Diminished sensation R LE L4, L5; absent sensation S1)                      Balance:  Sitting: Impaired; With support  Sitting - Static: Fair (occasional)  Sitting - Dynamic: Poor (constant support)  Standing: Impaired; With support  Standing - Static: Fair;Constant support  Standing - Dynamic : Poor;Fair;Constant support    Functional Mobility and Transfers for ADLs:  Bed Mobility:  Rolling: Contact guard assistance  Supine to Sit: Minimum assistance; Additional time;Assist x1  Sit to Supine: Minimum assistance; Moderate assistance; Additional time;Assist x1  Scooting: Minimum assistance;Assist x1    Transfers:  Sit to Stand: Moderate assistance;Assist x1  Stand to Sit: Moderate assistance;Assist x1 (No eccentric control)  Toilet Transfer : Total assistance (bed pan, unable to transfer)    ADL Assessment:  Feeding: Independent    Oral Facial Hygiene/Grooming: Setup    Bathing: Maximum assistance (poor endurance and strength)         Upper Body Dressing: Setup (poor endurance and strength)    Lower Body Dressing: Maximum assistance    Toileting: Maximum assistance (for CM and hygiene, poor endurnace and strength)         Completed OT evaluation and ADLs seated EOB and standing as able with mod A for balance. Educated on safety and endurance training with encouragement for full participation in ADLs while in hospital. Good understanding noted. ADL Intervention and task modifications:  Feeding  Container Management: Independent  Cutting Food: Independent  Utensil Management: Independent  Food to Mouth: Independent  Drink to Mouth: Independent       Patient instructed and indicated understanding the benefits of maintaining activity tolerance, functional mobility, and independence with self care tasks during acute stay  to ensure safe return home and to baseline. Encouraged patient to increase frequency and duration OOB, be out of bed for all meals, perform daily ADLs (as approved by RN/MD regarding bathing etc), and performing functional mobility to/from bathroom.           Lower Body Dressing Assistance  Underpants: Maximum assistance  Position Performed: Supine  Cues: Don;Visual/perceptual training/retraining    Toileting  Bladder Hygiene: Total assistance (dependent)    Cognitive Retraining  Safety/Judgement: Fall prevention    Therapeutic Exercise:     Functional Measure:    Barthel Index:  Bathin  Bladder: 0  Bowels: 10  Groomin  Dressin  Feeding: 10  Mobility: 0  Stairs: 0  Toilet Use: 0  Transfer (Bed to Chair and Back): 5  Total: 35/100      The Barthel ADL Index: Guidelines  1. The index should be used as a record of what a patient does, not as a record of what a patient could do. 2. The main aim is to establish degree of independence from any help, physical or verbal, however minor and for whatever reason. 3. The need for supervision renders the patient not independent. 4. A patient's performance should be established using the best available evidence. Asking the patient, friends/relatives and nurses are the usual sources, but direct observation and common sense are also important. However direct testing is not needed. 5. Usually the patient's performance over the preceding 24-48 hours is important, but occasionally longer periods will be relevant. 6. Middle categories imply that the patient supplies over 50 per cent of the effort. 7. Use of aids to be independent is allowed. Score Interpretation (from [address] [facility] [address])    Independent   60-79 Minimally independent   40-59 Partially dependent   20-39 Very dependent   <20 Totally dependent     -Cady Min., Barthel, D.W. (1965). Functional evaluation: the Barthel Index. [facility] ([address] (1997). The Barthel activities of daily living index: self-reporting versus actual performance in the old (> or = 75 years). Journal of [address] [place] [address] 45(7), [facility], JMARTINEF, Myrla Holter., Lata Brown. (1999). Measuring the change in disability after inpatient rehabilitation; comparison of the responsiveness of the Barthel Index and Functional Rabun Measure. Journal of Neurology, Neurosurgery, and Psychiatry, 66(4), 598-303. NGUYỄN MajanoA, AARON Arana, & Starr Delcid M.A. (2004) Assessment of post-stroke quality of life in cost-effectiveness studies: The usefulness of the Barthel Index and the EuroQoL-5D. Quality of Life Research, 15, 215-92     Occupational Therapy Evaluation Charge Determination   History Examination Decision-Making   LOW Complexity : Brief history review  HIGH Complexity : 5 or more performance deficits relating to physical, cognitive , or psychosocial skils that result in activity limitations and / or participation restrictions HIGH Complexity : Patient presents with comorbidities that affect occupational performance. Signifigant modification of tasks or assistance (eg, physical or verbal) with assessment (s) is necessary to enable patient to complete evaluation       Based on the above components, the patient evaluation is determined to be of the following complexity level: LOW   Pain Rating:  Abdominal with movement    Activity Tolerance:   Poor, requires frequent rest breaks, observed SOB with activity, and signs and symptoms of orthostatic hypotension    After treatment patient left in no apparent distress:    Supine in bed, Call bell within reach, and Caregiver / family present    COMMUNICATION/EDUCATION:   The patients plan of care was discussed with: Physical therapist and Registered nurse. Home safety education was provided and the patient/caregiver indicated understanding., Patient/family have participated as able in goal setting and plan of care. , and Patient/family agree to work toward stated goals and plan of care. This patients plan of care is appropriate for delegation to [facility].     Thank you for this referral.  Kelsy Cloud OT  Time Calculation: 16 mins

## 2022-07-06 NOTE — PROGRESS NOTES
Transition of Care     RUR - 22% high   Disposition  -  The disposition plan is home with family assistance   Transportation  - [facility] /CM will need to schedule transport home at time of d/c   Follow Up PCP/Specialist      PT/OT to evaluate today. Patient to begin Keytruda treatments Aug 2 @ [facility]. 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     n/a  Transportation    n/a  Support system or lack thereof?  n/a     Living arrangements?      n/a     Self-care/ADLs/Cognition?     n/a       Current Advanced Directive/Advance Care Plan:  no           Plan for utilizing home health:   No recommendations at this time             Transition of Care Plan:    Based on readmission, the patient's previous Plan of Care   has been evaluated and/or modified. The current Transition of Care Plan is:    Home with family support         PLEASE NOTE--Encounter / Re-Admission Within 30 Days  This patient has had another encounter or admission within the last 30 days.     Readmission Assessment  Number of days since last admission?: 8-30 days  Previous disposition: Home with Family  Who is being interviewed?: Caregiver  What was the patient's/caregiver's perception as to why they think they needed to return back to the hospital?: Other (Comment)  Did you visit your Primary Care Physician after you left the hospital, before you returned this time?: No  Why weren't you able to visit your PCP?: Did not have an appointment  Did you see a specialist, such as Cardiac, Pulmonary, Orthopedic Physician, etc. after you left the hospital?: No  Who advised the patient to return to the hospital?: Self-referral  Does the patient report anything that got in the way of taking their medications?: No  In our efforts to provide the best possible care to you and others like you, can you think of anything that we could have done to help you after you left the hospital the first time, so that you might not have needed to return so soon?: Discharge instructions that are concise, clear, and non contradictory,Education on how to continue taking medications upon discharge,Identify patient's health literacy needs,Teach back instructions regarding management of illness,Teaching during hospitalization regarding your illness    CM spoke with patient at bedside to introduce self and explain role. Verified demographics, emergency contact, insurance, PCP. Living situation:  Lives w/spouse in home, no steps to enter. ADL's: Independent w/assistance and walker to assist w/ambulation   DME:  C/Dima Buck: mail order and local [facility]   Discharge transportation: [facility]  PT/OT/Rehab/ history: no recent/prev rehab/pt/ot    Medicare pt has received, reviewed, and signed 1st IM letter informing them of their right to appeal the discharge. Signed copy has been placed on pt bedside chart.     CM to follow up for further needs     [facility]

## 2022-07-06 NOTE — PROGRESS NOTES
Problem: Mobility Impaired (Adult and Pediatric)  Goal: *Acute Goals and Plan of Care (Insert Text)  Description: FUNCTIONAL STATUS PRIOR TO ADMISSION: Patient was modified independent using a rolling walker for functional mobility. HOME SUPPORT PRIOR TO ADMISSION: The patient lived with wife but did not require assist.    Physical Therapy Goals  Initiated 7/6/2022  1. Patient will move from supine to sit and sit to supine  in bed with modified independence within 7 day(s). 2.  Patient will transfer from bed to chair and chair to bed with modified independence using the least restrictive device within 7 day(s). 3.  Patient will perform sit to stand with modified independence within 7 day(s). 4.  Patient will ambulate with modified independence for 15 feet with the least restrictive device within 7 day(s). Outcome: Progressing Towards Goal   PHYSICAL THERAPY EVALUATION  Patient: Gregoria Colin (27 y.o. male)  Date: 7/6/2022  Primary Diagnosis: Intractable abdominal pain [R10.9]        Precautions:   Fall    ASSESSMENT  Based on the objective data described below, the patient presents with grossly decreased strength, decreased activity tolerance, abdominal pain, fair-poor sitting balance, poor standing balance, increased need for assistance, high risk for falls, and orthostatic hypotension. Patient previously mod I with RW, however began having abdominal pain about 3 weeks ago and has been feeling weaker steadily since. Patient states one week ago he was so weak that he slid out of a chair and has not been able to walk since due to weakness. Patient has prostate cancer with previously known spinal mets, however per chart patient now also with confirmed liver and lymph node mets and concern for lung nodule. Patient completed rolling with CGA, supine > sit with increased time needed, min A, and bed modifications.  Patient's static sitting balance immediately was poor requiring assistance to prevent posterior lean, progressing to fair. Patient endorsed mild symptoms of wooziness with soft BP. Patient completed sit <> stand with mod A x 1 and RW, with noted increased UE support on RW, R knee buckling, and difficulty weight shifting to either side for side stepping. Patient was able to complete 4 side steps at EOB with mod A  x 1 and RW. Patient endorsed worsening wooziness and was returned stand > sit > supine with mod A x 1 with patient demonstrating poor eccentric control with sitting. Patient with difficult discharge disposition as patient is very weak and requires assistance at this time for mobilization and would benefit from continued rehab, however patient also with progressive metastatic CA per chart and patient is requesting to participate in infusion therapy to fight CA progression. Patient is not able to have this infusion therapy while at a rehab facility. Question how beneficial rehab would be for patient if he is not able to receive CA treatment. At this time, if patient is to receive infusion therapy, patient's functional goal likely bed <> wheelchair transfers with one person assist. Will continue to see patient for skilled PT to progress bed <> chair transfers and recommend patient to have one person able to provide physical assist for safe discharge to home. Patient will need to be measured for a wheelchair at next PT session. Current Level of Function Impacting Discharge (mobility/balance): sit <> stand with mod A x 1 and RW;; takes side steps at EOB with mod A x 1 and RW     Functional Outcome Measure: The patient scored 2/28 on the Tinetti outcome measure which is indicative of high risk for falls. Other factors to consider for discharge: orthostatic hypotension, progressive metastatic CA      Patient will benefit from skilled therapy intervention to address the above noted impairments.        PLAN :  Recommendations and Planned Interventions: bed mobility training, transfer training, gait training, therapeutic exercises, neuromuscular re-education, patient and family training/education, and therapeutic activities      Frequency/Duration: Patient will be followed by physical therapy:  5 times a week to address goals. Recommendation for discharge: (in order for the patient to meet his/her long term goals)  To be determined: complicated discharge disposition (see second paragraph above). Home with 24/7 physical assist x 1 for transfers bed <> wheelchair so that patient is able to receive infusion CA treatment. This discharge recommendation:  Has been made in collaboration with the attending provider and/or case management    IF patient discharges home will need the following DME: bedside commode, gait belt, shower chair, and wheelchair         SUBJECTIVE:   Patient stated my legs are useless, I can't walk.     OBJECTIVE DATA SUMMARY:   HISTORY:    Past Medical History:   Diagnosis Date    Cancer ([address]     prostate ca with mets    Prostate cancer [facility])      Past Surgical History:   Procedure Laterality Date    IR INSERT TUNL CVC W/O PORT OVER 5 YR  1/6/2022    IR KYPHOPLASTY THORACIC  6/20/2022    IR REMOVE TUNL CVAD W/O PORT / PUMP  1/25/2022       Personal factors and/or comorbidities impacting plan of care: progressive metastatic CA    Home Situation  Home Environment: Private residence  # Steps to Enter: 0  One/Two Story Residence: One story  Living Alone: No  Support Systems: Spouse/Significant Other  Patient Expects to be Discharged to[de-identified] Home with family assistance  Current DME Used/Available at Home: Walker, rolling,Cane, straight    EXAMINATION/PRESENTATION/DECISION MAKING:   Critical Behavior:  Neurologic State: Alert  Orientation Level: Oriented X4  Cognition: Appropriate decision making  Safety/Judgement: Fall prevention  Hearing:   Auditory  Auditory Impairment: None  Skin:  small skin tear to L arm by IV - RN aware and provided dressing   Edema: noted to R elbow area - RN aware   Range Of Motion:  AROM: Generally decreased, functional           PROM: Within functional limits           Strength:    Strength: Generally decreased, functional                    Tone & Sensation:   Tone: Normal              Sensation: Impaired (Diminished sensation R LE L4, L5; absent sensation S1)               Coordination:  Coordination: Within functional limits  Vision:      Functional Mobility:  Bed Mobility:  Rolling: Contact guard assistance  Supine to Sit: Minimum assistance; Additional time;Assist x1  Sit to Supine: Minimum assistance; Moderate assistance; Additional time;Assist x1  Scooting: Minimum assistance;Assist x1  Transfers:  Sit to Stand: Moderate assistance;Assist x1  Stand to Sit: Moderate assistance;Assist x1 (No eccentric control)           Lateral Transfers: Minimum assistance           Balance:   Sitting: Impaired; With support  Sitting - Static: Fair (occasional)  Sitting - Dynamic: Poor (constant support)  Standing: Impaired; With support  Standing - Static: Fair;Constant support  Standing - Dynamic : Poor;Fair;Constant support    Functional Measure:  Tinetti test:    Sitting Balance: 0  Arises: 0  Attempts to Rise: 0  Immediate Standing Balance: 0  Standing Balance: 1  Nudged: 0  Eyes Closed: 0  Turn 360 Degrees - Continuous/Discontinuous: 0  Turn 360 Degrees - Steady/Unsteady: 0  Sitting Down: 0  Balance Score: 1 Balance total score  Indication of Gait: 0  R Step Length/Height: 0  L Step Length/Height: 0  R Foot Clearance: 0  L Foot Clearance: 1  Step Symmetry: 0  Step Continuity: 0  Path: 0  Trunk: 0  Walking Time: 0  Gait Score: 1 Gait total score  Total Score: 2/28 Overall total score         Tinetti Tool Score Risk of Falls  <19 = High Fall Risk  19-24 = Moderate Fall Risk  25-28 = Low Fall Risk  Tinetti ME. Performance-Oriented Assessment of Mobility Problems in Elderly Patients. Brown 66; [id number].  (Scoring Description: PT Bulletin Feb. 10, 1993)    Older adults: Reshma Jackson et al, 2009; n = 1000 [facility] elderly evaluated with ABC, KATHY, ADL, and IADL)  · Mean KATHY score for males aged 69-68 years = 26.21(3.40)  · Mean KATHY score for females age 69-68 years = 25.16(4.30)  · Mean KATHY score for males over 80 years = 23.29(6.02)  · Mean KATHY score for females over [de-identified] years = 17.20(8.32)   ]       Physical Therapy Evaluation Charge Determination   History Examination Presentation Decision-Making   MEDIUM  Complexity : 1-2 comorbidities / personal factors will impact the outcome/ POC  LOW Complexity : 1-2 Standardized tests and measures addressing body structure, function, activity limitation and / or participation in recreation  MEDIUM Complexity : Evolving with changing characteristics  Other outcome measures Tinetti  HIGH       Based on the above components, the patient evaluation is determined to be of the following complexity level: LOW     Pain Ratin/10 upper abdominal pain with bed mobility - RN aware     Activity Tolerance:   Poor, requires frequent rest breaks, and signs and symptoms of orthostatic hypotension    After treatment patient left in no apparent distress:   Supine in bed, Call bell within reach, and Caregiver / family present    COMMUNICATION/EDUCATION:   The patients plan of care was discussed with: Occupational therapist, Registered nurse, and Case management. Fall prevention education was provided and the patient/caregiver indicated understanding.     Thank you for this referral.  Rodolfo Feliz, PT   Time Calculation: 44 mins

## 2022-07-06 NOTE — PROGRESS NOTES
0800: Bedside shift change report given to Tri Mendez (oncoming nurse) by Isauro Farr (offgoing nurse). Report included the following information SBAR, Kardex, MAR and Cardiac Rhythm Afib controlled rate. Charles Wright

## 2022-07-07 NOTE — PROGRESS NOTES
[address] [place] [address] lab fu  Cortisol level not done yet  Waiting on this per IM to see if pt needs steroids

## 2022-07-07 NOTE — PROGRESS NOTES
Attempted to see Mr. Jillian Carson in his room, however he was with [facility] and they were praying. I have reviewed his admission and history, he is a patient of my partners, Dr. Yina Russell. At this time there is no role for further radiation and I agree with deferring to a switch in systemic therapy. Dr. Yina Russell can see the patient as an outpatient. However there is no role for intervention from radiation oncology at this time during admission.

## 2022-07-07 NOTE — PROGRESS NOTES
Occupational Therapy    Pt engaged in skilled OT with emphasis on W/C measurements and education regarding recommended DME for discharge. Pt and wife report good understanding of recommendation of use of in-home hospital bed, BSC and W/C, with CM notified and following. Letter of medical necessity completed.     Thank you,  Eriberto Kramer, OT

## 2022-07-07 NOTE — PROGRESS NOTES
[facility] Adult  Hospitalist Group                                                                                          Hospitalist Progress Note  Peri Hung MD  Answering service: [phone] from in house phone        Date of Service:  2022  NAME:  Gregoria Colin  :  10/13/1933  MRN:  [id number]      Admission Summary:     Patient presents with abdominal pain. Known history of metastatic prostate cancer with multiple visceral metastasis. Patient is followed by oncology and plan for outpatient immunotherapy. Palliative care previously evaluated the patient and patient and family not ready for hospice status at this time. Interval history / Subjective:     Patient denies any significant abdominal pain. Patient is generally weak.      Assessment & Plan:     Metastatic prostate cancer  -Multiple metastasis to vertebrae as well as liver  -Oncology evaluated the patient during this admission, plan for outpatient immunotherapy with Troy Rowe    -Continue pain management    Urinary tract infection associated with chronic Serna catheter use  -Continue Rocephin, follow urine culture  -Serna catheter was exchanged by urology services 622  -Continue to maintain Serna catheter, patient needs outpatient follow-up    Hyponatremia  -Sodium level is stable at 131  -Continue monitor sodium level    Paroxysmal atrial fibrillation  -Stable on amiodarone, on anticoagulation with Eliquis    Anemia of chronic disease  -Anemia of chronic disease from malignancy  -Stable H&H     Code status:  Full  Prophylaxis: SCDs  Care Plan discussed with: Patient  Anticipated Disposition: Medically stable, waiting for placement options versus getting more help at home to address patient's generalized weakness     Hospital Problems  Date Reviewed: 2022          Codes Class Noted POA    * (Principal) Intractable abdominal pain ICD-10-CM: R10.9  ICD-9-CM: 789.00  2022 Yes                Review of Systems:   A comprehensive review of systems was negative except for that written in the HPI. Vital Signs:    Last 24hrs VS reviewed since prior progress note. Most recent are:  Visit Vitals  BP (!) 110/48   Pulse 82   Temp 98.2 °F (36.8 °C)   Resp 18   Ht 6' 3\" (1.905 m)   Wt 79.2 kg (174 lb 9.6 oz)   SpO2 96%   BMI 21.82 kg/m²         Intake/Output Summary (Last 24 hours) at 7/7/2022 1325  Last data filed at 7/7/2022 2651  Gross per 24 hour   Intake 180 ml   Output 875 ml   Net -695 ml        Physical Examination:     I had a face to face encounter with this patient and independently examined them on 7/7/2022 as outlined below:          Constitutional:  No acute distress, cooperative, pleasant    ENT:  Oral mucosa moist, oropharynx benign. Resp:  CTA bilaterally. No wheezing/rhonchi/rales. No accessory muscle use. CV:  Regular rhythm, normal rate, no murmurs, gallops, rubs    GI:  Soft, non distended, non tender. normoactive bowel sounds, no hepatosplenomegaly     Musculoskeletal:  No edema, warm, 2+ pulses throughout    Neurologic:  Moves all extremities. AAOx3, CN II-XII reviewed            Data Review:    Review and/or order of clinical lab test      Labs:     Recent Labs     07/07/22 0528 07/06/22  0331   WBC 4.6 5.9   HGB 9.0* 9.1*   HCT 26.6* 27.4*    173     Recent Labs     07/07/22  0528 07/06/22  0331 07/05/22  0617   * 132* 130*   K 3.6 3.9 3.7    99 96*   CO2 23 23 21   BUN 7 8 12   CREA 0.66* 0.76 0.87   GLU 87 92 65   CA 7.9* 7.8* 8.1*   MG 1.5* 1.6 1.8   PHOS 3.3 3.7 4.1     Recent Labs     07/05/22  0617 07/04/22 2124 07/04/22  2115   ALT 12  --  17   AP 90  --  117   TBILI 0.6  --  1.2*   TP 4.7*  --  5.9*   ALB 1.9*  --  2.6*   GLOB 2.8  --  3.3   LPSE  --  52*  --      No results for input(s): INR, PTP, APTT, INREXT in the last 72 hours. No results for input(s): FE, TIBC, PSAT, FERR in the last 72 hours.    No results found for: FOL, RBCF   No results for input(s): PH, PCO2, PO2 in the last 72 hours. No results for input(s): CPK, CKNDX, TROIQ in the last 72 hours.     No lab exists for component: CPKMB  Lab Results   Component Value Date/Time    Cholesterol, total 124 05/31/2019 07:07 AM    HDL Cholesterol 39 05/31/2019 07:07 AM    LDL, calculated 62 05/31/2019 07:07 AM    Triglyceride 115 05/31/2019 07:07 AM    CHOL/HDL Ratio 3.2 05/31/2019 07:07 AM     Lab Results   Component Value Date/Time    Glucose (POC) 92 10/07/2018 02:51 PM     Lab Results   Component Value Date/Time    Color PINK 07/06/2022 11:30 AM    Appearance TURBID (A) 07/06/2022 11:30 AM    Specific gravity 1.024 07/06/2022 11:30 AM    pH (UA) 6.0 07/06/2022 11:30 AM    Protein 300 (A) 07/06/2022 11:30 AM    Glucose Negative 07/06/2022 11:30 AM    Ketone 40 (A) 07/06/2022 11:30 AM    Bilirubin Negative 07/04/2022 10:03 PM    Urobilinogen 0.2 07/06/2022 11:30 AM    Nitrites Negative 07/06/2022 11:30 AM    Leukocyte Esterase LARGE (A) 07/06/2022 11:30 AM    Epithelial cells FEW 07/06/2022 11:30 AM    Bacteria Negative 07/06/2022 11:30 AM    WBC  07/06/2022 11:30 AM    RBC >100 (H) 07/06/2022 11:30 AM         Medications Reviewed:     Current Facility-Administered Medications   Medication Dose Route Frequency    oxyCODONE IR (ROXICODONE) tablet 10 mg  10 mg Oral Q4H PRN    oxyCODONE IR (ROXICODONE) tablet 5 mg  5 mg Oral TID    acetaminophen (TYLENOL) tablet 650 mg  650 mg Oral TID    tamsulosin (FLOMAX) capsule 0.4 mg  0.4 mg Oral BID    amiodarone (CORDARONE) tablet 200 mg  200 mg Oral DAILY    apixaban (ELIQUIS) tablet 5 mg  5 mg Oral BID    sodium chloride (NS) flush 5-40 mL  5-40 mL IntraVENous Q8H    sodium chloride (NS) flush 5-40 mL  5-40 mL IntraVENous PRN    acetaminophen (TYLENOL) tablet 650 mg  650 mg Oral Q6H PRN    Or    acetaminophen (TYLENOL) suppository 650 mg  650 mg Rectal Q6H PRN    polyethylene glycol (MIRALAX) packet 17 g  17 g Oral DAILY PRN    ondansetron (ZOFRAN ODT) tablet 4 mg  4 mg Oral Q8H PRN    Or    ondansetron (ZOFRAN) injection 4 mg  4 mg IntraVENous Q6H PRN    cefTRIAXone (ROCEPHIN) 1 g in 0.9% sodium chloride 10 mL IV syringe  1 g IntraVENous Q24H    senna-docusate (PERICOLACE) 8.6-50 mg per tablet 1 Tablet  1 Tablet Oral QHS     ______________________________________________________________________  EXPECTED LENGTH OF STAY: 3d 7h  ACTUAL LENGTH OF STAY:          2                 Bereket Ramirez MD

## 2022-07-07 NOTE — PROGRESS NOTES
Transition of Care: patients wants to return home with home health PT/OT/SN; pending EMILY referral sent to [facility]     Pending referral sent to [place] for wheelchair    In order to return home:  Patient needs NEW wheelchair-pending referral sent to [place] on 7/7/22  Patient needs home health PT/OT/SN-patient was being seen by [facility] prior to this admission; [address] referral has been sent on 7/7   Private duty [address] given to patient and wife on 7/7  Patient is recommended to get a hospital bed-list of hospital bed suppliers given to patient and wife on 7/7  Pateint requesintg bedside commode -information given to patient and wife on where to purchase one    Transport Plan: likely stretcher or WC van (not set up yet)    RUR: 23%    DX:     Main contact is wifePeggy Garcia- [phone]    Discharge pending:  -pending progress with PT/OT  -pending lab result  -pending set up of new home equipment, EMILY with home health, new wheelchair    [phone]: this CM met with patient and wife to discuss wheelchair, home equipment, home health and transport resources; patient stated he already had home health with [facility] prior to this admission; [facility]; this CM gave patient and his wife resources on [address] and stretcher transportation services, hospital bed rental places, personal care agencies and bedside commode resource; this CM noted WC order and sent referral with documentation and letter of necessity to [place] via [address] [place] [address],[address] Floor; updated attending on wheelchair and home setup via [employer] text    CM following  Noel Dance, RN, CRM      NOTE: immunotherapy is to start as OP on July 12 with Dr. Lori Arthur; patient and family want to return home with home health and start treatment; patient lives in a one level home with supportive wife; patient has a walker and was fairly independent with ADLs and able to ambulate with walker prior to this admission; patient is alert and oriented x 4

## 2022-07-07 NOTE — PROGRESS NOTES
Palliative Medicine Consult  Ronaldo: [phone] ([phone])    Patient Name: Isrrael Finley  YOB: 1933    Date of Initial Consult: July 5, 2022  Reason for Consult: End-stage disease  Requesting Provider: Dr. Merritt Eckert  Primary Care Physician: Jacqui Justice MD     SUMMARY:   Isrrael Finley is a 80 y.o. with a past history of stage IV metastatic prostate cancer first dx 2017 (bone)~radiation therapy to L2-5 6/2022, T7 kyphoplasty 6/2022. He is followed by Dr Daniel Quinones (urology) and Dr Natty Felix and most recently on Mila Alicia. A. Fib (eliquis), chronic indwelling Serna catheter, who was admitted on 7/4/2022 from home due to worsening abdominal pain. Other acute issues include UTI, paroxysmal A. Fib, bilateral lower extremity venous stasis, hyponatremia. Imaging suggests worsening metastatic disease now with mets to the lymph nodes and liver. Plan is to stop Xtandi and Prednisone and is scheduled for Keytruda on 7/12/22 as outpatient. Current medical issues leading to Palliative Medicine involvement include: End-stage disease. Full code    Social: Patient is  to [employer] and they have 3 sons (Marissa, Rylan, [place]). 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 He served in [employer] during the [employer]. He was working up until a couple months ago. PALLIATIVE DIAGNOSES:   1. Palliative Care Encounter  2. Cancer Related pain  3. Metastatic prostate cancer~bones, liver  4. Hypoalbuminemia   5. Drug induced constipation  6. Physical debility      PLAN:   1. Goals of care: Since my colleague saw pt yesterday, Dr Allyssa Boyce met w/ pt. She also talked about hospice as an option, pt and family not ready for this. Plan is to f/u with Dr Natty Felix next week on 7/12 to start [address]. 2. Code status: Discussed in detail earlier this week, full code status. 3. Pain: From bone mets s/p radiation 3 weeks ago and kypho a few weeks ago.  Has improved 75% since those interventions but still w/ pain in mid and lower back w/ RLE very weak. Mild epigastric pain, improved on current regimen. 4. Pt on Oxycodone 5mg tid while in the hospital and has not required the 10mg prn dose. Prior to admission was on Tramadol. 5. Appreciate attending writing 5 days worth of Oxycodone 5mg every 4-6h prn x 5 days. 6. Agree to consider Dexamethasone- given long hx of being on prednisone and also may help bone pain. Bring this up today (Dr Adrienne Sanchez talked about it yest) - pt will think about it, slightly hesitant given side effects of prednisone (thin skin, weight gain). 7. We talked about clinic, however as Jeffrey Jasmine noted yesterday pt very difficult to transfer now (lateral transfer to wheelchair, but needs assistance. Does have one local son who is supportive but has some physical pains of his own given his  work) and on low dose opioids so for now Onc to manage. Can always call, we'd be happy to see in clinic in person for first visit and then could to some virtual.   8. Discussed plan w/ Dr Beth Patterson care management and Sienna Jaimes    Dear Mr Maureen Mai,    It was nice to meet you and Arvin Denis this admission. Your pain from prostate cancer has improved since the radiation and kyphoplasty, but is still present in your back, right leg and abdomen.     -Take Oxycodone 5mg every 4-6 hours as needed for pain. You did well with this medication in the hospital. If your pain is stable, you do not need to take it. You will have enough tablets prescribed for you upon discharge to last you until you see Dr Lori Arthur.     -Chhaya Zazueta can talk to Dr Lori Arthur more about restarting a low dose steroid, this may also help bone pain.     -You are having bowel movements every few days. You do not feel constipated and your appetite has declined, so you are not going daily like you did before however we also want to avoid constipation given your pain medication use.      -Please take Senna (over the counter)- you can take 1-2 tablets daily. You can also take 1/2-1 capful of Miralax (also over the counter) daily mixed in water or juice.     -We talked about Hospice in the future. Right now your goal is to start immunotherapy with Dr Sita Porras.     -We are available to see you in clinic if your pain worsens. We did not make an appointment, but Dr Sita Porras or you can always call us. Take good care,    Fe Lieberman MD  Palliative Medicine  [phone]     GOALS OF CARE / TREATMENT PREFERENCES:     GOALS OF CARE:  Patient/Health Care Proxy Stated Goals: Other (comment) (immunotherapy)    TREATMENT PREFERENCES:   Code Status: Full Code    Patient and family's personal goals include: treatment    Advance Care Planning:  [] The [facility] Interdisciplinary Team has updated the ACP Navigator with [address] and Patient Capacity    Alise Days spouse [phone]    Advance Care Planning 5/31/2019   Patient's Healthcare Decision Maker is: -   Primary Decision Maker Name -   Primary Decision Maker Phone Number -   Confirm Advance Directive Yes, not on file       Medical Interventions: Full interventions       Other:    As far as possible, the palliative care team has discussed with patient / health care proxy about goals of care / treatment preferences for patient. HISTORY:     History obtained from: chart, team    CHIEF COMPLAINT: pt admitted with abdominal pain    HPI/SUBJECTIVE:    The patient is:   [x] Verbal and participatory  [] Non-participatory due to:     Pain improved, was able to sleep. Working on getting wheelchair and other equipment to get home. Last BM several days ago but denies constipation or nausea. Poor appetite.      Clinical Pain Assessment (nonverbal scale for severity on nonverbal patients):   Clinical Pain Assessment  Severity: 2  Location: epigastric, left inguinal, L knee, and L leg  Character: left leg weakness  Duration: weeks  Effect: limited mobility  Factors: pain worse with reposiitioning  Frequency: all the time          Duration: for how long has pt been experiencing pain (e.g., 2 days, 1 month, years)  Frequency: how often pain is an issue (e.g., several times per day, once every few days, constant)     FUNCTIONAL ASSESSMENT:     Palliative Performance Scale (PPS):  PPS: 50       PSYCHOSOCIAL/SPIRITUAL SCREENING:     Palliative IDT has assessed this patient for cultural preferences / practices and a referral made as appropriate to needs (Cultural Services, Patient Advocacy, Ethics, etc.)    Any spiritual / Advent concerns:  [] Yes /  [x] No   If \"Yes\" to discuss with pastoral care during IDT     Does caregiver feel burdened by caring for their loved one:   [] Yes /  [x] No /  [] No Caregiver Present/Available [] No Caregiver [] Pt Lives at [place] [address]  If \"Yes\" to discuss with social work during IDT    Anticipatory grief assessment:   [x] Normal  / [] Maladaptive     If \"Maladaptive\" to discuss with social work during IDT    ESAS Anxiety: Anxiety: 0    ESAS Depression: Depression: 0        REVIEW OF SYSTEMS:     Positive and pertinent negative findings in ROS are noted above in HPI. The following systems were [x] reviewed / [] unable to be reviewed as noted in HPI  Other findings are noted below. Systems: constitutional, ears/nose/mouth/throat, respiratory, gastrointestinal, genitourinary, musculoskeletal, integumentary, neurologic, psychiatric, endocrine. Positive findings noted below. Modified ESAS Completed by: provider   Fatigue: 1 Drowsiness: 0   Depression: 0 Pain: 2   Anxiety: 0 Nausea: 0   Anorexia: 4 Dyspnea: 0                    PHYSICAL EXAM:     From RN flowsheet:  Wt Readings from Last 3 Encounters:   07/06/22 174 lb 9.6 oz (79.2 kg)   06/28/22 172 lb (78 kg)   06/21/22 158 lb 3.2 oz (71.8 kg)     Blood pressure (!) 110/48, pulse 82, temperature 98.2 °F (36.8 °C), resp. rate 18, height 6' 3\" (1.905 m), weight 174 lb 9.6 oz (79.2 kg), SpO2 96 %.     Pain Scale 1: Numeric (0 - 10)  Pain Intensity 1: 3  Pain Onset 1: several days ago  Pain Location 1: Abdomen  Pain Orientation 1: Anterior  Pain Description 1: Aching  Pain Intervention(s) 1: Declines  Last bowel movement, if known: 3 days ago     Constitutional: alert, nad, conversant, oriented, no sedation or confusion   Eyes: pupils equal, anicteric  ENMT: no nasal discharge, moist mucous membranes  Cardiovascular: regular rhythm   Respiratory: breathing not labored, symmetric  Gastrointestinal: min distended but soft and NTTP, hypoactive bowel sounds   Musculoskeletal: no deformity, no tenderness to palpation  Skin: thin, poor turgor   Neurologic: following commands, moving all extremities  Psychiatric: full affect, no hallucinations  Other:       HISTORY:     Principal Problem:    Intractable abdominal pain (7/5/2022)      Past Medical History:   Diagnosis Date    Cancer [facility])     prostate ca with mets    Prostate cancer [facility])       Past Surgical History:   Procedure Laterality Date    IR INSERT TUNL CVC W/O PORT OVER 5 YR  1/6/2022    IR KYPHOPLASTY THORACIC  6/20/2022    IR REMOVE TUNL CVAD W/O PORT / PUMP  1/25/2022      History reviewed. No pertinent family history. History reviewed, no pertinent family history.   Social History     Tobacco Use    Smoking status: Never Smoker    Smokeless tobacco: Never Used   Substance Use Topics    Alcohol use: Never     No Known Allergies   Current Facility-Administered Medications   Medication Dose Route Frequency    senna-docusate (PERICOLACE) 8.6-50 mg per tablet 2 Tablet  2 Tablet Oral QHS    oxyCODONE IR (ROXICODONE) tablet 10 mg  10 mg Oral Q4H PRN    oxyCODONE IR (ROXICODONE) tablet 5 mg  5 mg Oral TID    acetaminophen (TYLENOL) tablet 650 mg  650 mg Oral TID    tamsulosin (FLOMAX) capsule 0.4 mg  0.4 mg Oral BID    amiodarone (CORDARONE) tablet 200 mg  200 mg Oral DAILY    apixaban (ELIQUIS) tablet 5 mg  5 mg Oral BID    sodium chloride (NS) flush 5-40 mL 5-40 mL IntraVENous Q8H    sodium chloride (NS) flush 5-40 mL  5-40 mL IntraVENous PRN    acetaminophen (TYLENOL) tablet 650 mg  650 mg Oral Q6H PRN    Or    acetaminophen (TYLENOL) suppository 650 mg  650 mg Rectal Q6H PRN    polyethylene glycol (MIRALAX) packet 17 g  17 g Oral DAILY PRN    ondansetron (ZOFRAN ODT) tablet 4 mg  4 mg Oral Q8H PRN    Or    ondansetron (ZOFRAN) injection 4 mg  4 mg IntraVENous Q6H PRN    cefTRIAXone (ROCEPHIN) 1 g in 0.9% sodium chloride 10 mL IV syringe  1 g IntraVENous Q24H          LAB AND IMAGING FINDINGS:     Lab Results   Component Value Date/Time    WBC 4.6 07/07/2022 05:28 AM    HGB 9.0 (L) 07/07/2022 05:28 AM    PLATELET 555 38/93/1234 05:28 AM     Lab Results   Component Value Date/Time    Sodium 131 (L) 07/07/2022 05:28 AM    Potassium 3.6 07/07/2022 05:28 AM    Chloride 100 07/07/2022 05:28 AM    CO2 23 07/07/2022 05:28 AM    BUN 7 07/07/2022 05:28 AM    Creatinine 0.66 (L) 07/07/2022 05:28 AM    Calcium 7.9 (L) 07/07/2022 05:28 AM    Magnesium 1.5 (L) 07/07/2022 05:28 AM    Phosphorus 3.3 07/07/2022 05:28 AM      Lab Results   Component Value Date/Time    Alk. phosphatase 90 07/05/2022 06:17 AM    Protein, total 4.7 (L) 07/05/2022 06:17 AM    Albumin 1.9 (L) 07/05/2022 06:17 AM    Globulin 2.8 07/05/2022 06:17 AM     Lab Results   Component Value Date/Time    INR 1.0 06/20/2022 11:11 AM    Prothrombin time 10.6 06/20/2022 11:11 AM      No results found for: IRON, FE, TIBC, IBCT, PSAT, FERR   No results found for: PH, PCO2, PO2  No components found for: [place]   Lab Results   Component Value Date/Time    CK 37 (L) 04/11/2022 03:56 AM                Total time: 35 min  Counseling / coordination time, spent as noted above: 30 min  > 50% counseling / coordination?: y    Prolonged service was provided for  []30 min   []75 min in face to face time in the presence of the patient, spent as noted above.   Time Start:   Time End:   Note: this can only be billed with 55479 (initial) or 84433 (follow up). If multiple start / stop times, list each separately.

## 2022-07-07 NOTE — PROGRESS NOTES
7/7/2022  To Whom It May Concern:   Elizabeth Dominguez is currently a patient at [facility]. He was admitted on 7/4/2022 due to Intractable abdominal pain [R10.9], with history of metastatic prostate cancer and Fall precautions. Due to medical diagnosis and associated decreased mobility, balance, strength, endurance, activity tolerance, he has not progressed to a functional level where the patient can safely ambulate using a SPC, rolling walker, crutches, or standard walker. Due to above listed functional deficits, he is only able to safely complete lateral transfers to/from W/C and requires frequent extended rest breaks. Patient has been unable to achieve full standing during transfer training, with pt's wife unable to provide physical transfer assist. Therefore, he is a HIGH fall risk. Upon discharge, pt will require outpatient immunotherapy and is unable to safely engage without use of W/C. The patient requires an 18 high-back manual; adult wheelchair with a reclining back to assist with noted kyphotic posture, as well as, to assist with increase W/C sit time, utilizing reclined position; a standard cushion for adequate pressure relief, for patient to safely shift weight, and reposition in sitting 2* patient is at increased risk for skin breakdown; hanging leg-rests to assist with proper positioning; removable/swing away leg-rests for safety with transfers; and removable arm-rests for safety with lateral transfers. A seat belt and anti-tippers are needed for fall prevention within the home, on access ramp into home, community outings, and appointments.            Patient dimensions are as follows:  Hip to bend of knee: 22\"  Bend of knee to bottom of foot: 23\"  Hip to hip width: 16.5\"  Shoulder to shoulder width 16.5\"  Shoulder to elbow: 15\"  Elbow to wrist: 12\"    Thank you from the treating therapist and physician,   Maxine Hayden, OT

## 2022-07-07 NOTE — PALLIATIVE CARE DISCHARGE
Palliative Medicine Discharge Note    Dear Mr Jillian Carson,    It was nice to meet you and Jessie Briggs this admission. Your pain from prostate cancer has improved since the radiation and kyphoplasty, but is still present in your back, right leg and abdomen.     -Take Oxycodone 5mg every 4-6 hours as needed for pain. You did well with this medication in the hospital. If your pain is stable, you do not need to take it. You will have enough tablets prescribed for you upon discharge to last you until you see Dr Janie Caballero.     -Emily Stroud can talk to Dr Janie Caballero more about restarting a low dose steroid, this may also help bone pain.     -You are having bowel movements every few days. You do not feel constipated and your appetite has declined, so you are not going daily like you did before however we also want to avoid constipation given your pain medication use. -Please take Senna (over the counter)- you can take 1-2 tablets daily. You can also take 1/2-1 capful of Miralax (also over the counter) daily mixed in water or juice.     -We talked about Hospice in the future. Right now your goal is to start immunotherapy with Dr Janie Caballero.     -We are available to see you in clinic if your pain worsens. We did not make an appointment, but Dr Janie Caballero or you can always call us.      Take good care,    Marcelo Brooks MD  Palliative Medicine  [phone]

## 2022-07-07 NOTE — PROGRESS NOTES
Bedside shift change report given to Waldo Wright RN (oncoming nurse) by Marychuy Raines RN (offgoing nurse).  Report included the following information SBAR, Kardex, Intake/Output, MAR, Recent Results and Med Rec Status

## 2022-07-07 NOTE — PROGRESS NOTES
Rounded on Spiritism patients and provided Anointing of the Sick at request of patient.     Viky Varner

## 2022-07-07 NOTE — PROGRESS NOTES
Problem: Mobility Impaired (Adult and Pediatric)  Goal: *Acute Goals and Plan of Care (Insert Text)  Description: FUNCTIONAL STATUS PRIOR TO ADMISSION: Patient was modified independent using a rolling walker for functional mobility. HOME SUPPORT PRIOR TO ADMISSION: The patient lived with wife but did not require assist.    Physical Therapy Goals  Initiated 7/6/2022  1. Patient will move from supine to sit and sit to supine  in bed with modified independence within 7 day(s). 2.  Patient will transfer from bed to chair and chair to bed with modified independence using the least restrictive device within 7 day(s). 3.  Patient will perform sit to stand with modified independence within 7 day(s). 4.  Patient will ambulate with modified independence for 15 feet with the least restrictive device within 7 day(s). Outcome: Progressing Towards Goal   PHYSICAL THERAPY TREATMENT  Patient: Rk Cho (86 y.o. male)  Date: 7/7/2022  Diagnosis: Intractable abdominal pain [R10.9] Intractable abdominal pain       Precautions: Fall  Chart, physical therapy assessment, plan of care and goals were reviewed. ASSESSMENT  Patient continues with skilled PT services and is progressing towards goals. He is still quite fatigued with limited endurance overall, but he was able to transfer to sitting EOB and then lateral transfer to the drop arm recliner chair and back. He needed only min assist for transfer, but would definitely NOT have been able to complete the transfer without the assistance. In addition, he is not able to get to sitting from a flat bed and requires an elevated HOB and low bed surface. Understand, per chart, that he will need to discharge home in order to start immunotherapy.       From a PT standpoint, the way for that to be the safest is with:                                              Wheelchair delivered to [facility]                                              Wheelchair [facility] transport [facility] bed, wheelchair and [facility] at home with 24 hour supervision                                              Wheelchair Enriqueta Serrato transport to [facility]                                              Home health PT and OT    Will discuss with CM and follow with wheelchair order and letter of medical necessity later today. Current Level of Function Impacting Discharge (mobility/balance): min assist for transfers to drop arm recliner    Other factors to consider for discharge: as noted above         PLAN :  Patient continues to benefit from skilled intervention to address the above impairments. Continue treatment per established plan of care. to address goals. Recommendation for discharge: (in order for the patient to meet his/her long term goals)  Physical therapy at least 2 days/week in the home AND ensure assist and/or supervision for safety with transfers and above indicated DME    This discharge recommendation:  Has been made in collaboration with the attending provider and/or case management    IF patient discharges home will need the following DME: wheelchair, bedside commode, hospital bed       SUBJECTIVE:   Patient stated I feel a little unsteady, but OK.     OBJECTIVE DATA SUMMARY:   Critical Behavior:  Neurologic State: Alert  Orientation Level: Oriented X4  Cognition: Appropriate decision making  Safety/Judgement: Fall prevention  Functional Mobility Training:  Bed Mobility:     Supine to Sit: Minimum assistance; Additional time;Bed Modified (HOB elevated)  Sit to Supine: Minimum assistance; Additional time (for LEs)           Transfers:                 Lateral Transfers: Minimum assistance (min assist to drop arm recliner and back to bed)                 Balance:  Sitting: Intact; Without support  Ambulation/Gait Training:                                                        Stairs:               Therapeutic Exercises:     Pain Rating:  Upper chest pain radiating from thoracic area    Activity Tolerance:   Fair and requires frequent rest breaks    After treatment patient left in no apparent distress:   Supine in bed, Heels elevated for pressure relief, Call bell within reach, Bed / chair alarm activated, Caregiver / family present, and Side rails x 3    COMMUNICATION/COLLABORATION:   The patients plan of care was discussed with: Occupational therapist, Registered nurse, and Case management.      Francisca Delgado, PT   Time Calculation: 40 mins

## 2022-07-08 NOTE — PROGRESS NOTES
Transition of Care: patients wants to return home with home health PT/OT/SN; [facility] has accepted back for home health;     Pending referral sent to [facility], [facility], [employer], [facility] for wheelchair  [facility] accepted for [employer]- out of stock but can order it and have it delivered to hospital on Monday 7/11  [facility]-  out of [address]- can order one but not delivered until middle of next week  [facility]- out of [address] and [facility]- no-both places part of [address].- out of stock  [employer]- out of service  [address]- out of stock; does not bill medicare or insurance  [facility]-no answer- on hold for 15 plus minutes  [employer] in [address] [facility]     In order to return home:  Patient needs NEW wheelchair-[facility] has accepted can supply the ordered [facility] by Monday July 11 to the hospital  Patient needs home health PT/OT/SN-patient was being seen by Juanjose prior to this admission; [facility] referral has been sent on 7/7-ACCEPTED BACK for home health   Private duty [address] given to patient and wife on 7/7  Patient is recommended to get a hospital bed-list of hospital bed suppliers given to patient and wife on 7/7; wife has called [facility] and bed to be delivered on Monday July 11  [employer] requesting bedside commode -information given to patient and wife on where to purchase one; patients wife arranging     Transport Plan: [facility]- is on willcall for Monday 7/11/22 (patient will self pay-$80.78)      RUR: 21%     DX: intractable abdominal pain     Main contact is wife- Ansley Alvares- [phone]     Discharge pending:  -pending progress with PT/OT  -pending set up of new home equipment, EMILY with home health, new wheelchair     [phone]: this CM noted that [address], denied [facility] referral; referral sent to additional agencies which include- [employer], [employer], [employer] [facility] [employer], [address] [facility], [place], [employer] Tyesha Valladares and then a fax was sent to [employer]; also faxed to [employer]    [phone]: [facility] accepted (from faxed referral) spoke to contact there; the ordered [facility] is not in stock but they can order it today and have it delivered to [facility] on Monday 7/11; this CM updated attending by [employer] text; he verbalized understanding    1200: this CM updated patients wife on [facility] and transport with [facility]; she verbalized understanding     CM following  Kaycee Salazar RN, CRM

## 2022-07-08 NOTE — PROGRESS NOTES
[facility] [address] [address], [address], [place]: [phone]  F: [phone]    Reason for Visit:   Cindy Santizo is a 80 y.o. male who is seen in hospital for fu metastatic Prostate cancer    Treatment History:   · 11-12/2017: ADT+ Zytiga stage IV CSPC  · 1/2022: Provenge for castrate resistant disease  · 6/2022-palliative radiation to L2-L5-Dr. Estela Rothman. Started Thanh Mccarty    History of Present Illness:     Seen today in hospital for fu metastatic prostate cancer. Pt of Dr Sita Porras and I am covering. Pt is in bed and doing ok. States cannot go home. Hx of recent spine XRT. To start immunotherapy next week. No fevers/ chills/ chest pain/ SOB/ nausea/ vomiting/diarrhea/        Last visit:  Patient is a 80 y.o.male who presented with urinary obstruction in 2017 when he was found to have an elevated PSA of 56. He then had a prostate biopsy that showed [place] 5+5 prostate cancer in all 12 cores. Scan showed kiersten disease in the pelvis as well as numerous bone metastases. He was diagnosed with metastatic prostate cancer 11/2017. He was also found to have lung nodules at the time of diagnosis. Started on androgen deprivation therapy and Zytiga 12/2017. He had disease progression by January 2022. 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 By April 2022 his systemic staging scans showed progression in bony metastases. He also reported R leg pain by June 2022. He was seen by Dr. Radha Muir with NSG. PSA continued to rise to 15.3 . He had an MRI of thoracic and lumbar spine 6/13/2022 that showed a moderate T7 pathological compression deformity, multiple new liver lesions likely representing metastatic disease, recent right L4 transverse process, S5 vertebral metastatic lesion, several other lesions in T12- L5 and S2. Underwent kyphoplasty to T7 on 6/20/2022 with biopsy. Biopsy was consistent with adenocarcinoma.   Genomic sequencing from primary of 2017 showed tumor mutational burden of 19, MLH1 and MSH2. He comes with his wife Jacqui Kiran. He states that he has noted that R leg pain is improved by 75%. He states that he had T7 Kyphoplasty 6/20. He still has noted mid back pain that radiates to his epigastrium. He takes tylenol every 6 hours , takes advil 500 mg BID. His R leg has becomes weaker, had a fall 2 weeks ago, he has been using a walker. He has a decreased appetite. No incontinence. He switched to [place] 1 month ago. Past Medical History:   Diagnosis Date    Cancer [facility])     prostate ca with mets    Prostate cancer [facility])       Past Surgical History:   Procedure Laterality Date    IR INSERT TUNL CVC W/O PORT OVER 5 YR  1/6/2022    IR KYPHOPLASTY THORACIC  6/20/2022    IR REMOVE TUNL CVAD W/O PORT / PUMP  1/25/2022      Social History     Tobacco Use    Smoking status: Never Smoker    Smokeless tobacco: Never Used   Substance Use Topics    Alcohol use: Never      History reviewed. No pertinent family history.   Current Facility-Administered Medications   Medication Dose Route Frequency    senna-docusate (PERICOLACE) 8.6-50 mg per tablet 2 Tablet  2 Tablet Oral QHS    oxyCODONE IR (ROXICODONE) tablet 10 mg  10 mg Oral Q4H PRN    oxyCODONE IR (ROXICODONE) tablet 5 mg  5 mg Oral TID    acetaminophen (TYLENOL) tablet 650 mg  650 mg Oral TID    tamsulosin (FLOMAX) capsule 0.4 mg  0.4 mg Oral BID    amiodarone (CORDARONE) tablet 200 mg  200 mg Oral DAILY    apixaban (ELIQUIS) tablet 5 mg  5 mg Oral BID    sodium chloride (NS) flush 5-40 mL  5-40 mL IntraVENous Q8H    sodium chloride (NS) flush 5-40 mL  5-40 mL IntraVENous PRN    acetaminophen (TYLENOL) tablet 650 mg  650 mg Oral Q6H PRN    Or    acetaminophen (TYLENOL) suppository 650 mg  650 mg Rectal Q6H PRN    polyethylene glycol (MIRALAX) packet 17 g  17 g Oral DAILY PRN    ondansetron (ZOFRAN ODT) tablet 4 mg  4 mg Oral Q8H PRN    Or    ondansetron (ZOFRAN) injection 4 mg  4 mg IntraVENous Q6H PRN    cefTRIAXone (ROCEPHIN) 1 g in 0.9% sodium chloride 10 mL IV syringe  1 g IntraVENous Q24H      No Known Allergies     Review of Systems: A complete review of systems was obtained, negative except as described above. Physical Exam:     Visit Vitals  /63   Pulse 81   Temp 98 °F (36.7 °C)   Resp 18   Ht 6' 3\" (1.905 m)   Wt 174 lb 9.6 oz (79.2 kg)   SpO2 96%   BMI 21.82 kg/m²     ECOG PS: 3  General: No distress  Eyes:  anicteric sclerae  HENT: Atraumatic  Neck: Supple  Respiratory:  normal respiratory effort  Neuro:  Leg weakness but can lift legs b/l off bed  Skin: No rashes, ecchymoses, or petechiae. Normal temperature, turgor, and texture. Psych: Awake, conversant    Results:     Lab Results   Component Value Date/Time    WBC 4.6 07/07/2022 05:28 AM    HGB 9.0 (L) 07/07/2022 05:28 AM    HCT 26.6 (L) 07/07/2022 05:28 AM    PLATELET 199 49/22/0266 05:28 AM    MCV 90.8 07/07/2022 05:28 AM    ABS. NEUTROPHILS 3.3 07/07/2022 05:28 AM    Hematocrit (POC) 37 10/07/2018 02:51 PM     Lab Results   Component Value Date/Time    Sodium 131 (L) 07/07/2022 05:28 AM    Potassium 3.6 07/07/2022 05:28 AM    Chloride 100 07/07/2022 05:28 AM    CO2 23 07/07/2022 05:28 AM    Glucose 87 07/07/2022 05:28 AM    BUN 7 07/07/2022 05:28 AM    Creatinine 0.66 (L) 07/07/2022 05:28 AM    GFR est AA >60 07/07/2022 05:28 AM    GFR est non-AA >60 07/07/2022 05:28 AM    Calcium 7.9 (L) 07/07/2022 05:28 AM    Sodium (POC) 139 10/07/2018 02:51 PM    Potassium (POC) 4.0 10/07/2018 02:51 PM    Chloride (POC) 101 10/07/2018 02:51 PM    Glucose (POC) 92 10/07/2018 02:51 PM    BUN (POC) 19 10/07/2018 02:51 PM    Creatinine (POC) 1.00 09/28/2021 12:42 PM    Calcium, ionized (POC) 1.12 10/07/2018 02:51 PM     Lab Results   Component Value Date/Time    Bilirubin, total 0.6 07/05/2022 06:17 AM    ALT (SGPT) 12 07/05/2022 06:17 AM    Alk.  phosphatase 90 07/05/2022 06:17 AM    Protein, total 4.7 (L) 07/05/2022 06:17 AM    Albumin 1.9 (L) 07/05/2022 06:17 AM    Globulin 2.8 07/05/2022 06:17 AM       No results found for: PSA, [id number]    Lab Results   Component Value Date/Time    C-Reactive protein 13.30 (H) 04/11/2022 03:56 AM    TSH 0.95 07/05/2022 06:17 AM    Lipase 52 (L) 07/04/2022 09:24 PM       Lab Results   Component Value Date/Time    INR 1.0 06/20/2022 11:11 AM       Records reviewed and summarized above. Pathology report(s) reviewed above. Radiology report(s) reviewed above. MRI reviewed    Assessment/PLAN:   1) Prostate cancer- stage IV  2017- PSA 53, [place] 5+5 denovo metastasis to multiple bones, nodes, lung  ADT+ Zytiga  1/2022: mCRPC- Provenge  6/2022- Rapidly progressive mCRPC with lung, liver, bone metastasis- Xtandi  TMB High  Disease is an aggressive variant with visceral metastasis  For [id number]- with cycle 1   genetic testing later  Stop Xtandi and Prednisone  He agrees to starting Afghanian    Seen today in hospital / covering for Dr Jose Luis Donald   Admitted with leg weakness. Stopped steroids last week. Took for 4 years. ? Adrenal insuff causing weakness. IM wanted cortisol level but still not back. Consider just starting decadron 4mg po bid. D/w Rad/onc and they want to consider MRI spine again to r/o cord compression. For immunotherapy start as outpt next week. Hx of recent spine XRT. Consult Rad/ONC per pt / family request.   Palliative care for symptom mgmt. Discussed hospice and pt/ family not ready for hospice. Plan is for outpt fu with Dr Jose Luis Donald next week for treatment. 2) Bone metastasis  T7- Kyphoplasy 6/20/2022  L2-L5- RT     3) Pain. Per palliative care. 4) Aflutter per IM/ cardio. Amiodarone, Eliquis    5) Psychosocial  Coping well  Supportive wife/ family at bedside. We will follow as needed  Call if questions   d/w IM  Fu with Dr Jose Luis Donald as outpt    I appreciate the opportunity to participate in Argelia Gail Jerry Riley's care.     Signed By: Ester Hercules DO

## 2022-07-08 NOTE — PROGRESS NOTES
Occupational Therapy    Alerted by case mgmt of concerns with obtaining specific wheelchair that was recommended from OT yesterday secondary to supply chain issues. Discussed with case mgmt the need for reclining high back wc with removable arm rests secondary to height, posture, pain and orthostatic hypotension. Without this wheelchair, patient would be bed bound at home and need ambulance transfers to his immunotherapy sessions(vs wc or car with spouse).  able to find company that can order this wc and have for delivery Monday. Time regarding matter approx 15 mins    Will continue to follow for OT as appropriate after the weekend.     Thank you,    Ismael Appiah, OT

## 2022-07-08 NOTE — PROGRESS NOTES
[facility]  Hospitalist Group                                                                                          Hospitalist Progress Note  Albino Goldmann, MD  Answering service: [phone] from in house phone        Date of Service:  2022  NAME:  Stephanie GAINES:  10/13/1933  MRN:  [id number]      Admission Summary:     Patient presents with abdominal pain. Known history of metastatic prostate cancer with multiple visceral metastasis. Patient is followed by oncology and plan for outpatient immunotherapy. Palliative care previously evaluated the patient and patient and family not ready for hospice status at this time. Interval history / Subjective:     Patient denies any significant abdominal pain. Patient is generally weak. Assessment & Plan:     Metastatic prostate cancer  -Multiple metastasis to vertebrae as well as liver  -Oncology evaluated the patient during this admission, plan for outpatient immunotherapy with [facility]    -Continue pain management    Urinary tract infection associated with chronic Serna catheter use  -Continue Rocephin, follow urine culture  -Serna catheter was exchanged by urology services [phone]  -Continue to maintain Serna catheter, patient needs outpatient follow-up    Hyponatremia  -Sodium level is stable at 131  -Continue monitor sodium level    Paroxysmal atrial fibrillation  -Stable on amiodarone, on anticoagulation with Eliquis    Anemia of chronic disease  -Anemia of chronic disease from malignancy  -Stable H&H     Code status:  Full  Prophylaxis: SCDs  Care Plan discussed with: Patient  Anticipated Disposition: Medically stable, patient will need special wheelchair as noted by OT. Case management to place the order today for the special wheelchair and the company will deliver the future on Monday. Patient probably not safe to go home over the weekend without proper equipment.   Plan for discharge patient on Monday when wheelchair is delivered. Discussed with patient's wife at bedside. Hospital Problems  Date Reviewed: 7/5/2022          Codes Class Noted POA    * (Principal) Intractable abdominal pain ICD-10-CM: R10.9  ICD-9-CM: 789.00  7/5/2022 Yes                Review of Systems:   A comprehensive review of systems was negative except for that written in the HPI. Vital Signs:    Last 24hrs VS reviewed since prior progress note. Most recent are:  Visit Vitals  /63   Pulse 81   Temp 98 °F (36.7 °C)   Resp 18   Ht 6' 3\" (1.905 m)   Wt 79.2 kg (174 lb 9.6 oz)   SpO2 96%   BMI 21.82 kg/m²         Intake/Output Summary (Last 24 hours) at 7/8/2022 1200  Last data filed at 7/8/2022 0518  Gross per 24 hour   Intake --   Output 425 ml   Net -425 ml        Physical Examination:     I had a face to face encounter with this patient and independently examined them on 7/8/2022 as outlined below:          Constitutional:  No acute distress, cooperative, pleasant    ENT:  Oral mucosa moist, oropharynx benign. Resp:  CTA bilaterally. No wheezing/rhonchi/rales. No accessory muscle use. CV:  Regular rhythm, normal rate, no murmurs, gallops, rubs    GI:  Soft, non distended, non tender. normoactive bowel sounds, no hepatosplenomegaly     Musculoskeletal:  No edema, warm, 2+ pulses throughout    Neurologic:  Moves all extremities. AAOx3, CN II-XII reviewed            Data Review:    Review and/or order of clinical lab test      Labs:     Recent Labs     07/07/22  0528 07/06/22  0331   WBC 4.6 5.9   HGB 9.0* 9.1*   HCT 26.6* 27.4*    173     Recent Labs     07/07/22  0528 07/06/22  0331   * 132*   K 3.6 3.9    99   CO2 23 23   BUN 7 8   CREA 0.66* 0.76   GLU 87 92   CA 7.9* 7.8*   MG 1.5* 1.6   PHOS 3.3 3.7     No results for input(s): ALT, AP, TBIL, TBILI, TP, ALB, GLOB, GGT, AML, LPSE in the last 72 hours.     No lab exists for component: SGOT, GPT, AMYP, HLPSE  No results for input(s): INR, PTP, APTT, INREXT, INREXT in the last 72 hours. No results for input(s): FE, TIBC, PSAT, FERR in the last 72 hours. No results found for: FOL, RBCF   No results for input(s): PH, PCO2, PO2 in the last 72 hours. No results for input(s): CPK, CKNDX, TROIQ in the last 72 hours.     No lab exists for component: CPKMB  Lab Results   Component Value Date/Time    Cholesterol, total 124 05/31/2019 07:07 AM    HDL Cholesterol 39 05/31/2019 07:07 AM    LDL, calculated 62 05/31/2019 07:07 AM    Triglyceride 115 05/31/2019 07:07 AM    CHOL/HDL Ratio 3.2 05/31/2019 07:07 AM     Lab Results   Component Value Date/Time    Glucose (POC) 92 10/07/2018 02:51 PM     Lab Results   Component Value Date/Time    Color PINK 07/06/2022 11:30 AM    Appearance TURBID (A) 07/06/2022 11:30 AM    Specific gravity 1.024 07/06/2022 11:30 AM    pH (UA) 6.0 07/06/2022 11:30 AM    Protein 300 (A) 07/06/2022 11:30 AM    Glucose Negative 07/06/2022 11:30 AM    Ketone 40 (A) 07/06/2022 11:30 AM    Bilirubin Negative 07/04/2022 10:03 PM    Urobilinogen 0.2 07/06/2022 11:30 AM    Nitrites Negative 07/06/2022 11:30 AM    Leukocyte Esterase LARGE (A) 07/06/2022 11:30 AM    Epithelial cells FEW 07/06/2022 11:30 AM    Bacteria Negative 07/06/2022 11:30 AM    WBC  07/06/2022 11:30 AM    RBC >100 (H) 07/06/2022 11:30 AM         Medications Reviewed:     Current Facility-Administered Medications   Medication Dose Route Frequency    senna-docusate (PERICOLACE) 8.6-50 mg per tablet 2 Tablet  2 Tablet Oral QHS    oxyCODONE IR (ROXICODONE) tablet 10 mg  10 mg Oral Q4H PRN    oxyCODONE IR (ROXICODONE) tablet 5 mg  5 mg Oral TID    acetaminophen (TYLENOL) tablet 650 mg  650 mg Oral TID    tamsulosin (FLOMAX) capsule 0.4 mg  0.4 mg Oral BID    amiodarone (CORDARONE) tablet 200 mg  200 mg Oral DAILY    apixaban (ELIQUIS) tablet 5 mg  5 mg Oral BID    sodium chloride (NS) flush 5-40 mL  5-40 mL IntraVENous Q8H    sodium chloride (NS) flush 5-40 mL  5-40 mL IntraVENous PRN    acetaminophen (TYLENOL) tablet 650 mg  650 mg Oral Q6H PRN    Or    acetaminophen (TYLENOL) suppository 650 mg  650 mg Rectal Q6H PRN    polyethylene glycol (MIRALAX) packet 17 g  17 g Oral DAILY PRN    ondansetron (ZOFRAN ODT) tablet 4 mg  4 mg Oral Q8H PRN    Or    ondansetron (ZOFRAN) injection 4 mg  4 mg IntraVENous Q6H PRN    cefTRIAXone (ROCEPHIN) 1 g in 0.9% sodium chloride 10 mL IV syringe  1 g IntraVENous Q24H     ______________________________________________________________________  EXPECTED LENGTH OF STAY: 3d 7h  ACTUAL LENGTH OF STAY:          3                 Ruiz Calloway MD

## 2022-07-08 NOTE — PROGRESS NOTES
I prayed with Arline Chaidez and celebrated with him the Anointing of the Sick.   Father Chon Cleaningt

## 2022-07-09 NOTE — PROGRESS NOTES
[address] [facility]  Hospitalist Group                                                                                          Hospitalist Progress Note  Albino Goldmann, MD  Answering service: [phone] from in house phone        Date of Service:  2022  NAME:  Stephanie GAINES:  10/13/1933  MRN:  [id number]      Admission Summary:     Patient presents with abdominal pain. Known history of metastatic prostate cancer with multiple visceral metastasis. Patient is followed by oncology and plan for outpatient immunotherapy. Palliative care previously evaluated the patient and patient and family not ready for hospice status at this time. Interval history / Subjective:     Patient denies any significant abdominal pain. Patient is generally weak. Assessment & Plan:     Metastatic prostate cancer  -Multiple metastasis to vertebrae as well as liver  -Oncology evaluated the patient during this admission, plan for outpatient immunotherapy with [facility]    -Continue pain management    Back pain  -MRI of the thoracic and lumbar spine was done by recommendation of patient's radiation oncologist  -Findings reveal multiple metastasis to spine, no acute spinal cord compression  -Outpatient follow-up with radiation oncology    Urinary tract infection associated with chronic Serna catheter use  -Continue Rocephin, follow urine culture  -Serna catheter was exchanged by urology services [phone]  -Continue to maintain Serna catheter, patient needs outpatient follow-up    Hyponatremia  -Sodium level is stable at 131  -Continue monitor sodium level    Paroxysmal atrial fibrillation  -Stable on amiodarone, on anticoagulation with Eliquis    Anemia of chronic disease  -Anemia of chronic disease from malignancy  -Stable H&H     Code status:  Full  Prophylaxis: SCDs  Care Plan discussed with: Patient  Anticipated Disposition: Medically stable, patient will need special wheelchair as noted by OT.   Case management to place the order today for the special wheelchair and the company will deliver the future on Monday. Patient probably not safe to go home over the weekend without proper equipment. Plan for discharge patient on Monday when wheelchair is delivered. Hospital Problems  Date Reviewed: 7/5/2022          Codes Class Noted POA    * (Principal) Intractable abdominal pain ICD-10-CM: R10.9  ICD-9-CM: 789.00  7/5/2022 Yes                Review of Systems:   A comprehensive review of systems was negative except for that written in the HPI. Vital Signs:    Last 24hrs VS reviewed since prior progress note. Most recent are:  Visit Vitals  BP (!) 92/55   Pulse 81   Temp 97.9 °F (36.6 °C)   Resp 16   Ht 6' 3\" (1.905 m)   Wt 79.2 kg (174 lb 9.6 oz)   SpO2 96%   BMI 21.82 kg/m²         Intake/Output Summary (Last 24 hours) at 7/9/2022 1445  Last data filed at 7/9/2022 7492  Gross per 24 hour   Intake --   Output 575 ml   Net -575 ml        Physical Examination:     I had a face to face encounter with this patient and independently examined them on 7/9/2022 as outlined below:          Constitutional:  No acute distress, cooperative, pleasant    ENT:  Oral mucosa moist, oropharynx benign. Resp:  CTA bilaterally. No wheezing/rhonchi/rales. No accessory muscle use. CV:  Regular rhythm, normal rate, no murmurs, gallops, rubs    GI:  Soft, non distended, non tender. normoactive bowel sounds, no hepatosplenomegaly     Musculoskeletal:  No edema, warm, 2+ pulses throughout    Neurologic:  Moves all extremities.   AAOx3, CN II-XII reviewed            Data Review:    Review and/or order of clinical lab test      Labs:     Recent Labs     07/07/22  0528   WBC 4.6   HGB 9.0*   HCT 26.6*        Recent Labs     07/09/22  0458 07/07/22  0528   * 131*   K 3.7 3.6   CL 96* 100   CO2 24 23   BUN 8 7   CREA 0.56* 0.66*   GLU 80 87   CA 8.0* 7.9*   MG  --  1.5*   PHOS  --  3.3     No results for input(s): ALT, AP, TBIL, TBILI, TP, ALB, GLOB, GGT, AML, LPSE in the last 72 hours. No lab exists for component: SGOT, GPT, AMYP, HLPSE  No results for input(s): INR, PTP, APTT, INREXT, INREXT in the last 72 hours. No results for input(s): FE, TIBC, PSAT, FERR in the last 72 hours. No results found for: FOL, RBCF   No results for input(s): PH, PCO2, PO2 in the last 72 hours. No results for input(s): CPK, CKNDX, TROIQ in the last 72 hours.     No lab exists for component: CPKMB  Lab Results   Component Value Date/Time    Cholesterol, total 124 05/31/2019 07:07 AM    HDL Cholesterol 39 05/31/2019 07:07 AM    LDL, calculated 62 05/31/2019 07:07 AM    Triglyceride 115 05/31/2019 07:07 AM    CHOL/HDL Ratio 3.2 05/31/2019 07:07 AM     Lab Results   Component Value Date/Time    Glucose (POC) 92 10/07/2018 02:51 PM     Lab Results   Component Value Date/Time    Color PINK 07/06/2022 11:30 AM    Appearance TURBID (A) 07/06/2022 11:30 AM    Specific gravity 1.024 07/06/2022 11:30 AM    pH (UA) 6.0 07/06/2022 11:30 AM    Protein 300 (A) 07/06/2022 11:30 AM    Glucose Negative 07/06/2022 11:30 AM    Ketone 40 (A) 07/06/2022 11:30 AM    Bilirubin Negative 07/04/2022 10:03 PM    Urobilinogen 0.2 07/06/2022 11:30 AM    Nitrites Negative 07/06/2022 11:30 AM    Leukocyte Esterase LARGE (A) 07/06/2022 11:30 AM    Epithelial cells FEW 07/06/2022 11:30 AM    Bacteria Negative 07/06/2022 11:30 AM    WBC  07/06/2022 11:30 AM    RBC >100 (H) 07/06/2022 11:30 AM         Medications Reviewed:     Current Facility-Administered Medications   Medication Dose Route Frequency    senna-docusate (PERICOLACE) 8.6-50 mg per tablet 2 Tablet  2 Tablet Oral QHS    oxyCODONE IR (ROXICODONE) tablet 10 mg  10 mg Oral Q4H PRN    oxyCODONE IR (ROXICODONE) tablet 5 mg  5 mg Oral TID    acetaminophen (TYLENOL) tablet 650 mg  650 mg Oral TID    amiodarone (CORDARONE) tablet 200 mg  200 mg Oral DAILY    apixaban (ELIQUIS) tablet 5 mg  5 mg Oral BID    sodium chloride (NS) flush 5-40 mL  5-40 mL IntraVENous Q8H    sodium chloride (NS) flush 5-40 mL  5-40 mL IntraVENous PRN    acetaminophen (TYLENOL) tablet 650 mg  650 mg Oral Q6H PRN    Or    acetaminophen (TYLENOL) suppository 650 mg  650 mg Rectal Q6H PRN    polyethylene glycol (MIRALAX) packet 17 g  17 g Oral DAILY PRN    ondansetron (ZOFRAN ODT) tablet 4 mg  4 mg Oral Q8H PRN    Or    ondansetron (ZOFRAN) injection 4 mg  4 mg IntraVENous Q6H PRN    cefTRIAXone (ROCEPHIN) 1 g in 0.9% sodium chloride 10 mL IV syringe  1 g IntraVENous Q24H     ______________________________________________________________________  EXPECTED LENGTH OF STAY: 3d 7h  ACTUAL LENGTH OF STAY:          4                 Neelam Acosta MD

## 2022-07-10 NOTE — PROGRESS NOTES
[address] [facility] Adult  Hospitalist Group                                                                                          Hospitalist Progress Note  Ruiz Calloway MD  Answering service: [phone] from in house phone        Date of Service:  7/10/2022  NAME:  Francisco Callejas  :  10/13/1933  MRN:  [id number]      Admission Summary:     Patient presents with abdominal pain. Known history of metastatic prostate cancer with multiple visceral metastasis. Patient is followed by oncology and plan for outpatient immunotherapy. Palliative care previously evaluated the patient and patient and family not ready for hospice status at this time. Interval history / Subjective:     Patient denies any significant abdominal pain. Patient is generally weak.      Assessment & Plan:     Metastatic prostate cancer  -Multiple metastasis to vertebrae as well as liver  -Oncology evaluated the patient during this admission, plan for outpatient immunotherapy with Betty Day    -Patient and wife is concerned about patient's ability to make it to outpatient immunotherapy due to his significant weakness and would like to have started immunotherapy while inpatient  -Oncology to reevaluate on Monday, continue pain management    Back pain  -MRI of the thoracic and lumbar spine was done by recommendation of patient's radiation oncologist  -Findings reveal multiple metastasis to spine, no acute spinal cord compression  -Outpatient follow-up with radiation oncology    Urinary tract infection associated with chronic Serna catheter use  -Continue Rocephin, follow urine culture  -Serna catheter was exchanged by urology services [phone]  -Continue to maintain Serna catheter, patient needs outpatient follow-up    Hyponatremia  -Sodium level is stable at 131  -Continue monitor sodium level    Paroxysmal atrial fibrillation  -Stable on amiodarone, on anticoagulation with Eliquis    Anemia of chronic disease  -Anemia of chronic disease from malignancy  -Stable H&H     Code status:  Full  Prophylaxis: SCDs  Care Plan discussed with: Patient  Anticipated Disposition: Medically stable, patient will need special wheelchair as noted by OT. Case management to place the order today for the special wheelchair and the company will deliver the future on Monday. Patient probably not safe to go home over the weekend without proper equipment. Patient's wife is getting additional help at home. Hospital Problems  Date Reviewed: 7/5/2022          Codes Class Noted POA    * (Principal) Intractable abdominal pain ICD-10-CM: R10.9  ICD-9-CM: 789.00  7/5/2022 Yes                Review of Systems:   A comprehensive review of systems was negative except for that written in the HPI. Vital Signs:    Last 24hrs VS reviewed since prior progress note. Most recent are:  Visit Vitals  /62   Pulse 77   Temp 97.7 °F (36.5 °C)   Resp 18   Ht 6' 3\" (1.905 m)   Wt 79.2 kg (174 lb 9.6 oz)   SpO2 97%   BMI 21.82 kg/m²         Intake/Output Summary (Last 24 hours) at 7/10/2022 1157  Last data filed at 7/9/2022 1900  Gross per 24 hour   Intake --   Output 350 ml   Net -350 ml        Physical Examination:     I had a face to face encounter with this patient and independently examined them on 7/10/2022 as outlined below:          Constitutional:  No acute distress, cooperative, pleasant    ENT:  Oral mucosa moist, oropharynx benign. Resp:  CTA bilaterally. No wheezing/rhonchi/rales. No accessory muscle use. CV:  Regular rhythm, normal rate, no murmurs, gallops, rubs    GI:  Soft, non distended, non tender. normoactive bowel sounds, no hepatosplenomegaly     Musculoskeletal:  No edema, warm, 2+ pulses throughout    Neurologic:  Moves all extremities.   AAOx3, CN II-XII reviewed            Data Review:    Review and/or order of clinical lab test      Labs:     No results for input(s): WBC, HGB, HCT, PLT, HGBEXT, HCTEXT, PLTEXT, HGBEXT, HCTEXT, PLTEXT in the last 72 hours. Recent Labs     07/09/22  0458   *   K 3.7   CL 96*   CO2 24   BUN 8   CREA 0.56*   GLU 80   CA 8.0*     No results for input(s): ALT, AP, TBIL, TBILI, TP, ALB, GLOB, GGT, AML, LPSE in the last 72 hours. No lab exists for component: SGOT, GPT, AMYP, HLPSE  No results for input(s): INR, PTP, APTT, INREXT, INREXT in the last 72 hours. No results for input(s): FE, TIBC, PSAT, FERR in the last 72 hours. No results found for: FOL, RBCF   No results for input(s): PH, PCO2, PO2 in the last 72 hours. No results for input(s): CPK, CKNDX, TROIQ in the last 72 hours.     No lab exists for component: CPKMB  Lab Results   Component Value Date/Time    Cholesterol, total 124 05/31/2019 07:07 AM    HDL Cholesterol 39 05/31/2019 07:07 AM    LDL, calculated 62 05/31/2019 07:07 AM    Triglyceride 115 05/31/2019 07:07 AM    CHOL/HDL Ratio 3.2 05/31/2019 07:07 AM     Lab Results   Component Value Date/Time    Glucose (POC) 92 10/07/2018 02:51 PM     Lab Results   Component Value Date/Time    Color PINK 07/06/2022 11:30 AM    Appearance TURBID (A) 07/06/2022 11:30 AM    Specific gravity 1.024 07/06/2022 11:30 AM    pH (UA) 6.0 07/06/2022 11:30 AM    Protein 300 (A) 07/06/2022 11:30 AM    Glucose Negative 07/06/2022 11:30 AM    Ketone 40 (A) 07/06/2022 11:30 AM    Bilirubin Negative 07/04/2022 10:03 PM    Urobilinogen 0.2 07/06/2022 11:30 AM    Nitrites Negative 07/06/2022 11:30 AM    Leukocyte Esterase LARGE (A) 07/06/2022 11:30 AM    Epithelial cells FEW 07/06/2022 11:30 AM    Bacteria Negative 07/06/2022 11:30 AM    WBC  07/06/2022 11:30 AM    RBC >100 (H) 07/06/2022 11:30 AM         Medications Reviewed:     Current Facility-Administered Medications   Medication Dose Route Frequency    senna-docusate (PERICOLACE) 8.6-50 mg per tablet 2 Tablet  2 Tablet Oral QHS    oxyCODONE IR (ROXICODONE) tablet 10 mg  10 mg Oral Q4H PRN    oxyCODONE IR (ROXICODONE) tablet 5 mg  5 mg Oral TID    acetaminophen (TYLENOL) tablet 650 mg  650 mg Oral TID    amiodarone (CORDARONE) tablet 200 mg  200 mg Oral DAILY    apixaban (ELIQUIS) tablet 5 mg  5 mg Oral BID    sodium chloride (NS) flush 5-40 mL  5-40 mL IntraVENous Q8H    sodium chloride (NS) flush 5-40 mL  5-40 mL IntraVENous PRN    acetaminophen (TYLENOL) tablet 650 mg  650 mg Oral Q6H PRN    Or    acetaminophen (TYLENOL) suppository 650 mg  650 mg Rectal Q6H PRN    polyethylene glycol (MIRALAX) packet 17 g  17 g Oral DAILY PRN    ondansetron (ZOFRAN ODT) tablet 4 mg  4 mg Oral Q8H PRN    Or    ondansetron (ZOFRAN) injection 4 mg  4 mg IntraVENous Q6H PRN    cefTRIAXone (ROCEPHIN) 1 g in 0.9% sodium chloride 10 mL IV syringe  1 g IntraVENous Q24H     ______________________________________________________________________  EXPECTED LENGTH OF STAY: 3d 7h  ACTUAL LENGTH OF STAY:          5                 Paulina Jewell MD

## 2022-07-11 NOTE — PROGRESS NOTES
Transition of Care: patients wants to return home with home health PT/OT/SN; [facility] has accepted back for home health;       [employer] accepted for wheelchair delievered to [facility] on Monday 7/11/22      In order to return home:  Patient needs NEW wheelchair-[employer] has accepted can supply the ordered [facility] by Monday July 11 to the hospital  Patient needs home health PT/OT/SN-patient was being seen by [facility] prior to this admission; [address] referral has been sent on 7/7-ACCEPTED BACK for home health   [employer] [address] [facility] given to patient and wife on 7/7- his wife has spoken to [employer] and setting up extra care  Patient is recommended to get a hospital bed-list of hospital bed suppliers given to patient and wife on 7/7; wife has called [employer] and bed to be delivered to home when patient discharges  [employer] requesting bedside commode -information given to patient and wife on where to purchase one; patients wife arranging     Transport Plan: [facility]- is on willcall for Tuesday 7/12 (patient will self pay-$80.78)      RUR: 21%     DX: intractable abdominal pain     Main contact is wife- Vince Rogers- [phone]     Discharge pending:  -pending reconsult with oncology  -pending progress with PT/OT  -pending additional lab results    [id number]: this CM informed in rounds that patient is not likely discharging today; sent message to [facility] updating them and called [facility] to move the willcall transport date from today to tomorrow (also canceled his ride to his treatment appt.  That was set up for 7/12); they verbalized understanding       CM following  Lupe Bae RN, CRM          Revision History

## 2022-07-11 NOTE — PROGRESS NOTES
Problem: Mobility Impaired (Adult and Pediatric)  Goal: *Acute Goals and Plan of Care (Insert Text)  Description: FUNCTIONAL STATUS PRIOR TO ADMISSION: Patient was modified independent using a rolling walker for functional mobility. HOME SUPPORT PRIOR TO ADMISSION: The patient lived with wife but did not require assist.    Physical Therapy Goals  Initiated 7/6/2022  1. Patient will move from supine to sit and sit to supine  in bed with modified independence within 7 day(s). 2.  Patient will transfer from bed to chair and chair to bed with modified independence using the least restrictive device within 7 day(s). 3.  Patient will perform sit to stand with modified independence within 7 day(s). 4.  Patient will ambulate with modified independence for 15 feet with the least restrictive device within 7 day(s). Outcome: Progressing Towards Goal   PHYSICAL THERAPY TREATMENT  Patient: Josse Nunn (76 y.o. male)  Date: 7/11/2022  Diagnosis: Intractable abdominal pain [R10.9] Intractable abdominal pain       Precautions: Fall  Chart, physical therapy assessment, plan of care and goals were reviewed. ASSESSMENT  Patient continues with skilled PT services and is not progressing towards goals. He continues to be orthostatic with sitting EOB and unable to tolerate sitting long enough to even attempt lateral transfer to the chair. Worked some on lateral scooting while sitting EOB. He was ultimately able to tolerate bed in chair position with BP stable. Sitting BP dropped as low as 97 systolic with symptoms. In addition, he reports pain in his upper back radiating around his chest with mobility which resolves for the most part when he is still. He remains too debilitated to safely be at home with family caregivers. Awaiting oncology follow up today, but from a PT standpoint, he is not safe for D/C home.      Current Level of Function Impacting Discharge (mobility/balance): mod assist for bed mobility, unable to tolerate sitting    Other factors to consider for discharge: inability to tolerate sitting         PLAN :  Patient continues to benefit from skilled intervention to address the above impairments. Continue treatment per established plan of care. to address goals. Recommendation for discharge: (in order for the patient to meet his/her long term goals)  To be determined: pending oncology plan, but from a mobility standpoint, he is bed level care right now    This discharge recommendation:  Has been made in collaboration with the attending provider and/or case management    IF patient discharges home will need the following DME: to be determined (TBD)       SUBJECTIVE:   Patient stated The food is just not good.     OBJECTIVE DATA SUMMARY:   Critical Behavior:  Neurologic State: Alert  Orientation Level: Oriented X4  Cognition: Follows commands  Safety/Judgement: Fall prevention,Home safety  Functional Mobility Training:  Bed Mobility:  Rolling: Moderate assistance  Supine to Sit: Moderate assistance  Sit to Supine: Moderate assistance           Transfers:                                   Balance:  Sitting: Impaired; Without support  Sitting - Static: Good (unsupported)  Sitting - Dynamic: Fair (occasional)  Ambulation/Gait Training:                                                        Stairs: Therapeutic Exercises:   Sitting EOB, lateral scooting in sitting  Pain Rating:  Pain wrapping around his chest with movement, but improved at rest    Activity Tolerance:   Poor and signs and symptoms of orthostatic hypotension    After treatment patient left in no apparent distress:   Call bell within reach, Bed / chair alarm activated, Caregiver / family present, Side rails x 3, and bed in chair position    COMMUNICATION/COLLABORATION:   The patients plan of care was discussed with: Registered nurse and Case management.      Anthony Glaser, PT   Time Calculation: 23 mins

## 2022-07-11 NOTE — PROGRESS NOTES
[address] [facility] Adult  Hospitalist Group                                                                                          Hospitalist Progress Note  Arya Tovar MD  Answering service: [phone] from in house phone        Date of Service:  2022  NAME:  Andreina Anderson  :  10/13/1933  MRN:  [id number]      Admission Summary:     Patient presents with abdominal pain. Known history of metastatic prostate cancer with multiple visceral metastasis. Patient is followed by oncology and plan for outpatient immunotherapy. Palliative care previously evaluated the patient and patient and family not ready for hospice status at this time. Interval history / Subjective:     Patient denies any significant abdominal pain. Patient is generally weak.      Assessment & Plan:     Metastatic prostate cancer  -Multiple metastasis to vertebrae as well as liver  -Oncology evaluated the patient during this admission, plan for outpatient immunotherapy with Lul Quiroga    -Patient and wife is concerned about patient's ability to make it to outpatient immunotherapy due to his significant weakness and would like to have started immunotherapy while inpatient  -Oncology to reevaluate on Monday, continue pain management    Back pain  -MRI of the thoracic and lumbar spine was done by recommendation of patient's radiation oncologist  -Findings reveal multiple metastasis to spine, no acute spinal cord compression  -Outpatient follow-up with radiation oncology    Urinary tract infection associated with chronic Serna catheter use  -Continue Rocephin, follow urine culture  -Serna catheter was exchanged by urology services   -Continue to maintain Serna catheter, patient needs outpatient follow-up    Hyponatremia  -Sodium level downtrending, probable hypovolemic component due to poor p.o. intake  -Continue monitor sodium level, start normal saline    Paroxysmal atrial fibrillation  -Stable on amiodarone, on anticoagulation with Eliquis    Anemia of chronic disease  -Anemia of chronic disease from malignancy  -Stable H&H     Code status:  Full  Prophylaxis: SCDs  Care Plan discussed with: Patient  Anticipated Disposition: Medically stable, patient will need special wheelchair as noted by OT. Case management to place the order today for the special wheelchair, delivered to the hospital today. Unclear disposition plan at current, patient will be very poor functioning at home. Disposition plan to be discussed with the patient after oncology reevaluation today. Hospital Problems  Date Reviewed: 7/5/2022          Codes Class Noted POA    * (Principal) Intractable abdominal pain ICD-10-CM: R10.9  ICD-9-CM: 789.00  7/5/2022 Yes                Review of Systems:   A comprehensive review of systems was negative except for that written in the HPI. Vital Signs:    Last 24hrs VS reviewed since prior progress note. Most recent are:  Visit Vitals  /69   Pulse 82   Temp 97.6 °F (36.4 °C)   Resp 16   Ht 6' 3\" (1.905 m)   Wt 79.2 kg (174 lb 9.6 oz)   SpO2 94%   BMI 21.82 kg/m²         Intake/Output Summary (Last 24 hours) at 7/11/2022 1259  Last data filed at 7/11/2022 0500  Gross per 24 hour   Intake 360 ml   Output 2000 ml   Net -1640 ml        Physical Examination:     I had a face to face encounter with this patient and independently examined them on 7/11/2022 as outlined below:          Constitutional:  No acute distress, cooperative, pleasant    ENT:  Oral mucosa moist, oropharynx benign. Resp:  CTA bilaterally. No wheezing/rhonchi/rales. No accessory muscle use. CV:  Regular rhythm, normal rate, no murmurs, gallops, rubs    GI:  Soft, non distended, non tender. normoactive bowel sounds, no hepatosplenomegaly     Musculoskeletal:  No edema, warm, 2+ pulses throughout    Neurologic:  Moves all extremities.   AAOx3, CN II-XII reviewed            Data Review:    Review and/or order of clinical lab test      Labs:     No results for input(s): WBC, HGB, HCT, PLT, HGBEXT, HCTEXT, PLTEXT, HGBEXT, HCTEXT, PLTEXT in the last 72 hours. Recent Labs     07/11/22  0441 07/09/22  0458   * 128*   K 4.1 3.7   CL 92* 96*   CO2 22 24   BUN 8 8   CREA 0.60* 0.56*   GLU 54* 80   CA 8.0* 8.0*     No results for input(s): ALT, AP, TBIL, TBILI, TP, ALB, GLOB, GGT, AML, LPSE in the last 72 hours. No lab exists for component: SGOT, GPT, AMYP, HLPSE  No results for input(s): INR, PTP, APTT, INREXT, INREXT in the last 72 hours. No results for input(s): FE, TIBC, PSAT, FERR in the last 72 hours. No results found for: FOL, RBCF   No results for input(s): PH, PCO2, PO2 in the last 72 hours. No results for input(s): CPK, CKNDX, TROIQ in the last 72 hours.     No lab exists for component: CPKMB  Lab Results   Component Value Date/Time    Cholesterol, total 124 05/31/2019 07:07 AM    HDL Cholesterol 39 05/31/2019 07:07 AM    LDL, calculated 62 05/31/2019 07:07 AM    Triglyceride 115 05/31/2019 07:07 AM    CHOL/HDL Ratio 3.2 05/31/2019 07:07 AM     Lab Results   Component Value Date/Time    Glucose (POC) 75 07/11/2022 11:05 AM    Glucose (POC) 55 (L) 07/11/2022 06:58 AM    Glucose (POC) 92 10/07/2018 02:51 PM     Lab Results   Component Value Date/Time    Color PINK 07/06/2022 11:30 AM    Appearance TURBID (A) 07/06/2022 11:30 AM    Specific gravity 1.024 07/06/2022 11:30 AM    pH (UA) 6.0 07/06/2022 11:30 AM    Protein 300 (A) 07/06/2022 11:30 AM    Glucose Negative 07/06/2022 11:30 AM    Ketone 40 (A) 07/06/2022 11:30 AM    Bilirubin Negative 07/04/2022 10:03 PM    Urobilinogen 0.2 07/06/2022 11:30 AM    Nitrites Negative 07/06/2022 11:30 AM    Leukocyte Esterase LARGE (A) 07/06/2022 11:30 AM    Epithelial cells FEW 07/06/2022 11:30 AM    Bacteria Negative 07/06/2022 11:30 AM    WBC  07/06/2022 11:30 AM    RBC >100 (H) 07/06/2022 11:30 AM         Medications Reviewed:     Current Facility-Administered Medications Medication Dose Route Frequency    0.9% sodium chloride infusion  75 mL/hr IntraVENous CONTINUOUS    senna-docusate (PERICOLACE) 8.6-50 mg per tablet 2 Tablet  2 Tablet Oral QHS    oxyCODONE IR (ROXICODONE) tablet 10 mg  10 mg Oral Q4H PRN    oxyCODONE IR (ROXICODONE) tablet 5 mg  5 mg Oral TID    acetaminophen (TYLENOL) tablet 650 mg  650 mg Oral TID    amiodarone (CORDARONE) tablet 200 mg  200 mg Oral DAILY    apixaban (ELIQUIS) tablet 5 mg  5 mg Oral BID    sodium chloride (NS) flush 5-40 mL  5-40 mL IntraVENous Q8H    sodium chloride (NS) flush 5-40 mL  5-40 mL IntraVENous PRN    acetaminophen (TYLENOL) tablet 650 mg  650 mg Oral Q6H PRN    Or    acetaminophen (TYLENOL) suppository 650 mg  650 mg Rectal Q6H PRN    polyethylene glycol (MIRALAX) packet 17 g  17 g Oral DAILY PRN    ondansetron (ZOFRAN ODT) tablet 4 mg  4 mg Oral Q8H PRN    Or    ondansetron (ZOFRAN) injection 4 mg  4 mg IntraVENous Q6H PRN    cefTRIAXone (ROCEPHIN) 1 g in 0.9% sodium chloride 10 mL IV syringe  1 g IntraVENous Q24H     ______________________________________________________________________  EXPECTED LENGTH OF STAY: 3d 7h  ACTUAL LENGTH OF STAY:          6                 Bereket Ramirez MD

## 2022-07-11 NOTE — CONSULTS
[facility]   [address] [facility], [address] [place] [address]  Phone: [phone]   [phone] NOTE     Patient: Andreina Anderson MRN: [id number]  PCP: Jasper Vang MD   :     10/13/1933  Age:   80 y.o. Sex:  male      Referring physician: Lesly Tapia MD  Reason for consultation: 80 y.o. 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 Subjective:   HPI: Andreina Anderson is a 80 y.o.  male who has been admitted to the hospital for worsening abdominal pain. Serum Na was 127 on admission and improved to 132 on . Subsequently Na trended down to 124 this morning that prompted renal consult.    Known to have Prostate Ca for 4.5 years   Prior to admission was diagnosed w/ bony mets   Had tomlinson PTA and was changed during the admission   Was treated w/ Cipro for UTI PTA and has been getting rocephin   Denies any N/V/D  Has been on anti androgen Rx which usually causes hypernatremia and not Hypo       Past Medical Hx:   Past Medical History:   Diagnosis Date    Cancer [facility])     prostate ca with mets    Prostate cancer [facility])         Past Surgical Hx:     Past Surgical History:   Procedure Laterality Date    IR INSERT TUNL CVC W/O PORT OVER 5 YR  2022    IR KYPHOPLASTY THORACIC  2022    IR REMOVE TUNL CVAD W/O PORT / PUMP  2022         No Known Allergies    Social Hx:  reports that he has never smoked. He has never used smokeless tobacco. He reports that he does not drink alcohol and does not use drugs. History reviewed. No pertinent family history. Review of Systems:  A thorough twelve point review of system was performed today. Pertinent positives and negatives are mentioned in the HPI. The reminder of the ROS is negative and noncontributory. Objective:    Vitals:    Vitals:    07/11/22 1005 07/11/22 1008 07/11/22 1011 07/11/22 1013   BP: 97/61 (!) 159/69 126/70 124/69   Pulse: 94 85 85 82   Resp:       Temp:       SpO2:       Weight:       Height:         I&O's:  07/10 0701 - 07/11 0700  In: 360 [P.O.:360]  Out: 2000 [Urine:2000]  Visit Vitals  /69 Comment (BP Patient Position): bed in chair   Pulse 82   Temp 97.6 °F (36.4 °C)   Resp 16   Ht 6' 3\" (1.905 m)   Wt 79.2 kg (174 lb 9.6 oz)   SpO2 94%   BMI 21.82 kg/m²       Physical Exam:  General:  No apparent Distress  HEENT: PERRL,  ++ Pallor , No Icterus  Neck: Supple,no mass palpable  Lungs : CTA  CVS: RRR, S1 S2 normal, No murmur   Abdomen: Soft, NT, BS +  Extremities: no Edema  Neurologic: non focal, AAO x 3  Psych: normal affect    Laboratory Results:    Recent Labs     07/11/22  0441 07/09/22  0458   * 128*   K 4.1 3.7   CL 92* 96*   CO2 22 24   GLU 54* 80   BUN 8 8   CREA 0.60* 0.56*   CA 8.0* 8.0*     No results for input(s): WBC, HGB, HCT, PLT, HGBEXT, HCTEXT, PLTEXT in the last 72 hours. No results found for: SDES  Lab Results   Component Value Date/Time    Culture result:  07/04/2022 10:03 PM     >2 ORGANISMS - CONTAMINATED SPECIMEN.  SUGGEST RECOLLECTION    Culture result: NO GROWTH 5 DAYS 04/09/2022 02:56 PM    Culture result: No growth (<1,000 CFU/ML) 04/09/2022 02:56 PM     Recent Results (from the past 24 hour(s))   METABOLIC PANEL, BASIC    Collection Time: 07/11/22  4:41 AM   Result Value Ref Range    Sodium 124 (L) 136 - 145 mmol/L    Potassium 4.1 3.5 - 5.1 mmol/L    Chloride 92 (L) 97 - 108 mmol/L    CO2 22 21 - 32 mmol/L    Anion gap 10 5 - 15 mmol/L    Glucose 54 (L) 65 - 100 mg/dL    BUN 8 6 - 20 MG/DL    Creatinine 0.60 (L) 0.70 - 1.30 MG/DL    BUN/Creatinine ratio 13 12 - 20      GFR est AA >60 >60 ml/min/1.73m2    GFR est non-AA >60 >60 ml/min/1.73m2    Calcium 8.0 (L) 8.5 - 10.1 MG/DL   SAMPLES BEING HELD    Collection Time: 07/11/22  4:41 AM   Result Value Ref Range    SAMPLES BEING HELD 1lav     COMMENT        Add-on orders for these samples will be processed based on acceptable specimen integrity and analyte stability, which may vary by analyte. GLUCOSE, POC    Collection Time: 07/11/22  6:58 AM   Result Value Ref Range    Glucose (POC) 55 (L) 65 - 117 mg/dL    Performed by Jaylyn Feliciano, POC    Collection Time: 07/11/22 11:05 AM   Result Value Ref Range    Glucose (POC) 75 65 - 117 mg/dL    Performed by FERMÍN LEAVITT          Urine dipstick:   Lab Results   Component Value Date/Time    Color PINK 07/06/2022 11:30 AM    Appearance TURBID (A) 07/06/2022 11:30 AM    Specific gravity 1.024 07/06/2022 11:30 AM    pH (UA) 6.0 07/06/2022 11:30 AM    Protein 300 (A) 07/06/2022 11:30 AM    Glucose Negative 07/06/2022 11:30 AM    Ketone 40 (A) 07/06/2022 11:30 AM    Bilirubin Negative 07/04/2022 10:03 PM    Urobilinogen 0.2 07/06/2022 11:30 AM    Nitrites Negative 07/06/2022 11:30 AM    Leukocyte Esterase LARGE (A) 07/06/2022 11:30 AM    Epithelial cells FEW 07/06/2022 11:30 AM    Bacteria Negative 07/06/2022 11:30 AM    WBC  07/06/2022 11:30 AM    RBC >100 (H) 07/06/2022 11:30 AM       I have reviewed the following: All pertinent labs, microbiology data, radiology imaging for my assessment     Medications list Personally Reviewed   [x]      Yes     []               No       Medications:  Prior to Admission medications    Medication Sig Start Date End Date Taking? Authorizing Provider   lidocaine HCL 4 % ptmd 1 Patch by Apply Externally route every twelve (12) hours.  6/21/22   Lex Roman MD KARYNA   amiodarone (CORDARONE) 200 mg tablet Take 1 Tablet by mouth daily. 5/9/22   Shay Rios MD   apixaban (ELIQUIS) 5 mg tablet Take 1 Tablet by mouth two (2) times a day. 4/28/22   Bon Carvajal MD   calcium citrate-vitamin D3 (CITRACAL + D) tablet Take 1 Tab by mouth two (2) times a day. Provider, Historical   leuprolide acetate (LUPRON DEPOT IM) by IntraMUSCular route. Every 6 months    Provider, Historical   abiraterone (ZYTIGA) 250 mg tab Take 1,000 mg by mouth daily. Provider, Historical   predniSONE (DELTASONE) 5 mg tablet Take 5 mg by mouth two (2) times a day. Provider, Historical   tamsulosin (FLOMAX) 0.4 mg capsule Take 0.4 mg by mouth every evening. Provider, Historical        Thank you for allowing us to participate in the care of this patient. We will follow patient. Please dont hesitate to call with any questions    Adolph Powers MD  [facility]   [address] [facility] [address]  Phone - [phone]   Fax - [phone]  [email or web address]

## 2022-07-11 NOTE — TELEPHONE ENCOUNTER
Rosi Lundy @ [facility] called. Pt was supposed to be discharged today and will not be. She said he is supposed to have Chemo tomorrow and more than likely will not make it.     CB# is [phone]

## 2022-07-12 NOTE — PROGRESS NOTES
Rounded on Christianity patients and provided Anointing of the Sick at request of patient.     Mica Castillo

## 2022-07-12 NOTE — HOSPICE
[facility]  [phone]     Patient Name: Srinivas Bradley  YOB: 1933  Age: 80 y.o. [address] RN Note:  Hospice consult received, reviewing chart. Will follow up with Unit Nurse and Care Manager to discuss plan of care, patient status and discharge disposition within the hour. Thank you for the opportunity to be of service to this patient.     Eduardo Powers RN  Clinical Nurse Liaison   [address] [facility]  [phone] (s) [phone]

## 2022-07-12 NOTE — PROGRESS NOTES
[facility]   [address] [facility], [address] [place] [address]  Phone: [phone]   [phone]    [email or web address]     Nephrology Progress Note    Patient Name : Alberto Ambrosio      : 10/13/1933     MRN : [id number]  Date of Admission : 2022  Date of Servive : 22    CC:  Follow up for Hyponatremia        Assessment and Plan   Hyponatremia :  - Appears to be combination of Poor po intake and SIADH   - continue NS till the end of the day and then stop   - repeat labs in pm and if Na < 125, will give one dose of Tolvaptan   - labs q12     Metastatic Prostate Ca   - per Onc      UTI     PAF   - on amiodarone      Chronic anemia      Care Plan discussed with:  Pt      Interval History:  Seen and examined. Good UOP. Na at 124   No new sx   Poor po intake continues . Reports some nausea and lack of apetite and does not like hospital food     Review of Systems: A comprehensive review of systems was negative except for that written in the HPI.     Current Medications:   Current Facility-Administered Medications   Medication Dose Route Frequency    0.9% sodium chloride infusion  75 mL/hr IntraVENous CONTINUOUS    senna-docusate (PERICOLACE) 8.6-50 mg per tablet 2 Tablet  2 Tablet Oral QHS    oxyCODONE IR (ROXICODONE) tablet 10 mg  10 mg Oral Q4H PRN    oxyCODONE IR (ROXICODONE) tablet 5 mg  5 mg Oral TID    acetaminophen (TYLENOL) tablet 650 mg  650 mg Oral TID    amiodarone (CORDARONE) tablet 200 mg  200 mg Oral DAILY    apixaban (ELIQUIS) tablet 5 mg  5 mg Oral BID    sodium chloride (NS) flush 5-40 mL  5-40 mL IntraVENous Q8H    sodium chloride (NS) flush 5-40 mL  5-40 mL IntraVENous PRN    acetaminophen (TYLENOL) tablet 650 mg  650 mg Oral Q6H PRN    Or    acetaminophen (TYLENOL) suppository 650 mg  650 mg Rectal Q6H PRN    polyethylene glycol (MIRALAX) packet 17 g  17 g Oral DAILY PRN    ondansetron (ZOFRAN ODT) tablet 4 mg  4 mg Oral Q8H PRN    Or    ondansetron (ZOFRAN) injection 4 mg  4 mg IntraVENous Q6H PRN      No Known Allergies    Objective:  Vitals:    Vitals:    07/11/22 1534 07/11/22 1945 07/12/22 0024 07/12/22 0801   BP: 133/73 111/69 108/66 124/65   Pulse: 78 87 86 84   Resp: 16 16 18 18   Temp: 97.4 °F (36.3 °C) 98.6 °F (37 °C) 98 °F (36.7 °C) 98.5 °F (36.9 °C)   SpO2: 95% 95% 95% 94%   Weight:       Height:         Intake and Output:  No intake/output data recorded. 07/10 1901 - 07/12 0700  In: 1220 [P.O.:360; I.V.:860]  Out: 1500 [Urine:1500]    Physical Exam:  General:  No apparent Distress  HEENT: PERRL,  ++ Pallor , No Icterus  Neck: Supple,no mass palpable  Lungs : CTA  CVS: RRR, S1 S2 normal, No murmur   Abdomen: Soft, NT, BS +  Extremities: no Edema  Neurologic: non focal, AAO x 3  Psych: normal affect       []    High complexity decision making was performed  []    Patient is at high-risk of decompensation with multiple organ involvement    Lab Data Personally Reviewed: I have reviewed all the pertinent labs, microbiology data and radiology studies during assessment. Recent Labs     07/12/22  0416 07/11/22  0441   * 124*   K 3.8 4.1   CL 92* 92*   CO2 23 22   GLU 83 54*   BUN 8 8   CREA 0.51* 0.60*   CA 8.0* 8.0*     No results for input(s): WBC, HGB, HCT, PLT, HGBEXT, HCTEXT, PLTEXT in the last 72 hours. No results found for: SDES  Lab Results   Component Value Date/Time    Culture result:  07/04/2022 10:03 PM     >2 ORGANISMS - CONTAMINATED SPECIMEN.  SUGGEST RECOLLECTION    Culture result: NO GROWTH 5 DAYS 04/09/2022 02:56 PM    Culture result: No growth (<1,000 CFU/ML) 04/09/2022 02:56 PM    Culture result: NO GROWTH 1 DAY 05/30/2019 10:58 PM    Culture result: NO GROWTH 5 DAYS 05/30/2019 09:10 PM     Recent Results (from the past 24 hour(s))   SODIUM, UR, RANDOM    Collection Time: 07/11/22  1:10 PM   Result Value Ref Range    Sodium,urine random 7 MMOL/L   OSMOLALITY, UR    Collection Time: 07/11/22  1:10 PM   Result Value Ref Range    Osmolality,urine 488 MOSM/kg H2O   POTASSIUM, UR, RANDOM    Collection Time: 07/11/22  1:10 PM   Result Value Ref Range    Potassium urine, random 37 MMOL/L   METABOLIC PANEL, BASIC    Collection Time: 07/12/22  4:16 AM   Result Value Ref Range    Sodium 124 (L) 136 - 145 mmol/L    Potassium 3.8 3.5 - 5.1 mmol/L    Chloride 92 (L) 97 - 108 mmol/L    CO2 23 21 - 32 mmol/L    Anion gap 9 5 - 15 mmol/L    Glucose 83 65 - 100 mg/dL    BUN 8 6 - 20 MG/DL    Creatinine 0.51 (L) 0.70 - 1.30 MG/DL    BUN/Creatinine ratio 16 12 - 20      GFR est AA >60 >60 ml/min/1.73m2    GFR est non-AA >60 >60 ml/min/1.73m2    Calcium 8.0 (L) 8.5 - 10.1 MG/DL           I have reviewed the flowsheets. Chart and Pertinent Notes have been reviewed. No change in PMH ,family and social history from Consult note.       Sunni Flores [facility]

## 2022-07-12 NOTE — PROGRESS NOTES
[address] [facility]  Hospitalist Group                                                                                          Hospitalist Progress Note  Hong Adams MD  Answering service: [phone] from in house phone        Date of Service:  2022  NAME:  Munir Pace  :  10/13/1933  MRN:  [id number]      Admission Summary:     Patient presents with abdominal pain. Known history of metastatic prostate cancer with multiple visceral metastasis. Patient is followed by oncology and plan for outpatient immunotherapy. Palliative care previously evaluated the patient and patient and family not ready for hospice status at this time. Interval history / Subjective:     Patient denies any significant abdominal pain. Patient is generally weak.      Assessment & Plan:     Metastatic prostate cancer  -Multiple metastasis to vertebrae as well as liver  -Oncology evaluated the patient during this admission, plan for outpatient immunotherapy with [facility]    -Patient and wife is concerned about patient's ability to make it to outpatient immunotherapy due to his significant weakness and would like to have started immunotherapy while inpatient  -Oncology reevaluation requested    Back pain  -MRI of the thoracic and lumbar spine was done by recommendation of patient's radiation oncologist  -Findings reveal multiple metastasis to spine, no acute spinal cord compression  -Outpatient follow-up with radiation oncology    Urinary tract infection associated with chronic Serna catheter use  -Continue Rocephin, follow urine culture  -Serna catheter was exchanged by urology services   -Continue to maintain Serna catheter, patient needs outpatient follow-up    Hyponatremia  -Sodium level still at 124, likely combination of dehydration and SIADH  -Continue monitor sodium level, continue normal saline  -Nephrology following    Paroxysmal atrial fibrillation  -Stable on amiodarone, on anticoagulation with Eliquis    Anemia of chronic disease  -Anemia of chronic disease from malignancy  -Stable H&H     Code status:  Full  Prophylaxis: SCDs  Care Plan discussed with: Patient  Anticipated Disposition: Medically stable, patient will need special wheelchair as noted by OT. Case management to place the order today for the special wheelchair, delivered to the hospital today. Unclear disposition plan at current, patient will be very poor functioning at home. Disposition plan to be discussed with the patient after oncology reevaluation. Hospital Problems  Date Reviewed: 7/5/2022          Codes Class Noted POA    * (Principal) Intractable abdominal pain ICD-10-CM: R10.9  ICD-9-CM: 789.00  7/5/2022 Yes                Review of Systems:   A comprehensive review of systems was negative except for that written in the HPI. Vital Signs:    Last 24hrs VS reviewed since prior progress note. Most recent are:  Visit Vitals  /65   Pulse 84   Temp 98.5 °F (36.9 °C)   Resp 18   Ht 6' 3\" (1.905 m)   Wt 79.2 kg (174 lb 9.6 oz)   SpO2 94%   BMI 21.82 kg/m²         Intake/Output Summary (Last 24 hours) at 7/12/2022 1159  Last data filed at 7/12/2022 0024  Gross per 24 hour   Intake 860 ml   Output 600 ml   Net 260 ml        Physical Examination:     I had a face to face encounter with this patient and independently examined them on 7/12/2022 as outlined below:          Constitutional:  No acute distress, cooperative, pleasant    ENT:  Oral mucosa moist, oropharynx benign. Resp:  CTA bilaterally. No wheezing/rhonchi/rales. No accessory muscle use. CV:  Regular rhythm, normal rate, no murmurs, gallops, rubs    GI:  Soft, non distended, non tender. normoactive bowel sounds, no hepatosplenomegaly     Musculoskeletal:  No edema, warm, 2+ pulses throughout    Neurologic:  Moves all extremities.   AAOx3, CN II-XII reviewed            Data Review:    Review and/or order of clinical lab test      Labs: No results for input(s): WBC, HGB, HCT, PLT, HGBEXT, HCTEXT, PLTEXT, HGBEXT, HCTEXT, PLTEXT in the last 72 hours. Recent Labs     07/12/22  0416 07/11/22  0441   * 124*   K 3.8 4.1   CL 92* 92*   CO2 23 22   BUN 8 8   CREA 0.51* 0.60*   GLU 83 54*   CA 8.0* 8.0*     No results for input(s): ALT, AP, TBIL, TBILI, TP, ALB, GLOB, GGT, AML, LPSE in the last 72 hours. No lab exists for component: SGOT, GPT, AMYP, HLPSE  No results for input(s): INR, PTP, APTT, INREXT, INREXT in the last 72 hours. No results for input(s): FE, TIBC, PSAT, FERR in the last 72 hours. No results found for: FOL, RBCF   No results for input(s): PH, PCO2, PO2 in the last 72 hours. No results for input(s): CPK, CKNDX, TROIQ in the last 72 hours.     No lab exists for component: CPKMB  Lab Results   Component Value Date/Time    Cholesterol, total 124 05/31/2019 07:07 AM    HDL Cholesterol 39 05/31/2019 07:07 AM    LDL, calculated 62 05/31/2019 07:07 AM    Triglyceride 115 05/31/2019 07:07 AM    CHOL/HDL Ratio 3.2 05/31/2019 07:07 AM     Lab Results   Component Value Date/Time    Glucose (POC) 75 07/11/2022 11:05 AM    Glucose (POC) 55 (L) 07/11/2022 06:58 AM    Glucose (POC) 92 10/07/2018 02:51 PM     Lab Results   Component Value Date/Time    Color PINK 07/06/2022 11:30 AM    Appearance TURBID (A) 07/06/2022 11:30 AM    Specific gravity 1.024 07/06/2022 11:30 AM    pH (UA) 6.0 07/06/2022 11:30 AM    Protein 300 (A) 07/06/2022 11:30 AM    Glucose Negative 07/06/2022 11:30 AM    Ketone 40 (A) 07/06/2022 11:30 AM    Bilirubin Negative 07/04/2022 10:03 PM    Urobilinogen 0.2 07/06/2022 11:30 AM    Nitrites Negative 07/06/2022 11:30 AM    Leukocyte Esterase LARGE (A) 07/06/2022 11:30 AM    Epithelial cells FEW 07/06/2022 11:30 AM    Bacteria Negative 07/06/2022 11:30 AM    WBC  07/06/2022 11:30 AM    RBC >100 (H) 07/06/2022 11:30 AM         Medications Reviewed:     Current Facility-Administered Medications   Medication Dose Route Frequency    0.9% sodium chloride infusion  75 mL/hr IntraVENous CONTINUOUS    senna-docusate (PERICOLACE) 8.6-50 mg per tablet 2 Tablet  2 Tablet Oral QHS    oxyCODONE IR (ROXICODONE) tablet 10 mg  10 mg Oral Q4H PRN    oxyCODONE IR (ROXICODONE) tablet 5 mg  5 mg Oral TID    acetaminophen (TYLENOL) tablet 650 mg  650 mg Oral TID    amiodarone (CORDARONE) tablet 200 mg  200 mg Oral DAILY    apixaban (ELIQUIS) tablet 5 mg  5 mg Oral BID    sodium chloride (NS) flush 5-40 mL  5-40 mL IntraVENous Q8H    sodium chloride (NS) flush 5-40 mL  5-40 mL IntraVENous PRN    acetaminophen (TYLENOL) tablet 650 mg  650 mg Oral Q6H PRN    Or    acetaminophen (TYLENOL) suppository 650 mg  650 mg Rectal Q6H PRN    polyethylene glycol (MIRALAX) packet 17 g  17 g Oral DAILY PRN    ondansetron (ZOFRAN ODT) tablet 4 mg  4 mg Oral Q8H PRN    Or    ondansetron (ZOFRAN) injection 4 mg  4 mg IntraVENous Q6H PRN     ______________________________________________________________________  EXPECTED LENGTH OF STAY: 3d 7h  ACTUAL LENGTH OF STAY:          7                 Paulina Jewell MD

## 2022-07-12 NOTE — PROGRESS NOTES
Palliative Medicine Consult  Ronaldo: [phone] ([phone])    Patient Name: Serina Gregorio  YOB: 1933    Date of Initial Consult: July 5, 2022  Reason for Consult: End-stage disease  Requesting Provider: Dr. Gomez Munoz  Primary Care Physician: Adrian Frazier MD     SUMMARY:   Serina Gregorio is a 80 y.o. with a past history of stage IV metastatic prostate cancer first dx 2017 (bone)~radiation therapy to L2-5 6/2022, T7 kyphoplasty 6/2022. He is followed by Dr Natalee Holden (urology) and Dr Arcelia Diaz and most recently on [place]. A. Fib (eliquis), chronic indwelling Serna catheter, who was admitted on 7/4/2022 from home due to worsening abdominal pain. Other acute issues include UTI, paroxysmal A. Fib, bilateral lower extremity venous stasis, hyponatremia. Imaging suggests worsening metastatic disease now with mets to the lymph nodes and liver. Plan is to stop Xtandi and Prednisone and is scheduled for Keytruda on 7/12/22 as outpatient. Current medical issues leading to Palliative Medicine involvement include: End-stage disease. Full code    Social: Patient is  to Maru scales and they have 3 sons ([address], [place], [facility]). 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 He served in [employer] during the [employer]. He was working up until a couple months ago. PALLIATIVE DIAGNOSES:   1. Palliative Care Encounter  2. Cancer Related pain  3. Metastatic prostate cancer~bones, liver  4. Hypoalbuminemia   5. Drug induced constipation  6. Physical debility   7. End-of-life     PLAN:   1. Goals of care: Patient and wife agree to meet with hospice today. I explained that if they go home without hospice he will likely be readmitted in a short period. I discussed hospice in great detail and answered all questions. 2. Code status: Patient agrees to DNR. He will need a DDNR prior to discharge. AMD reviewed today. Wife had an unsigned copy  3.  Pain: Controlled on current regimen  4. Dispo: Home. There is no need to wait for Dr. Peggy Ott prior to discharge. The patient is unable to go to [place] to receive any treatment at this time. I have spoken with Dr. Peggy Ott personally and she agrees with hospice. He can follow-up with oncology outpatient if needed. He understands if he elects hospice, there is no need to follow-up with any specialists. 8. I will follow-up with the patient and wife tomorrow around 11 AM.  9. Discussed plan w/ Dr Elina Cardona care management and Josue Byrne    Dear Mr Zana Ernandez,    It was nice to meet you and Angel Addison this admission. Your pain from prostate cancer has improved since the radiation and kyphoplasty, but is still present in your back, right leg and abdomen.     -Take Oxycodone 5mg every 4-6 hours as needed for pain. You did well with this medication in the hospital. If your pain is stable, you do not need to take it. You will have enough tablets prescribed for you upon discharge to last you until you see Dr Peggy Ott.     -Braden Fernandes can talk to Dr Peggy Ott more about restarting a low dose steroid, this may also help bone pain.     -You are having bowel movements every few days. You do not feel constipated and your appetite has declined, so you are not going daily like you did before however we also want to avoid constipation given your pain medication use. -Please take Senna (over the counter)- you can take 1-2 tablets daily. You can also take 1/2-1 capful of Miralax (also over the counter) daily mixed in water or juice.     -We talked about Hospice in the future. Right now your goal is to start immunotherapy with Dr Peggy Ott.     -We are available to see you in clinic if your pain worsens. We did not make an appointment, but Dr Peggy Ott or you can always call us.      Take good care,    Jose Guadalupe Iverson MD  Palliative Medicine  [phone]     GOALS OF CARE / TREATMENT PREFERENCES:     GOALS OF CARE:  Patient/Health Care Proxy Stated Goals: Comfort    TREATMENT PREFERENCES:   Code Status: DNR    Patient and family's personal goals include: treatment    Advance Care Planning:  [] [facility] Interdisciplinary Team has updated the ACP Navigator with [address] and Patient Capacity    Rani Rodriguez spouse [phone]    Advance Care Planning 5/31/2019   Patient's Healthcare Decision Maker is: -   Primary Decision Maker Name -   Primary Decision Maker Phone Number -   Confirm Advance Directive Yes, not on file       Medical Interventions: Comfort measures       Other:    As far as possible, the palliative care team has discussed with patient / health care proxy about goals of care / treatment preferences for patient. HISTORY:     History obtained from: chart, team    CHIEF COMPLAINT: pt admitted with abdominal pain    HPI/SUBJECTIVE:    The patient is:   [x] Verbal and participatory  [] Non-participatory due to:     Pain controlled.   Having some nausea today    Clinical Pain Assessment (nonverbal scale for severity on nonverbal patients):   Clinical Pain Assessment  Severity: 0  Location: epigastric, left inguinal, L knee, and L leg  Character: left leg weakness  Duration: weeks  Effect: limited mobility  Factors: pain worse with reposiitioning  Frequency: all the time          Duration: for how long has pt been experiencing pain (e.g., 2 days, 1 month, years)  Frequency: how often pain is an issue (e.g., several times per day, once every few days, constant)     FUNCTIONAL ASSESSMENT:     Palliative Performance Scale (PPS):  PPS: 30       PSYCHOSOCIAL/SPIRITUAL SCREENING:     Palliative IDT has assessed this patient for cultural preferences / practices and a referral made as appropriate to needs (Cultural Services, Patient Advocacy, Ethics, etc.)    Any spiritual / Synagogue concerns:  [] Yes /  [x] No   If \"Yes\" to discuss with pastoral care during IDT     Does caregiver feel burdened by caring for their loved one:   [] Yes /  [x] No /  [] No Caregiver Present/Available [] No Caregiver [] Pt Lives at Facility  If \"Yes\" to discuss with social work during IDT    Anticipatory grief assessment:   [x] Normal  / [] Maladaptive     If \"Maladaptive\" to discuss with social work during IDT    ESAS Anxiety: Anxiety: 0    ESAS Depression: Depression: 0        REVIEW OF SYSTEMS:     Positive and pertinent negative findings in ROS are noted above in HPI. The following systems were [x] reviewed / [] unable to be reviewed as noted in HPI  Other findings are noted below. Systems: constitutional, ears/nose/mouth/throat, respiratory, gastrointestinal, genitourinary, musculoskeletal, integumentary, neurologic, psychiatric, endocrine. Positive findings noted below. Modified ESAS Completed by: provider   Fatigue: 7 Drowsiness: 0   Depression: 0 Pain: 0   Anxiety: 0 Nausea: 3   Anorexia: 6 Dyspnea: 0                    PHYSICAL EXAM:     From RN flowsheet:  Wt Readings from Last 3 Encounters:   07/06/22 174 lb 9.6 oz (79.2 kg)   06/28/22 172 lb (78 kg)   06/21/22 158 lb 3.2 oz (71.8 kg)     Blood pressure 124/65, pulse 84, temperature 98.5 °F (36.9 °C), resp. rate 18, height 6' 3\" (1.905 m), weight 174 lb 9.6 oz (79.2 kg), SpO2 94 %.     Pain Scale 1: Numeric (0 - 10)  Pain Intensity 1: 0  Pain Onset 1: several days ago  Pain Location 1: Abdomen  Pain Orientation 1: Anterior  Pain Description 1: Aching  Pain Intervention(s) 1: Medication (see MAR)  Last bowel movement, if known: 3 days ago     Constitutional: alert, nad, conversant, oriented, no sedation or confusion   Eyes: pupils equal, anicteric  ENMT: no nasal discharge, moist mucous membranes  Cardiovascular:    Respiratory: breathing not labored, symmetric  Gastrointestinal:   Musculoskeletal: no deformity, no tenderness to palpation  Skin: thin, poor turgor, anasarca  Neurologic: following commands, moving all extremities  Psychiatric: full affect, no hallucinations  Other:       HISTORY:     Principal Problem:    Intractable abdominal pain (7/5/2022)      Past Medical History:   Diagnosis Date    Cancer [facility])     prostate ca with mets    Prostate cancer [facility])       Past Surgical History:   Procedure Laterality Date    IR INSERT TUNL CVC W/O PORT OVER 5 YR  1/6/2022    IR KYPHOPLASTY THORACIC  6/20/2022    IR REMOVE TUNL CVAD W/O PORT / PUMP  1/25/2022      History reviewed. No pertinent family history. History reviewed, no pertinent family history.   Social History     Tobacco Use    Smoking status: Never Smoker    Smokeless tobacco: Never Used   Substance Use Topics    Alcohol use: Never     No Known Allergies   Current Facility-Administered Medications   Medication Dose Route Frequency    0.9% sodium chloride infusion  75 mL/hr IntraVENous CONTINUOUS    senna-docusate (PERICOLACE) 8.6-50 mg per tablet 2 Tablet  2 Tablet Oral QHS    oxyCODONE IR (ROXICODONE) tablet 10 mg  10 mg Oral Q4H PRN    oxyCODONE IR (ROXICODONE) tablet 5 mg  5 mg Oral TID    acetaminophen (TYLENOL) tablet 650 mg  650 mg Oral TID    amiodarone (CORDARONE) tablet 200 mg  200 mg Oral DAILY    apixaban (ELIQUIS) tablet 5 mg  5 mg Oral BID    sodium chloride (NS) flush 5-40 mL  5-40 mL IntraVENous Q8H    sodium chloride (NS) flush 5-40 mL  5-40 mL IntraVENous PRN    acetaminophen (TYLENOL) tablet 650 mg  650 mg Oral Q6H PRN    Or    acetaminophen (TYLENOL) suppository 650 mg  650 mg Rectal Q6H PRN    polyethylene glycol (MIRALAX) packet 17 g  17 g Oral DAILY PRN    ondansetron (ZOFRAN ODT) tablet 4 mg  4 mg Oral Q8H PRN    Or    ondansetron (ZOFRAN) injection 4 mg  4 mg IntraVENous Q6H PRN          LAB AND IMAGING FINDINGS:     Lab Results   Component Value Date/Time    WBC 4.6 07/07/2022 05:28 AM    HGB 9.0 (L) 07/07/2022 05:28 AM    PLATELET 170 86/15/2264 05:28 AM     Lab Results   Component Value Date/Time    Sodium 123 (L) 07/12/2022 11:40 AM    Potassium 3.8 07/12/2022 11:40 AM    Chloride 93 (L) 07/12/2022 11:40 AM    CO2 23 07/12/2022 11:40 AM    BUN 7 07/12/2022 11:40 AM    Creatinine 0.58 (L) 07/12/2022 11:40 AM    Calcium 8.0 (L) 07/12/2022 11:40 AM    Magnesium 1.5 (L) 07/07/2022 05:28 AM    Phosphorus 3.0 07/12/2022 11:40 AM      Lab Results   Component Value Date/Time    Alk. phosphatase 90 07/05/2022 06:17 AM    Protein, total 4.7 (L) 07/05/2022 06:17 AM    Albumin 1.6 (L) 07/12/2022 11:40 AM    Globulin 2.8 07/05/2022 06:17 AM     Lab Results   Component Value Date/Time    INR 1.0 06/20/2022 11:11 AM    Prothrombin time 10.6 06/20/2022 11:11 AM      No results found for: IRON, FE, TIBC, IBCT, PSAT, FERR   No results found for: PH, PCO2, PO2  No components found for: [place]   Lab Results   Component Value Date/Time    CK 37 (L) 04/11/2022 03:56 AM                Total time: 45 min  Counseling / coordination time, spent as noted above: 40 min  > 50% counseling / coordination?: y    Prolonged service was provided for  []30 min   []75 min in face to face time in the presence of the patient, spent as noted above. Time Start:   Time End:   Note: this can only be billed with 10382 (initial) or 25988 (follow up). If multiple start / stop times, list each separately.

## 2022-07-12 NOTE — PROGRESS NOTES
Bedside shift change report given to NasimW RN (oncoming nurse) by Hayley Fowler RN (offgoing nurse). Report included the following information SBAR, Kardex, Intake/Output, MAR and Recent Results.

## 2022-07-12 NOTE — PROGRESS NOTES
Transition of Care: new referral for hospice;  [address] has accepted; patient will discharge home on Thursday 7/14/22      Transport Plan: [facility] is scheduled for 0900 on Thursday 7/14     RUR: 17%     DX: intractable abdominal pain     Main contact is wife- Yomaira North- [phone]     Discharge pending:  -pending reconsult with oncology and final recommendations  -pending progress with PT/OT  -pending additional lab results for sodium     1156: this CM informed in rounds that patient is not  discharging today; sent message to [facility] updating them and called [facility] to move the willcall transport date from today to [address]: this CM informed by palliative team that patient has consult for [address]; referral sent via [email or web address]    [phone]: this CM was informed by hospice team that patient will discharge home on Thursday 7/14 to home with hospice; informed that hospice will order wheelchair and to call [facility] equipment to come [facility] that was delivered hereon 7/11; this CM called Sangeeta Lua at [facility] to inform; she verbalized understanding; she stated they will come [facility] on Wednesday 7/13       NOTE: previous discharge plan  patients wants to return home with home health PT/OT/SN; new wheelchair, hospital bed, private duty caregivers  [facility] back for [facility] health   [facility] accepted for wheelchair delievered to [facility] [place] on Monday 7/11/22     CM following  Noel Dance, RN, CRM            Revision History                                                                                    Revision History

## 2022-07-13 NOTE — PROGRESS NOTES
Problem: Self Care Deficits Care Plan (Adult)  Goal: *Acute Goals and Plan of Care (Insert Text)  Description: FUNCTIONAL STATUS PRIOR TO ADMISSION: prior to 2 weeks ago, was mod I with RW and mod I for ADLs with increased time, in past two week sedentary mostly in bed due to progressive weakness, wife assisting with ADLs as needed. Very active prior to this. HOME SUPPORT: The patient lived with wife who was assisting as needed and driving to appointments. Occupational Therapy Goals  Initiated 7/6/2022  1. Patient will perform grooming with supervision/set-up within 7 day(s). 2.  Patient will perform anterior UB bathing with supervision/set-up within 7 day(s). 3.  Patient will perform lower body dressing with Min A within 7 day(s). 5.  Patient will perform all aspects of toileting with minimal assistance/contact guard assist within 7 day(s). 6.  Patient will participate in Bupper extremity therapeutic exercise/activities with supervision/set-up for 10 minutes within 7 day(s). 7.  Patient will utilize energy conservation techniques during functional activities with visual/verbal cues within 7 day(s). Outcome: Not Progressing Towards Goal    OCCUPATIONAL THERAPY TREATMENT  Patient: Cami Chambers (89 y.o. male)  Date: 7/13/2022  Diagnosis: Intractable abdominal pain [R10.9] Intractable abdominal pain       Precautions: Fall  Chart, occupational therapy assessment, plan of care, and goals were reviewed. ASSESSMENT  Patient continues with skilled OT services and is not progressing towards goals. Pt with good engagement with bed level UE strengthening, using yellow theraband, with Supervision for technique, as well as, written/picture handout provided. Pt's wife present and reports she is able to assist pt with HEP engagement. Education provided regarding lateral transfer; good understanding verbalized.   Chart indicates pt is discharging home tomorrow, 7/14/2022, with hospice services following. Acute OT to follow in the event of delay in discharge. Current Level of Function Impacting Discharge (ADLs): Max A bed level ADLs    Other factors to consider for discharge: discharge under hospice services tomorrow         PLAN :  Patient continues to benefit from skilled intervention to address the above impairments. Continue treatment per established plan of care to address goals. Recommend with staff: Frequent positional changes    Recommendation for discharge: (in order for the patient to meet his/her long term goals)  To be determined: home with hospice    This discharge recommendation:  Has been made in collaboration with the attending provider and/or case management    IF patient discharges home will need the following DME: hospice following needs       SUBJECTIVE:   Patient stated Tommie Easley you for your help.     OBJECTIVE DATA SUMMARY:   Cognitive/Behavioral Status:  Neurologic State: Alert  Orientation Level: Oriented X4  Cognition: Follows commands  Perception: Appears intact  Perseveration: No perseveration noted  Safety/Judgement: Awareness of environment    Cognitive Retraining  Safety/Judgement: Awareness of environment    Therapeutic Exercises:   Completed BUE strengthening HEP, long-sitting, challenging functional strength/endurance required for increased ADL independence at bed level, with pt completing 1 sets of 7 reps of biceps flexion, triceps extension, chest press and latissimus dorsi pulls, against yellow theraband resistance, with Max verbal and Min tactile cues for technique (e.g. triceps extension above heart level with slow return, elbow adducted to body during biceps flexion and driving movement through elbows rather than hands during latissimus alysa pulls), as well as, cues for proper pacing and breathing. Frequent rest breaks implemented.     Pain:  Non-verbally indicated pain during bed level trunk flexion; RW aware and following    Activity Tolerance:   Poor and requires frequent rest breaks    After treatment patient left in no apparent distress:   Supine in bed, Call bell within reach, Caregiver / family present, and Side rails x 3    COMMUNICATION/COLLABORATION:   The patients plan of care was discussed with: Physical therapist, Registered nurse, and Case management.      Marie Gomes OT  Time Calculation: 21 mins

## 2022-07-13 NOTE — HOSPICE
[facility]  [phone]    Patient Name: Francisco Callejas  YOB: 1933  Age: 80 y.o. [facility] LCSW Note:  Hospice consult noted. Chart reviewed. Plan of care discussed with patients care manager. This LCSW met with pt, who is alert and lethargic to sign hospice consents. Pts wife Valentine Campbell was at bedside. Discussed Hospice philosophy, general plan of care, levels of care, services and on call procedures. Consents Reviewed: Yes  Person Reviewed/Signed with:Pt and wife Valentine Campbell  Right to NCD Reviewed: Yes  NCD Requested: Yes   Admission Nurse/Intake Notified: Yes   Planned Start of Care Date: 7/14/2022  Hospice Witness Representative: Candy Gonzalez LCSW, MSG    Wife has hired caregiver through [employer]. Wife reports son in [place] will be coming in the middle of next week to stay with his parents for the summer. LCSW provided education re hospice services and support. Pt and wife are overwhelmed with pts decline over the past 3 weeks. Pt to be dc at 9 am TH 7/14/2022 for home hospice admission. Thank you for the opportunity to be of service to Mr. Lincoln Pearl and his wife Valentine Campbell.      Candy Gonzalez LCSW, [address] [place] [address]  [phone]

## 2022-07-13 NOTE — HOSPICE
[facility]  [phone]    Hospice Nursing PRE-Admission   Discharge Summary  Patient Name: Pat Valente  YOB: 1933  Age: 80 y.o. Date of PLANNED Hospice Admission: 2022  Hospice Attending: Dr. Claude Morrow  Primary Care Physician: Dung Baldwin MD     Home Hospice Address:  [address] [place] [id number]  Primary Contact and Phone:  [phone] [facility]) *Preferred*   Yomaira North: [phone]    ADVANCE CARE PLANNING    Code Status: DNR  Durable DNR: [x]  Yes  []  No  Advance Care Planning 2019   Patient's Healthcare Decision Maker is: -   Primary Decision Maker Name -   Primary Decision Maker Phone Number -   Confirm Advance Directive Yes, not on file       Yazdanism: Hoahaoism   Home: TBD    HOSPICE SUMMARY     Verbal CTI of terminal diagnosis with life expectancy of 6 months or less received from: Dr. Claude Morrow    For the Hospice Diagnosis of: Metastatic Prostate Cancer    NCD: Requested      CLINICAL INFORMATION   Allergies: No Known Allergies      Currently this patient has:  [] Supplemental O2 [] Peripheral IV  [] PICC    [x] PORT   [x] Serna Catheter [] NG Tube   [] PEG Tube [] Ostomy    [] AICD: Has ICD been deactivated? [] Yes [] Wounds      History of COVID 2022     ASSESSMENT & PLAN     1. Symptom Issues Identified: Pain, weakness/fatigue, nausea. 2.  Psych/ Social/ Emotional Issues Identified: Pt lives with his wife, Yomaira North. Garrett Sanchez has arranged for hired caregivers to begin /at 10:00am through [address]. Family have 3 sons. One lives local [employer], one lives in [place], one son lives in [facility] and is planning to come live with patient for next month. CARE COORDINATION     1. Hospice Consents: Patient signed Hospice consents and DDNR. Candy Gonzalez, scanned consents and sent to Consent Folder. 2. DME Ordered/Company/Delivery Plan:    Thank you for your order [id number]. It is scheduled to be Delivered by Wed Jul 13 04:00 PM - 08:00 PM EST. 1. OVER THE BED TABLE USED W/FRAME   2. STANDARD 3-IN-1 COMMODE   3. STANDARD FULLY ELECTRIC BED Lonie Lukes W/FOAM MATTRESS   4. STANDARD OVERLAY (NON POWERED) GEL PAD   5. STANDARD RECLINING WHEELCHAIR   6. RC GEL WC CUSHION    3. Unique home needs for safety: Bariatric patient  NO    4. Symptom Kit and other Medications Needs: Confirmation #: [id number]   01:28 PM 7/13/2022   Symptom Kit with Morphine 30ml Liquid, Lorazepam 5 tabs, Oxycodone 5mg #35, Senna-S #14   To be sent home with patient. Patient is alert and oriented. 5. Home Admission Reservation: 7/14/2022 at 11:00am    6. Transportation by: [facility]   Scheduled for 09:00am       7. Verbal Report/Handoff given to: Home Hospice Team     8. Phone number to JAMAAL QUINTERO [facility] [address] [phone]    9.  Supplies/Wound Care: Plan to send starter kit home with patient    Shy Kaplan RN, [address] [address] Nurse Liaison  [phone] Mobile  [phone] Office  Available on [employer]

## 2022-07-13 NOTE — PROGRESS NOTES
Problem: Falls - Risk of  Goal: *Absence of Falls  Description: Document Denis Goodwin Fall Risk and appropriate interventions in the flowsheet. Outcome: Progressing Towards Goal  Note: Fall Risk Interventions:  Mobility Interventions: PT Consult for mobility concerns         Medication Interventions: Patient to call before getting OOB    Elimination Interventions: Call light in reach    History of Falls Interventions: Bed/chair exit alarm         Problem: Patient Education: Go to Patient Education Activity  Goal: Patient/Family Education  Outcome: Progressing Towards Goal     Problem: Pressure Injury - Risk of  Goal: *Prevention of pressure injury  Description: Document Wally Scale and appropriate interventions in the flowsheet.   Outcome: Progressing Towards Goal  Note: Pressure Injury Interventions:  Sensory Interventions: Assess changes in LOC    Moisture Interventions: Absorbent underpads    Activity Interventions: PT/OT evaluation    Mobility Interventions: Float heels    Nutrition Interventions: Document food/fluid/supplement intake    Friction and Shear Interventions: Apply protective barrier, creams and emollients                Problem: Patient Education: Go to Patient Education Activity  Goal: Patient/Family Education  Outcome: Progressing Towards Goal     Problem: Pain  Goal: *Control of Pain  Outcome: Progressing Towards Goal  Goal: *PALLIATIVE CARE:  Alleviation of Pain  Outcome: Progressing Towards Goal     Problem: Patient Education: Go to Patient Education Activity  Goal: Patient/Family Education  Outcome: Progressing Towards Goal     Problem: Discharge Planning  Goal: *Discharge to safe environment  Outcome: Progressing Towards Goal

## 2022-07-13 NOTE — PROGRESS NOTES
Transition of Care: home hospice;  [facility]; patient will discharge home on Thursday 7/14/22        Transport Plan: [facility] is [facility] 0900 on Thursday 7/14     RUR: 18%     DX: intractable abdominal pain     Main contact is wifeTimothy Guevara- [phone]     1115: this CM called and  cancelled Hospital to [address] [place] [address] ride for today    [phone]: this CM sent message via [employer] to [facility] informing them of change in discharge plans; they verbalized understanding              NOTE: previous discharge plan  patients wanted to return home with home health PT/OT/SN; new wheelchair, hospital bed, private duty caregivers ([facility]-patients wife arranged)   [facility] back for home health-informed them on 7/13 that patient will discharge home with hospice   [facility] had accepted for wheelchair and it was delievered to [facility] on Monday 7/11/22-called Kassandra Chavez back on 7/12 about WC return (patient will get [facility] with hospice services) ; she stated [facility] will come by the hospital on 7/13 to ; WC picked up on 7/13/22         CM following  Michelle Martinez RN, CRM            Revision History                                                                                       Revision History                                                                                    Revision History

## 2022-07-13 NOTE — PROGRESS NOTES
Palliative Medicine Consult  Ronaldo: [phone])    Patient Name: Garfield Barnes  YOB: 1933    Date of Initial Consult: July 5, 2022  Reason for Consult: End-stage disease  Requesting Provider: Dr. Thania Fraser  Primary Care Physician: Matt Escobar MD     SUMMARY:   Garfield Barnes is a 80 y.o. with a past history of stage IV metastatic prostate cancer first dx 2017 (bone)~radiation therapy to L2-5 6/2022, T7 kyphoplasty 6/2022. He is followed by Dr Cinthia Powell (urology) and Dr Declan Schmidt and most recently on [place]. A. Fib (eliquis), chronic indwelling Serna catheter, who was admitted on 7/4/2022 from home due to worsening abdominal pain. Other acute issues include UTI, paroxysmal A. Fib, bilateral lower extremity venous stasis, hyponatremia. Imaging suggests worsening metastatic disease now with mets to the lymph nodes and liver. Plan is to stop Xtandi and Prednisone and is scheduled for Keytruda on 7/12/22 as outpatient. Current medical issues leading to Palliative Medicine involvement include: End-stage disease. Full code    Social: Patient is  to Maru scales and they have 3 sons (Marissa, [facility], [place] 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 He served in [employer] during the [employer]. He was working up until a couple months ago. PALLIATIVE DIAGNOSES:   1. Palliative Care Encounter  2. Cancer Related pain  3. Metastatic prostate cancer~bones, liver  4. Hypoalbuminemia   5. Drug induced constipation  6. Physical debility   7. End-of-life     PLAN:   1. Goals of care: Home with hospice tomorrow  2. Code status: Patient agrees to DNR. DDNR completed  3. Pain: Controlled on current regimen  8. Dispo: Home. 9. Able to answer questions regarding hospice and going home. Discussed with hospice nurse Zarina and also case [employer]. Symptoms remain controlled at this time.   10. Will sign off     GOALS OF CARE / TREATMENT PREFERENCES:     GOALS OF CARE:  Patient/Health Care Proxy Stated Goals: Comfort    TREATMENT PREFERENCES:   Code Status: DNR    Patient and family's personal goals include: treatment    Advance Care Planning:  [] [facility] Interdisciplinary Team has updated the ACP Navigator with [address] and Patient Capacity    Jordan Salazar spouse [phone]    Advance Care Planning 5/31/2019   Patient's Healthcare Decision Maker is: -   Primary Decision Maker Name -   Primary Decision Maker Phone Number -   Confirm Advance Directive Yes, not on file       Medical Interventions: Comfort measures       Other:    As far as possible, the palliative care team has discussed with patient / health care proxy about goals of care / treatment preferences for patient. HISTORY:     History obtained from: chart, team    CHIEF COMPLAINT: pt admitted with abdominal pain    HPI/SUBJECTIVE:    The patient is:   [x] Verbal and participatory  [] Non-participatory due to:     Pain controlled.      Clinical Pain Assessment (nonverbal scale for severity on nonverbal patients):   Clinical Pain Assessment  Severity: 0  Location: epigastric, left inguinal, L knee, and L leg  Character: left leg weakness  Duration: weeks  Effect: limited mobility  Factors: pain worse with reposiitioning  Frequency: all the time          Duration: for how long has pt been experiencing pain (e.g., 2 days, 1 month, years)  Frequency: how often pain is an issue (e.g., several times per day, once every few days, constant)     FUNCTIONAL ASSESSMENT:     Palliative Performance Scale (PPS):  PPS: 30       PSYCHOSOCIAL/SPIRITUAL SCREENING:     Palliative IDT has assessed this patient for cultural preferences / practices and a referral made as appropriate to needs (Cultural Services, Patient Advocacy, Ethics, etc.)    Any spiritual / Gnosticism concerns:  [] Yes /  [x] No   If \"Yes\" to discuss with pastoral care during IDT     Does caregiver feel burdened by caring for their loved one:   [] Yes /  [x] No /  [] No Caregiver Present/Available [] No Caregiver [] Pt Lives at Facility  If \"Yes\" to discuss with social work during IDT    Anticipatory grief assessment:   [x] Normal  / [] Maladaptive     If \"Maladaptive\" to discuss with social work during IDT    ESAS Anxiety: Anxiety: 0    ESAS Depression: Depression: 0        REVIEW OF SYSTEMS:     Positive and pertinent negative findings in ROS are noted above in HPI. The following systems were [x] reviewed / [] unable to be reviewed as noted in HPI  Other findings are noted below. Systems: constitutional, ears/nose/mouth/throat, respiratory, gastrointestinal, genitourinary, musculoskeletal, integumentary, neurologic, psychiatric, endocrine. Positive findings noted below. Modified ESAS Completed by: provider   Fatigue: 7 Drowsiness: 0   Depression: 0 Pain: 0   Anxiety: 0 Nausea: 0   Anorexia: 6 Dyspnea: 0                    PHYSICAL EXAM:     From RN flowsheet:  Wt Readings from Last 3 Encounters:   07/06/22 174 lb 9.6 oz (79.2 kg)   06/28/22 172 lb (78 kg)   06/21/22 158 lb 3.2 oz (71.8 kg)     Blood pressure 97/62, pulse 87, temperature 97.7 °F (36.5 °C), resp. rate 18, height 6' 3\" (1.905 m), weight 174 lb 9.6 oz (79.2 kg), SpO2 97 %.     Pain Scale 1: Visual  Pain Intensity 1: 2  Pain Onset 1: several days ago  Pain Location 1: Abdomen  Pain Orientation 1: Anterior  Pain Description 1: Aching  Pain Intervention(s) 1: Medication (see MAR)  Last bowel movement, if known: 3 days ago     Constitutional: alert, nad, conversant, oriented, no sedation or confusion   Eyes: pupils equal, anicteric  ENMT: no nasal discharge, moist mucous membranes  Cardiovascular:    Respiratory: breathing not labored, symmetric  Gastrointestinal:   Musculoskeletal: no deformity, no tenderness to palpation  Skin: thin, poor turgor, anasarca  Neurologic: following commands, moving all extremities  Psychiatric: full affect, no hallucinations  Other:       HISTORY:     Principal Problem:    Intractable abdominal pain (7/5/2022)      Past Medical History:   Diagnosis Date    Cancer [facility])     prostate ca with mets    Prostate cancer [facility])       Past Surgical History:   Procedure Laterality Date    IR INSERT TUNL CVC W/O PORT OVER 5 YR  1/6/2022    IR KYPHOPLASTY THORACIC  6/20/2022    IR REMOVE TUNL CVAD W/O PORT / PUMP  1/25/2022      History reviewed. No pertinent family history. History reviewed, no pertinent family history.   Social History     Tobacco Use    Smoking status: Never Smoker    Smokeless tobacco: Never Used   Substance Use Topics    Alcohol use: Never     No Known Allergies   Current Facility-Administered Medications   Medication Dose Route Frequency    senna-docusate (PERICOLACE) 8.6-50 mg per tablet 2 Tablet  2 Tablet Oral QHS    oxyCODONE IR (ROXICODONE) tablet 10 mg  10 mg Oral Q4H PRN    oxyCODONE IR (ROXICODONE) tablet 5 mg  5 mg Oral TID    acetaminophen (TYLENOL) tablet 650 mg  650 mg Oral TID    amiodarone (CORDARONE) tablet 200 mg  200 mg Oral DAILY    apixaban (ELIQUIS) tablet 5 mg  5 mg Oral BID    sodium chloride (NS) flush 5-40 mL  5-40 mL IntraVENous Q8H    sodium chloride (NS) flush 5-40 mL  5-40 mL IntraVENous PRN    acetaminophen (TYLENOL) tablet 650 mg  650 mg Oral Q6H PRN    Or    acetaminophen (TYLENOL) suppository 650 mg  650 mg Rectal Q6H PRN    polyethylene glycol (MIRALAX) packet 17 g  17 g Oral DAILY PRN    ondansetron (ZOFRAN ODT) tablet 4 mg  4 mg Oral Q8H PRN    Or    ondansetron (ZOFRAN) injection 4 mg  4 mg IntraVENous Q6H PRN          LAB AND IMAGING FINDINGS:     Lab Results   Component Value Date/Time    WBC 4.6 07/07/2022 05:28 AM    HGB 9.0 (L) 07/07/2022 05:28 AM    PLATELET 233 57/77/8635 05:28 AM     Lab Results   Component Value Date/Time    Sodium 128 (L) 07/13/2022 03:54 AM    Potassium 4.2 07/13/2022 03:54 AM    Chloride 96 (L) 07/13/2022 03:54 AM    CO2 24 07/13/2022 03:54 AM    BUN 7 07/13/2022 03:54 AM    Creatinine 0.53 (L) 07/13/2022 03:54 AM    Calcium 8.0 (L) 07/13/2022 03:54 AM    Magnesium 1.5 (L) 07/07/2022 05:28 AM    Phosphorus 3.5 07/13/2022 03:54 AM      Lab Results   Component Value Date/Time    Alk. phosphatase 90 07/05/2022 06:17 AM    Protein, total 4.7 (L) 07/05/2022 06:17 AM    Albumin 1.7 (L) 07/13/2022 03:54 AM    Globulin 2.8 07/05/2022 06:17 AM     Lab Results   Component Value Date/Time    INR 1.0 06/20/2022 11:11 AM    Prothrombin time 10.6 06/20/2022 11:11 AM      No results found for: IRON, FE, TIBC, IBCT, PSAT, FERR   No results found for: PH, PCO2, PO2  No components found for: [place]   Lab Results   Component Value Date/Time    CK 37 (L) 04/11/2022 03:56 AM                Total time: 35 min  Counseling / coordination time, spent as noted above: 30 min  > 50% counseling / coordination?: y    Prolonged service was provided for  []30 min   []75 min in face to face time in the presence of the patient, spent as noted above. Time Start:   Time End:   Note: this can only be billed with 84481 (initial) or 37368 (follow up). If multiple start / stop times, list each separately.

## 2022-07-13 NOTE — HOSPICE
Hospice Liaison Note:    Chart reviewed for updates in plan of care. Plan: Meet with patient and his wife this morning to continue with home with Hospice plan. Please call Hospice team to offer support for patient, family or staff. Thank you for your coordination with the hospice plan of care. 12:00: Bedside meeting with patient and his wife, Gee Lewis. Reviewed plan of care to go home and enroll with Hospice services. Reviewed DME needs. Plan to have DME delivered to home this evening. PLAN: Admit patient into Hospice Routine level of care when patient arrives home on 7/14/2022.       Arvin Maloney RN, [place] [address] Nurse Liaison  [phone] [place]  [phone] Office

## 2022-07-13 NOTE — PROGRESS NOTES
Noted plans for hospice   Signing off   Thank you for allowing us to participate in this patient's care.         [facility]  Office :[phone]  Fax: [phone]

## 2022-07-13 NOTE — PROGRESS NOTES
I prayed with See Palacios and celebrated with him the Anointing of the Sick. I also gave him Communion.   Father Eric Drake

## 2022-07-13 NOTE — HOSPICE
met with pt and pt's wife, Alpesh Young, at [facility] at bedside. SW provided general hospice information. Pt was in the [employer] throughout his career. Pt was working part-time for the [employer] until March 2022. Pt and Alpesh Young have been together 54 years. Pt and Alpesh Young have three sons, one in [place], one in [place], and one in [place] [facility]. Pt's son is currently coming from [place] to stay with his parents. Pt is processing recent loss of ability to complete ADLs and IADLs independently. Alpesh Young reported that she would be calling [facility] for agency direct personal care services in the home. Hospice RN, Santy Christian provided hospice education to pt and family as well. F/u on 7/13 to discuss moving forward with hospice services.

## 2022-07-13 NOTE — PROGRESS NOTES
[facility]  Hospitalist Group                                                                                          Hospitalist Progress Note  Myesha Henderson MD  Answering service: [phone] from in house phone        Date of Service:  2022  NAME:  Logan Fuentes  :  10/13/1933  MRN:  [id number]      Admission Summary:     Patient presents with abdominal pain. Known history of metastatic prostate cancer with multiple visceral metastasis. Patient is followed by oncology and plan for outpatient immunotherapy. Palliative care previously evaluated the patient and patient and family not ready for hospice status at this time. Interval history / Subjective:     Patient denies any significant abdominal pain. Patient is generally weak.      Assessment & Plan:     Metastatic prostate cancer  -Multiple metastasis to vertebrae as well as liver  -Oncology evaluated the patient during this admission, initial plan for outpatient immunotherapy with [place]   however patient is not able to be discharged to home  -Patient was subsequently evaluated by palliative care and patient to be discharged to home with hospice    Back pain  -MRI of the thoracic and lumbar spine was done by recommendation of patient's radiation oncologist  -Findings reveal multiple metastasis to spine, no acute spinal cord compression  -Outpatient follow-up with radiation oncology    Urinary tract infection associated with chronic Serna catheter use  -Continue Rocephin, follow urine culture  -Serna catheter was exchanged by urology services   -Continue to maintain Serna catheter, patient needs outpatient follow-up    Hyponatremia  -Sodium level still at 124, 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 Hospital Problems  Date Reviewed: 7/5/2022          Codes Class Noted POA    * (Principal) Intractable abdominal pain ICD-10-CM: R10.9  ICD-9-CM: 789.00  7/5/2022 Yes                Review of Systems:   A comprehensive review of systems was negative except for that written in the HPI. Vital Signs:    Last 24hrs VS reviewed since prior progress note. Most recent are:  Visit Vitals  BP 97/62   Pulse 87   Temp 97.7 °F (36.5 °C)   Resp 18   Ht 6' 3\" (1.905 m)   Wt 79.2 kg (174 lb 9.6 oz)   SpO2 97%   BMI 21.82 kg/m²         Intake/Output Summary (Last 24 hours) at 7/13/2022 1451  Last data filed at 7/13/2022 9188  Gross per 24 hour   Intake --   Output 1450 ml   Net -1450 ml        Physical Examination:     I had a face to face encounter with this patient and independently examined them on 7/13/2022 as outlined below:          Constitutional:  No acute distress, cooperative, pleasant    ENT:  Oral mucosa moist, oropharynx benign. Resp:  CTA bilaterally. No wheezing/rhonchi/rales. No accessory muscle use. CV:  Regular rhythm, normal rate, no murmurs, gallops, rubs    GI:  Soft, non distended, non tender. normoactive bowel sounds, no hepatosplenomegaly     Musculoskeletal:  No edema, warm, 2+ pulses throughout    Neurologic:  Moves all extremities. AAOx3, CN II-XII reviewed            Data Review:    Review and/or order of clinical lab test      Labs:     No results for input(s): WBC, HGB, HCT, PLT, HGBEXT, HCTEXT, PLTEXT, HGBEXT, HCTEXT, PLTEXT in the last 72 hours.   Recent Labs     07/13/22  0354 07/12/22  1140 07/12/22  0416   * 123* 124*   K 4.2 3.8 3.8   CL 96* 93* 92*   CO2 24 23 23   BUN 7 7 8   CREA 0.53* 0.58* 0.51*   GLU 65 93 83   CA 8.0* 8.0* 8.0* PHOS 3.5 3.0  --      Recent Labs     07/13/22  0354 07/12/22  1140   ALB 1.7* 1.6*     No results for input(s): INR, PTP, APTT, INREXT, INREXT in the last 72 hours. No results for input(s): FE, TIBC, PSAT, FERR in the last 72 hours. No results found for: FOL, RBCF   No results for input(s): PH, PCO2, PO2 in the last 72 hours. No results for input(s): CPK, CKNDX, TROIQ in the last 72 hours.     No lab exists for component: CPKMB  Lab Results   Component Value Date/Time    Cholesterol, total 124 05/31/2019 07:07 AM    HDL Cholesterol 39 05/31/2019 07:07 AM    LDL, calculated 62 05/31/2019 07:07 AM    Triglyceride 115 05/31/2019 07:07 AM    CHOL/HDL Ratio 3.2 05/31/2019 07:07 AM     Lab Results   Component Value Date/Time    Glucose (POC) 75 07/11/2022 11:05 AM    Glucose (POC) 55 (L) 07/11/2022 06:58 AM    Glucose (POC) 92 10/07/2018 02:51 PM     Lab Results   Component Value Date/Time    Color PINK 07/06/2022 11:30 AM    Appearance TURBID (A) 07/06/2022 11:30 AM    Specific gravity 1.024 07/06/2022 11:30 AM    pH (UA) 6.0 07/06/2022 11:30 AM    Protein 300 (A) 07/06/2022 11:30 AM    Glucose Negative 07/06/2022 11:30 AM    Ketone 40 (A) 07/06/2022 11:30 AM    Bilirubin Negative 07/04/2022 10:03 PM    Urobilinogen 0.2 07/06/2022 11:30 AM    Nitrites Negative 07/06/2022 11:30 AM    Leukocyte Esterase LARGE (A) 07/06/2022 11:30 AM    Epithelial cells FEW 07/06/2022 11:30 AM    Bacteria Negative 07/06/2022 11:30 AM    WBC  07/06/2022 11:30 AM    RBC >100 (H) 07/06/2022 11:30 AM         Medications Reviewed:     Current Facility-Administered Medications   Medication Dose Route Frequency    senna-docusate (PERICOLACE) 8.6-50 mg per tablet 2 Tablet  2 Tablet Oral QHS    oxyCODONE IR (ROXICODONE) tablet 10 mg  10 mg Oral Q4H PRN    oxyCODONE IR (ROXICODONE) tablet 5 mg  5 mg Oral TID    acetaminophen (TYLENOL) tablet 650 mg  650 mg Oral TID    amiodarone (CORDARONE) tablet 200 mg  200 mg Oral DAILY    apixaban (ELIQUIS) tablet 5 mg  5 mg Oral BID    sodium chloride (NS) flush 5-40 mL  5-40 mL IntraVENous Q8H    sodium chloride (NS) flush 5-40 mL  5-40 mL IntraVENous PRN    acetaminophen (TYLENOL) tablet 650 mg  650 mg Oral Q6H PRN    Or    acetaminophen (TYLENOL) suppository 650 mg  650 mg Rectal Q6H PRN    polyethylene glycol (MIRALAX) packet 17 g  17 g Oral DAILY PRN    ondansetron (ZOFRAN ODT) tablet 4 mg  4 mg Oral Q8H PRN    Or    ondansetron (ZOFRAN) injection 4 mg  4 mg IntraVENous Q6H PRN     ______________________________________________________________________  EXPECTED LENGTH OF STAY: 3d 7h  ACTUAL LENGTH OF STAY:          8                 Peri Hung MD

## 2022-07-14 NOTE — PROGRESS NOTES
Transition of Care: home hospice; [facility] accepted; patient will discharge home on Thursday 7/14/22        Transport Plan: [facility] is scheduled for 0900 on Thursday 7/14     RUR: 19%     DX: intractable abdominal pain     Main contact is wife- [place]- [phone]: this CM met with patient at bedside to update on discharge plan home and discharge time today; he verbalized understanding (this Cm had updated patients wife yesterday 7/13 at bedside on discharge plan and time)    Medicare pt has received, reviewed,2nd IM letter informing them of their right to appeal the discharge.   Signed copy has been placed on pt bedside chart.          NOTE: previous discharge plan  patients wanted to return home with home health PT/OT/SN; new wheelchair, hospital bed, private duty caregivers ([facility]-patients wife arranged)     [facility] has accepted back for home health-informed them on 7/13 that patient will discharge home with hospice; they verbalized understanding   [facility] had accepted for wheelchair and [facility] on Monday 7/11/22-called Alfredo Hernandez back on 7/12 about WC return (patient will get [facility] with hospice services) ; she stated [facility] will come by the hospital on 7/13 to ; WC picked up by [facility] on 7/13/22           CM following  Tatiana Ball RN, CRM            Revision History                                                                                       Revision History                                                                                       Revision History                                                                          Revision History

## 2022-07-14 NOTE — PROGRESS NOTES
Problem: Falls - Risk of  Goal: *Absence of Falls  Description: Document Wilfredo Petersonjazlyn Fall Risk and appropriate interventions in the flowsheet. Outcome: Resolved/Met  Note: Fall Risk Interventions:  Mobility Interventions: PT Consult for mobility concerns         Medication Interventions: Patient to call before getting OOB,Teach patient to arise slowly    Elimination Interventions: Stay With Me (per policy),Bed/chair exit alarm,Call light in reach    History of Falls Interventions: Bed/chair exit alarm,Room close to nurse's station,Door open when patient unattended         Problem: Patient Education: Go to Patient Education Activity  Goal: Patient/Family Education  Outcome: Resolved/Met     Problem: Pressure Injury - Risk of  Goal: *Prevention of pressure injury  Description: Document Wally Scale and appropriate interventions in the flowsheet.   Outcome: Resolved/Met  Note: Pressure Injury Interventions:  Sensory Interventions: Assess changes in LOC    Moisture Interventions: Absorbent underpads    Activity Interventions: PT/OT evaluation    Mobility Interventions: PT/OT evaluation    Nutrition Interventions: Offer support with meals,snacks and hydration    Friction and Shear Interventions: Apply protective barrier, creams and emollients                Problem: Patient Education: Go to Patient Education Activity  Goal: Patient/Family Education  Outcome: Resolved/Met     Problem: Pain  Goal: *Control of Pain  Outcome: Resolved/Met  Goal: *PALLIATIVE CARE:  Alleviation of Pain  Outcome: Resolved/Met     Problem: Patient Education: Go to Patient Education Activity  Goal: Patient/Family Education  Outcome: Resolved/Met     Problem: Discharge Planning  Goal: *Discharge to safe environment  Outcome: Resolved/Met     Problem: Patient Education: Go to Patient Education Activity  Goal: Patient/Family Education  Outcome: Resolved/Met     Problem: Patient Education: Go to Patient Education Activity  Goal: Patient/Family Education  Outcome: Resolved/Met     Problem: Patient Education: Go to Patient Education Activity  Goal: Patient/Family Education  Outcome: Resolved/Met

## 2022-07-14 NOTE — HOSPICE
Mr. Vonda Curtis was initially quiet with a flat affect he admitted to being overwhelmed with the recent rapid disease progression with associated physical decline. He stated that a month / six weeks ago he was fully ambulatory and self-sufficient. Clinical assessment noted for scattered ecchymosis with several IV puncture sites to BUE's. Cleansed with normal saline and dressings applied as necessary. Intact dressings also noted to BUE posterior areas that were confirmed were skin tears, dressings left in-tact. Sacral area and bilateral hips were slightly red, instructions given to pdcg, the patient and Mrs. Riley that frequent position changes where necessary to prevent breakdown of skin integrity, Mr. Vonda Curtis was able to assist and turned well with x2 assist. Venous stasis of LLE noted and per patient was weaker than RLE. Chronic Serna catheter was in situ, last exchanged by urology 6/2022, a course of antibiotics had been completed prior to discharge for a UTI. Last BM not recorded, good bowel sounds were noted, and instructions for Senna S to be administered this evening and in the morning were given. The SRK and Oxycodone 5mg IR were available, and instructions were given for administration, Mrs. Vonda Curtis verbalized her understanding. Vital signs were wnl, and lungs were clear to auscultation. Personal care was attended to by this RN and the pdcg. After a short nap Mr. Riley looked more relaxed and confident and he engaged in a conversation with this RN about his career after the [employer], spanning combat duties as a  in the [employer] [employer] and [address] [place] [address] after which his career expanded into the [employer].

## 2022-07-14 NOTE — HOSPICE
Hospice Liaison Note:    Chart reviewed for updates in plan of care. Bedside patient. Patient appears stable to discharge home with Hospice admission scheduled today at 11:00. Symptom Kit medications filled at [facility] pharmacy. Medications handed to patient and packed with his belongings. Pt wife is not coming to [facility] prior to discharge home. Included with medications: Morphine 30ml, Lorazepam #5 tabs, Oxycodone 5mg #35 tabs, Senna-S. Phone call to patient's wife, Valentine Campbell, who confirms DME arrived last evening and she is at home waiting for patient to arrive. Updated Hospice admission nurse, Teresita Lagunas RN to patient condition and medications being sent home with patient. Plan: Hospice home admission today at 11:00    Please call Hospice team to offer support for patient, family or staff. Thank you for your coordination with the hospice plan of care.       Jayne Natarajan RN, [facility] Nurse [facility]iaison  [phone] Mobile  [phone] Office

## 2022-07-14 NOTE — DISCHARGE INSTRUCTIONS
Discharge Instructions       PATIENT ID: Be Mancuso  MRN: [id number]   YOB: 1933    DATE OF ADMISSION: 7/4/2022  8:58 PM    DATE OF DISCHARGE: 7/14/2022    PRIMARY CARE PROVIDER: Devin Andre MD     ATTENDING PHYSICIAN: Ira Villalta MD  DISCHARGING PROVIDER: Modesto Rodriguez MD    To contact this individual call [phone] and ask the  to page. If unavailable ask to be transferred the Adult Hospitalist Department. DISCHARGE DIAGNOSES and CARE RECOMMENDATIONS:   Metastatic prostate cancer. Cancer related pain   Your cancer is advanced and treatment options are limited. After discussing with the care team you have chosen to focus on comfort and quality of life and decided to return home with hospice services. The hospice team will be in charge of your care specially related to pain and comfort. I have sent an electronic prescription for the pain medicine to cover you for  few days in case there is a delay before the hospice team delivers those to you.         DIET: Regular Diet      ACTIVITY: Activity as tolerated        PENDING TEST RESULTS:   At the time of discharge the following test results are still pending: none    FOLLOW UP APPOINTMENTS:   Follow-up Information     Follow up With Specialties Details Why [address] Call for home hospice  [address]    Devin Andre MD Internal Medicine Physician   [address] BY THE FOLLOWING CONSULTATIONS:   IP CONSULT TO PALLIATIVE CARE - PROVIDER  IP CONSULT TO ONCOLOGY  IP CONSULT TO UROLOGY  IP CONSULT TO RADIATION ONCOLOGY  IP CONSULT TO NEPHROLOGY  IP CONSULT TO ONCOLOGY    YOU HAD THE FOLLOWING PROCEDURES/SURGERIES  :   * No surgery found *              DISCHARGE MEDICATIONS:   See Medication Reconciliation Form    · It is important that you take the medication exactly as they are prescribed. · Keep your medication in the bottles provided by the pharmacist and keep a list of the medication names, dosages, and times to be taken in your wallet. · Do not take other medications without consulting your doctor. NOTIFY YOUR PHYSICIAN FOR ANY OF THE FOLLOWING:   Fever over 101 degrees for 24 hours. Chest pain, shortness of breath, fever, chills, nausea, vomiting, diarrhea, change in mentation, falling, weakness, bleeding. Severe pain or pain not relieved by medications. Or, any other signs or symptoms that you may have questions about. Services you need on discharge: Hospice services. Signed:    Nataliya Dunham MD  7/14/2022  7:48 AM

## 2022-07-15 NOTE — PROGRESS NOTES
Physician Progress Note      Vineet Dennis  CSN #:                  [id number]  :                       10/13/1933  ADMIT DATE:       2022 8:58 PM  [address] [place] DATE:        2022 9:21 AM  RESPONDING  PROVIDER #:        Randy Boswell MD          QUERY TEXT:    Dear Dr Luigi Ayers,  Please respond to this query on this discharged patient. Pt admitted with Abdominal Pain. Pt noted to have Metastasized Malignancy and UTI related to Chronic Tomlinson. If possible, please document in progress notes and discharge summary if you are evaluating and /or treating any of the following: The medical record reflects the following:    Risk Factors: Prostate CA, Multiple metastasis to vertebrae as well as liver, Chronic Tomlinson Catheter    Clinical Indicators:    H&P 22 Tanesha  Intractable abdominal pain. We will admit the patient for further evaluation and treatment. The treatment will consist of pain control, fluid therapy, and any other supportive therapy. This is most likely due to the patient's metastatic disease, specifically the hepatic metastasis. Hepatic metastasis. This is also coming from the patient's prostate cancer and is the most likely cause of the patient's intractable abdominal pain. PN 7 Yuriy Roya  Abdominal pain  - Suspect that pain is related to liver mets  Chronic tomlinson with possible catheter associated UTI - pt with no symptoms.  - Catheter was not changed in the ER, so this is a contaminated sample. - Continue ceftriaxone, started on admission    Treatment: Palliative Care consult for symptom management with Schedule oxycodone ir 5mg three times daily (includes bedtime dose) along with tylenol 650mg three times daily. Oxycodone ir 10mg every 4 as needed for rescue. 2mg Morphine IV QHS.     Thank you,  Howard Alexander, DAVIDN, RN, CCRN  [phone]  Options provided:  -- Abdominal Pain due to Hepatic Metastasis  -- Abdominal Pain due to Prostate CA  -- Abdominal Pain due to CAUTI  -- Abdominal Pain due to Bone Metastasis  -- Other - I will add my own diagnosis  -- Disagree - Not applicable / Not valid  -- Disagree - Clinically unable to determine / Unknown  -- Refer to Clinical Documentation Reviewer    PROVIDER RESPONSE TEXT:    This patient has abdominal pain due to Hepatic Metastasis.     Query created by: Kimberly Reardon on 7/15/2022 12:51 PM      Electronically signed by:  Mathew Cisse MD 7/15/2022 5:53 PM

## 2022-07-15 NOTE — DISCHARGE SUMMARY
Physician Discharge Summary     Patient ID:    Rk Cho  [id number]  [unfilled]  10/13/1933    Admit date: 7/4/2022    Discharge date and time: 7/15/2022    Hospital Diagnoses and Treatment Rendered:   Patient presents with abdominal pain. Known history of metastatic prostate cancer with multiple visceral metastasis. Patient is followed by oncology and plan for outpatient immunotherapy. Palliative care previously evaluated the patient and patient and family not ready for hospice status at this time. Metastatic prostate cancer  -Multiple metastasis to vertebrae as well as liver  -Oncology evaluated the patient during this admission, initial plan for outpatient immunotherapy with Medardo Nazario  7/12 however patient is not able to be discharged to home  -Patient was subsequently evaluated by palliative care and patient is discharged home with hospice.      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  -Continue monitor sodium level, continue normal saline       Paroxysmal atrial fibrillation  -Stable on amiodarone, on anticoagulation with Eliquis     Anemia of chronic disease  -Anemia of chronic disease from malignancy      Disposition: Patient is discharged home with hospice services in place               Chronic Diagnoses:    Problem List as of 7/14/2022 Date Reviewed: 7/5/2022          Codes Class Noted - Resolved    * (Principal) Intractable abdominal pain ICD-10-CM: R10.9  ICD-9-CM: 789.00  2022 - Present        Tumor lysis syndrome ICD-10-CM: E88.3  ICD-9-CM: 277.88  2022 - Present        Chronic atrial fibrillation, unspecified ICD-10-CM: I48.20  ICD-9-CM: 427.31  2022 - Present        Typical atrial flutter ICD-10-CM: I48.3  ICD-9-CM: 427.32  2022 - Present        Sepsis ([address] ICD-10-CM: A41.9  ICD-9-CM: 038.9, 995.91  2022 - Present        Acute retention of urine ICD-10-CM: R33.8  ICD-9-CM: 788.29  2021 - Present        Closed fracture of one rib ICD-10-CM: S22.39XA  ICD-9-CM: 807.01  2021 - Present        Vitamin D deficiency ICD-10-CM: E55.9  ICD-9-CM: 268.9  2021 - Present        Immunosuppression due to drug therapy [facility]) ICD-10-CM: S70.748, Z79.899  ICD-9-CM: V58.69  2019 - Present        Community acquired pneumonia ICD-10-CM: J18.9  ICD-9-CM: 800  2019 - Present        Prostate cancer metastatic to pelvis [facility]) ICD-10-CM: C61, C79.89  ICD-9-CM: 185, 198.89  2018 - Present        Lumbar disc disease with radiculopathy ICD-10-CM: M51.16  ICD-9-CM: 722.10, 724.4  2018 - Present        Prostate cancer metastatic to multiple sites [facility]) ICD-10-CM: C61  ICD-9-CM: 185, 199.0  2018 - Present        Heart murmur, systolic LGM-33-SJ: J92.5  ICD-9-CM: 785.2  2018 - Present    Overview Addendum 2019  9:20 AM by Lakisha Velarde MD      Transthoracic Echocardiogram     Patient: Eusebio Sequeira  MRN: [id number]  ACCT #: [de-identified]  : 13-Oct-1933  Age: 80 years  Gender: Male  Height: 75 in  Weight: 174.7 lb  BSA: 2.07 mï¾²  BP: 136 / 70 mmHg  Study date: 2018  Status: Routine  Location: Out-patient area  [facility] #: [id number]     Allergies: NO KNOWN ALLERGENS     Reading Group:  *CAV Group  Technologist:  Vikki Walton RDCS  Reading Physician:  Grecia Escalante. Lorelei Ashley MD     SUMMARY:  Left ventricle: Systolic function was normal. Ejection fraction was  estimated in the range of 60 % to 65 %.  There were no regional wall motion  abnormalities. Doppler parameters were consistent with abnormal left  ventricular relaxation (grade 1 diastolic dysfunction).     Mitral valve: There was mild to moderate annular calcification.     Tricuspid valve: There was mild regurgitation.     INDICATIONS: Heart Murmur     PROCEDURE: This was a routine study. The study included complete 2D  imaging, M-mode, complete spectral Doppler, and color Doppler. The heart  rate was 89 bpm, at the start of the study. Systolic blood pressure was  136 mmHg, at the start of the study. Diastolic blood pressure was 70 mmHg,  at the start of the study. Image quality was adequate.     LEFT VENTRICLE: Size was normal. Systolic function was normal. Ejection  fraction was estimated in the range of 60 % to 65 %. There were no  regional wall motion abnormalities. Wall thickness was normal. DOPPLER:  Doppler parameters were consistent with abnormal left ventricular  relaxation (grade 1 diastolic dysfunction).     RIGHT VENTRICLE: The size was normal. Systolic function was normal. Wall  thickness was normal.     LEFT ATRIUM: Size was normal.     RIGHT ATRIUM: Size was normal.     MITRAL VALVE: There was mild to moderate annular calcification. There was  normal leaflet separation. DOPPLER: There was trivial regurgitation.     AORTIC VALVE: The valve was trileaflet. Leaflets exhibited mild  calcification and normal cuspal separation. DOPPLER: There was no stenosis.     TRICUSPID VALVE: Normal valve structure. There was normal leaflet  separation. DOPPLER: The transtricuspid velocity was within the normal  range. There was no evidence for tricuspid stenosis. There was mild  regurgitation.  Pulmonary artery systolic pressure: 35 mmHg.     PULMONIC VALVE: Not well visualized.     AORTA: The root exhibited normal size.     PERICARDIUM: There was no pericardial effusion.     SYSTEM MEASUREMENT TABLES     2D  LAAs A2C: 19.5 cm2  LAAs A4C: 17.2 cm2  LAESV A-L A2C: 59.3 ml  LAESV A-L A4C: 53.4 ml  LAESV Index (A-L): 29.2 ml/m2  LAESV MOD A2C: 57.9 ml  LAESV MOD A4C: 46 ml  LAESV(A-L): 60.5 ml  LALs A2C: 5.4 cm  LALs A4C: 4.7 cm  %FS: 32.9 %  EDV(Teich): 84.9 ml  EF(Teich): 61.7 %  ESV(Teich): 32.5 ml  IVSd: 1.1 cm  LVIDd: 4.3 cm  LVIDs: 2.9 cm  LVPWd: 1.1 cm  SV(Teich): 52.4 ml     CW  AV Vmax: 1.4 m/s  AV maxP.4 mmHg  PV Vmax: 0.7 m/s  PV maxP mmHg  TR Vmax: 2.8 m/s  TR maxP.4 mmHg     MM  Ao Diam: 2.9 cm  LA Diam: 2.8 cm  Ao/LA: 1  LA/Ao: 1  TAPSE: 2.3 cm     PW  LVOT Vmax: 1.2 m/s  LVOT maxP.2 mmHg  E' Lat: 0.1 m/s  E' Sept: 0.1 m/s  E/E' Lat: 12.9  E/E' Sept: 10.5  MV A Gigi: 1.3 m/s  MV Dec [place]: 4.4 m/s2  MV DecT: 219.8 ms  MV E Gigi: 1 m/s  MV E/A Ratio: 0.8  MV PHT: 63.8 ms  MVA By PHT: 3.5 cm2  RV S': 0.2 m/s  E' Av.1 m/s  E/E' Av.6     Prepared and E-signed by  Faith Parsons. Za Crabtree MD  Signed [id number] 16:55:20                             Degenerative disc disease, lumbar ICD-10-CM: M51.36  ICD-9-CM: 722.52  2018 - Present        Acute back pain ICD-10-CM: M54.9  ICD-9-CM: 724.5  10/7/2018 - Present        Bone metastasis ([facility] ICD-10-CM: C79.51  ICD-9-CM: 198.5  2017 - Present        RESOLVED: Atrial flutter ([facility] ICD-10-CM: I48.92  ICD-9-CM: 427.32  2019 - 2020              Discharge Medications:   Discharge Medication List as of 2022  8:35 AM      START taking these medications    Details   polyethylene glycol (MIRALAX) 17 gram packet Take 1 Packet by mouth daily for 30 days. , Normal, Disp-30 Packet, R-0      senna-docusate (PERICOLACE) 8.6-50 mg per tablet Take 2 Tablets by mouth nightly for 30 days. , Normal, Disp-60 Tablet, R-0      oxyCODONE IR (ROXICODONE) 5 mg immediate release tablet Take 1 Tablet by mouth three (3) times daily for 5 days.  Max Daily Amount: 15 mg., Normal, Disp-15 Tablet, R-0         CONTINUE these medications which have NOT CHANGED    Details   lidocaine HCL 4 % ptmd 1 Patch by Apply Externally route every twelve (12) hours. , Normal, Disp-5 Each, R-0      amiodarone (CORDARONE) 200 mg tablet Take 1 Tablet by mouth daily. , Normal, Disp-90 Tablet, R-3      apixaban (ELIQUIS) 5 mg tablet Take 1 Tablet by mouth two (2) times a day., Normal, Disp-180 Tablet, R-1      calcium citrate-vitamin D3 (CITRACAL + D) tablet Take 1 Tab by mouth two (2) times a day., Historical Med      leuprolide acetate (LUPRON DEPOT IM) by IntraMUSCular route. Every 6 months, Historical Med      abiraterone (ZYTIGA) 250 mg tab Take 1,000 mg by mouth daily. , Historical Med      predniSONE (DELTASONE) 5 mg tablet Take 5 mg by mouth two (2) times a day., Historical Med      tamsulosin (FLOMAX) 0.4 mg capsule Take 0.4 mg by mouth every evening., Historical Med               Follow up Care: Follow-up Information     Follow up With Specialties Details Why [address] Call for home hospice  [facility] [address] [place] [address]    Bon Carvajal MD Internal Medicine Physician   [address] [address]  [phone]          1. Bon Carvajal MD in 1-2 weeks. Please call to set up an appointment shortly after discharge. Diet:  Regular Diet    Disposition:  Home with home hospice    PATIENT CONDITION AT DISCHARGE:   Functional status:    Independent     Cognition    Lucid       Catheters/lines (plus indication)    Serna x   PICC    PEG    None       Advanced Directive:   FULL    DNR x     Discharge Exam:  GENERAL:  Alert, oriented, cooperative, no apparent distress  HEENT:  Normocephalic, atraumatic, non icteric sclerae, non pallor conjuctivae, EOMs intact, PERRLA. NECK: Supple, trachea midline, no adenopathy, no thyromegally or tenderness, no carotid bruit and no JVD. LUNGS:   Vesicular breath sounds bilaterally, no added sounds. HEART:   S1 and S2 well heard,RRR,  no murmur, click, rub or gallop. ABDOMEB:   Soft, non-tender.  Normoactive bowel sounds. No masses,  No organomegaly. EXTREMETIES:  Atraumatic, acyanotic, no edema  PULSES: 2+ and symmetric all extremities. SKIN:  No rashes or lesions  NEUROLOGY: Alert and oriented to PPT, CNII-XII intact. Motor and sensory exam grossly intact. CONSULTATIONS: IP CONSULT TO ONCOLOGY  IP CONSULT TO UROLOGY  IP CONSULT TO NEPHROLOGY    Significant Diagnostic Studies:   No results for input(s): WBC, HGB, HCT, PLT, HGBEXT, HCTEXT, PLTEXT in the last 72 hours. Recent Labs     07/13/22  0354 07/12/22  1140   * 123*   K 4.2 3.8   CL 96* 93*   CO2 24 23   BUN 7 7   CREA 0.53* 0.58*   GLU 65 93   CA 8.0* 8.0*   PHOS 3.5 3.0     Recent Labs     07/13/22  0354 07/12/22  1140   ALB 1.7* 1.6*     No results for input(s): INR, PTP, APTT, INREXT in the last 72 hours. No results for input(s): FE, TIBC, PSAT, FERR in the last 72 hours. No results for input(s): PH, PCO2, PO2 in the last 72 hours. No results for input(s): CPK, CKMB in the last 72 hours. No lab exists for component: TROPONINI  No components found for: [place]      Greater than 30 minutes were spent with the patient on counseling and coordination of care      Signed:   Evangelina Jonas MD  7/15/2022  7:17 AM

## 2022-07-15 NOTE — HOSPICE
PRN visit made to meet Agustina technician and switch patient to extended length bed. Once bed was set up, PCG and writer transferred patient to new bed. Old bed removed. Patient denies pain, however, because his skin is fragile, care needs to be given when repositioning or moving.

## 2022-07-15 NOTE — HOSPICE
Follow up inocente in visit made for new admission. Patient alert and oriented. moans and grimaces when moved but denies pain. Asked spouse if she feels he is in pain. she feels he is in the habit of those responses from when he did have pain. Will need to to monitor. Patient states the pain medication he takes bid is working and rated 0/10 pain for upper abdomen/lower chest area. Patient has RUE skin tears that were cleansed, patted dry, vaseline gauze applied, wrapped with rolled gauze and secured with tape. Patient's skin is very fragile. Writer left 2 sheets of vasaline guaze, wound cleanser, and 2 5x5 optiforms. Patient in bed with feet pressing against foot of bed. Patient in need of an extension for bed. Patient is bedbound and no one available to move him out of the bed for extensions to be added. This nurse requested new bed with extension. Technician unable to come before 3:00. This nurse will return to patient's home when notified by Conor Humphrey. This nurse and pcg will move patient from old bed to new bed. HHA has been requested for 2/week.

## 2022-07-18 NOTE — HOSPICE
ADD TO 0-7 LIST     PRN visit - Greeted  by wife and caregiver. Patient resting in hospital bed. Patient is alert and oriented times three. Patient appears comfortable normal respirations. Blood pressure 78/43 heart rate 89 oxygen saturation is 97% on room air. Patient's lungs posterior lower lobes scattered crackles, respiration rate 16. Heart rate 89, distal pulses thready, necrotic spots on toes (left big toe and third toe) upper extremities pulses palpable, edema +3. Patient has a skin tear right forearm. Patient's wife asking questions regarding end of life. She stated that he had a temperature last night and she administered Tylenol. Gave patient's wife book Gone From My Sight. Urine output 50 mL in five hours. Called and updated Dr. Van Camp on patient status agreed to add to 0 to 7 list.  With pressure so low educated wife not to trying to get  up to bedside commode. Wipes ordered. Patient and wife and no additional needs at this time.

## 2022-07-18 NOTE — HOSPICE
Arrived at patient's home, patient resting in bed; patient's wife, son and PCG present for visit. Patient denied any pain, he stated that he has been sleeping okay, patient denied any shortness of breath. Wife stated that patient has been having diarrhea, patient stated that he has not been eating solids due to upsetting his stomach but he has been drinking fluids. Incontinence care completed, wound care completed. Wife stated that patient has not required pain medications or SRK medications. Discussed using lorazepam if patient was experiencing restlessness, wife verbalized understanding. Wipes and chux left in home. Additional wipes, briefs and chux ordered. Reminded wife to call hospice number with any needs or concerns.

## 2022-07-19 NOTE — HOSPICE
Arrived at patient's home; patient resting in bed; HHA just completed bath; patient denied any pain but stated that he had a lot of discomfort during bath. Discussed with wife to give patient pain medication 1 hour prior to HHA coming. Also discussed using lorazepam to help relax patient if necessary. Wife stated that patient didn't eat anything yesterday and fluid intake was not much. Discussed with wife that it was okay for patient to not eat or drink, to encourage but not force. Mediations reconciled, none needed at this time. Wife stated that she was fimilar with SRK medications and would call before using any. No supplies needed at this time. Remined wife to call hospice number with any needs or concerns.

## 2022-07-19 NOTE — HOSPICE
visit initiated to provide comfort and spiritual guidance to the pt, family and CG as they make their journey towards EOL. Introduced spiritual care services to pt and spouse; educated on hospice philosophy and the support roles of the team; offered empathic listening processing transition to hospice. Patient shared about his illness journey w/ cancer for the last 4 years and how his health has continued to decline after his struggles w/ COVID in March. Validated feelings and normalized his experience. Patient noted his primary issues are weakness in his leg, that makes him bedbound and increased fatigue. At this time, patient felt his pain was managed comfortably. Engaged in life review: patient shared about his transition from [place], [place] to living in [place], [place]. Completed spiritual history and reviewed coping resources: patient has 3 adult sons and other family who are visiting this week; additionally patient is well supported by spouse and caregivers. Patient and family are Pentec[employer]al and belong to [address] [place] [address] [place] [facility]. Patient welcomes ongoing  support. Mello et al. Assessing Spiritual Concerns in Palliative Care (Completed 7/18/22)  0--no evidence of unmet need; 0*--further assessment needed to clarify;   1--some evidence of unmet need; 2--substantial evidence of unmet need; 3--evidence of severe unmet need     Need for Meaning in the Face of Suffering: _2__ (Indicate level of spiritual distress from 0-3) - patient and family still processing decline and progression of illness since March.   __X__ difficulty coming to terms with changes in things that gave meaning to life (e.g., grief related to key relationships, illness, frailty, dependency)   ____ expresses despair or hopelessness about changes due to illness, loss, diminished quality of life, suffering or other diminishment   ____ Other (specify)     Need for integrity, legacy, generativity: __0_ (Indicate level of spiritual distress from 0-3) - extended family are making time for visits and meaning-making.  ____ questions the meaning of life and whether they made positive contributions to loved ones/others/society   ____ painful regrets about some or all of life lived   ____ distress about living an imperfect life   ____ reminiscing about life is painful or they have tasks that must be completed before death  ____ Other (specify)     Concerns about relationships: family and/or significant others: _1__ (Indicate level of spiritual distress from 0-3) - spouse struggling some w/ caregiver stress and anticipatory grief  ____ unfinished business w/ significant others (e.g. estrangement, need for forgiveness/reconciliation, unfulfilled expectations about others)   __X__ concerns about the family's ability to cope without him or her or concerns about being a burden to family/friends   ____ feels isolated/lonely   ____ Other (specify)     Concern or fear of dying or death: __0*_ (Indicate level of spiritual distress from 0-3)   ____ general concerns/fears about pain, the dying process or being unready for death (may include explicit hesitation, reluctance, or avoidance to discuss mortality, or associated issues)   ____ impatient for death   ____ concerns that illness/death will impact and/or prevent participation in important events   ____ feeling torn between letting go and fighting on   ____ uncertainty/fear about life after death (afraid of damnation; concerned about reunion with loved ones)   ____ Other (specify)     Issues related to making decisions about treatment: __0_ (Indicate level of spiritual distress from 0-3)  ____ needs assistance with value-based advance care planning   ____ confused or distressed about EOL treatment or about making choices about EOL  ____ Other (specify)      R/S Struggle or Other Concerns: __0__ (Indicate level of spiritual distress from 0-3) Strength area: patient and family are [employer] and well supported by family and local parish [facility]. Che  following.  ____ feels abandoned or punished by God   ____ concerns about God's judgment, forgiveness, and/or love   ____ concerns about connection w/ God via prayer (feels God is not answering prayer)   ____ doubts/questions or anger with God   ____ feels alienated from formerly meaningful connections with Zoroastrianism institutions or leaders   ____ need for assistance to perform important rituals, Zoroastrianism or otherwise   ____ Other spiritual concerns (specify)    Total Spiritual Distress Score: 3/18 (Low 0-5; Medium 6-11;  High 12-18)

## 2022-07-20 PROBLEM — Z51.5 HOSPICE CARE: Status: ACTIVE | Noted: 2022-01-01

## 2022-07-20 NOTE — HOSPICE
Sergio Reynolds is an 80 y.o. male history of metastatic prostate cancer, MSW conducted initial social work assessment with pts wife Daniel Cha. Lives with wife Daniel Cha, they moved in March into a new house and had Covid. The move was a local downsize and they remain well connected with their friends and the [employer]. Family has 3 sons. One lives local [facility], one lives in [place], one son lives in [facility] and is planning to come next month to stay with his parents for the summer. Pt and wife are overwhelmed with pts decline over the past 3 weeks. Wife shared she's hired caregiver through [address]. MSW provided education re hospice services and support. The patient was in the [employer], spanning combat duties as a  in the [facility] and [address] [place] [address] after which his career expanded into the [employer]. The patient wife shared how pt moved from [place], [place] to living in [facility]. Patient has DNR on file.  home TBD. Patient and family open to  visits. MSW continue to follow up with emotional support to family and assist if they need further resources. MSW provided supportive counseling and encouraged patient's family to call with any questions and/or concerns. Patient/family were appreciative of the MSW service. Discussed Hospice philosophy, general plan of care, levels of care, services and on call procedures.

## 2022-07-21 NOTE — HOSPICE
0-7 visit performed. Patient in hospital bed. Spouse, pcg [place], and son from [facility] present. alert and oriented. Patient denied pain or shortness of breath. When checking sacrum area, patient showed grimacing but declined any medication. Spouse states he hasn't taken anything medication for symptom management. Patient has difficulty swallowing and has stopped all of his medications in tab form. When assessing his sacrum area, he has developed two stage 2 wounds and surrounding excoriation with some bleeding. [facility], NP ordered Triad cream. Triad has been ordered along with 7x7 optiforms. Education on repositioning. Serna intact draining lindsey urine. Penis has small amount of blood at meatus. Small amount of loose stool in brief. Changed brief and wound dressing. Patient comfortable. Appreciative of visit. Requesting morning visits as many family members are coming in from out of town. Mornings no visitors are there.

## 2022-07-21 NOTE — HOSPICE
Hospice SN assessment visit 0-7 days  Patient received in hospital bed with [facility] elevated 90 degrees  Color pale  Appears weak and fatigued  No S/S of pain or shortness of breath noted on room air   Spouse, Kelle Hendricks, present during visit and reports giving Tylenol for some discomfort shoulder area with good effect   Patient does not take  Oxycodone often   No appetite now and is not accepting foods / fluids  Having increasing difficulty swallowing  Unable to swallow Eliquis   Patient is alert and engaging  Denies having pain or shortness of breath currently     Serna catheter patent and draining lindsey urine  Loose stools 7/18  No further stools since that time    Patient expresses no concerns / needs during visit  To continue with current hospice plan of care    Informed caregiver, Kelle Hendricks, to call hospice with concerns / needs   Understanding vebalized    No medications refills or supplies needed

## 2022-07-23 NOTE — HOSPICE
Declanwarren Nicole (10/13/1933)   Patient received resting in bed with  and wife at bedside. Gave them time to finish their visit. Reagan left. Patient on room air, appears comfortable. Patient denies SOB or pain. Patient alert and oriented x 3.  /66, HR 92.  Lungs are diminished with scattered crackles in bases. Pulses are palpable in upper and lower extremities. Abdomen is soft, non-tender, with bowel sounds in all quadrants. Urinary catheter output 80mL over 5 hours. Urine dark yellow. Skin tear on left arm scabbed and open to air. Mrs. Jordan Vuong stated she needs more of the mouth swabs. Stated she feels like he is doing good. Encouraged repositioning. He has been drinking liquids and yesterday he held his cup. Patient and wife stated no other needs or concerns at this time.

## 2022-07-24 NOTE — HOSPICE
SN hospice assessment visit 0-7 days  Patient received in hospital bed semi fowlers position  Awake and alert  Appears more fatigued and weak since yesterday's visit  Spouse reports that she is seeing him decline more each day    No intake, oral care provided by caregiver, Lea Castillo  and patient is repositioned frequently for comfort and pressure relief   No S/S of pain or shortness of breath on room air  Patient denies having any pain and has not required any pain medication     Patient states that he is comfortable currently  Receiving many family memebers and freinds  Spouse, Dinorah Stephens, at bedside and very supportive     Appreciation expressed for hospice care and support     Instructed spouse, Dinorah Stephens, to call hospie for any concerns / needs   Understanding verbalized

## 2022-07-24 NOTE — HOSPICE
Lord Parks (10/13/1933)   Patient received sitting up in bed. Caregiver at bedside. Patient is alert and oriented x 3. Patient just had a bath and it is reported he tolerated it well. Patient has increased urine output,  240mL over 5 hours, lindsey. Caregiver has encouraged fluids. Lungs sounds diminished with crackles in lower bases. Lung assessment has not changed. VS normal range. Patient is not a pale as yesterday. He did state he was tired. Patient is comfortable and denies SOB or pain. Patient needed more briefs, lotion, and chucks, able to supply from car stock. Will order remainder of supplies needed. Patient and family had no additional questions or concerns at this time.

## 2022-07-27 NOTE — HOSPICE
Arrived at patient's home; patient's wife and PCG present for visit. Patient resting in bed; patient still drinking liquids but not taking in solid foods. Patient stated that he tried to take morphine yesterday but spit it out. Patient denied any pain but stated he did have pain with movement. Patient denied any shortness of breath. Patient asked about getting up into the chair, discussed risks with patient, due to safety, risk of injury to patient and to caregivers, also discussed energy conservation with patient, patient understanding. Wife stated that patient had redness of back, skin CDI, PCG had but calazime on back. Medications reconciled, none needed at this time. Supplies ordered via [employer]. Reminded wife to call hospice number with any needs or concerns.

## 2022-08-01 NOTE — HOSPICE
Received patient in hospital bed. Remains alert and oriented x3-4. Patient is pale and appears weaker today. Patient denies pain or shortness of breath. Discussed pain management options with patient. Patient would just prefer to not take any medication unless he absolutely needs it so he can be awake, alert and spend quality time with family without sedation. No appetite, not eating anything now at all. Only sips of water and gingerale. Encouraged patient to drink from cup directly or spoon to prevent aspiration. Patient coughs after drinking too much at one time. No refills needed at this time. No supplies needed currently. Encouraged family to call hospice 24/7 with any needs or change in patient condition. Family verbalizes understanding and agrees.

## 2022-08-01 NOTE — HOSPICE
Patient sitting up in bed upon arrival for visit; patient's wife and private duty caregiver present. Patient alert; denies pain currently however patient's wife reports patient reported severe back pain last night. Patient's family attempted to give patient PRN Oxycodone; patient's family crushed the Oxycodone and patient stated he felt nauseated when taking this. Patient also reports he felt nauseated at the taste of the Morphine. Patient states that his son talked to him about a Fentanyl patch this morning, and patient's son called hospice triage this morning to request a Fentanyl patch for the patient. Patient's wife states she was unaware that their son called hospice about this. Lorraine Arthur NP notified of patient's intolerance with taking Oxycodone and Morphine and that patient's son is requesting a Fentanyl patch for the patient. Patient is not a good candidate for pain patch due to low body fat. New order received for Hydromorphone 0.5 mg every 1 hour as needed. Patient's wife aware of this new order and verbalizes understanding and agreeable. Patient is only taking liquids at this time; no solid food. Serna patent and draining. Wound care to sacrum completed by patient's family this morning. Excoriated area with zinc paste and covered with foam dressing. No drainage observed. Medication ordered: Hydromorphone via [employer] for next day delivery. Reinforced hospice 24/7 for any concerns or change in patient's condition. NEW MEDICATION INITIATION DOCUMENTATION:  Consulted AT MD to report change in patient status: YES  Obtained Order from Provider for initiation of Symptom relief medication / other medication needed:YES   Documentation completed in Telephone/Visit Note in [facility]  Reason medication is being initiated: Pain. MD / Provider name consulted re: change in status / initiation of new medication: Lorraine Arthur NP. New Symptom(s):  Pain.   New Order(s):  Hydromorphone 0.5 mg every 1 hour PRN. Name of person being taught:  Cortney Son, patient's wife. Instructions given: YES  Side Effects taught:YES  Response to teaching:  Verbalized understanding.

## 2022-08-03 NOTE — HOSPICE
Patient lying in bed upon arrival for visit; patient's wife and private duty caregiver present. Patient lethargic but answers some questions appropriately. Patient denies pain at rest, however does not tolerate turning/repositioning well. This RN asked patient multiple times during visit if he would like to take any pain medication; patient declined. Patient's wife reports patient has been sleeping more and increasingly more confused when awake. Audible rhonchi noted; PRN Levsin given to patient during visit. Serna catheter draining and patent lindsey urine. Wound care completed to excoriated open areas of sacrum. Patient's wife reports that patient's back has pressure areas and caregivers have been applying zinc paste to patient's back. Upon assessment, copious amount of zinc paste on patient's back; instructed patient's wife and caregiver to only apply a thin layer to the skin. No open areas able to be visualized on patient's back at this time. Patient's shirt changed during visit. Signs/symptoms of patient's decline and comfort measures reviewed with patient's wife and caregiver. Patient added to daily visit list.  Medications ordered: liquid Lorazepam and liquid Levsin for delivery today via [address] in [place]. Reinforced hospice 24/7 for any concerns or change in patient's condition.

## 2022-08-04 NOTE — HOSPICE
Spouse Daniel Cha at bedside, tearful but grieving appropriately. Both sons and daughter in law present in the home. Patient observed with no respirations or signs of life. Auscultated for one minute without heart beat or respiratory effort. TOD pronounced at 0854. Spouse reports family have  arrangements with [facility], the [facility] location. RNCM called to notify  home of passing and given 90 minute ETA for pickup. Post mortem care completed, tomlinson catheter removed. Medications wasted per hospice protocol with spouse Daniel Cha as witness. DME pickup requested by Sky Landry triage RN for later today. Spouse verbalizes appreciation for all the care given by hospice team. Encouraged family to reach out to Hospice for any further needs or questions or bereavement needs. Daniel Cha agrees.

## 2022-08-05 ENCOUNTER — HOME CARE VISIT (OUTPATIENT)
Dept: HOSPICE | Facility: HOSPICE | Age: 87
End: 2022-08-05
Payer: MEDICARE

## 2022-08-09 ENCOUNTER — HOSPITAL ENCOUNTER (OUTPATIENT)
Dept: INFUSION THERAPY | Age: 87
End: 2022-08-09

## 2022-08-11 NOTE — PROGRESS NOTES
Last 3 Recorded Weights in this Encounter    05/31/19 0033 05/31/19 0410 06/01/19 0758   Weight: 78.7 kg (173 lb 8 oz) 78.4 kg (172 lb 13.5 oz) 79.8 kg (175 lb 14.8 oz)    Pt with 1.4 kg weight gain over night. Pt with elevated P-BNP  (1071)   Pt educated on monitoring weight daily and reporting 2+ lb gains to Dr. Rhett Dennis voices denial regarding him having any \"heart issues\". Positive reinforcement ongoing. 11-Aug-2022

## 2022-08-23 ENCOUNTER — APPOINTMENT (OUTPATIENT)
Dept: INFUSION THERAPY | Age: 87
End: 2022-08-23

## 2022-08-30 ENCOUNTER — APPOINTMENT (OUTPATIENT)
Dept: INFUSION THERAPY | Age: 87
End: 2022-08-30

## 2022-09-13 ENCOUNTER — APPOINTMENT (OUTPATIENT)
Dept: INFUSION THERAPY | Age: 87
End: 2022-09-13

## 2022-09-20 ENCOUNTER — APPOINTMENT (OUTPATIENT)
Dept: INFUSION THERAPY | Age: 87
End: 2022-09-20

## 2022-10-04 ENCOUNTER — APPOINTMENT (OUTPATIENT)
Dept: INFUSION THERAPY | Age: 87
End: 2022-10-04

## 2022-10-11 ENCOUNTER — APPOINTMENT (OUTPATIENT)
Dept: INFUSION THERAPY | Age: 87
End: 2022-10-11

## 2022-10-25 ENCOUNTER — APPOINTMENT (OUTPATIENT)
Dept: INFUSION THERAPY | Age: 87
End: 2022-10-25

## 2022-11-01 ENCOUNTER — APPOINTMENT (OUTPATIENT)
Dept: INFUSION THERAPY | Age: 87
End: 2022-11-01

## 2023-03-30 NOTE — HOSPICE
"Chart has been dictated using voice recognition software.  It is not been reviewed carefully for any transcriptional errors due to this technology.   Subjective:   Patient ID:  Jairo Garcia is a 31 y.o. male who presents for evaluation of No chief complaint on file.      HPI:  Patient with mild hyperlipidemia and family history of coronary disease presents for evaluation.     Has occasional palpitations but no syncope.  Has lightheadedness after getting up quickly following long time sitting.   Patient denies any dyspnea at rest or on exertion, orthopnea, or PND. Patient denies any chest discomfort on exertion or at rest. Has occasional edema after sitting for a long time. Has extensive early heart disease on his mother's side of his family.    Cardiac risk factors: hyperlipidemia, positive family history    No past medical history on file.    No past surgical history on file.    Social History     Tobacco Use    Smoking status: Never     Passive exposure: Never    Smokeless tobacco: Never   Substance Use Topics    Alcohol use: Yes     Alcohol/week: 10.0 standard drinks     Types: 5 Glasses of wine, 5 Cans of beer per week    Drug use: Never       No outpatient medications prior to visit.     No facility-administered medications prior to visit.       Review of patient's allergies indicates:  No Known Allergies    ROS  - The patient's review of systems is negative for any significant constitutional, respiratory, endocrine, hematologic, musculoskeletal or neurologic symptoms.   Objective:   Physical Exam  Constitutional:       Appearance: He is well-developed.      Comments: /77 (BP Location: Left arm, Patient Position: Sitting, BP Method: Medium (Automatic))   Pulse 65   Ht 5' 9" (1.753 m)   Wt 94.1 kg (207 lb 7.3 oz)   SpO2 98%   BMI 30.64 kg/m²      Neck:      Vascular: No carotid bruit or JVD.   Cardiovascular:      Rate and Rhythm: Normal rate and regular rhythm.      Pulses: Intact distal pulses. " [facility] Help to Those in Need  [phone]    Patient Name: Cindy Santizo  YOB: 1933  Age: 80 y.o. [address] RN Note:  Hospice consult noted. Chart reviewed. Plan of care discussed with patients nurse & care manager. Myself and Pereira Null in  to meet with patient and wife. Discussed Hospice philosophy, general plan of care, levels of care, services and on call procedures. Family information packet provided & reviewed with wife. They are open to him going home with hospice on Thursday morning 7/14. Wife is setting up [employer] to start Thursday morning at [facility] set for 9:30am  Will need SRX ordered  Will need DME ordered for delivery tomorrow: hospital bed, gel overlay, OBT, high back reclining wheelchair, BSC  Consents will need to be signed tomorrow  Wife is requesting to meet with liaison in AM 10-10:30 if possible  They meeting with an  at 1pm so sign some documents. .    Thank you for the opportunity to be of service to this patient.     Eduin Llamas RN  Clinical Nurse Liaison   [address]  (M)[phone] (F) [phone]      Heart sounds: Normal heart sounds. No murmur heard.    No friction rub. No gallop.   Pulmonary:      Effort: Pulmonary effort is normal.      Breath sounds: Normal breath sounds. No wheezing or rales.   Abdominal:      General: Bowel sounds are normal. There is no abdominal bruit.      Palpations: Abdomen is soft. There is no hepatomegaly.      Tenderness: There is no abdominal tenderness.   Musculoskeletal:      Cervical back: Neck supple.   Skin:     Nails: There is no clubbing.   Neurological:      Mental Status: He is alert and oriented to person, place, and time.       Lab Results   Component Value Date    WBC 7.86 03/08/2023    HGB 15.1 03/08/2023    HCT 45.9 03/08/2023    MCV 94 03/08/2023     03/08/2023       Lab Results   Component Value Date     03/08/2023    K 4.0 03/08/2023    BUN 13 03/08/2023    CREATININE 1.3 03/08/2023    GLU 96 03/08/2023    CHOL 207 (H) 03/08/2023    HDL 56 03/08/2023    LDLCALC 109.4 03/08/2023    TRIG 208 (H) 03/08/2023    CHOLHDL 27.1 03/08/2023    HGB 15.1 03/08/2023    HCT 45.9 03/08/2023     03/08/2023       Assessment:     1. Family history of early CAD    2. Hyperlipidemia, unspecified hyperlipidemia type      The patient has a history of extensive early-onset coronary artery disease in his mother side of the family, but not on his father side of the family.  The patient currently has no significant symptoms of coronary artery disease, significant arrhythmias or heart failure.  His lipid profile from beginning of March-2023 shows mildly elevated triglyceride and LDL cholesterol levels.  The patient exercises regularly.  Appropriate diet was discussed with the patient and he was given copies of the Mediterranean and dash diets.  The patient should have his lipids rechecked in 6 months.  If the triglycerides or LDL are still increased, the patient should then have testing for HS -CRP as well as Lp(a).  Initiation of lipid lowering therapy  would then   be dependent on the results of those tests in conjunction with his lipid profile.  Unless there are intervening problems, patient should return for re-evaluation in 6 months after the results of the cholesterol test are obtained.      Plan:     Diagnoses and all orders for this visit:    Family history of early CAD    Hyperlipidemia, unspecified hyperlipidemia type          Facundo Elaine MD  Consultative Cardiology